# Patient Record
Sex: MALE | Race: WHITE | NOT HISPANIC OR LATINO | Employment: OTHER | URBAN - METROPOLITAN AREA
[De-identification: names, ages, dates, MRNs, and addresses within clinical notes are randomized per-mention and may not be internally consistent; named-entity substitution may affect disease eponyms.]

---

## 2017-01-04 ENCOUNTER — GENERIC CONVERSION - ENCOUNTER (OUTPATIENT)
Dept: OTHER | Facility: OTHER | Age: 67
End: 2017-01-04

## 2017-01-05 ENCOUNTER — ALLSCRIPTS OFFICE VISIT (OUTPATIENT)
Dept: OTHER | Facility: OTHER | Age: 67
End: 2017-01-05

## 2017-01-05 LAB
BUN SERPL-MCNC: 19 MG/DL
BUN/CREA RATIO (HISTORICAL): 21
CALCIUM SERPL-MCNC: 8.9 MG/DL
CHLORIDE SERPL-SCNC: 103 MMOL/L (ref 96–106)
CO2 SERPL-SCNC: 25 MMOL/L (ref 18–29)
CREAT SERPL-MCNC: 0.89 MG/DL
CREATININE, URINE (HISTORICAL): 17.6 MG/DL
EGFR AFRICAN AMERICAN (HISTORICAL): ABNORMAL ML/MIN/1.73
EGFR-AMERICAN CALC (HISTORICAL): ABNORMAL ML/MIN/1.73
GLUCOSE SERPL-MCNC: 166 MG/DL (ref 65–99)
HBA1C MFR BLD HPLC: 6.5 % (ref 4.8–5.6)
MICROALBUM.,U,RANDOM (HISTORICAL): <3 UG/ML
MICROALBUMIN/CREATININE RATIO (HISTORICAL): <17 MG/G CREAT (ref 0–30)
POTASSIUM SERPL-SCNC: 4.9 MMOL/L (ref 3.5–5.2)
SODIUM SERPL-SCNC: 143 MMOL/L (ref 134–144)

## 2017-04-05 ENCOUNTER — ALLSCRIPTS OFFICE VISIT (OUTPATIENT)
Dept: OTHER | Facility: OTHER | Age: 67
End: 2017-04-05

## 2017-06-29 ENCOUNTER — GENERIC CONVERSION - ENCOUNTER (OUTPATIENT)
Dept: OTHER | Facility: OTHER | Age: 67
End: 2017-06-29

## 2017-06-29 LAB
BUN SERPL-MCNC: 15 MG/DL (ref 8–27)
BUN/CREA RATIO (HISTORICAL): 19 (ref 10–24)
CALCIUM SERPL-MCNC: 8.9 MG/DL (ref 8.6–10.2)
CHLORIDE SERPL-SCNC: 99 MMOL/L (ref 96–106)
CO2 SERPL-SCNC: 24 MMOL/L (ref 18–29)
CREAT SERPL-MCNC: 0.81 MG/DL (ref 0.76–1.27)
CREATININE, URINE (HISTORICAL): 182 MG/DL
EGFR AFRICAN AMERICAN (HISTORICAL): 109 ML/MIN/1.73
EGFR-AMERICAN CALC (HISTORICAL): 94 ML/MIN/1.73
GLUCOSE SERPL-MCNC: 141 MG/DL (ref 65–99)
HBA1C MFR BLD HPLC: 7 % (ref 4.8–5.6)
MICROALBUM.,U,RANDOM (HISTORICAL): 4.2 UG/ML
MICROALBUMIN/CREATININE RATIO (HISTORICAL): 2.3 MG/G CREAT (ref 0–30)
POTASSIUM SERPL-SCNC: 4.6 MMOL/L (ref 3.5–5.2)
SODIUM SERPL-SCNC: 141 MMOL/L (ref 134–144)

## 2017-08-14 ENCOUNTER — ALLSCRIPTS OFFICE VISIT (OUTPATIENT)
Dept: OTHER | Facility: OTHER | Age: 67
End: 2017-08-14

## 2017-12-09 ENCOUNTER — TRANSCRIBE ORDERS (OUTPATIENT)
Dept: ADMINISTRATIVE | Facility: HOSPITAL | Age: 67
End: 2017-12-09

## 2017-12-09 ENCOUNTER — APPOINTMENT (OUTPATIENT)
Dept: LAB | Facility: HOSPITAL | Age: 67
End: 2017-12-09
Attending: FAMILY MEDICINE
Payer: MEDICARE

## 2017-12-09 DIAGNOSIS — E13.8 DIABETES MELLITUS OF OTHER TYPE WITH COMPLICATION, UNSPECIFIED LONG TERM INSULIN USE STATUS: ICD-10-CM

## 2017-12-09 DIAGNOSIS — E13.8 DIABETES MELLITUS OF OTHER TYPE WITH COMPLICATION, UNSPECIFIED LONG TERM INSULIN USE STATUS: Primary | ICD-10-CM

## 2017-12-09 LAB
ALBUMIN SERPL BCP-MCNC: 3.3 G/DL (ref 3.5–5)
ALP SERPL-CCNC: 117 U/L (ref 46–116)
ALT SERPL W P-5'-P-CCNC: 24 U/L (ref 12–78)
ANION GAP SERPL CALCULATED.3IONS-SCNC: 9 MMOL/L (ref 4–13)
AST SERPL W P-5'-P-CCNC: 16 U/L (ref 5–45)
BILIRUB SERPL-MCNC: 0.9 MG/DL (ref 0.2–1)
BUN SERPL-MCNC: 13 MG/DL (ref 5–25)
CALCIUM SERPL-MCNC: 8.2 MG/DL (ref 8.3–10.1)
CHLORIDE SERPL-SCNC: 106 MMOL/L (ref 100–108)
CHOLEST SERPL-MCNC: 118 MG/DL (ref 50–200)
CO2 SERPL-SCNC: 26 MMOL/L (ref 21–32)
CREAT SERPL-MCNC: 0.72 MG/DL (ref 0.6–1.3)
CREAT UR-MCNC: 251 MG/DL
ERYTHROCYTE [DISTWIDTH] IN BLOOD BY AUTOMATED COUNT: 12.6 % (ref 11.6–15.1)
EST. AVERAGE GLUCOSE BLD GHB EST-MCNC: 163 MG/DL
GFR SERPL CREATININE-BSD FRML MDRD: 98 ML/MIN/1.73SQ M
GLUCOSE P FAST SERPL-MCNC: 180 MG/DL (ref 65–99)
HBA1C MFR BLD: 7.3 % (ref 4.2–6.3)
HCT VFR BLD AUTO: 44.7 % (ref 36.5–49.3)
HDLC SERPL-MCNC: 45 MG/DL (ref 40–60)
HGB BLD-MCNC: 15 G/DL (ref 12–17)
LDLC SERPL CALC-MCNC: 60 MG/DL (ref 0–100)
MCH RBC QN AUTO: 31.6 PG (ref 26.8–34.3)
MCHC RBC AUTO-ENTMCNC: 33.6 G/DL (ref 31.4–37.4)
MCV RBC AUTO: 94 FL (ref 82–98)
MICROALBUMIN UR-MCNC: 11.5 MG/L (ref 0–20)
MICROALBUMIN/CREAT 24H UR: 5 MG/G CREATININE (ref 0–30)
PLATELET # BLD AUTO: 188 THOUSANDS/UL (ref 149–390)
PMV BLD AUTO: 11 FL (ref 8.9–12.7)
POTASSIUM SERPL-SCNC: 4.4 MMOL/L (ref 3.5–5.3)
PROT SERPL-MCNC: 6 G/DL (ref 6.4–8.2)
RBC # BLD AUTO: 4.74 MILLION/UL (ref 3.88–5.62)
SODIUM SERPL-SCNC: 141 MMOL/L (ref 136–145)
TRIGL SERPL-MCNC: 66 MG/DL
WBC # BLD AUTO: 6.28 THOUSAND/UL (ref 4.31–10.16)

## 2017-12-09 PROCEDURE — 82043 UR ALBUMIN QUANTITATIVE: CPT | Performed by: FAMILY MEDICINE

## 2017-12-09 PROCEDURE — 82570 ASSAY OF URINE CREATININE: CPT | Performed by: FAMILY MEDICINE

## 2017-12-09 PROCEDURE — 80061 LIPID PANEL: CPT

## 2017-12-09 PROCEDURE — 36415 COLL VENOUS BLD VENIPUNCTURE: CPT

## 2017-12-09 PROCEDURE — 83036 HEMOGLOBIN GLYCOSYLATED A1C: CPT

## 2017-12-09 PROCEDURE — 85027 COMPLETE CBC AUTOMATED: CPT

## 2017-12-09 PROCEDURE — 80053 COMPREHEN METABOLIC PANEL: CPT

## 2017-12-11 ENCOUNTER — GENERIC CONVERSION - ENCOUNTER (OUTPATIENT)
Dept: OTHER | Facility: OTHER | Age: 67
End: 2017-12-11

## 2017-12-12 ENCOUNTER — ALLSCRIPTS OFFICE VISIT (OUTPATIENT)
Dept: OTHER | Facility: OTHER | Age: 67
End: 2017-12-12

## 2017-12-13 ENCOUNTER — ALLSCRIPTS OFFICE VISIT (OUTPATIENT)
Dept: OTHER | Facility: OTHER | Age: 67
End: 2017-12-13

## 2017-12-13 NOTE — PROGRESS NOTES
Assessment  Assessed    1  CAD (coronary artery disease) (414 00) (I25 10)   2  History of Cath Stent Placement   3  Benign essential hypertension (401 1) (I10)   4  Dyslipidemia (272 4) (E78 5)    Plan  Benign essential hypertension, CAD (coronary artery disease)    · Follow-up visit in 1 year Evaluation and Treatment  Follow-up  Status: Hold For -Scheduling  Requested for: 17Taj5397   Ordered;Benign essential hypertension, CAD (coronary artery disease); Ordered By: Tejinder Yeung Performed:  Due: [id number]    Discussion/Summary  Discussion Summary:   Mr Mp Aguilar is a pleasant 82-year-old gentleman who presents to the office today for routine followup   his last visit he has been feeling well from a cardiac standpoint and is currently asymptomatic  His activity continues to be limited by his knee pain  blood pressure is well-controlled in the office today  He had lipids recently performed earlier this month are acceptable on current therapy  changes were made to his medication regimen  No cardiac testing is indicated given he is asymptomatic although I did offer him vascular screening which he declines  will see him back in the office in one year  History of Present Illness  HPI: Mr Mp Aguilar is a pleasant 82-year-old gentleman who presents to the office today for followup   his last visit one year ago he has been feeling well  His activity continues to be limited by bilateral knee pain  With the activity he is able to do including ascending a flight of steps, he denies any exertional chest pain or shortness of breath  He denies symptoms of heart failure Including increasing lower extremity edema, paroxysmal nocturnal dyspnea, orthopnea, weight gain or increasing abdominal girth  He denies lightheadedness, dizziness, syncope or presyncope  He denies symptoms of palpitations or claudication  Review of Systems  Cardiology Male ROS:    Cardiac: No complaints of chest pain, no palpitations, no fainiting    Skin: No complaints of nonhealing sores or skin rash  Genitourinary: No complaints of recurrent urinary tract infections, frequent urination at night, difficult urination, blood in urine, kidney stones, loss of bladder control, no kidney or prostate problems, no erectile dysfunction  Psychological: No complaints of feeling depressed, anxiety, panic attacks, or difficulty concentrating  General: No complaints of trouble sleeping, lack of energy, fatigue, appetite changes, weight changes, fever, frequent infections, or night sweats  Respiratory: No complaints of shortness of breath, cough with sputum, or wheezing  HEENT: No complaints of serious problems, hearing problems, nose problems, throat problems, or snoring  Gastrointestinal: No complaints of liver problems, nausea, vomiting, heartburn, constipation, bloody stools, diarrhea, problems swallowing, adbominal pain, or rectal bleeding  Hematologic: No complaints of bleeding disorders, anemia, blood clots, or excessive brusing  Neurological: No complaints of numbness, tingling, dizziness, weakness, seizures, headaches, syncope or fainting, AM fatigue, daytime sleepiness, no witnessed apnea episodes  Musculoskeletal: arthritis      Active Problems  Problems    1  Benign essential hypertension (401 1) (I10)   2  CAD (coronary artery disease) (414 00) (I25 10)   3  Carious teeth (521 00) (K02 9)   4  Dependent edema (782 3) (R60 9)   5  Diabetes mellitus type 2, controlled (250 00) (E11 9)   6  Dyslipidemia (272 4) (E78 5)   7  Edema (782 3) (R60 9)   8  Hypertension (401 9) (I10)   9  Lacunar Stroke (434 91)   10  Morbid or severe obesity due to excess calories (278 01) (E66 01)   11  Need for immunization against influenza (V04 81) (Z23)   12  Osteoarthritis (715 90) (M19 90)   13  Sciatica (724 3) (M54 30)    Past Medical History  Problems    1  History of Closed fracture of fifth metacarpal bone of right hand (815 00) (S62 306A)   2   History of Depression screening (V79 0) (Z13 89)   3  History of Diverticulitis (562 11) (K57 92)   4  History of Fracture of metacarpal shaft (815 03) (S62 329A)   5  History of Hemopneumothorax, left (511 89) (J94 2)   6  History of asthma (V12 69) (Z87 09)   7  History of pilonidal cyst (V13 3) (Z87 2)   8  History of Inguinal hernia (550 90) (K40 90)   9  History of Lumbar transverse process fracture (805 4) (S32 009A)   10  History of Need for prophylactic vaccination with Streptococcus pneumoniae  (Pneumococcus) and Influenza vaccines (V06 6) (Z23)   11  History of Pain of lower leg, unspecified laterality (729 5) (M79 669)   12  History of Pleural effusion (511 9) (J90)   13  History of Postoperative visit (V58 49) (Z48 89)   14  History of Rib fractures (807 00) (S22 39XA)   15  History of Screening for colorectal cancer (V76 51) (Z12 11,Z12 12)   16  History of Screening for diabetic peripheral neuropathy (V80 09) (Z13 89)   17  History of Screening for diabetic retinopathy (V80 2) (Z13 5)   18  Status post fall (V15 88) (Z91 81)   19  History of Superficial thrombophlebitis of leg, unspecified laterality  Active Problems And Past Medical History Reviewed: The active problems and past medical history were reviewed and updated today  Surgical History  Problems    1  History of Cath Stent Placement   2  History of Closed Treatment Of Fracture Of A Metacarpal Bone   3  History of Colostomy Revision   4  History of Exploratory Laparoscopy   5  History of Hernia Repair   6  History of Knee Arthroscopy With Lateral Meniscus Repair   7  History of Rectal Surgery   8  History of Surgical Lysis Of Intestinal Adhesions   9  History of Thoracentesis With Imaging Guidance   10  History of Tonsillectomy With Adenoidectomy  Surgical History Reviewed: The surgical history was reviewed and updated today  Family History  Mother    1  Family history of Coronary artery disease (414 00) (I25 10)  Father    2   Family history of Benign hypertensive heart disease (402 10) (I11 9)   3  Family history of hyperlipidemia (V18 19) (Z83 49)  Family History Reviewed: The family history was reviewed and updated today  Social History  Problems    · Always uses seat belt   · Being A Social Drinker   · Denied: History of Drug Use   · Former smoker (V15 82) (R06 316)   · Marital History - Currently    · No living will   · Retired  Social History Reviewed: The social history was reviewed and updated today  Current Meds   1  Atorvastatin Calcium 40 MG Oral Tablet; TAKE 1 TABLET BY MOUTH AT  BEDTIME; Therapy: 33WID4454 to (Evaluate:09Aug2018)  Requested for: 59RAY2466; Last Rx:88Cqy3129 Ordered   2  Furosemide 20 MG Oral Tablet; Take 1 tablet by mouth  every day; Therapy: 57OAK0488 to (Evaluate:09Aug2018)  Requested for: 77Qrs8858; Last Rx:16Cdx0405 Ordered   3  K-Tab 10 MEQ Oral Tablet Extended Release; TAKE 1 TABLET DAILY; Therapy: 56FIY8622 to (Evaluate:09Aug2018)  Requested for: 18NIF1029; Last Rx:24Afu7955 Ordered   4  MetFORMIN HCl  MG Oral Tablet Extended Release 24 Hour; Take 1 tablet twice daily; Therapy: 74MIR1852 to (Evaluate:09Aug2018)  Requested for: 18UFL1063; Last Rx:76Aau3505 Ordered   5  Metoprolol Tartrate 25 MG Oral Tablet; Take 1 tablet by mouth  twice a day; Therapy: 65AOZ0575 to (Evaluate:09Aug2018)  Requested for: 00RMQ6221; Last Rx:33Egk9147 Ordered   6  Nitroglycerin 0 4 MG Sublingual Tablet Sublingual; DISSOLVE 1 TABLET UNDER THE TONGUE AS NEEDED FOR CHEST PAIN; Therapy: 87NQR6720 to (Evaluate:22Sbb9903)  Requested for: 17HCS3933; Last Rx:93Vqj9225 Ordered   7  Ramipril 10 MG Oral Capsule; Take 1 capsule by mouth  daily; Therapy: 86FNS8372 to (Evaluate:02Rls9627)  Requested for: 37Kve9678; Last Rx:98Cze9628 Ordered  Medication List Reviewed: The medication list was reviewed and updated today  Allergies  Medication    1   No Known Drug Allergies    Vitals  Vital Signs    Recorded: 12Dec2017 02:52PM   Heart Rate 69   Systolic 819   Diastolic 68   Height 6 ft    Weight 304 lb 6 oz   BMI Calculated 41 28   BSA Calculated 2 55       Physical Exam   Constitutional  General appearance: No acute distress, well appearing and well nourished  Eyes  Conjunctiva and Sclera examination: Conjunctiva pink, sclera anicteric  Ears, Nose, Mouth, and Throat - Oropharynx: Clear, nares are clear, mucous membranes are moist   Neck  Neck and thyroid: Normal, supple, trachea midline, no thyromegaly  Pulmonary  Respiratory effort: No increased work of breathing or signs of respiratory distress  Cardiovascular  Auscultation of heart: Normal rate and rhythm, normal S1 and S2, no murmurs  Carotid pulses: Normal, 2+ bilaterally  Peripheral vascular exam: Normal pulses throughout, no tenderness, erythema or swelling  Pedal pulses: Normal, 2+ bilaterally  Examination of extremities for edema and/or varicosities: Normal    Abdomen  Abdomen: Non-tender and no distention  The abdomen was obese  Bowel sounds were normal  The abdomen was soft and nontender  no masses palpated  Liver and spleen: No hepatomegaly or splenomegaly  Musculoskeletal Gait and station: Normal gait  -- Digits and nails: Normal without clubbing or cyanosis  -- Inspection/palpation of joints, bones, and muscles: Normal, ROM normal    Skin - Skin and subcutaneous tissue: Normal without rashes or lesions  Skin is warm and well perfused, normal turgor  Neurologic - Cranial nerves: II - XII intact  Psychiatric - Orientation to person, place, and time: Normal -- Mood and affect: Normal       Results/Data  EKG/ECG- [facility] [id number] 12:00AM Joy Mas     Test Name Result Flag Reference   EKG/ECG december 12, 2017         ECG Report:  Rhythm and rate: sinus bradycardia--   normal sinus rhythm  T waves:   there are nonspecific ST-T wave changes        Future Appointments    Date/Time Provider Specialty Site   12/13/2017 12:15 PM Belen Estrada [address] [place] [address] [facility]       Signatures   Electronically signed by : Daya Montanez DO; Dec 12 2017  3:20PM EST                       (Author)

## 2018-01-13 VITALS
DIASTOLIC BLOOD PRESSURE: 72 MMHG | HEIGHT: 72 IN | SYSTOLIC BLOOD PRESSURE: 122 MMHG | BODY MASS INDEX: 40.9 KG/M2 | WEIGHT: 302 LBS

## 2018-01-13 VITALS — SYSTOLIC BLOOD PRESSURE: 118 MMHG | DIASTOLIC BLOOD PRESSURE: 64 MMHG

## 2018-01-13 NOTE — RESULT NOTES
Verified Results  (1) MICROALBUMIN CREATININE RATIO, RANDOM URINE [id number] 12:00AM [employer]     Test Name Result Flag Reference   Creatinine, Urine 94 3 mg/dL  Not Estab  Microalbumin, Urine <3 0 ug/mL  Not Estab     Microalb/Creat Ratio <3 2 mg/g creat  0 0-30 0     (1) HEMOGLOBIN A1C [id number] 12:00AM [employer]     Test Name Result Flag Reference   Hemoglobin A1c 6 5 % H 4 8-5 6   Pre-diabetes: 5 7 - 6 4           Diabetes: >6 4           Glycemic control for adults with diabetes: <7 0

## 2018-01-16 NOTE — RESULT NOTES
Verified Results  (1) BASIC METABOLIC PROFILE [id number] 12:00AM [facility]     Test Name Result Flag Reference   Glucose, Serum 146 mg/dL H 65-99   BUN 14 mg/dL  8-27   Creatinine, Serum 0 80 mg/dL  0 76-1 27   eGFR If NonAfricn Am 95 mL/min/1 73  >59   eGFR If Africn Am 110 mL/min/1 73  >59   BUN/Creatinine Ratio 18  10-22   Sodium, Serum 142 mmol/L  134-144   Potassium, Serum 5 2 mmol/L  3 5-5 2   Chloride, Serum 105 mmol/L     Carbon Dioxide, Total 25 mmol/L  18-29   Calcium, Serum 8 5 mg/dL L 8 6-10 2     (LC) Lipid Panel [id number] 12:00AM [facility]     Test Name Result Flag Reference   Cholesterol, Total 108 mg/dL  100-199   Triglycerides 78 mg/dL  0-149   HDL Cholesterol 42 mg/dL  >39   According to ATP-III Guidelines, HDL-C >59 mg/dL is considered a  negative risk factor for CHD  VLDL Cholesterol Lance 16 mg/dL  5-40   LDL Cholesterol Calc 50 mg/dL  0-99     [facility]) Please note [id number] 12:00AM [facility]     Test Name Result Flag Reference   Please note Comment     The date and/or time of collection was not indicated on the  requisition as required by state and federal law  The date of  receipt of the specimen was used as the collection date if not  supplied

## 2018-01-16 NOTE — RESULT NOTES
Verified Results  (1) BASIC METABOLIC PROFILE [id number] 11:08AM [facility]     Test Name Result Flag Reference   Glucose, Serum 141 mg/dL H 65-99   BUN 15 mg/dL  8-27   Creatinine, Serum 0 81 mg/dL  0 76-1 27   BUN/Creatinine Ratio 19  10-24   Sodium, Serum 141 mmol/L  134-144   Potassium, Serum 4 6 mmol/L  3 5-5 2   Chloride, Serum 99 mmol/L     Carbon Dioxide, Total 24 mmol/L  18-29   Calcium, Serum 8 9 mg/dL  8 6-10 2   eGFR If NonAfricn Am 94 mL/min/1 73  >59   eGFR If Africn Am 109 mL/min/1 73  >59     (1) HEMOGLOBIN A1C 28Jun2017 11:08AM [facility]     Test Name Result Flag Reference   Hemoglobin A1c 7 0 % H 4 8-5 6   Pre-diabetes: 5 7 - 6 4           Diabetes: >6 4           Glycemic control for adults with diabetes: <7 0     (1) MICROALBUMIN CREATININE RATIO, RANDOM URINE 28Jun2017 11:08AM [facility]   A courtesy copy of this report has been sent to  the patient  Test Name Result Flag Reference   Creatinine, Urine 182 0 mg/dL  Not Estab  Microalbumin, Urine 4 2 ug/mL  Not Estab     Microalb/Creat Ratio 2 3 mg/g creat  0 0-30 0

## 2018-01-18 NOTE — PROGRESS NOTES
Assessment    1  Encounter for preventive health examination (V70 0) (Z00 00)   2  Carious teeth (521 00) (K02 9)   3  Need for prophylactic vaccination with Streptococcus pneumoniae (Pneumococcus) and   Influenza vaccines (V06 6) (Z23)    Plan  Carious teeth    · [facility] [place] [facility]  Consult Only: the  expectation is that the referring provider will communicate back to the patient on  treatment options  Evaluation and Treatment: the expectation is that the referred to  provider will communicate back to the patient on treatment options  Status: Hold For  - Scheduling  Requested for: [id number]  Care Summary provided  : Yes  Diabetes mellitus type 2, controlled    · Jentadueto 2 5-850 MG Oral Tablet  Health Maintenance    · Azithromycin 250 MG Oral Tablet; TAKE 2 TABLETS ON DAY 1 THEN TAKE 1  TABLET A DAY FOR 4 DAYS   · MetFORMIN HCl  MG Oral Tablet Extended Release 24 Hour; Take 1 tablet  twice daily  Need for prophylactic vaccination with Streptococcus pneumoniae (Pneumococcus) and  Influenza vaccines    · Prevnar 13 Intramuscular Suspension (Prevnar 13 Intramuscular Suspension); INJECT 0 5  ML Intramuscular  Screening for colorectal cancer    · COLONOSCOPY; Status:Temporary Deferral - Pt requests deferral,Pt refuses;    1/5/2018    Discussion/Summary  Impression: health maintenance visit  Currently, he eats a poor diet  Prostate cancer screening: the risks and benefits of prostate cancer screening were discussed and the patient declines PSA testing  Testicular cancer screening: the risks and benefits of testicular cancer screening were discussed and monthly self testicular exam was advised  Colorectal cancer screening: the risks and benefits of colorectal cancer screening were discussed and colorectal cancer screening is current  The risks and benefits of immunizations were discussed, immunizations are needed and PCV 13   Advice and education were given regarding nutrition and dental care  Patient discussion: discussed with the patient  Patient needs dental extractions due to multiple carious teeth  History of Present Illness  HM, Adult Male: The patient is being seen for a health maintenance evaluation  The last health maintenance visit was 16 month(s) ago  Social History: Household members include spouse  He is   Work status: not currently employed  The patient has never smoked cigarettes  He reports rare alcohol use  He has never used illicit drugs  General Health: The patient's health since the last visit is described as good  He does not have regular dental visits  He complains of vision problems  Vision care includes wearing glasses and an eye examination more than a year ago  He denies hearing loss  Lifestyle:  He does not have a healthy diet  He has weight concerns  He does not exercise regularly  He does not use tobacco  He denies alcohol use  He denies drug use  Reproductive health:  the patient is sexually active  birth control is not being practiced  He complains of erectile dysfunction  Screening: cancer screening reviewed and current  Prostate cancer screening includes prostate-specific antigen testing last year  Testicular cancer screening includes monthly self testicular examinations  Colorectal cancer screening includes a colonoscopy within the past ten years  metabolic screening reviewed and current  Metabolic screening includes lipid profile performed within the past five years, glucose screening performed last year and thyroid function test performed last year  risk screening reviewed and current  Cardiovascular risk factors: hypertension, diabetes, high LDL cholesterol and obesity, but no low HDL cholesterol, no stress, no tobacco use, no illicit drug use, no sedentary lifestyle and no family history of cardiovascular disease     General health risks: previous colon polyps, but no elevated prostate-specific antigen, no undescended testis, no family history of prostate cancer, no inflammatory bowel disease, no osteoporosis risk factors, no asbestos exposure, no radiation exposure and no occupational exposure to  Safety elements used: seat belt, safe driving habits, sunscreen, smoke detector, carbon monoxide detector, hot water temperature less than 120 degrees F, bathroom grab bars, fall prevention measures, gun trigger locks, gun safe, CPR training for the patient and CPR training for household members, but no motorcycle helmet  Review of Systems    Constitutional: No fever or chills, feels well, no tiredness, no recent weight gain or weight loss  Eyes: No complaints of eye pain, no red eyes, no discharge from eyes, no itchy eyes  ENT: carious teeth  Cardiovascular: No complaints of slow heart rate, no fast heart rate, no chest pain, no palpitations, no leg claudication, no lower extremity  Respiratory: No complaints of shortness of breath, no wheezing, no cough, no SOB on exertion, no orthopnea or PND  Gastrointestinal: No complaints of abdominal pain, no constipation, no nausea or vomiting, no diarrhea or bloody stools  Genitourinary: No complaints of dysuria, no incontinence, no hesitancy, no nocturia, no genital lesion, no testicular pain  Musculoskeletal: arthralgias  Integumentary: No complaints of skin rash or skin lesions, no itching, no skin wound, no dry skin  Neurological: No compliants of headache, no confusion, no convulsions, no numbness or tingling, no dizziness or fainting, no limb weakness, no difficulty walking  Psychiatric: Is not suicidal, no sleep disturbances, no anxiety or depression, no change in personality, no emotional problems  Endocrine: No complaints of proptosis, no hot flashes, no muscle weakness, no erectile dysfunction, no deepening of the voice, no feelings of weakness  ROS reviewed  Active Problems    1  Arthritis (952 90) (M19 90)   2   Asthma (493 90) (J45 909)   3  Benign essential hypertension (401 1) (I10)   4  CAD (coronary artery disease) (414 00) (I25 10)   5  Dependent edema (782 3) (R60 9)   6  Depression screening (V79 0) (Z13 89)   7  Diabetes mellitus type 2, controlled (250 00) (E11 9)   8  Dyslipidemia (272 4) (E78 5)   9  Edema (782 3) (R60 9)   10  Hypertension (401 9) (I10)   11  Lacunar Stroke (434 91)   12  Morbid or severe obesity due to excess calories (278 01) (E66 01)   13  Need for influenza vaccination (V04 81) (Z23)   14  Osteoarthritis (715 90) (M19 90)   15  Sciatica (724 3) (M54 30)   16   Screening for colorectal cancer (V76 51) (Z12 11,Z12 12)    Past Medical History    · History of Closed fracture of fifth metacarpal bone of right hand (815 00) (S62 306A)   · History of Diverticulitis (562 11) (K57 92)   · History of Fracture of metacarpal shaft (815 03) (S62 329A)   · History of Hemopneumothorax, left (511 89) (J94 2)   · History of pilonidal cyst (V13 3) (Z87 2)   · History of Inguinal hernia (550 90) (K40 90)   · History of Lumbar transverse process fracture (805 4) (S32 008A)   · History of Pain of lower leg, unspecified laterality (729 5) (M79 669)   · History of Pleural effusion (511 9) (J90)   · History of Postoperative visit (V58 49) (Z48 89)   · History of Rib fractures (807 00) (S22 39XA)   · Status post fall (V15 88) (Z91 81)   · History of Superficial thrombophlebitis of leg, unspecified laterality    Surgical History    · History of Cath Stent Placement   · History of Closed Treatment Of Fracture Of A Metacarpal Bone   · History of Colostomy Revision   · History of Exploratory Laparoscopy   · History of Hernia Repair   · History of Knee Arthroscopy With Lateral Meniscus Repair   · History of Rectal Surgery   · History of Surgical Lysis Of Intestinal Adhesions   · History of Thoracentesis With Imaging Guidance   · History of Tonsillectomy With Adenoidectomy    Family History  Mother    · Family history of Coronary artery disease (414 00) (I25 10)  Father    · Family history of Benign hypertensive heart disease (402 10) (I11 9)   · Family history of hyperlipidemia (V18 19) (Z83 49)    Social History    · Always uses seat belt   · Being A Social Drinker   · Denied: History of Drug Use   · Former smoker (V15 82) (Z63 224)   · Marital History - Currently    · No living will   · Retired    Current Meds   1  Atorvastatin Calcium 40 MG Oral Tablet; TAKE 1 TABLET AT BEDTIME; Last   Rx:[id number] Ordered   2  Furosemide 20 MG Oral Tablet; Take 1 tablet by mouth  every day; Therapy: 29SJK3931 to (Evaluate:18Feb2017)  Requested for: 20Nov2016; Last   Rx:20Nov2016 Ordered   3  Jentadueto 2 5-850 MG Oral Tablet; Take 1 tablet daily; Therapy: 31SFI6995 to (Evaluate:03Jan2017)  Requested for: 91XXE0758; Last   Rx:72Lro5625 Ordered   4  K-Tab 10 MEQ Oral Tablet Extended Release; TAKE 1 TABLET DAILY; Therapy: 81GGD3273 to (Evaluate:03Jan2017)  Requested for: 52HJX6688; Last   Rx:10Mru5471 Ordered   5  Metoprolol Tartrate 25 MG Oral Tablet; Take 1 tablet twice daily; Therapy: 39LFS2284 to (Evaluate:03Jan2017)  Requested for: 29TVC6429; Last   Rx:62Imr3206 Ordered   6  Nitroglycerin 0 4 MG Sublingual Tablet Sublingual; DISSOLVE 1 TABLET UNDER THE   TONGUE AS NEEDED FOR CHEST PAIN;   Therapy: 32JPK4647 to (Evaluate:04Jul2017)  Requested for: 35VBN6474; Last   Rx:85Rav3873 Ordered   7  Ramipril 10 MG Oral Capsule; TAKE 1 CAPSULE DAILY; Therapy: 33FNM5773 to (Evaluate:26Mar2017)  Requested for: 70ECN8857; Last   Rx:29Xfh1423 Ordered    Allergies    1  No Known Drug Allergies    Vitals   Recorded: 42JMG6867 56:37WO   Systolic 152   Diastolic 78   Height 6 ft    Weight 304 lb 9 6 oz   BMI Calculated 41 31   BSA Calculated 2 55     Physical Exam    Constitutional   General appearance: Abnormal   obese     Ears, Nose, Mouth, and Throat   External inspection of ears and nose: Normal     Otoscopic examination: Tympanic membrance translucent with normal light reflex  Canals patent without erythema  Oropharynx: Normal with no erythema, edema, exudate or lesions  Pulmonary   Respiratory effort: No increased work of breathing or signs of respiratory distress  Auscultation of lungs: Clear to auscultation  Cardiovascular   Palpation of heart: Normal PMI, no thrills  Auscultation of heart: Normal rate and rhythm, normal S1 and S2, without murmurs  Examination of extremities for edema and/or varicosities: Normal     Abdomen   Abdomen: Abnormal   obese  Liver and spleen: No hepatomegaly or splenomegaly  Lymphatic   Palpation of lymph nodes in neck: No lymphadenopathy  Musculoskeletal   Gait and station: Normal     Digits and nails: Normal without clubbing or cyanosis  Inspection/palpation of joints, bones, and muscles: Normal     Skin   Skin and subcutaneous tissue: Normal without rashes or lesions  Neurologic   Cranial nerves: Cranial nerves 2-12 intact  Reflexes: 2+ and symmetric         Signatures   Electronically signed by : Jono Clark DO; Jan 5 2017  9:09AM EST                       (Author)

## 2018-01-22 VITALS
WEIGHT: 304.6 LBS | SYSTOLIC BLOOD PRESSURE: 132 MMHG | HEIGHT: 72 IN | BODY MASS INDEX: 41.26 KG/M2 | DIASTOLIC BLOOD PRESSURE: 78 MMHG

## 2018-01-22 VITALS
WEIGHT: 304.38 LBS | HEART RATE: 69 BPM | BODY MASS INDEX: 41.23 KG/M2 | SYSTOLIC BLOOD PRESSURE: 128 MMHG | HEIGHT: 72 IN | DIASTOLIC BLOOD PRESSURE: 68 MMHG

## 2018-01-23 VITALS
BODY MASS INDEX: 40.63 KG/M2 | SYSTOLIC BLOOD PRESSURE: 144 MMHG | DIASTOLIC BLOOD PRESSURE: 72 MMHG | HEIGHT: 72 IN | WEIGHT: 300 LBS

## 2018-01-23 NOTE — RESULT NOTES
Verified Results  (1) MICROALBUMIN CREATININE RATIO, RANDOM URINE [id number] 09:40AM [facility]     Test Name Result Flag Reference   MICROALBUMIN/ CREAT R 5 mg/g creatinine  0-30   MICROALBUMIN,URINE 11 5 mg/L  0 0-20 0   CREATININE URINE 251 0 mg/dL       (1) HEMOGLOBIN A1C [id number] 09:40AM [facility]     Test Name Result Flag Reference   HEMOGLOBIN A1C 7 3 % H 4 2-6 3   EST  AVG   GLUCOSE 163 mg/dl

## 2018-01-23 NOTE — RESULT NOTES
Verified Results  (1) CBC/ PLT (NO DIFF) [id number] 09:40AM Almeta Handler     Test Name Result Flag Reference   HEMATOCRIT 44 7 %  36 5-49 3   HEMOGLOBIN 15 0 g/dL  12 0-17 0   MCHC 33 6 g/dL  31 4-37 4   MCH 31 6 pg  26 8-34 3   MCV 94 fL  82-98   PLATELET COUNT 633 Thousands/uL  149-390   RBC COUNT 4 74 Million/uL  3 88-5 62   RDW 12 6 %  11 6-15 1   WBC COUNT 6 28 Thousand/uL  4 31-10 16   MPV 11 0 fL  8 9-12 7     (1) COMPREHENSIVE METABOLIC PANEL [id number] 61:24DS Almeta Handler     Test Name Result Flag Reference   SODIUM 141 mmol/L  136-145   POTASSIUM 4 4 mmol/L  3 5-5 3   CHLORIDE 106 mmol/L  100-108   CARBON DIOXIDE 26 mmol/L  21-32   ANION GAP (CALC) 9 mmol/L  4-13   BLOOD UREA NITROGEN 13 mg/dL  5-25   CREATININE 0 72 mg/dL  0 60-1 30   Standardized to IDMS reference method   CALCIUM 8 2 mg/dL L 8 3-10 1   BILI, TOTAL 0 90 mg/dL  0 20-1 00   ALK PHOSPHATAS 117 U/L H    ALT (SGPT) 24 U/L  12-78   Specimen collection should occur prior to Sulfasalazine administration due to the potential for falsely depressed results  AST(SGOT) 16 U/L  5-45   Specimen collection should occur prior to Sulfasalazine administration due to the potential for falsely depressed results  ALBUMIN 3 3 g/dL L 3 5-5 0   TOTAL PROTEIN 6 0 g/dL L 6 4-8 2   eGFR 98 ml/min/1 73sq m     This is a patient instruction: Patient fasting for 8 hours or longer recommended  National Kidney Disease Education Program recommendations are as follows:  GFR calculation is accurate only with a steady state creatinine  Chronic Kidney disease less than 60 ml/min/1 73 sq  meters  Kidney failure less than 15 ml/min/1 73 sq  meters  GLUCOSE FASTING 180 mg/dL H 65-99   Specimen collection should occur prior to Sulfasalazine administration due to the potential for falsely depressed results  Specimen collection should occur prior to Sulfapyridine administration due to the potential for falsely elevated results       (1) LIPID PANEL, FASTING [id number] 09:40AM Kristin Ibarra     Test Name Result Flag Reference   CHOLESTEROL 118 mg/dL     HDL,DIRECT 45 mg/dL  40-60   Specimen collection should occur prior to Metamizole administration due to the potential for falsley depressed results  LDL CHOLESTEROL CALCULATED 60 mg/dL  0-100   This is a patient instruction: This test requires patient fasting for 10-12 hours or longer  Drinking of black coffee or black tea is acceptable  Triglyceride:        Normal <150 mg/dl   Borderline High 150-199 mg/dl   High 200-499 mg/dl   Very High >499 mg/dl      Cholesterol:       Desirable <200 mg/dl    Borderline High 200-239 mg/dl    High >239 mg/dl      HDL Cholesterol:       High>59 mg/dL    Low <41 mg/dL      This screening LDL is a calculated result  It does not have the accuracy of the Direct Measured LDL in the monitoring of patients with hyperlipidemia and/or statin therapy  Direct Measure LDL (HFC653) must be ordered separately in these patients  TRIGLYCERIDES 66 mg/dL  <=150   Specimen collection should occur prior to N-Acetylcysteine or Metamizole administration due to the potential for falsely depressed results

## 2018-01-23 NOTE — CONSULTS
I had the pleasure of evaluating your patient, Jonelle Jasmine  My full evaluation follows:      History of Present Illness  Mr Ela Arteaga is a pleasant 45-year-old gentleman who presents to the office today for followup  Since his last visit one year ago he has been feeling well  His activity continues to be limited by bilateral knee pain  With the activity he is able to do including ascending a flight of steps, he denies any exertional chest pain or shortness of breath  He denies symptoms of heart failure Including increasing lower extremity edema, paroxysmal nocturnal dyspnea, orthopnea, weight gain or increasing abdominal girth  He denies lightheadedness, dizziness, syncope or presyncope  He denies symptoms of palpitations or claudication  Review of Systems      Cardiac: No complaints of chest pain, no palpitations, no fainiting  Skin: No complaints of nonhealing sores or skin rash  Genitourinary: No complaints of recurrent urinary tract infections, frequent urination at night, difficult urination, blood in urine, kidney stones, loss of bladder control, no kidney or prostate problems, no erectile dysfunction  Psychological: No complaints of feeling depressed, anxiety, panic attacks, or difficulty concentrating  General: No complaints of trouble sleeping, lack of energy, fatigue, appetite changes, weight changes, fever, frequent infections, or night sweats  Respiratory: No complaints of shortness of breath, cough with sputum, or wheezing  HEENT: No complaints of serious problems, hearing problems, nose problems, throat problems, or snoring  Gastrointestinal: No complaints of liver problems, nausea, vomiting, heartburn, constipation, bloody stools, diarrhea, problems swallowing, adbominal pain, or rectal bleeding  Hematologic: No complaints of bleeding disorders, anemia, blood clots, or excessive brusing     Neurological: No complaints of numbness, tingling, dizziness, weakness, seizures, headaches, syncope or fainting, AM fatigue, daytime sleepiness, no witnessed apnea episodes  Musculoskeletal: arthritis      Active Problems    1  Benign essential hypertension (401 1) (I10)   2  CAD (coronary artery disease) (414 00) (I25 10)   3  Carious teeth (521 00) (K02 9)   4  Dependent edema (782 3) (R60 9)   5  Diabetes mellitus type 2, controlled (250 00) (E11 9)   6  Dyslipidemia (272 4) (E78 5)   7  Edema (782 3) (R60 9)   8  Hypertension (401 9) (I10)   9  Lacunar Stroke (434 91)   10  Morbid or severe obesity due to excess calories (278 01) (E66 01)   11  Need for immunization against influenza (V04 81) (Z23)   12  Osteoarthritis (715 90) (M19 90)   13   Sciatica (724 3) (M54 30)    Past Medical History    · History of Closed fracture of fifth metacarpal bone of right hand (815 00) (S62 306A)   · History of Depression screening (V79 0) (Z13 89)   · History of Diverticulitis (562 11) (K57 92)   · History of Fracture of metacarpal shaft (815 03) (S62 329A)   · History of Hemopneumothorax, left (511 89) (J94 2)   · History of asthma (V12 69) (Z87 09)   · History of pilonidal cyst (V13 3) (Z87 2)   · History of Inguinal hernia (550 90) (K40 90)   · History of Lumbar transverse process fracture (805 4) (S32 009A)   · History of Need for prophylactic vaccination with Streptococcus pneumoniae  (Pneumococcus) and Influenza vaccines (V06 6) (Z23)   · History of Pain of lower leg, unspecified laterality (729 5) (M79 669)   · History of Pleural effusion (511 9) (J90)   · History of Postoperative visit (V58 49) (Z48 89)   · History of Rib fractures (807 00) (S22 39XA)   · History of Screening for colorectal cancer (V76 51) (Z12 11,Z12 12)   · History of Screening for diabetic peripheral neuropathy (V80 09) (Z13 89)   · History of Screening for diabetic retinopathy (V80 2) (Z13 5)   · Status post fall (V15 88) (Z91 81)   · History of Superficial thrombophlebitis of leg, unspecified laterality    The active problems and past medical history were reviewed and updated today  Surgical History    · History of Cath Stent Placement   · History of Closed Treatment Of Fracture Of A Metacarpal Bone   · History of Colostomy Revision   · History of Exploratory Laparoscopy   · History of Hernia Repair   · History of Knee Arthroscopy With Lateral Meniscus Repair   · History of Rectal Surgery   · History of Surgical Lysis Of Intestinal Adhesions   · History of Thoracentesis With Imaging Guidance   · History of Tonsillectomy With Adenoidectomy    The surgical history was reviewed and updated today  Family History    · Family history of Coronary artery disease (414 00) (I25 10)    · Family history of Benign hypertensive heart disease (402 10) (I11 9)   · Family history of hyperlipidemia (V18 19) (Z83 49)    The family history was reviewed and updated today  Social History    · Always uses seat belt   · Being A Social Drinker   · Denied: History of Drug Use   · Former smoker (V15 82) (Z02 860)   · Marital History - Currently    · No living will   · Retired  The social history was reviewed and updated today  Current Meds   1  Atorvastatin Calcium 40 MG Oral Tablet; TAKE 1 TABLET BY MOUTH AT  BEDTIME; Therapy: 82HFG5051 to (Evaluate:09Aug2018)  Requested for: 95SDU8245; Last   Rx:14Aug2017 Ordered   2  Furosemide 20 MG Oral Tablet; Take 1 tablet by mouth  every day; Therapy: 99NXG3443 to (Evaluate:09Aug2018)  Requested for: 77Nof6854; Last   Rx:14Aug2017 Ordered   3  K-Tab 10 MEQ Oral Tablet Extended Release; TAKE 1 TABLET DAILY; Therapy: 18AKD2569 to (Evaluate:09Aug2018)  Requested for: 85PMY2292; Last   Rx:14Aug2017 Ordered   4  MetFORMIN HCl  MG Oral Tablet Extended Release 24 Hour; Take 1 tablet twice   daily; Therapy: 54SAL9035 to (Evaluate:09Aug2018)  Requested for: 92TNT8726; Last   Rx:14Aug2017 Ordered   5  Metoprolol Tartrate 25 MG Oral Tablet;  Take 1 tablet by mouth  twice a day; Therapy: 58AFJ6353 to (Evaluate:16Cfl8072)  Requested for: 37XIJ9080; Last   Rx:78Fjv2397 Ordered   6  Nitroglycerin 0 4 MG Sublingual Tablet Sublingual; DISSOLVE 1 TABLET UNDER THE   TONGUE AS NEEDED FOR CHEST PAIN;   Therapy: 55SLL5583 to (Evaluate:74Slj2170)  Requested for: 30ECG6396; Last   Rx:19Vgr6766 Ordered   7  Ramipril 10 MG Oral Capsule; Take 1 capsule by mouth  daily; Therapy: 35WBB0503 to (Evaluate:94Kdb7388)  Requested for: 06JKK6748; Last   Rx:35Bhl5610 Ordered    The medication list was reviewed and updated today  Allergies    1  No Known Drug Allergies    Vitals   Recorded: 12Dec2017 02:52PM   Heart Rate 69   Systolic 016   Diastolic 68   Height 6 ft    Weight 304 lb 6 oz   BMI Calculated 41 28   BSA Calculated 2 55     Physical Exam    Constitutional   General appearance: No acute distress, well appearing and well nourished  Eyes   Conjunctiva and Sclera examination: Conjunctiva pink, sclera anicteric  Ears, Nose, Mouth, and Throat - Oropharynx: Clear, nares are clear, mucous membranes are moist    Neck   Neck and thyroid: Normal, supple, trachea midline, no thyromegaly  Pulmonary   Respiratory effort: No increased work of breathing or signs of respiratory distress  Cardiovascular   Auscultation of heart: Normal rate and rhythm, normal S1 and S2, no murmurs  Carotid pulses: Normal, 2+ bilaterally  Peripheral vascular exam: Normal pulses throughout, no tenderness, erythema or swelling  Pedal pulses: Normal, 2+ bilaterally  Examination of extremities for edema and/or varicosities: Normal     Abdomen   Abdomen: Non-tender and no distention  The abdomen was obese  Bowel sounds were normal  The abdomen was soft and nontender  no masses palpated  Liver and spleen: No hepatomegaly or splenomegaly  Musculoskeletal Gait and station: Normal gait  Digits and nails: Normal without clubbing or cyanosis   Inspection/palpation of joints, bones, and muscles: Normal, ROM normal  Skin - Skin and subcutaneous tissue: Normal without rashes or lesions  Skin is warm and well perfused, normal turgor  Neurologic - Cranial nerves: II - XII intact  Psychiatric - Orientation to person, place, and time: Normal  Mood and affect: Normal       Results/Data  EKG/ECG- POC 23ALD4465 12:00AM Vikas Duran     Test Name Result Flag Reference   EKG/ECG december 12, 2017         Rhythm and rate: sinus bradycardia   normal sinus rhythm  T waves:   there are nonspecific ST-T wave changes  Assessment    1  CAD (coronary artery disease) (414 00) (I25 10)   2  History of Cath Stent Placement   · PTCA/RITA TO RCA-2009   3  Benign essential hypertension (401 1) (I10)   4  Dyslipidemia (272 4) (E78 5)    Plan  Benign essential hypertension, CAD (coronary artery disease)    · Follow-up visit in 1 year Evaluation and Treatment  Follow-up  Status: Hold For -  Scheduling  Requested for: 37Gcj2329   Ordered; For: Benign essential hypertension, CAD (coronary artery disease); Ordered By: Vikas Duran Performed:  Due: 33NPI9325    Discussion/Summary    Mr Ela Arteaga is a pleasant 59-year-old gentleman who presents to the office today for routine followup  Since his last visit he has been feeling well from a cardiac standpoint and is currently asymptomatic  His activity continues to be limited by his knee pain  His blood pressure is well-controlled in the office today  He had lipids recently performed earlier this month are acceptable on current therapy  No changes were made to his medication regimen  No cardiac testing is indicated given he is asymptomatic although I did offer him vascular screening which he declines  I will see him back in the office in one year  Thank you very much for allowing me to participate in the care of this patient  If you have any questions, please do not hesitate to contact me        Future Appointments    Signatures   Electronically signed by : Sandra Rodriguez DO; Dec 12 2017 3:20PM EST                       (Author)

## 2018-03-23 ENCOUNTER — TELEPHONE (OUTPATIENT)
Dept: FAMILY MEDICINE CLINIC | Facility: CLINIC | Age: 68
End: 2018-03-23

## 2018-03-23 DIAGNOSIS — E11.9 TYPE 2 DIABETES MELLITUS WITHOUT COMPLICATION, WITHOUT LONG-TERM CURRENT USE OF INSULIN (HCC): Primary | ICD-10-CM

## 2018-03-26 ENCOUNTER — LAB (OUTPATIENT)
Dept: LAB | Facility: HOSPITAL | Age: 68
End: 2018-03-26
Attending: FAMILY MEDICINE
Payer: MEDICARE

## 2018-03-26 DIAGNOSIS — E11.9 TYPE 2 DIABETES MELLITUS WITHOUT COMPLICATION, WITHOUT LONG-TERM CURRENT USE OF INSULIN (HCC): ICD-10-CM

## 2018-03-26 LAB
ANION GAP SERPL CALCULATED.3IONS-SCNC: 6 MMOL/L (ref 4–13)
BUN SERPL-MCNC: 15 MG/DL (ref 5–25)
CALCIUM SERPL-MCNC: 8.7 MG/DL (ref 8.3–10.1)
CHLORIDE SERPL-SCNC: 105 MMOL/L (ref 100–108)
CO2 SERPL-SCNC: 29 MMOL/L (ref 21–32)
CREAT SERPL-MCNC: 0.83 MG/DL (ref 0.6–1.3)
CREAT UR-MCNC: 164 MG/DL
EST. AVERAGE GLUCOSE BLD GHB EST-MCNC: 154 MG/DL
GFR SERPL CREATININE-BSD FRML MDRD: 92 ML/MIN/1.73SQ M
GLUCOSE P FAST SERPL-MCNC: 168 MG/DL (ref 65–99)
HBA1C MFR BLD: 7 % (ref 4.2–6.3)
LDLC SERPL DIRECT ASSAY-MCNC: 70 MG/DL (ref 0–100)
MICROALBUMIN UR-MCNC: 7.6 MG/L (ref 0–20)
MICROALBUMIN/CREAT 24H UR: 5 MG/G CREATININE (ref 0–30)
POTASSIUM SERPL-SCNC: 5.1 MMOL/L (ref 3.5–5.3)
SODIUM SERPL-SCNC: 140 MMOL/L (ref 136–145)

## 2018-03-26 PROCEDURE — 82570 ASSAY OF URINE CREATININE: CPT

## 2018-03-26 PROCEDURE — 82043 UR ALBUMIN QUANTITATIVE: CPT

## 2018-03-26 PROCEDURE — 80048 BASIC METABOLIC PNL TOTAL CA: CPT

## 2018-03-26 PROCEDURE — 83721 ASSAY OF BLOOD LIPOPROTEIN: CPT

## 2018-03-26 PROCEDURE — 36415 COLL VENOUS BLD VENIPUNCTURE: CPT

## 2018-03-26 PROCEDURE — 83036 HEMOGLOBIN GLYCOSYLATED A1C: CPT

## 2018-04-13 ENCOUNTER — OFFICE VISIT (OUTPATIENT)
Dept: FAMILY MEDICINE CLINIC | Facility: CLINIC | Age: 68
End: 2018-04-13
Payer: MEDICARE

## 2018-04-13 VITALS
DIASTOLIC BLOOD PRESSURE: 80 MMHG | HEIGHT: 72 IN | WEIGHT: 305.4 LBS | BODY MASS INDEX: 41.37 KG/M2 | SYSTOLIC BLOOD PRESSURE: 132 MMHG

## 2018-04-13 DIAGNOSIS — I10 BENIGN ESSENTIAL HYPERTENSION: ICD-10-CM

## 2018-04-13 DIAGNOSIS — E11.9 CONTROLLED TYPE 2 DIABETES MELLITUS WITHOUT COMPLICATION, WITHOUT LONG-TERM CURRENT USE OF INSULIN (HCC): Primary | ICD-10-CM

## 2018-04-13 DIAGNOSIS — E78.5 DYSLIPIDEMIA: ICD-10-CM

## 2018-04-13 PROCEDURE — 99213 OFFICE O/P EST LOW 20 MIN: CPT | Performed by: FAMILY MEDICINE

## 2018-04-13 RX ORDER — FUROSEMIDE 20 MG/1
1 TABLET ORAL DAILY
COMMUNITY
Start: 2016-11-20 | End: 2018-07-18 | Stop reason: SDUPTHER

## 2018-04-13 RX ORDER — METFORMIN HYDROCHLORIDE 500 MG/1
1 TABLET, EXTENDED RELEASE ORAL 2 TIMES DAILY
COMMUNITY
Start: 2017-01-05 | End: 2018-04-13 | Stop reason: SDUPTHER

## 2018-04-13 RX ORDER — POTASSIUM CHLORIDE 750 MG/1
1 TABLET, FILM COATED, EXTENDED RELEASE ORAL DAILY
COMMUNITY
Start: 2015-06-10 | End: 2018-07-18 | Stop reason: SDUPTHER

## 2018-04-13 RX ORDER — RAMIPRIL 10 MG/1
1 CAPSULE ORAL DAILY
COMMUNITY
Start: 2015-03-03 | End: 2018-07-18 | Stop reason: SDUPTHER

## 2018-04-13 RX ORDER — HYDROXYZINE HYDROCHLORIDE 10 MG/1
1 TABLET, FILM COATED ORAL 3 TIMES DAILY PRN
COMMUNITY
Start: 2017-12-13 | End: 2018-04-13 | Stop reason: ALTCHOICE

## 2018-04-13 RX ORDER — METFORMIN HYDROCHLORIDE 500 MG/1
500 TABLET, EXTENDED RELEASE ORAL 2 TIMES DAILY WITH MEALS
Qty: 180 TABLET | Refills: 3 | Status: SHIPPED | OUTPATIENT
Start: 2018-04-13 | End: 2018-07-18 | Stop reason: SDUPTHER

## 2018-04-13 RX ORDER — NITROGLYCERIN 0.4 MG/1
1 TABLET SUBLINGUAL
COMMUNITY
Start: 2013-12-02 | End: 2018-07-18 | Stop reason: SDUPTHER

## 2018-04-13 RX ORDER — ATORVASTATIN CALCIUM 40 MG/1
1 TABLET, FILM COATED ORAL
COMMUNITY
Start: 2017-03-16 | End: 2018-07-18 | Stop reason: SDUPTHER

## 2018-04-13 NOTE — PROGRESS NOTES
Assessment/Plan:    No problem-specific Assessment & Plan notes found for this encounter  Diagnoses and all orders for this visit:    Controlled type 2 diabetes mellitus without complication, without long-term current use of insulin (HCC)    Benign essential hypertension    Dyslipidemia    Other orders  -     atorvastatin (LIPITOR) 40 mg tablet; Take 1 tablet by mouth  -     furosemide (LASIX) 20 mg tablet; Take 1 tablet by mouth daily  -     Discontinue: hydrOXYzine HCL (ATARAX) 10 mg tablet; Take 1 tablet by mouth 3 (three) times a day as needed  -     potassium chloride (K-TAB) 10 mEq tablet; Take 1 tablet by mouth daily  -     metFORMIN (GLUCOPHAGE-XR) 500 mg 24 hr tablet; Take 1 tablet by mouth 2 (two) times a day  -     metoprolol tartrate (LOPRESSOR) 25 mg tablet; Take 1 tablet by mouth 2 (two) times a day  -     nitroglycerin (NITROSTAT) 0 4 mg SL tablet; Place 1 tablet under the tongue  -     ramipril (ALTACE) 10 MG capsule; Take 1 capsule by mouth daily          Subjective:      Patient ID: Augustina Ayers is a 74 y o  male  Patient here follow-up regarding diabetes hypertension hyperlipidemia of most recent sugars have been slightly elevated but his wife rapid him out and told me that he has been doing a lot of nighttime snacking when she goes up the bed on he is going to try to do a better job regarding his eating so that we do not have to increase his medications denies any diabetic nephropathy are neuropathy in the feet        The following portions of the patient's history were reviewed and updated as appropriate:   He  has a past medical history of Asthma; Closed fracture of fifth metacarpal bone of right hand; Diverticulitis; Fracture of metacarpal shaft; Hemopneumothorax, left; Inguinal hernia; Lumbar transverse process fracture ([address] ); Pilonidal cyst; Pleural effusion; Rib fractures; Status post fall; and Superficial thrombophlebitis of leg    He   Patient Active Problem List    Diagnosis Date Noted    Sciatica 01/06/2014    Dyslipidemia 12/02/2013    CAD (coronary artery disease) 11/20/2013    Morbid obesity ([facility] [address] ) 11/20/2013    Diabetes mellitus type 2, controlled ([facility] ) 09/05/2013    Benign essential hypertension 07/27/2012     He  has a past surgical history that includes Coronary angioplasty with stent (2009); Fracture surgery (05/04/2015); Revision Colostomy; LAPAROSCOPY (05/18/2015); Hernia repair; Knee arthroscopy w/ meniscal repair; Rectal surgery; Other surgical history; Thoracentesis (06/02/2015); and Tonsillectomy and adenoidectomy  His family history includes Coronary artery disease in his mother; Heart disease in his father; Hyperlipidemia in his father  He  reports that he has quit smoking  He has never used smokeless tobacco  He reports that he drinks alcohol  He reports that he does not use drugs  Current Outpatient Prescriptions   Medication Sig Dispense Refill    atorvastatin (LIPITOR) 40 mg tablet Take 1 tablet by mouth      furosemide (LASIX) 20 mg tablet Take 1 tablet by mouth daily      metFORMIN (GLUCOPHAGE-XR) 500 mg 24 hr tablet Take 1 tablet by mouth 2 (two) times a day      metoprolol tartrate (LOPRESSOR) 25 mg tablet Take 1 tablet by mouth 2 (two) times a day      potassium chloride (K-TAB) 10 mEq tablet Take 1 tablet by mouth daily      ramipril (ALTACE) 10 MG capsule Take 1 capsule by mouth daily      nitroglycerin (NITROSTAT) 0 4 mg SL tablet Place 1 tablet under the tongue       No current facility-administered medications for this visit  No current outpatient prescriptions on file prior to visit  No current facility-administered medications on file prior to visit  He has No Known Allergies       Review of Systems   Constitutional: Negative for activity change, appetite change, diaphoresis, fatigue and fever  HENT: Negative  Eyes: Negative  Respiratory: Negative for apnea, cough, chest tightness, shortness of breath and wheezing  Cardiovascular: Negative for chest pain, palpitations and leg swelling  Gastrointestinal: Negative for abdominal distention, abdominal pain, anal bleeding, constipation, diarrhea, nausea and vomiting  Endocrine: Negative for cold intolerance, heat intolerance, polydipsia, polyphagia and polyuria  Genitourinary: Negative for difficulty urinating, dysuria, flank pain, hematuria and urgency  Musculoskeletal: Negative for arthralgias, back pain, gait problem, joint swelling and myalgias  Skin: Negative for color change, rash and wound  Allergic/Immunologic: Negative for environmental allergies, food allergies and immunocompromised state  Neurological: Negative for dizziness, seizures, syncope, speech difficulty, numbness and headaches  Hematological: Negative for adenopathy  Does not bruise/bleed easily  Psychiatric/Behavioral: Negative for agitation, behavioral problems, hallucinations, sleep disturbance and suicidal ideas  Objective:      /80 (BP Location: Left arm, Patient Position: Sitting, Cuff Size: Standard)   Ht 6' (1 829 m)   Wt (!) 139 kg (305 lb 6 4 oz)   BMI 41 42 kg/m²          Physical Exam   Constitutional: He is oriented to person, place, and time  He appears well-developed and well-nourished  No distress  HENT:   Head: Normocephalic  Right Ear: External ear normal    Left Ear: External ear normal    Nose: Nose normal    Mouth/Throat: Oropharynx is clear and moist    Eyes: Conjunctivae and EOM are normal  Pupils are equal, round, and reactive to light  Right eye exhibits no discharge  Left eye exhibits no discharge  No scleral icterus  Neck: Normal range of motion  No tracheal deviation present  No thyromegaly present  Cardiovascular: Normal rate, regular rhythm and normal heart sounds  Exam reveals no gallop and no friction rub  Pulses are no weak pulses  No murmur heard    Pulses:       Dorsalis pedis pulses are 2+ on the right side, and 2+ on the left side         Posterior tibial pulses are 2+ on the left side  Pulmonary/Chest: Effort normal and breath sounds normal  No respiratory distress  He has no wheezes  Abdominal: Soft  Bowel sounds are normal  He exhibits no mass  There is no tenderness  There is no guarding  Musculoskeletal: He exhibits no edema or deformity  Feet:   Right Foot:   Skin Integrity: Negative for ulcer, skin breakdown, erythema, warmth, callus or dry skin  Left Foot:   Skin Integrity: Negative for ulcer, skin breakdown, erythema, warmth, callus or dry skin  Lymphadenopathy:     He has no cervical adenopathy  Neurological: He is alert and oriented to person, place, and time  No cranial nerve deficit  Skin: Skin is warm and dry  No rash noted  He is not diaphoretic  No erythema  Psychiatric: He has a normal mood and affect  Thought content normal      Patient's shoes and socks removed  Right Foot/Ankle   Right Foot Inspection  Skin Exam: skin normal and skin intact no dry skin, no warmth, no callus, no erythema, no maceration, no abnormal color, no pre-ulcer, no ulcer and no callus                          Toe Exam: ROM and strength within normal limits  Sensory   Vibration: intact  Proprioception: intact   Monofilament testing: intact  Vascular  Capillary refills: < 3 seconds  The right DP pulse is 2+  Left Foot/Ankle  Left Foot Inspection  Skin Exam: skin normal and skin intactno dry skin, no warmth, no erythema, no maceration, normal color, no pre-ulcer, no ulcer and no callus                         Toe Exam: ROM and strength within normal limits                   Sensory   Vibration: intact  Proprioception: intact  Monofilament: intact  Vascular  Capillary refills: < 3 seconds  The left DP pulse is 2+  The left PT pulse is 2+  Assign Risk Category:  No deformity present; No loss of protective sensation;  No weak pulses       Risk: 0

## 2018-07-17 ENCOUNTER — APPOINTMENT (OUTPATIENT)
Dept: LAB | Facility: HOSPITAL | Age: 68
End: 2018-07-17
Attending: FAMILY MEDICINE
Payer: MEDICARE

## 2018-07-17 ENCOUNTER — TRANSCRIBE ORDERS (OUTPATIENT)
Dept: ADMINISTRATIVE | Facility: HOSPITAL | Age: 68
End: 2018-07-17

## 2018-07-17 DIAGNOSIS — E11.8 TYPE 2 DIABETES MELLITUS WITH COMPLICATION, UNSPECIFIED WHETHER LONG TERM INSULIN USE: Primary | ICD-10-CM

## 2018-07-17 LAB
ANION GAP SERPL CALCULATED.3IONS-SCNC: 10 MMOL/L (ref 4–13)
BUN SERPL-MCNC: 16 MG/DL (ref 5–25)
CALCIUM SERPL-MCNC: 8.7 MG/DL (ref 8.3–10.1)
CHLORIDE SERPL-SCNC: 106 MMOL/L (ref 100–108)
CO2 SERPL-SCNC: 25 MMOL/L (ref 21–32)
CREAT SERPL-MCNC: 0.8 MG/DL (ref 0.6–1.3)
EST. AVERAGE GLUCOSE BLD GHB EST-MCNC: 154 MG/DL
GFR SERPL CREATININE-BSD FRML MDRD: 94 ML/MIN/1.73SQ M
GLUCOSE P FAST SERPL-MCNC: 189 MG/DL (ref 65–99)
HBA1C MFR BLD: 7 % (ref 4.2–6.3)
POTASSIUM SERPL-SCNC: 4.6 MMOL/L (ref 3.5–5.3)
SODIUM SERPL-SCNC: 141 MMOL/L (ref 136–145)

## 2018-07-17 PROCEDURE — 83036 HEMOGLOBIN GLYCOSYLATED A1C: CPT

## 2018-07-17 PROCEDURE — 36415 COLL VENOUS BLD VENIPUNCTURE: CPT

## 2018-07-17 PROCEDURE — 80048 BASIC METABOLIC PNL TOTAL CA: CPT

## 2018-07-18 ENCOUNTER — OFFICE VISIT (OUTPATIENT)
Dept: FAMILY MEDICINE CLINIC | Facility: CLINIC | Age: 68
End: 2018-07-18
Payer: MEDICARE

## 2018-07-18 VITALS
WEIGHT: 305.8 LBS | DIASTOLIC BLOOD PRESSURE: 82 MMHG | BODY MASS INDEX: 41.42 KG/M2 | HEIGHT: 72 IN | SYSTOLIC BLOOD PRESSURE: 132 MMHG

## 2018-07-18 DIAGNOSIS — Z11.59 ENCOUNTER FOR HEPATITIS C SCREENING TEST FOR LOW RISK PATIENT: ICD-10-CM

## 2018-07-18 DIAGNOSIS — I25.118 CORONARY ARTERY DISEASE OF NATIVE HEART WITH STABLE ANGINA PECTORIS, UNSPECIFIED VESSEL OR LESION TYPE (HCC): ICD-10-CM

## 2018-07-18 DIAGNOSIS — I10 ESSENTIAL HYPERTENSION: Primary | ICD-10-CM

## 2018-07-18 DIAGNOSIS — E11.9 CONTROLLED TYPE 2 DIABETES MELLITUS WITHOUT COMPLICATION, WITHOUT LONG-TERM CURRENT USE OF INSULIN (HCC): ICD-10-CM

## 2018-07-18 DIAGNOSIS — E78.2 MIXED HYPERLIPIDEMIA: ICD-10-CM

## 2018-07-18 PROCEDURE — G0402 INITIAL PREVENTIVE EXAM: HCPCS | Performed by: FAMILY MEDICINE

## 2018-07-18 RX ORDER — ATORVASTATIN CALCIUM 40 MG/1
40 TABLET, FILM COATED ORAL DAILY
Qty: 90 TABLET | Refills: 3 | Status: SHIPPED | OUTPATIENT
Start: 2018-07-18 | End: 2018-07-18 | Stop reason: SDUPTHER

## 2018-07-18 RX ORDER — NITROGLYCERIN 0.4 MG/1
0.4 TABLET SUBLINGUAL
Qty: 90 TABLET | Refills: 0 | Status: SHIPPED | OUTPATIENT
Start: 2018-07-18 | End: 2018-09-05 | Stop reason: SDUPTHER

## 2018-07-18 RX ORDER — POTASSIUM CHLORIDE 750 MG/1
10 TABLET, FILM COATED, EXTENDED RELEASE ORAL DAILY
Qty: 90 TABLET | Refills: 3 | Status: SHIPPED | OUTPATIENT
Start: 2018-07-18 | End: 2019-08-02 | Stop reason: SDUPTHER

## 2018-07-18 RX ORDER — METFORMIN HYDROCHLORIDE 500 MG/1
500 TABLET, EXTENDED RELEASE ORAL 2 TIMES DAILY WITH MEALS
Qty: 180 TABLET | Refills: 3 | Status: SHIPPED | OUTPATIENT
Start: 2018-07-18 | End: 2018-07-18 | Stop reason: SDUPTHER

## 2018-07-18 RX ORDER — FUROSEMIDE 20 MG/1
20 TABLET ORAL DAILY
Qty: 90 TABLET | Refills: 3 | Status: SHIPPED | OUTPATIENT
Start: 2018-07-18 | End: 2018-07-18 | Stop reason: SDUPTHER

## 2018-07-18 RX ORDER — METFORMIN HYDROCHLORIDE 500 MG/1
500 TABLET, EXTENDED RELEASE ORAL 2 TIMES DAILY WITH MEALS
Qty: 180 TABLET | Refills: 3 | Status: SHIPPED | OUTPATIENT
Start: 2018-07-18 | End: 2019-08-02 | Stop reason: SDUPTHER

## 2018-07-18 RX ORDER — FUROSEMIDE 20 MG/1
20 TABLET ORAL DAILY
Qty: 90 TABLET | Refills: 3 | Status: SHIPPED | OUTPATIENT
Start: 2018-07-18 | End: 2019-08-02 | Stop reason: SDUPTHER

## 2018-07-18 RX ORDER — NITROGLYCERIN 0.4 MG/1
0.4 TABLET SUBLINGUAL
Qty: 90 TABLET | Refills: 0 | Status: SHIPPED | OUTPATIENT
Start: 2018-07-18 | End: 2018-07-18 | Stop reason: SDUPTHER

## 2018-07-18 RX ORDER — POTASSIUM CHLORIDE 750 MG/1
10 TABLET, FILM COATED, EXTENDED RELEASE ORAL DAILY
Qty: 90 TABLET | Refills: 3 | Status: SHIPPED | OUTPATIENT
Start: 2018-07-18 | End: 2018-07-18 | Stop reason: SDUPTHER

## 2018-07-18 RX ORDER — ATORVASTATIN CALCIUM 40 MG/1
40 TABLET, FILM COATED ORAL DAILY
Qty: 90 TABLET | Refills: 3 | Status: SHIPPED | OUTPATIENT
Start: 2018-07-18 | End: 2019-08-02 | Stop reason: SDUPTHER

## 2018-07-18 RX ORDER — RAMIPRIL 10 MG/1
10 CAPSULE ORAL DAILY
Qty: 90 CAPSULE | Refills: 3 | Status: SHIPPED | OUTPATIENT
Start: 2018-07-18 | End: 2019-08-02 | Stop reason: SDUPTHER

## 2018-07-18 RX ORDER — RAMIPRIL 10 MG/1
10 CAPSULE ORAL DAILY
Qty: 90 CAPSULE | Refills: 3 | Status: SHIPPED | OUTPATIENT
Start: 2018-07-18 | End: 2018-07-18 | Stop reason: SDUPTHER

## 2018-07-18 NOTE — PROGRESS NOTES
Assessment and Plan:  Problem List Items Addressed This Visit     None      Visit Diagnoses     Encounter for hepatitis C screening test for low risk patient    -  Primary        Health Maintenance Due   Topic Date Due    ABDOMINAL AORTIC ANEURYSM (AAA) SCREEN  12/06/2017         HPI:  Farrukh Porter is a 74 y o  male here for his Subsequent Wellness Visit      Patient Active Problem List   Diagnosis    Benign essential hypertension    CAD (coronary artery disease)    Diabetes mellitus type 2, controlled ([facility]    Dyslipidemia    Morbid obesity ([facility]    Sciatica     Past Medical History:   Diagnosis Date    Asthma     resolved: 08/14/17    Closed fracture of fifth metacarpal bone of right hand     last assessed: 06/30/15    Diverticulitis     Fracture of metacarpal shaft     Hemopneumothorax, left     last assessed: 06/02/15    Inguinal hernia     Lumbar transverse process fracture (HCC)     Pilonidal cyst     Pleural effusion     last assessed: 07/01/15    Rib fractures     last assessed: 06/02/15    Status post fall     Superficial thrombophlebitis of leg     last assessed: 03/19/14     Past Surgical History:   Procedure Laterality Date    CORONARY ANGIOPLASTY WITH STENT PLACEMENT  2009    PTCA/RITA to RCA    FRACTURE SURGERY  05/04/2015    Closed treatment of fracture of metacarpal bone, right 5t metacarpal fracture Dr Rashawn Bruce ARTHROSCOPY W/ MENISCAL REPAIR      Lateral meniscus    LAPAROSCOPY  05/18/2015    Exploratory, extensive lysis of adhesions Dr Samanta Benjamin OTHER SURGICAL HISTORY      Surgical lysis of intestinal adhesions    RECTAL SURGERY      REVISION COLOSTOMY      THORACENTESIS  06/02/2015    with imaging guidance left, removed 1300cc of fluid w/o problem lower base of lun06    TONSILLECTOMY AND ADENOIDECTOMY       Family History   Problem Relation Age of Onset    Coronary artery disease Mother     Heart disease Father         Benign hypertensive  Hyperlipidemia Father      History   Smoking Status    Former Smoker    Quit date: 1985   Smokeless Tobacco    Never Used     History   Alcohol Use    Yes     Comment: Social      History   Drug Use No         Current Outpatient Prescriptions   Medication Sig Dispense Refill    atorvastatin (LIPITOR) 40 mg tablet Take 1 tablet by mouth      furosemide (LASIX) 20 mg tablet Take 1 tablet by mouth daily      metFORMIN (GLUCOPHAGE-XR) 500 mg 24 hr tablet Take 1 tablet (500 mg total) by mouth 2 (two) times a day with meals for 90 days 180 tablet 3    metoprolol tartrate (LOPRESSOR) 25 mg tablet Take 1 tablet by mouth 2 (two) times a day      nitroglycerin (NITROSTAT) 0 4 mg SL tablet Place 1 tablet under the tongue      potassium chloride (K-TAB) 10 mEq tablet Take 1 tablet by mouth daily      ramipril (ALTACE) 10 MG capsule Take 1 capsule by mouth daily       No current facility-administered medications for this visit        No Known Allergies  Immunization History   Administered Date(s) Administered    Influenza Quadrivalent Preservative Free 3 years and older IM 08/24/2015, 10/05/2016, 08/14/2017    Influenza TIV (IM) 1950, 09/27/2012, 10/01/2013, 10/01/2014    Pneumococcal Polysaccharide PPV23 1950    Zoster 09/09/2015       Patient Care Team:  Delfina Hayes, DO as PCP - General  Daya Montanez DO    Medicare Screening Tests and Risk Assessments:  BARB Clinical     ISAR:   Previous hospitalizations?:  No       Once in a Lifetime Medicare Screening:   EKG performed?:  No    AAA screening performed? (if performed, please add date to Health Maintenance):  No       Medicare Screening Tests and Risk Assessment:   AAA Risk Assessment     Tobacco use (males only):  No   Age over 72 (males only):  Yes Family history of AAA:  No   Osteoporosis Risk Assessment     Female:  No   :  Yes :  No   Age over 48:  Yes Low body weight (<127lbs):  No   Tobacco use:  No Alcohol use:  No   Low calcium diet:  No PMHX of fractures:  Yes   FHX of fractures:  No    HIV Risk Assessment    None indicated:  Yes        Drug and Alcohol Use:   Tobacco use    Cigarettes:  former smoker    Quit date:  1/1/1985   Tobacco use duration    Years:  20    Packs per day:  1    Tobacco Cessation Readiness    Alcohol use    Alcohol use:  rare use    Concern about alcohol use:  No    Alcohol Treatment Readiness   Illicit Drug Use    Drug use:  never    Drug type:  no sedative use       Diet & Exercise:   Diet   What is your diet?:  Diabetic, Limited junk food, Low Cholesterol   How many servings a day of the following:   Exercise    Do you currently exercise?:  currently not exercising       Cognitive Impairment Screening:   Depression screening preformed:  Yes     PHQ-9 Depression scale score:  0   Depression screening results:  negative for symptoms   Cognitive Impairment Screening    Do you have difficulty learning or retaining new information?:  No Do you have difficulty handling new tasks?:  No   Do you have difficulty with reasoning?:  No Do you have difficulty with spatial ability and orientation?:  No   Do you have difficulty with language?:  No Do you have difficulty with behavior?:  No       Functional Ability/Level of Safety:   Hearing    Hearing difficulties:  No Bilateral:  normal   Hearing aid:  No    Hearing Impairment Assessment    Hearing status:  No impairment   Current Activities    Status:  unlimited ADL's, unlimited IADL's, unlimited social activities, unlimited driving   Help needed with the folllowing:    Using the phone:  No Transportation:  No   Shopping:  No Preparing Meals:  No   Doing Housework:  No Doing Laundry:  No   ADL    Fall Risk   Have you fallen in the last 12 months?:  No Are you unsteady on your feet?:  No    Are you taking any medications that may cause fatigue or dizziness?:  Yes   Do you have any chronic conditions that may contribute to a fall?:  Diabetes, Arthritis, Stroke    Injury History   Polypharmacy:  No Antidepressant Use:  No   Sedative Use:  No Antihypertensive Use:  Yes   Previous Fall:  Yes Alcohol Use:  No   Deconditioning:  No Visual Impairment:  No   Cogitive Impairment:  No Mmobility Impairment:  No   Postural Hypotension:  No Urinary Incontinence:  No       Home Safety:   Are there hazards in your environment?:  No   If you fell, would you need help to get back up from the ground?:  Yes Do you have problems or concerns getting in/out of a bed, chair, tub, or toilet?:  No   Do you feel unsteady when walking?:  No Is your activity limited by pain?:  No   Do you have handrails and grab-bars in the home?:  Yes Are emergency numbers kept by the phone and regularly updated?:  Yes   Are you and/or family members aware of the dangers of smoking in bed?:  Yes Are firearms stored securely?:  Yes   Do you have working smoke alarms and fire extinguisher?:  Yes Do all household members know how to use them?:  Yes   Have you left the stove on unsupervised?:  No    Home Safety Risk Factors   Unfamilar with surroundings:  No Uneven floors:  No   Stairs or handrail saftey risk:  No Loose rugs:  No   Household clutter:  No Poor household lighting:  No   No grab bars in bathroom:  No Further evaluation needed:  No       Advanced Directives:   Advanced Directives    Living Will:  No Durable POA for healthcare:  No   Advanced directive:  No    Patient's End of Life Decisions        Urinary Incontinence:   Do you have urinary incontinence?:  No Do you have incomplete emptying?:  No   Do you urinate frequently?:  No Do you have urinary urgency?:  No   Do you have urinary hesitancy?:  No Do you have dysuria (painful and/or difficult urination)?:  No   Do you have nocturia (waking up to urinate)?:  No Do you strain when urinating (have to push to urinate)?:  No   Do you have a weak stream when urinating?:  No Do you have intermittent streaming when urinating?:  No   Do you dribble urine after finishing?:  No        Glaucoma:            Provider Screening     Preventative Screening/Counseling:   Cardiovascular Screening/Counseling:   (Labs Q5 years, EKG optional one-time)         Diabetes Screening/Counseling:   (2 tests/year if Pre-Diabetes or 1 test/year if no Diabetes)         Colorectal Cancer Screening/Counseling:   (FOBT Q1 yr; Flex Sig Q4 yrs or Q10 yrs after Screening Colonoscopy; Screening Colonoscpy Q2 yrs High Risk or Q10 yrs Low Risk; Barium Enema Q2 yrs High Risk or Q4 yrs Low Risk)         Prostate Cancer Screening/Counseling:   (Annual)          Breast Cancer Screening/Counseling:   (Baseline Age 28 - 43; Annual Age 36+)         Cervical Cancer Screening/Counseling:   (Annual for High Risk or Childbearing Age with Abnormal Pap in Last 3 yrs; Every 2 all others)         Osteoporosis Screening/Counseling:   (Every 2 Yrs if at risk or more if medically necessary)         AAA Screening/Counseling:   (Once per Lifetime with risk factors)    Family History of AAA:  No Age over 72 (males only):  Yes Tobacco use (males only):  No         Glaucoma Screening/Counseling:   (Annual)         HIV Screening/Counseling:   (Voluntary; Once annually for high risk OR 3 times for Pregnancy at diagnosis of IUP; 3rd trimester; and at Labor         Hepatitis C Screening:             Immunizations: Other Preventative Couseling (Non-Medicare Wellness Visit Required):       Referrals (Non-Medicare Wellness Visit Required):       Medical Equipment/Suppliers:           No exam data present    Physical Exam :  General ROS: negative  Physical Exam   Constitutional: He is oriented to person, place, and time  He appears well-developed and well-nourished  HENT:   Head: Normocephalic and atraumatic     Right Ear: External ear normal    Left Ear: External ear normal    Nose: Nose normal    Mouth/Throat: Oropharynx is clear and moist    Eyes: Conjunctivae and EOM are normal  Pupils are equal, round, and reactive to light    Neck: Normal range of motion  Neck supple  Cardiovascular: Normal rate, regular rhythm, normal heart sounds and intact distal pulses  Exam reveals no friction rub  Pulses are no weak pulses  No murmur heard  Pulses:       Dorsalis pedis pulses are 2+ on the right side, and 2+ on the left side  Posterior tibial pulses are 2+ on the right side, and 2+ on the left side  Pulmonary/Chest: Effort normal and breath sounds normal  No respiratory distress  He has no wheezes  He has no rales  He exhibits no tenderness  Abdominal: Soft  Bowel sounds are normal    Musculoskeletal: Normal range of motion  Feet:   Right Foot:   Skin Integrity: Negative for ulcer, skin breakdown, erythema, warmth, callus or dry skin  Left Foot:   Skin Integrity: Negative for ulcer, skin breakdown, erythema, warmth, callus or dry skin  Neurological: He is alert and oriented to person, place, and time  Skin: Skin is warm and dry  Capillary refill takes 2 to 3 seconds  Psychiatric: He has a normal mood and affect  His behavior is normal  Judgment and thought content normal    Patient's shoes and socks removed  Right Foot/Ankle   Right Foot Inspection  Skin Exam: skin normal and skin intact no dry skin, no warmth, no callus, no erythema, no maceration, no abnormal color, no pre-ulcer, no ulcer and no callus                          Toe Exam: ROM and strength within normal limits  Sensory   Vibration: intact  Proprioception: intact   Monofilament testing: intact  Vascular  Capillary refills: < 3 seconds  The right DP pulse is 2+  The right PT pulse is 2+       Left Foot/Ankle  Left Foot Inspection  Skin Exam: skin normal and skin intactno dry skin, no warmth, no erythema, no maceration, normal color, no pre-ulcer, no ulcer and no callus                         Toe Exam: ROM and strength within normal limits                   Sensory   Vibration: intact  Proprioception: intact  Monofilament: intact  Vascular  Capillary refills: < 3 seconds  The left DP pulse is 2+  The left PT pulse is 2+  Assign Risk Category:  No deformity present; No loss of protective sensation;  No weak pulses       Risk: 2

## 2018-09-05 DIAGNOSIS — I25.118 CORONARY ARTERY DISEASE OF NATIVE HEART WITH STABLE ANGINA PECTORIS, UNSPECIFIED VESSEL OR LESION TYPE (HCC): ICD-10-CM

## 2018-09-05 RX ORDER — NITROGLYCERIN 0.4 MG/1
TABLET SUBLINGUAL
Qty: 100 TABLET | Refills: 2 | Status: SHIPPED | OUTPATIENT
Start: 2018-09-05 | End: 2019-12-10

## 2018-10-01 ENCOUNTER — OFFICE VISIT (OUTPATIENT)
Dept: URGENT CARE | Facility: CLINIC | Age: 68
End: 2018-10-01
Payer: MEDICARE

## 2018-10-01 VITALS
WEIGHT: 300 LBS | HEART RATE: 58 BPM | SYSTOLIC BLOOD PRESSURE: 141 MMHG | TEMPERATURE: 99.7 F | RESPIRATION RATE: 18 BRPM | HEIGHT: 72 IN | DIASTOLIC BLOOD PRESSURE: 65 MMHG | OXYGEN SATURATION: 96 % | BODY MASS INDEX: 40.63 KG/M2
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
## 2018-10-01 DIAGNOSIS — L03.114 CELLULITIS OF LEFT UPPER EXTREMITY: ICD-10-CM

## 2018-10-01 DIAGNOSIS — S50.812A CAT SCRATCH OF FOREARM, LEFT, INITIAL ENCOUNTER: Primary | ICD-10-CM

## 2018-10-01 PROCEDURE — 99203 OFFICE O/P NEW LOW 30 MIN: CPT | Performed by: NURSE PRACTITIONER

## 2018-10-01 PROCEDURE — G0463 HOSPITAL OUTPT CLINIC VISIT: HCPCS | Performed by: NURSE PRACTITIONER

## 2018-10-01 PROCEDURE — 90715 TDAP VACCINE 7 YRS/> IM: CPT

## 2018-10-01 RX ORDER — AMOXICILLIN AND CLAVULANATE POTASSIUM 875; 125 MG/1; MG/1
1 TABLET, FILM COATED ORAL EVERY 12 HOURS SCHEDULED
Qty: 20 TABLET | Refills: 0 | Status: SHIPPED | OUTPATIENT
Start: 2018-10-01 | End: 2018-10-11

## 2018-10-01 NOTE — PROGRESS NOTES
[address]        NAME: Yesi Mtz is a 74 y o  male  : 1950    MRN: [id number]  DATE: 2018  TIME: 5:25 PM    Assessment and Plan   Cat scratch of forearm, left, initial encounter Chance Williamson, [id number]]  1  Cat scratch of forearm, left, initial encounter  amoxicillin-clavulanate (AUGMENTIN) 875-125 mg per tablet   2  Cellulitis of left upper extremity  amoxicillin-clavulanate (AUGMENTIN) 875-125 mg per tablet         Patient Instructions     Patient Instructions   TDap today  Augmentin as directed  If worse- go to ER  Call or return if problems/concerns/questions    Follow up with PCP in 3-5 days  Proceed to  ER if symptoms worsen  Chief Complaint     Chief Complaint   Patient presents with    Laceration     unsure of cat bit him or scratched him         History of Present Illness       Last Wednesday night pt  Rolled over on his cat- unsure if the cat bit him or scratched him  Within the last 24 hours the area has been more red, swollen, and painful  Unknown last tetanus shot  Pt  Owns cat- will watch for 10 days- declining rabies vaccine        Review of Systems   Review of Systems   Constitutional: Negative for activity change, diaphoresis, fatigue and fever  HENT: Negative for congestion, facial swelling, hearing loss, rhinorrhea, sinus pain, sinus pressure, sneezing, sore throat and voice change  Eyes: Negative for discharge and visual disturbance  Respiratory: Negative for cough, choking, chest tightness, shortness of breath, wheezing and stridor  Cardiovascular: Negative for chest pain, palpitations and leg swelling  Gastrointestinal: Negative for abdominal distention, abdominal pain, constipation, diarrhea, nausea and vomiting  Endocrine: Negative for polydipsia, polyphagia and polyuria  Genitourinary: Negative for difficulty urinating, dysuria, frequency and urgency     Musculoskeletal: Negative for arthralgias, back pain, gait problem, joint swelling, myalgias, neck pain and neck stiffness  Skin: Positive for rash and wound  Negative for color change  Neurological: Negative for dizziness, syncope, speech difficulty, weakness, light-headedness and headaches  Hematological: Negative for adenopathy  Does not bruise/bleed easily  Psychiatric/Behavioral: Negative for agitation, behavioral problems, confusion, hallucinations, sleep disturbance and suicidal ideas  The patient is not nervous/anxious  Current Medications       Current Outpatient Prescriptions:     atorvastatin (LIPITOR) 40 mg tablet, Take 1 tablet (40 mg total) by mouth daily, Disp: 90 tablet, Rfl: 3    furosemide (LASIX) 20 mg tablet, Take 1 tablet (20 mg total) by mouth daily, Disp: 90 tablet, Rfl: 3    metFORMIN (GLUCOPHAGE-XR) 500 mg 24 hr tablet, Take 1 tablet (500 mg total) by mouth 2 (two) times a day with meals for 90 days, Disp: 180 tablet, Rfl: 3    metoprolol tartrate (LOPRESSOR) 25 mg tablet, Take 1 tablet (25 mg total) by mouth 2 (two) times a day, Disp: 180 tablet, Rfl: 3    nitroglycerin (NITROSTAT) 0 4 mg SL tablet, DISSOLVE 1 TABLET UNDER THE TONGUE EVERY 5 MINUTES AS  NEEDED FOR CHEST PAIN    NOT TO EXCEED 3 TABLETS IN 15  MINUTES, Disp: 100 tablet, Rfl: 2    potassium chloride (K-TAB) 10 mEq tablet, Take 1 tablet (10 mEq total) by mouth daily, Disp: 90 tablet, Rfl: 3    ramipril (ALTACE) 10 MG capsule, Take 1 capsule (10 mg total) by mouth daily, Disp: 90 capsule, Rfl: 3    amoxicillin-clavulanate (AUGMENTIN) 875-125 mg per tablet, Take 1 tablet by mouth every 12 (twelve) hours for 10 days, Disp: 20 tablet, Rfl: 0    Current Allergies     Allergies as of 10/01/2018    (No Known Allergies)            The following portions of the patient's history were reviewed and updated as appropriate: allergies, current medications, past family history, past medical history, past social history, past surgical history and problem list      Past Medical History:   Diagnosis Date    Asthma     resolved: 08/14/17    Closed fracture of fifth metacarpal bone of right hand     last assessed: 06/30/15    Diverticulitis     Fracture of metacarpal shaft     Hemopneumothorax, left     last assessed: 06/02/15    Inguinal hernia     Lumbar transverse process fracture (HCC)     Pilonidal cyst     Pleural effusion     last assessed: 07/01/15    Rib fractures     last assessed: 06/02/15    Status post fall     Superficial thrombophlebitis of leg     last assessed: 03/19/14       Past Surgical History:   Procedure Laterality Date    CORONARY ANGIOPLASTY WITH STENT PLACEMENT  2009    PTCA/RITA to RCA    FRACTURE SURGERY  05/04/2015    Closed treatment of fracture of metacarpal bone, right 5t metacarpal fracture Dr Sandeep Etienne ARTHROSCOPY W/ MENISCAL REPAIR      Lateral meniscus    LAPAROSCOPY  05/18/2015    Exploratory, extensive lysis of adhesions Dr Stacey Cruz HISTORY      Surgical lysis of intestinal adhesions    RECTAL SURGERY      REVISION COLOSTOMY      THORACENTESIS  06/02/2015    with imaging guidance left, removed 1300cc of fluid w/o problem lower base of lun06    TONSILLECTOMY AND ADENOIDECTOMY         Family History   Problem Relation Age of Onset    Coronary artery disease Mother     Heart disease Father         Benign hypertensive    Hyperlipidemia Father          Medications have been verified  Objective   /65   Pulse 58   Temp 99 7 °F (37 6 °C)   Resp 18   Ht 6' (1 829 m)   Wt 136 kg (300 lb)   SpO2 96%   BMI 40 69 kg/m²        Physical Exam     Physical Exam   Constitutional: He is oriented to person, place, and time  He appears well-developed and well-nourished  No distress  Cardiovascular: Normal rate, regular rhythm and normal heart sounds  No murmur heard  Pulmonary/Chest: Effort normal and breath sounds normal  No respiratory distress  He has no wheezes     Musculoskeletal: Normal range of motion  Neurological: He is alert and oriented to person, place, and time  Skin: Skin is warm and dry  He is not diaphoretic  Psychiatric: He has a normal mood and affect  His behavior is normal  Judgment and thought content normal    Nursing note and vitals reviewed        TDap given here today

## 2018-10-01 NOTE — PATIENT INSTRUCTIONS
TDap today  Augmentin as directed  If worse- go to ER  Call or return if problems/concerns/questions

## 2018-10-03 ENCOUNTER — HOSPITAL ENCOUNTER (EMERGENCY)
Facility: HOSPITAL | Age: 68
Discharge: HOME | End: 2018-10-03
Attending: EMERGENCY MEDICINE | Admitting: EMERGENCY MEDICINE
Payer: MEDICARE

## 2018-10-03 ENCOUNTER — APPOINTMENT (EMERGENCY)
Dept: RADIOLOGY | Facility: HOSPITAL | Age: 68
End: 2018-10-03
Payer: MEDICARE

## 2018-10-03 ENCOUNTER — APPOINTMENT (EMERGENCY)
Dept: ULTRASOUND IMAGING | Facility: HOSPITAL | Age: 68
End: 2018-10-03
Payer: MEDICARE

## 2018-10-03 VITALS
TEMPERATURE: 97.8 F | SYSTOLIC BLOOD PRESSURE: 162 MMHG | OXYGEN SATURATION: 96 % | RESPIRATION RATE: 17 BRPM | DIASTOLIC BLOOD PRESSURE: 69 MMHG | HEART RATE: 60 BPM | WEIGHT: 303.6 LBS | BODY MASS INDEX: 41.12 KG/M2 | HEIGHT: 72 IN

## 2018-10-03 DIAGNOSIS — S50.812S: Primary | ICD-10-CM
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
## 2018-10-03 LAB
ANION GAP SERPL CALCULATED.3IONS-SCNC: 6 MMOL/L (ref 4–13)
BASOPHILS # BLD AUTO: 0.02 THOUSANDS/ΜL (ref 0–0.1)
BASOPHILS NFR BLD AUTO: 0 % (ref 0–1)
BUN SERPL-MCNC: 11 MG/DL (ref 5–25)
CALCIUM SERPL-MCNC: 9 MG/DL (ref 8.3–10.1)
CHLORIDE SERPL-SCNC: 106 MMOL/L (ref 100–108)
CO2 SERPL-SCNC: 28 MMOL/L (ref 21–32)
CREAT SERPL-MCNC: 0.84 MG/DL (ref 0.6–1.3)
EOSINOPHIL # BLD AUTO: 0.09 THOUSAND/ΜL (ref 0–0.61)
EOSINOPHIL NFR BLD AUTO: 1 % (ref 0–6)
ERYTHROCYTE [DISTWIDTH] IN BLOOD BY AUTOMATED COUNT: 12 % (ref 11.6–15.1)
GFR SERPL CREATININE-BSD FRML MDRD: 92 ML/MIN/1.73SQ M
GLUCOSE SERPL-MCNC: 183 MG/DL (ref 65–140)
HCT VFR BLD AUTO: 44.3 % (ref 36.5–49.3)
HGB BLD-MCNC: 14.7 G/DL (ref 12–17)
IMM GRANULOCYTES # BLD AUTO: 0.03 THOUSAND/UL (ref 0–0.2)
IMM GRANULOCYTES NFR BLD AUTO: 0 % (ref 0–2)
LYMPHOCYTES # BLD AUTO: 1.47 THOUSANDS/ΜL (ref 0.6–4.47)
LYMPHOCYTES NFR BLD AUTO: 20 % (ref 14–44)
MCH RBC QN AUTO: 31.9 PG (ref 26.8–34.3)
MCHC RBC AUTO-ENTMCNC: 33.2 G/DL (ref 31.4–37.4)
MCV RBC AUTO: 96 FL (ref 82–98)
MONOCYTES # BLD AUTO: 0.51 THOUSAND/ΜL (ref 0.17–1.22)
MONOCYTES NFR BLD AUTO: 7 % (ref 4–12)
NEUTROPHILS # BLD AUTO: 5.27 THOUSANDS/ΜL (ref 1.85–7.62)
NEUTS SEG NFR BLD AUTO: 72 % (ref 43–75)
NRBC BLD AUTO-RTO: 0 /100 WBCS
PLATELET # BLD AUTO: 194 THOUSANDS/UL (ref 149–390)
PMV BLD AUTO: 10.6 FL (ref 8.9–12.7)
POTASSIUM SERPL-SCNC: 4.4 MMOL/L (ref 3.5–5.3)
RBC # BLD AUTO: 4.61 MILLION/UL (ref 3.88–5.62)
SODIUM SERPL-SCNC: 140 MMOL/L (ref 136–145)
WBC # BLD AUTO: 7.39 THOUSAND/UL (ref 4.31–10.16)

## 2018-10-03 PROCEDURE — 85025 COMPLETE CBC W/AUTO DIFF WBC: CPT | Performed by: EMERGENCY MEDICINE

## 2018-10-03 PROCEDURE — 36415 COLL VENOUS BLD VENIPUNCTURE: CPT | Performed by: EMERGENCY MEDICINE

## 2018-10-03 PROCEDURE — 99284 EMERGENCY DEPT VISIT MOD MDM: CPT

## 2018-10-03 PROCEDURE — 76882 US LMTD JT/FCL EVL NVASC XTR: CPT

## 2018-10-03 PROCEDURE — 73090 X-RAY EXAM OF FOREARM: CPT

## 2018-10-03 PROCEDURE — 80048 BASIC METABOLIC PNL TOTAL CA: CPT | Performed by: EMERGENCY MEDICINE

## 2018-10-03 RX ORDER — AZITHROMYCIN 250 MG/1
TABLET, FILM COATED ORAL
Qty: 6 TABLET | Refills: 0 | Status: SHIPPED | OUTPATIENT
Start: 2018-10-03 | End: 2018-10-07

## 2018-10-03 NOTE — DISCHARGE INSTRUCTIONS
Cat Scratch Disease   WHAT YOU NEED TO KNOW:   Cat-scratch disease (CSD) is an infection caused by bacteria in a cat's mouth  You can get CSD by being scratched, licked, or bitten by an infected cat  The germs usually spread after the cat licks its paws then scratches or bites human skin  DISCHARGE INSTRUCTIONS:   Return to the emergency department if:   · You have severe pain in your stomach, muscles, bones, or joints  · You have severe pain in the lymph nodes in your neck, armpit, or groin  · You have seizures, headaches, or cannot think clearly  Contact your healthcare provider if:   · You have a fever, sore throat, or headache  · You notice swelling in your neck, armpit, or groin  · You have stomach, muscle, or joint pain  · You have a skin rash, itching, or swelling after you take your medicine  · You have questions or concerns about your condition or care  Medicines:   · Medicines  may be given to help treat a bacterial infection or decrease pain, fever, and swelling  · Take your medicine as directed  Contact your healthcare provider if you think your medicine is not helping or if you have side effects  Tell him or her if you are allergic to any medicine  Keep a list of the medicines, vitamins, and herbs you take  Include the amounts, and when and why you take them  Bring the list or the pill bottles to follow-up visits  Carry your medicine list with you in case of an emergency  Follow up with your healthcare provider as directed:  Write down your questions so you remember to ask them during your visits  Prevent CSD:   · Always wash your hands after you handle or pet a cat  · Have your cat treated for fleas  [place] can spread the germ from cat to cat  · Do not allow your cat to lick an open wound on your skin  · Take care when you play with cats to avoid bites or scratches  Avoid rough play      · If you get scratched, licked, or bitten by a cat, wash the affected area with clean water and soap right away  © 2017 [facility] Information is for End User's use only and may not be sold, redistributed or otherwise used for commercial purposes  All illustrations and images included in CareNotes® are the copyrighted property of A D [employer] M [employer]  or Deshawn Harknis  The above information is an  only  It is not intended as medical advice for individual conditions or treatments  Talk to your doctor, nurse or pharmacist before following any medical regimen to see if it is safe and effective for you

## 2018-10-03 NOTE — ED PROVIDER NOTES
History  Chief Complaint   Patient presents with    Arm Pain     Pt has a laceration/scratch on his left forearm from his cat  Pt rolled on his cat last Wednesday and cat either scratched or bit him  Pt reports he noticed redness around injury on Sunday  This is a pleasant 44-year-old right-handed male who presents to the emergency department erythema/soft tissue swelling of the dorsum of the left forearm after a cat injury occurring on 26 September 2018  Patient states that he rolled over onto his cat while he was sleeping, causing the cat to either bite or scratch him on the dorsum of the left forearm  He did not perform any immediate local wound care measures but because of gradually increasing swelling and erythema was seen in an urgent care on 1 October 2018  He was started on Augmentin of which he has taken 6 doses thus far  He was given strict instructions to come to the emergency department if he had any increasing swelling/erythema of the left forearm  He has marked the area of erythema in question; states there has been a gradual increase since he was seen on 1 October  Does not have any history of fracture/surgery left upper extremity  Denies any paresthesias/weakness in the distal aspect of left upper extremity  He has not otherwise performed local wound care measures to the left arm apart from marking the area of inflammation  Received tetanus vaccine on 1 October 2018 during urgent care visit  History provided by:  Patient and medical records  Arm Pain   Associated symptoms: no fatigue, no fever, no myalgias and no rash        Prior to Admission Medications   Prescriptions Last Dose Informant Patient Reported?  Taking?   amoxicillin-clavulanate (AUGMENTIN) 875-125 mg per tablet   No Yes   Sig: Take 1 tablet by mouth every 12 (twelve) hours for 10 days   atorvastatin (LIPITOR) 40 mg tablet   No Yes   Sig: Take 1 tablet (40 mg total) by mouth daily   furosemide (LASIX) 20 mg tablet No Yes   Sig: Take 1 tablet (20 mg total) by mouth daily   metFORMIN (GLUCOPHAGE-XR) 500 mg 24 hr tablet   No Yes   Sig: Take 1 tablet (500 mg total) by mouth 2 (two) times a day with meals for 90 days   metoprolol tartrate (LOPRESSOR) 25 mg tablet   No Yes   Sig: Take 1 tablet (25 mg total) by mouth 2 (two) times a day   nitroglycerin (NITROSTAT) 0 4 mg SL tablet   No Yes   Sig: DISSOLVE 1 TABLET UNDER THE TONGUE EVERY 5 MINUTES AS  NEEDED FOR CHEST PAIN    NOT TO EXCEED 3 TABLETS IN 15  MINUTES   potassium chloride (K-TAB) 10 mEq tablet   No Yes   Sig: Take 1 tablet (10 mEq total) by mouth daily   ramipril (ALTACE) 10 MG capsule   No Yes   Sig: Take 1 capsule (10 mg total) by mouth daily      Facility-Administered Medications: None       Past Medical History:   Diagnosis Date    Asthma     resolved: 08/14/17    Closed fracture of fifth metacarpal bone of right hand     last assessed: 06/30/15    Diabetes mellitus ([facility] [address] )     Diverticulitis     Fracture of metacarpal shaft     Hemopneumothorax, left     last assessed: 06/02/15    Hyperlipidemia     Hypertension     Inguinal hernia     Lumbar transverse process fracture (HCC)     Pilonidal cyst     Pleural effusion     last assessed: 07/01/15    Rib fractures     last assessed: 06/02/15    Status post fall     Superficial thrombophlebitis of leg     last assessed: 03/19/14       Past Surgical History:   Procedure Laterality Date    CORONARY ANGIOPLASTY WITH STENT PLACEMENT  2009    PTCA/RITA to RCA    FRACTURE SURGERY  05/04/2015    Closed treatment of fracture of metacarpal bone, right 5t metacarpal fracture Dr Ghassan Salas ARTHROSCOPY W/ MENISCAL REPAIR      Lateral meniscus    LAPAROSCOPY  05/18/2015    Exploratory, extensive lysis of adhesions Dr Evangelista Murray OTHER SURGICAL HISTORY      Surgical lysis of intestinal adhesions    RECTAL SURGERY      REVISION COLOSTOMY      THORACENTESIS  06/02/2015    with imaging guidance left, removed 1300cc of fluid w/o problem lower base of lun06    TONSILLECTOMY AND ADENOIDECTOMY         Family History   Problem Relation Age of Onset    Coronary artery disease Mother     Heart disease Father         Benign hypertensive    Hyperlipidemia Father      I have reviewed and agree with the history as documented  Social History   Substance Use Topics    Smoking status: Former Smoker     Quit date: 1985    Smokeless tobacco: Never Used    Alcohol use Yes      Comment: Social        Review of Systems   Constitutional: Negative for chills, diaphoresis, fatigue and fever  Musculoskeletal: Negative for arthralgias, joint swelling and myalgias  Skin: Positive for wound  Negative for color change, pallor and rash  Neurological: Negative for weakness and numbness  Hematological: Negative for adenopathy  Does not bruise/bleed easily  Physical Exam  Physical Exam   Constitutional: He is oriented to person, place, and time  Vital signs are normal  He appears well-developed and well-nourished  He is active and cooperative  No distress  HENT:   Head: Normocephalic and atraumatic  Neck: Trachea normal and phonation normal    Cardiovascular: Normal rate, regular rhythm, intact distal pulses and normal pulses  Pulses:       Radial pulses are 2+ on the right side, and 2+ on the left side  Ulnar pulses 2+ bilaterally   Pulmonary/Chest: Effort normal  No respiratory distress  Musculoskeletal:        Right elbow: Normal        Left elbow: Normal         Right wrist: Normal         Left wrist: Normal         Right forearm: Normal         Left forearm: He exhibits swelling and laceration (Well-demarcated linear 3 cm superficial laceration mid-dorsal L forearm  There is a well-demarcated roughly square area of erythema approx 10 cm on each side surrounding laceration  No induration/crepitus/drainage/lymphangitis )  He exhibits no tenderness, no bony tenderness, no edema and no deformity  Arms:       Right hand: Normal         Left hand: Normal    Lymphadenopathy:     He has no axillary adenopathy  Neurological: He is alert and oriented to person, place, and time  He has normal strength  No sensory deficit  GCS eye subscore is 4  GCS verbal subscore is 5  GCS motor subscore is 6  Sensation is intact to light touch in the bilateral upper extremity in the C3-C8 distribution  Strength 5/5 bilateral upper extremity at shoulder/elbow/wrist/MCP/PIP/DI P in all digits and all planes of motion  Skin: Skin is warm and dry  Capillary refill takes less than 2 seconds  Capillary refill less than 2 seconds in all digits of bilateral upper extremity  Laceration (L forearm) noted  He is not diaphoretic  Nursing note and vitals reviewed        Vital Signs  ED Triage Vitals [10/03/18 1104]   Temperature Pulse Respirations Blood Pressure SpO2   97 8 °F (36 6 °C) 60 17 162/69 96 %      Temp Source Heart Rate Source Patient Position - Orthostatic VS BP Location FiO2 (%)   Temporal Monitor Sitting Right arm --      Pain Score       2           Vitals:    10/03/18 1104   BP: 162/69   Pulse: 60   Patient Position - Orthostatic VS: Sitting       Visual Acuity      ED Medications  Medications - No data to display    Diagnostic Studies  Results Reviewed     Procedure Component Value Units Date/Time    Basic metabolic panel [[id number]]  (Abnormal) Collected:  10/03/18 1150    Lab Status:  Final result Specimen:  Blood from Arm, Right Updated:  10/03/18 1207     Sodium 140 mmol/L      Potassium 4 4 mmol/L      Chloride 106 mmol/L      CO2 28 mmol/L      ANION GAP 6 mmol/L      BUN 11 mg/dL      Creatinine 0 84 mg/dL      Glucose 183 (H) mg/dL      Calcium 9 0 mg/dL      eGFR 92 ml/min/1 73sq m     Narrative:         National Kidney Disease Education Program recommendations are as follows:  GFR calculation is accurate only with a steady state creatinine  Chronic Kidney disease less than 60 ml/min/1 73 sq  meters  Kidney failure less than 15 ml/min/1 73 sq  meters  CBC and differential [[id number]] Collected:  10/03/18 1150    Lab Status:  Final result Specimen:  Blood from Arm, Right Updated:  10/03/18 1154     WBC 7 39 Thousand/uL      RBC 4 61 Million/uL      Hemoglobin 14 7 g/dL      Hematocrit 44 3 %      MCV 96 fL      MCH 31 9 pg      MCHC 33 2 g/dL      RDW 12 0 %      MPV 10 6 fL      Platelets 804 Thousands/uL      nRBC 0 /100 WBCs      Neutrophils Relative 72 %      Immat GRANS % 0 %      Lymphocytes Relative 20 %      Monocytes Relative 7 %      Eosinophils Relative 1 %      Basophils Relative 0 %      Neutrophils Absolute 5 27 Thousands/µL      Immature Grans Absolute 0 03 Thousand/uL      Lymphocytes Absolute 1 47 Thousands/µL      Monocytes Absolute 0 51 Thousand/µL      Eosinophils Absolute 0 09 Thousand/µL      Basophils Absolute 0 02 Thousands/µL                  US extremity soft tissue   Final Result by HERNAN Gutiérrez MD (10/03 1223)      Normal study  Workstation performed: [id number]         XR forearm 2 views LEFT   Final Result by Hali Anders MD (10/03 1144)      No acute osseous abnormality  Workstation performed: [id number]                    Procedures  Procedures       Phone Contacts  ED Phone Contact    ED Course  ED Course as of Oct 03 1417   Wed Oct 03, 2018   1224 1  WBC wnl   2  Hg/Hct wnl   3  Plt wnl   4  Electrolytes wnl            MDM  Number of Diagnoses or Management Options  Cat scratch of forearm, left, sequela: established and worsening     Amount and/or Complexity of Data Reviewed  Clinical lab tests: ordered and reviewed  Tests in the radiology section of CPT®: ordered and reviewed  Decide to obtain previous medical records or to obtain history from someone other than the patient: yes  Review and summarize past medical records: yes  Independent visualization of images, tracings, or specimens: yes    Risk of Complications, Morbidity, and/or Mortality  Presenting problems: high  Diagnostic procedures: high  Management options: high  General comments: Workup is normal including lab/x-ray/ultrasound without evidence of deep space infection or major systemic illness and without any evidence of progression of symptoms beyond local area of involvement  No evidence of neurovascular involvement or injury  The appearance of the injury and patient's form is most suggestive of scratch as opposed to a cat bite; there is no evidence of a puncture wound that would be more consistent with bite and accordingly I suspect this is a cat scratch  Amoxicillin/clavulanate may be suboptimal for treatment of Bartonella-related disease and I will add a course of azithromycin for additional symptomatic control  Patient will require short-term follow-up with his primary physician for wound recheck  ED return for fever/chills/crepitus/lymphangitis  All questions answered prior to discharge  The patient expressed understanding and agreed to plan  Patient Progress  Patient progress: stable    CritCare Time    Disposition  Final diagnoses:   Cat scratch of forearm, left, sequela     Time reflects when diagnosis was documented in both MDM as applicable and the Disposition within this note     Time User Action Codes Description Comment    10/3/2018  1:25 PM [place] Add [A72 878D,  W55 03XS] Cat scratch of forearm, left, sequela       ED Disposition     ED Disposition Condition Comment    Discharge  Melina Swain discharge to home/self care  Condition at discharge: Good        Follow-up Information     Follow up With Specialties Details Why Contact Nehal Recio,  Family Medicine Schedule an appointment as soon as possible for a visit in 1 week For wound re-check  Go to the ER for fever, chills, red streaks moving up your arm, or swollen glands in your armpit at any time   [address] [place] [address]            Discharge Medication List as of 10/3/2018  1:26 PM      START taking these medications    Details   azithromycin (ZITHROMAX) 250 mg tablet Take 2 tablets today then 1 tablet daily x 4 days, Normal         CONTINUE these medications which have NOT CHANGED    Details   amoxicillin-clavulanate (AUGMENTIN) 875-125 mg per tablet Take 1 tablet by mouth every 12 (twelve) hours for 10 days, Starting Mon 10/1/2018, Until Thu 10/11/2018, Normal      atorvastatin (LIPITOR) 40 mg tablet Take 1 tablet (40 mg total) by mouth daily, Starting Wed 7/18/2018, Normal      furosemide (LASIX) 20 mg tablet Take 1 tablet (20 mg total) by mouth daily, Starting Wed 7/18/2018, Normal      metFORMIN (GLUCOPHAGE-XR) 500 mg 24 hr tablet Take 1 tablet (500 mg total) by mouth 2 (two) times a day with meals for 90 days, Starting Wed 7/18/2018, Until Tue 10/16/2018, Print      metoprolol tartrate (LOPRESSOR) 25 mg tablet Take 1 tablet (25 mg total) by mouth 2 (two) times a day, Starting Wed 7/18/2018, Normal      nitroglycerin (NITROSTAT) 0 4 mg SL tablet DISSOLVE 1 TABLET UNDER THE TONGUE EVERY 5 MINUTES AS  NEEDED FOR CHEST PAIN   NOT TO EXCEED 3 TABLETS IN 15  MINUTES, Normal      potassium chloride (K-TAB) 10 mEq tablet Take 1 tablet (10 mEq total) by mouth daily, Starting Wed 7/18/2018, Normal      ramipril (ALTACE) 10 MG capsule Take 1 capsule (10 mg total) by mouth daily, Starting Wed 7/18/2018, Normal           No discharge procedures on file      ED Provider  Electronically Signed by           Bryon Venegas DO  10/03/18 9237

## 2018-10-24 ENCOUNTER — IMMUNIZATION (OUTPATIENT)
Dept: FAMILY MEDICINE CLINIC | Facility: CLINIC | Age: 68
End: 2018-10-24
Payer: MEDICARE

## 2018-10-24 DIAGNOSIS — Z23 NEED FOR IMMUNIZATION AGAINST INFLUENZA: Primary | ICD-10-CM

## 2018-10-24 PROCEDURE — G0008 ADMIN INFLUENZA VIRUS VAC: HCPCS | Performed by: FAMILY MEDICINE

## 2018-10-24 PROCEDURE — 90662 IIV NO PRSV INCREASED AG IM: CPT | Performed by: FAMILY MEDICINE

## 2018-11-19 ENCOUNTER — OFFICE VISIT (OUTPATIENT)
Dept: FAMILY MEDICINE CLINIC | Facility: CLINIC | Age: 68
End: 2018-11-19
Payer: MEDICARE

## 2018-11-19 VITALS
DIASTOLIC BLOOD PRESSURE: 68 MMHG | WEIGHT: 303.6 LBS | HEIGHT: 72 IN | SYSTOLIC BLOOD PRESSURE: 128 MMHG | BODY MASS INDEX: 41.12 KG/M2

## 2018-11-19 DIAGNOSIS — E78.5 DYSLIPIDEMIA: ICD-10-CM

## 2018-11-19 DIAGNOSIS — I10 BENIGN ESSENTIAL HYPERTENSION: Primary | ICD-10-CM

## 2018-11-19 DIAGNOSIS — E11.9 CONTROLLED TYPE 2 DIABETES MELLITUS WITHOUT COMPLICATION, WITHOUT LONG-TERM CURRENT USE OF INSULIN (HCC): ICD-10-CM

## 2018-11-19 PROCEDURE — 99214 OFFICE O/P EST MOD 30 MIN: CPT | Performed by: FAMILY MEDICINE

## 2018-11-19 NOTE — ASSESSMENT & PLAN NOTE
Lab Results   Component Value Date    HGBA1C 7 0 (H) 07/17/2018       No results for input(s): POCGLU in the last 72 hours      Blood Sugar Average: Last 72 hrs:   do for diabetic lab work for the prep for the meantime continue current meds

## 2018-11-19 NOTE — PROGRESS NOTES
Assessment/Plan:  Patient's will get his diabetic lab work in the near future and he will work on getting to his optometrist for good diabetic eye exam    No problem-specific Assessment & Plan notes found for this encounter  There are no diagnoses linked to this encounter  Subjective:      Patient ID: Vinny Reich is a 74 y o  male  Diabetes   Pertinent negatives for hypoglycemia include no dizziness, headaches, seizures or speech difficulty  Pertinent negatives for diabetes include no chest pain, no fatigue, no polydipsia, no polyphagia and no polyuria  The following portions of the patient's history were reviewed and updated as appropriate:   He  has a past medical history of Asthma; Closed fracture of fifth metacarpal bone of right hand; Diabetes mellitus ([facility] [address] ); Diverticulitis; Fracture of metacarpal shaft; Hemopneumothorax, left; Hyperlipidemia; Hypertension; Inguinal hernia; Lumbar transverse process fracture ([facility] [address] ); Pilonidal cyst; Pleural effusion; Rib fractures; Status post fall; and Superficial thrombophlebitis of leg  He   Patient Active Problem List    Diagnosis Date Noted    Sciatica 01/06/2014    Dyslipidemia 12/02/2013    CAD (coronary artery disease) 11/20/2013    Morbid obesity ([facility] [address] ) 11/20/2013    Diabetes mellitus type 2, controlled ([facility] ) 09/05/2013    Benign essential hypertension 07/27/2012     He  has a past surgical history that includes Coronary angioplasty with stent (2009); Fracture surgery (05/04/2015); Revision Colostomy; LAPAROSCOPY (05/18/2015); Hernia repair; Knee arthroscopy w/ meniscal repair; Rectal surgery; Other surgical history; Thoracentesis (06/02/2015); and Tonsillectomy and adenoidectomy  His family history includes Coronary artery disease in his mother; Heart disease in his father; Hyperlipidemia in his father  He  reports that he quit smoking about 33 years ago  He has never used smokeless tobacco  He reports that he drinks alcohol   He reports that he does not use drugs  Current Outpatient Prescriptions   Medication Sig Dispense Refill    atorvastatin (LIPITOR) 40 mg tablet Take 1 tablet (40 mg total) by mouth daily 90 tablet 3    furosemide (LASIX) 20 mg tablet Take 1 tablet (20 mg total) by mouth daily 90 tablet 3    metFORMIN (GLUCOPHAGE-XR) 500 mg 24 hr tablet Take 1 tablet (500 mg total) by mouth 2 (two) times a day with meals for 90 days 180 tablet 3    metoprolol tartrate (LOPRESSOR) 25 mg tablet Take 1 tablet (25 mg total) by mouth 2 (two) times a day 180 tablet 3    nitroglycerin (NITROSTAT) 0 4 mg SL tablet DISSOLVE 1 TABLET UNDER THE TONGUE EVERY 5 MINUTES AS  NEEDED FOR CHEST PAIN   NOT TO EXCEED 3 TABLETS IN 15  MINUTES 100 tablet 2    potassium chloride (K-TAB) 10 mEq tablet Take 1 tablet (10 mEq total) by mouth daily 90 tablet 3    ramipril (ALTACE) 10 MG capsule Take 1 capsule (10 mg total) by mouth daily 90 capsule 3     No current facility-administered medications for this visit  Current Outpatient Prescriptions on File Prior to Visit   Medication Sig    atorvastatin (LIPITOR) 40 mg tablet Take 1 tablet (40 mg total) by mouth daily    furosemide (LASIX) 20 mg tablet Take 1 tablet (20 mg total) by mouth daily    metFORMIN (GLUCOPHAGE-XR) 500 mg 24 hr tablet Take 1 tablet (500 mg total) by mouth 2 (two) times a day with meals for 90 days    metoprolol tartrate (LOPRESSOR) 25 mg tablet Take 1 tablet (25 mg total) by mouth 2 (two) times a day    nitroglycerin (NITROSTAT) 0 4 mg SL tablet DISSOLVE 1 TABLET UNDER THE TONGUE EVERY 5 MINUTES AS  NEEDED FOR CHEST PAIN   NOT TO EXCEED 3 TABLETS IN 15  MINUTES    potassium chloride (K-TAB) 10 mEq tablet Take 1 tablet (10 mEq total) by mouth daily    ramipril (ALTACE) 10 MG capsule Take 1 capsule (10 mg total) by mouth daily     No current facility-administered medications on file prior to visit  He has No Known Allergies       Review of Systems   Constitutional: Negative for activity change, appetite change, diaphoresis, fatigue and fever  HENT: Negative  Eyes: Negative  Respiratory: Negative for apnea, cough, chest tightness, shortness of breath and wheezing  Cardiovascular: Negative for chest pain, palpitations and leg swelling  Gastrointestinal: Negative for abdominal distention, abdominal pain, anal bleeding, constipation, diarrhea, nausea and vomiting  Endocrine: Negative for cold intolerance, heat intolerance, polydipsia, polyphagia and polyuria  Genitourinary: Negative for difficulty urinating, dysuria, flank pain, hematuria and urgency  Musculoskeletal: Negative for arthralgias, back pain, gait problem, joint swelling and myalgias  Skin: Negative for color change, rash and wound  Allergic/Immunologic: Negative for environmental allergies, food allergies and immunocompromised state  Neurological: Negative for dizziness, seizures, syncope, speech difficulty, numbness and headaches  Hematological: Negative for adenopathy  Does not bruise/bleed easily  Psychiatric/Behavioral: Negative for agitation, behavioral problems, hallucinations, sleep disturbance and suicidal ideas  Objective:      /68 (BP Location: Left arm, Patient Position: Sitting, Cuff Size: Large)   Ht 6' (1 829 m)   Wt (!) 138 kg (303 lb 9 6 oz)   BMI 41 18 kg/m²          Physical Exam   Constitutional: He is oriented to person, place, and time  He appears well-developed and well-nourished  No distress  HENT:   Head: Normocephalic  Right Ear: External ear normal    Left Ear: External ear normal    Nose: Nose normal    Mouth/Throat: Oropharynx is clear and moist    Eyes: Pupils are equal, round, and reactive to light  Conjunctivae and EOM are normal  Right eye exhibits no discharge  Left eye exhibits no discharge  No scleral icterus  Neck: Normal range of motion  No tracheal deviation present  No thyromegaly present     Cardiovascular: Normal rate, regular rhythm and normal heart sounds  Exam reveals no gallop and no friction rub  No murmur heard  Pulmonary/Chest: Effort normal and breath sounds normal  No respiratory distress  He has no wheezes  Abdominal: Soft  Bowel sounds are normal  He exhibits no mass  There is no tenderness  There is no guarding  Musculoskeletal: He exhibits no edema or deformity  Lymphadenopathy:     He has no cervical adenopathy  Neurological: He is alert and oriented to person, place, and time  No cranial nerve deficit  Skin: Skin is warm and dry  No rash noted  He is not diaphoretic  No erythema  Psychiatric: He has a normal mood and affect   Thought content normal

## 2018-12-03 PROBLEM — Z95.5 PRESENCE OF DRUG-ELUTING STENT IN RIGHT CORONARY ARTERY: Status: ACTIVE | Noted: 2018-12-03

## 2018-12-04 ENCOUNTER — OFFICE VISIT (OUTPATIENT)
Dept: CARDIOLOGY CLINIC | Facility: HOSPITAL | Age: 68
End: 2018-12-04
Payer: MEDICARE

## 2018-12-04 VITALS
HEIGHT: 72 IN | DIASTOLIC BLOOD PRESSURE: 63 MMHG | SYSTOLIC BLOOD PRESSURE: 137 MMHG | BODY MASS INDEX: 42.66 KG/M2 | HEART RATE: 49 BPM | WEIGHT: 315 LBS

## 2018-12-04 DIAGNOSIS — I10 BENIGN ESSENTIAL HYPERTENSION: ICD-10-CM

## 2018-12-04 DIAGNOSIS — E78.5 DYSLIPIDEMIA: ICD-10-CM

## 2018-12-04 DIAGNOSIS — Z95.5 PRESENCE OF DRUG-ELUTING STENT IN RIGHT CORONARY ARTERY: ICD-10-CM

## 2018-12-04 DIAGNOSIS — I25.10 CORONARY ARTERY DISEASE INVOLVING NATIVE CORONARY ARTERY OF NATIVE HEART WITHOUT ANGINA PECTORIS: Primary | ICD-10-CM

## 2018-12-04 DIAGNOSIS — E66.01 MORBID OBESITY (HCC): ICD-10-CM

## 2018-12-04 PROCEDURE — 99214 OFFICE O/P EST MOD 30 MIN: CPT | Performed by: INTERNAL MEDICINE

## 2018-12-04 PROCEDURE — 93000 ELECTROCARDIOGRAM COMPLETE: CPT | Performed by: INTERNAL MEDICINE

## 2018-12-04 NOTE — PROGRESS NOTES
Cardiology Follow Up    Codie Baxter  1950  [id number]  [address]  [address] [facility] [address] [phone]    1  Coronary artery disease involving native coronary artery of native heart without angina pectoris  POCT ECG   2  Presence of drug-eluting stent in right coronary artery     3  Benign essential hypertension     4  Dyslipidemia  Lipid Panel with Direct LDL reflex   5  Morbid obesity [facility])         Discussion/Summary:  Mr Barrington Cisneros is a pleasant 29-year-old gentleman who presents to the office today for routine followup       Since his last visit he has been feeling well from a cardiac standpoint and is currently asymptomatic  His activity continues to be limited by his knee pain       His blood pressure is well-controlled in the office today  He had lipids recently performed earlier this year and they are acceptable on current therapy  I have requested a repeat lipid panel       No changes were made to his medication regimen  No cardiac testing is indicated given he is asymptomatic although I did again offer him vascular screening which he declines      I will see him back in the office in one year  Interval History:   Mr Barrington Cisneros is a pleasant 29-year-old gentleman who presents to the office today for followup       Since his last visit one year ago he has been feeling well  His activity continues to be limited by bilateral knee pain  With the activity he is able to do including ascending a flight of steps, he denies any exertional chest pain or shortness of breath  He denies symptoms of heart failure including increasing lower extremity edema, paroxysmal nocturnal dyspnea, orthopnea, weight gain or increasing abdominal girth  He denies lightheadedness, dizziness, syncope or presyncope  He denies symptoms of palpitations or claudication       Problem List     Benign essential hypertension Overview Signed 11/19/2018 10:43 AM by Luis Ellis, DO     On good medications ramipril doing well         CAD (coronary artery disease)    Diabetes mellitus type 2, controlled ([facility] )    Overview Signed 11/19/2018 10:43 AM by Luis Ellis, DO     Blood sugars in the range of 150         Lab Results   Component Value Date    HGBA1C 7 0 (H) 07/17/2018       No results for input(s): POCGLU in the last 72 hours      Blood Sugar Average: Last 72 hrs:          Dyslipidemia    Morbid obesity ([facility] )    Sciatica        Past Medical History:   Diagnosis Date    Asthma     resolved: 08/14/17    Closed fracture of fifth metacarpal bone of right hand     last assessed: 06/30/15    [facility]     Diverticulitis     Fracture of metacarpal shaft     Hemopneumothorax, left     last assessed: 06/02/15    Hyperlipidemia     Hypertension     Inguinal hernia     Lumbar transverse process fracture (HCC)     Pilonidal cyst     Pleural effusion     last assessed: 07/01/15    Rib fractures     last assessed: 06/02/15    Status post fall     Superficial thrombophlebitis of leg     last assessed: 03/19/14     Social History     Social History    Marital status: /Civil Union     Spouse name: N/A    Number of children: N/A    Years of education: N/A     Occupational History    Retired      Social History Main Topics    Smoking status: Former Smoker     Quit date: 1985    Smokeless tobacco: Never Used    Alcohol use Yes      Comment: Social    Drug use: No    Sexual activity: Yes     Other Topics Concern    Not on file     Social History Narrative    Always uses seat belt    No living will      Family History   Problem Relation Age of Onset    Coronary artery disease Mother     Heart disease Father         Benign hypertensive    Hyperlipidemia Father      Past Surgical History:   Procedure Laterality Date    CORONARY ANGIOPLASTY WITH STENT PLACEMENT  2009    PTCA/RITA to RCA    FRACTURE SURGERY 05/04/2015    Closed treatment of fracture of metacarpal bone, right 5t metacarpal fracture Dr Cory Mejia ARTHROSCOPY W/ MENISCAL REPAIR      Lateral meniscus    LAPAROSCOPY  05/18/2015    Exploratory, extensive lysis of adhesions Dr Rudolph Hart      Surgical lysis of intestinal adhesions    RECTAL SURGERY      REVISION COLOSTOMY      THORACENTESIS  06/02/2015    with imaging guidance left, removed 1300cc of fluid w/o problem lower base of lun06    TONSILLECTOMY AND ADENOIDECTOMY         Current Outpatient Prescriptions:     atorvastatin (LIPITOR) 40 mg tablet, Take 1 tablet (40 mg total) by mouth daily, Disp: 90 tablet, Rfl: 3    furosemide (LASIX) 20 mg tablet, Take 1 tablet (20 mg total) by mouth daily, Disp: 90 tablet, Rfl: 3    metFORMIN (GLUCOPHAGE-XR) 500 mg 24 hr tablet, Take 1 tablet (500 mg total) by mouth 2 (two) times a day with meals for 90 days, Disp: 180 tablet, Rfl: 3    metoprolol tartrate (LOPRESSOR) 25 mg tablet, Take 1 tablet (25 mg total) by mouth 2 (two) times a day, Disp: 180 tablet, Rfl: 3    nitroglycerin (NITROSTAT) 0 4 mg SL tablet, DISSOLVE 1 TABLET UNDER THE TONGUE EVERY 5 MINUTES AS  NEEDED FOR CHEST PAIN    NOT TO EXCEED 3 TABLETS IN 15  MINUTES, Disp: 100 tablet, Rfl: 2    potassium chloride (K-TAB) 10 mEq tablet, Take 1 tablet (10 mEq total) by mouth daily, Disp: 90 tablet, Rfl: 3    ramipril (ALTACE) 10 MG capsule, Take 1 capsule (10 mg total) by mouth daily, Disp: 90 capsule, Rfl: 3  No Known Allergies    Labs:     Chemistry        Component Value Date/Time     06/28/2017 1108    K 4 4 10/03/2018 1150    K 4 6 06/28/2017 1108     10/03/2018 1150    CL 99 06/28/2017 1108    CO2 28 10/03/2018 1150    CO2 24 06/28/2017 1108    BUN 11 10/03/2018 1150    BUN 15 06/28/2017 1108    CREATININE 0 84 10/03/2018 1150    CREATININE 0 81 06/28/2017 1108        Component Value Date/Time    CALCIUM 9 0 10/03/2018 1150 CALCIUM 8 9 06/28/2017 1108    ALKPHOS 117 (H) 12/09/2017 0940    ALKPHOS 127 (H) 05/14/2015 1003    AST 16 12/09/2017 0940    AST 18 05/14/2015 1003    ALT 24 12/09/2017 0940    ALT 27 05/14/2015 1003    BILITOT 1 50 (H) 05/14/2015 1003            Lab Results   Component Value Date    CHOL 108 07/14/2016    CHOL 191 10/28/2015    CHOL 117 11/16/2013     Lab Results   Component Value Date    HDL 45 12/09/2017    HDL 42 07/14/2016    HDL 50 10/28/2015     Lab Results   Component Value Date    LDLCALC 60 12/09/2017    LDLCALC 50 07/14/2016    LDLCALC 122 (H) 10/28/2015     Lab Results   Component Value Date    TRIG 66 12/09/2017    TRIG 78 07/14/2016    TRIG 97 10/28/2015     No results found for: CHOLHDL    Imaging: No results found  ECG:  Sinus bradycardia, otherwise normal ECG      Review of Systems   Cardiovascular: Negative for chest pain, cyanosis, dyspnea on exertion and irregular heartbeat  Musculoskeletal: Positive for arthritis and joint pain  All other systems reviewed and are negative  Vitals:    12/04/18 0745   BP: 137/63   Pulse: (!) 49     Vitals:    12/04/18 0745   Weight: (!) 150 kg (330 lb 6 4 oz)     Height: 6' (182 9 cm)   Body mass index is 44 81 kg/m²      Physical Exam:   General appearance:  Appears stated age, alert, well appearing and in no distress  HEENT:  PERRLA, EOMI, no scleral icterus, no conjunctival pallor  NECK:  Supple, No elevated JVP, no thyromegaly, no carotid bruits  HEART:  Regular rate and rhythm, normal S1/S2, no S3/S4, no murmur or rub  LUNGS:  Clear to auscultation bilaterally  ABDOMEN:  Soft, non-tender, positive bowel sounds, no rebound or guarding, no organomegaly   EXTREMITIES:  No edema,  + varicosites noted  VASCULAR:  Normal pedal pulses   SKIN: No lesions or rashes on exposed skin  NEURO:  CN II-XII intact, no focal deficits

## 2018-12-05 ENCOUNTER — LAB (OUTPATIENT)
Dept: LAB | Facility: HOSPITAL | Age: 68
End: 2018-12-05
Attending: FAMILY MEDICINE
Payer: MEDICARE

## 2018-12-05 DIAGNOSIS — E11.8 TYPE 2 DIABETES MELLITUS WITH COMPLICATION, UNSPECIFIED WHETHER LONG TERM INSULIN USE: ICD-10-CM

## 2018-12-05 DIAGNOSIS — E78.5 DYSLIPIDEMIA: ICD-10-CM

## 2018-12-05 DIAGNOSIS — Z11.59 ENCOUNTER FOR HEPATITIS C SCREENING TEST FOR LOW RISK PATIENT: ICD-10-CM

## 2018-12-05 LAB
CHOLEST SERPL-MCNC: 120 MG/DL (ref 50–200)
CREAT UR-MCNC: 51.8 MG/DL
HDLC SERPL-MCNC: 48 MG/DL (ref 40–60)
LDLC SERPL CALC-MCNC: 60 MG/DL (ref 0–100)
MICROALBUMIN UR-MCNC: <5 MG/L (ref 0–20)
MICROALBUMIN/CREAT 24H UR: <10 MG/G CREATININE (ref 0–30)
TRIGL SERPL-MCNC: 62 MG/DL

## 2018-12-05 PROCEDURE — 80061 LIPID PANEL: CPT

## 2018-12-05 PROCEDURE — 86803 HEPATITIS C AB TEST: CPT

## 2018-12-05 PROCEDURE — 82570 ASSAY OF URINE CREATININE: CPT

## 2018-12-05 PROCEDURE — 82043 UR ALBUMIN QUANTITATIVE: CPT

## 2018-12-06 LAB — HCV AB SER QL: NORMAL

## 2019-03-07 ENCOUNTER — OFFICE VISIT (OUTPATIENT)
Dept: FAMILY MEDICINE CLINIC | Facility: CLINIC | Age: 69
End: 2019-03-07
Payer: MEDICARE

## 2019-03-07 VITALS
SYSTOLIC BLOOD PRESSURE: 118 MMHG | HEIGHT: 72 IN | DIASTOLIC BLOOD PRESSURE: 68 MMHG | WEIGHT: 302 LBS | BODY MASS INDEX: 40.9 KG/M2

## 2019-03-07 DIAGNOSIS — I10 BENIGN ESSENTIAL HYPERTENSION: ICD-10-CM

## 2019-03-07 DIAGNOSIS — I25.118 CORONARY ARTERY DISEASE OF NATIVE HEART WITH STABLE ANGINA PECTORIS, UNSPECIFIED VESSEL OR LESION TYPE (HCC): ICD-10-CM

## 2019-03-07 DIAGNOSIS — E78.5 DYSLIPIDEMIA: ICD-10-CM

## 2019-03-07 DIAGNOSIS — E66.01 MORBID OBESITY (HCC): ICD-10-CM

## 2019-03-07 DIAGNOSIS — M17.0 PRIMARY OSTEOARTHRITIS OF BOTH KNEES: ICD-10-CM

## 2019-03-07 DIAGNOSIS — E11.9 CONTROLLED TYPE 2 DIABETES MELLITUS WITHOUT COMPLICATION, WITHOUT LONG-TERM CURRENT USE OF INSULIN (HCC): Primary | ICD-10-CM

## 2019-03-07 PROCEDURE — 99214 OFFICE O/P EST MOD 30 MIN: CPT | Performed by: FAMILY MEDICINE

## 2019-03-07 NOTE — PROGRESS NOTES
Assessment/Plan:  Controlled diabetes 2 patient will have his diabetic lab work done in the near future also will have a diabetic eye exam results to be sent to me also will have a see his diabetic podiatrist for treatment of ingrown toenail on the left foot    Hypertension continue same meds    Morbid obesity patient will follow a low-calorie low-fat diet    Primary osteoarthritis both knees patient will be referred to Dr Gemma Pacheco at [address] [facility] for injection therapy to both knees    No problem-specific Assessment & Plan notes found for this encounter  Diagnoses and all orders for this visit:    Controlled type 2 diabetes mellitus without complication, without long-term current use of insulin ([address] )    Morbid obesity ([address] )    Coronary artery disease of native heart with stable angina pectoris, unspecified vessel or lesion type ([address] )    Benign essential hypertension    Dyslipidemia          Subjective:      Patient ID: Dwight Ireland is a 79 y o  male      Mr Weir Showers here for a follow-up visit regarding diabetes hypertension dyslipidemia also primary osteoarthritis of both knees and an ingrown toenail on the left great toe blood sugars not recently done as far as hemoglobin A1c he does have of is prescriptions for his blood work having considerable problems with his knees and discomfort especially at nighttime when he tries to go to sleep needs a referral to Dr Gemma Pacheco at [facility] [facility] for possible injection therapy to the knees needs a diabetic eye exam has not been there for years and will be seeing a ophthalmologist in the near future      The following portions of the patient's history were reviewed and updated as appropriate: He  has a past medical history of Asthma, Closed fracture of fifth metacarpal bone of right hand, Diabetes mellitus ([address] ), Diverticulitis, Fracture of metacarpal shaft, Hemopneumothorax, left, Hyperlipidemia, Hypertension, Inguinal hernia, Lumbar transverse process fracture (HCC), Pilonidal cyst, Pleural effusion, Rib fractures, Status post fall, and Superficial thrombophlebitis of leg  He   Patient Active Problem List    Diagnosis Date Noted    Presence of drug-eluting stent in right coronary artery 12/03/2018    Sciatica 01/06/2014    Dyslipidemia 12/02/2013    Coronary artery disease of native heart with stable angina pectoris ([facility] ) 11/20/2013    Morbid obesity ([place] [address] ) 11/20/2013    Type 2 diabetes mellitus with complication ([place] [address] ) 14/36/1901    Benign essential hypertension 07/27/2012     He  has a past surgical history that includes Coronary angioplasty with stent (2009); Fracture surgery (05/04/2015); Revision Colostomy; LAPAROSCOPY (05/18/2015); Hernia repair; Knee arthroscopy w/ meniscal repair; Rectal surgery; Other surgical history; Thoracentesis (06/02/2015); and Tonsillectomy and adenoidectomy  His family history includes Coronary artery disease in his mother; Heart disease in his father; Hyperlipidemia in his father  He  reports that he quit smoking about 34 years ago  He has never used smokeless tobacco  He reports that he drank alcohol  He reports that he does not use drugs  Current Outpatient Medications   Medication Sig Dispense Refill    atorvastatin (LIPITOR) 40 mg tablet Take 1 tablet (40 mg total) by mouth daily 90 tablet 3    furosemide (LASIX) 20 mg tablet Take 1 tablet (20 mg total) by mouth daily 90 tablet 3    metFORMIN (GLUCOPHAGE-XR) 500 mg 24 hr tablet Take 1 tablet (500 mg total) by mouth 2 (two) times a day with meals for 90 days 180 tablet 3    metoprolol tartrate (LOPRESSOR) 25 mg tablet Take 1 tablet (25 mg total) by mouth 2 (two) times a day 180 tablet 3    nitroglycerin (NITROSTAT) 0 4 mg SL tablet DISSOLVE 1 TABLET UNDER THE TONGUE EVERY 5 MINUTES AS  NEEDED FOR CHEST PAIN    NOT TO EXCEED 3 TABLETS IN 15  MINUTES 100 tablet 2    potassium chloride (K-TAB) 10 mEq tablet Take 1 tablet (10 mEq total) by mouth daily 90 tablet 3    ramipril (ALTACE) 10 MG capsule Take 1 capsule (10 mg total) by mouth daily 90 capsule 3     No current facility-administered medications for this visit  Current Outpatient Medications on File Prior to Visit   Medication Sig    atorvastatin (LIPITOR) 40 mg tablet Take 1 tablet (40 mg total) by mouth daily    furosemide (LASIX) 20 mg tablet Take 1 tablet (20 mg total) by mouth daily    metFORMIN (GLUCOPHAGE-XR) 500 mg 24 hr tablet Take 1 tablet (500 mg total) by mouth 2 (two) times a day with meals for 90 days    metoprolol tartrate (LOPRESSOR) 25 mg tablet Take 1 tablet (25 mg total) by mouth 2 (two) times a day    nitroglycerin (NITROSTAT) 0 4 mg SL tablet DISSOLVE 1 TABLET UNDER THE TONGUE EVERY 5 MINUTES AS  NEEDED FOR CHEST PAIN   NOT TO EXCEED 3 TABLETS IN 15  MINUTES    potassium chloride (K-TAB) 10 mEq tablet Take 1 tablet (10 mEq total) by mouth daily    ramipril (ALTACE) 10 MG capsule Take 1 capsule (10 mg total) by mouth daily     No current facility-administered medications on file prior to visit  He has No Known Allergies       Review of Systems   Constitutional: Negative for activity change, appetite change, diaphoresis, fatigue and fever  HENT: Negative  Eyes: Negative  Respiratory: Negative for apnea, cough, chest tightness, shortness of breath and wheezing  Cardiovascular: Negative for chest pain, palpitations and leg swelling  Gastrointestinal: Negative for abdominal distention, abdominal pain, anal bleeding, constipation, diarrhea, nausea and vomiting  Endocrine: Negative for cold intolerance, heat intolerance, polydipsia, polyphagia and polyuria  Genitourinary: Negative for difficulty urinating, dysuria, flank pain, hematuria and urgency  Musculoskeletal: Positive for arthralgias and gait problem  Negative for back pain, joint swelling and myalgias          Primary osteoarthritis of both knees which is quite painful patient would benefit from injection therapy   Skin: Negative for color change, rash and wound  Ingrown toenail left great toe   Allergic/Immunologic: Negative for environmental allergies, food allergies and immunocompromised state  Neurological: Negative for dizziness, seizures, syncope, speech difficulty, numbness and headaches  Hematological: Negative for adenopathy  Does not bruise/bleed easily  Psychiatric/Behavioral: Negative for agitation, behavioral problems, hallucinations, sleep disturbance and suicidal ideas  Objective:      /68 (BP Location: Left arm, Patient Position: Sitting, Cuff Size: Standard)   Ht 6' (1 829 m)   Wt (!) 137 kg (302 lb)   BMI 40 96 kg/m²          Physical Exam   Constitutional: He is oriented to person, place, and time  He appears well-developed and well-nourished  No distress  HENT:   Head: Normocephalic  Right Ear: External ear normal    Left Ear: External ear normal    Nose: Nose normal    Mouth/Throat: Oropharynx is clear and moist    Eyes: Pupils are equal, round, and reactive to light  Conjunctivae and EOM are normal  Right eye exhibits no discharge  Left eye exhibits no discharge  No scleral icterus  Neck: Normal range of motion  No tracheal deviation present  No thyromegaly present  Cardiovascular: Normal rate, regular rhythm and normal heart sounds  Exam reveals no gallop and no friction rub  Pulses are no weak pulses  No murmur heard  Pulses:       Dorsalis pedis pulses are 2+ on the right side, and 2+ on the left side  Posterior tibial pulses are 2+ on the right side, and 2+ on the left side  Pulmonary/Chest: Effort normal and breath sounds normal  No respiratory distress  He has no wheezes  Abdominal: Soft  Bowel sounds are normal  He exhibits no mass  There is no tenderness  There is no guarding  Musculoskeletal: He exhibits no edema or deformity          Feet:    Feet:   Right Foot:   Skin Integrity: Negative for ulcer, skin breakdown, erythema, warmth, callus or dry skin  Left Foot:   Skin Integrity: Negative for ulcer, skin breakdown, erythema, warmth, callus or dry skin  Lymphadenopathy:     He has no cervical adenopathy  Neurological: He is alert and oriented to person, place, and time  No cranial nerve deficit  Skin: Skin is warm and dry  Capillary refill takes 2 to 3 seconds  No rash noted  He is not diaphoretic  No erythema  Psychiatric: He has a normal mood and affect  Thought content normal      Patient's shoes and socks removed  Right Foot/Ankle   Right Foot Inspection  Skin Exam: skin normal and skin intact no dry skin, no warmth, no callus, no erythema, no maceration, no abnormal color, no pre-ulcer, no ulcer and no callus                          Toe Exam: ROM and strength within normal limits  Sensory   Vibration: intact  Proprioception: intact   Monofilament testing: intact  Vascular  Capillary refills: < 3 seconds  The right DP pulse is 2+  The right PT pulse is 2+  Left Foot/Ankle  Left Foot Inspection  Skin Exam: skin normal and skin intactno dry skin, no warmth, no erythema, no maceration, normal color, no pre-ulcer, no ulcer and no callus                         Toe Exam: ROM and strength within normal limits                   Sensory   Vibration: intact  Proprioception: intact  Monofilament: intact  Vascular  Capillary refills: < 3 seconds  The left DP pulse is 2+  The left PT pulse is 2+  Assign Risk Category:  No deformity present; No loss of protective sensation; No weak pulses       Risk: 0    BMI Counseling: Body mass index is 40 96 kg/m²  Discussed the patient's BMI with him  The BMI is above average  BMI counseling and education was provided to the patient   Nutrition recommendations include reducing portion sizes, decreasing overall calorie intake, 3-5 servings of fruits/vegetables daily, reducing fast food intake, consuming healthier snacks, decreasing soda and/or juice intake, moderation in carbohydrate intake and increasing intake of lean protein  Exercise recommendations include moderate aerobic physical activity for 150 minutes/week

## 2019-03-07 NOTE — PATIENT INSTRUCTIONS
Obesity   AMBULATORY CARE:   Obesity  is when your body mass index (BMI) is greater than 30  Your healthcare provider will use your height and weight to measure your BMI  The risks of obesity include  many health problems, such as injuries or physical disability  You may need tests to check for the following:  · Diabetes     · High blood pressure or high cholesterol     · Heart disease     · Gallbladder or liver disease     · Cancer of the colon, breast, prostate, liver, or kidney     · Sleep apnea     · Arthritis or gout  Seek care immediately if:   · You have a severe headache, confusion, or difficulty speaking  · You have weakness on one side of your body  · You have chest pain, sweating, or shortness of breath  Contact your healthcare provider if:   · You have symptoms of gallbladder or liver disease, such as pain in your upper abdomen  · You have knee or hip pain and discomfort while walking  · You have symptoms of diabetes, such as intense hunger and thirst, and frequent urination  · You have symptoms of sleep apnea, such as snoring or daytime sleepiness  · You have questions or concerns about your condition or care  Treatment for obesity  focuses on helping you lose weight to improve your health  Even a small decrease in BMI can reduce the risk for many health problems  Your healthcare provider will help you set a weight-loss goal   · Lifestyle changes  are the first step in treating obesity  These include making healthy food choices and getting regular physical activity  Your healthcare provider may suggest a weight-loss program that involves coaching, education, and therapy  · Medicine  may help you lose weight when it is used with a healthy diet and physical activity  · Surgery  can help you lose weight if you are very obese and have other health problems  There are several types of weight-loss surgery  Ask your healthcare provider for more information    Be successful losing weight:   · Set small, realistic goals  An example of a small goal is to walk for 20 minutes 5 days a week  Anther goal is to lose 5% of your body weight  · Tell friends, family members, and coworkers about your goals  and ask for their support  Ask a friend to lose weight with you, or join a weight-loss support group  · Identify foods or triggers that may cause you to overeat , and find ways to avoid them  Remove tempting high-calorie foods from your home and workplace  Place a bowl of fresh fruit on your kitchen counter  If stress causes you to eat, then find other ways to cope with stress  · Keep a diary to track what you eat and drink  Also write down how many minutes of physical activity you do each day  Weigh yourself once a week and record it in your diary  Eating changes: You will need to eat 500 to 1,000 fewer calories each day than you currently eat to lose 1 to 2 pounds a week  The following changes will help you cut calories:  · Eat smaller portions  Use small plates, no larger than 9 inches in diameter  Fill your plate half full of fruits and vegetables  Measure your food using measuring cups until you know what a serving size looks like  · Eat 3 meals and 1 or 2 snacks each day  Plan your meals in advance  Colleen Damon and eat at home most of the time  Eat slowly  · Eat fruits and vegetables at every meal   They are low in calories and high in fiber, which makes you feel full  Do not add butter, margarine, or cream sauce to vegetables  Use herbs to season steamed vegetables  · Eat less fat and fewer fried foods  Eat more baked or grilled chicken and fish  These protein sources are lower in calories and fat than red meat  Limit fast food  Dress your salads with olive oil and vinegar instead of bottled dressing  · Limit the amount of sugar you eat  Do not drink sugary beverages  Limit alcohol  Activity changes:  Physical activity is good for your body in many ways   It helps you burn calories and build strong muscles  It decreases stress and depression, and improves your mood  It can also help you sleep better  Talk to your healthcare provider before you begin an exercise program   · Exercise for at least 30 minutes 5 days a week  Start slowly  Set aside time each day for physical activity that you enjoy and that is convenient for you  It is best to do both weight training and an activity that increases your heart rate, such as walking, bicycling, or swimming  · Find ways to be more active  Do yard work and housecleaning  Walk up the stairs instead of using elevators  Spend your leisure time going to events that require walking, such as outdoor festivals or fairs  This extra physical activity can help you lose weight and keep it off  Follow up with your healthcare provider as directed: You may need to meet with a dietitian  Write down your questions so you remember to ask them during your visits  © 2017 [employer] Information is for End User's use only and may not be sold, redistributed or otherwise used for commercial purposes  All illustrations and images included in CareNotes® are the copyrighted property of [employer]  or Deshawn Harkins  The above information is an  only  It is not intended as medical advice for individual conditions or treatments  Talk to your doctor, nurse or pharmacist before following any medical regimen to see if it is safe and effective for you  Weight Management   AMBULATORY CARE:   Why it is important to manage your weight:  Being overweight increases your risk of health conditions such as heart disease, high blood pressure, type 2 diabetes, and certain types of cancer  It can also increase your risk for osteoarthritis, sleep apnea, and other respiratory problems  Aim for a slow, steady weight loss  Even a small amount of weight loss can lower your risk of health problems    How to lose weight safely:  A safe and healthy way to lose weight is to eat fewer calories and get regular exercise  You can lose up about 1 pound a week by decreasing the number of calories you eat by 500 calories each day  You can decrease calories by eating smaller portion sizes or by cutting out high-calorie foods  Read labels to find out how many calories are in the foods you eat  You can also burn calories with exercise such as walking, swimming, or biking  You will be more likely to keep weight off if you make these changes part of your lifestyle  Healthy meal plan for weight management:  A healthy meal plan includes a variety of foods, contains fewer calories, and helps you stay healthy  A healthy meal plan includes the following:  · Eat whole-grain foods more often  A healthy meal plan should contain fiber  Fiber is the part of grains, fruits, and vegetables that is not broken down by your body  Whole-grain foods are healthy and provide extra fiber in your diet  Some examples of whole-grain foods are whole-wheat breads and pastas, oatmeal, brown rice, and bulgur  · Eat a variety of vegetables every day  Include dark, leafy greens such as spinach, kale, jhonathan greens, and mustard greens  Eat yellow and orange vegetables such as carrots, sweet potatoes, and winter squash  · Eat a variety of fruits every day  Choose fresh or canned fruit (canned in its own juice or light syrup) instead of juice  Fruit juice has very little or no fiber  · Eat low-fat dairy foods  Drink fat-free (skim) milk or 1% milk  Eat fat-free yogurt and low-fat cottage cheese  Try low-fat cheeses such as mozzarella and other reduced-fat cheeses  · Choose meat and other protein foods that are low in fat  Choose beans or other legumes such as split peas or lentils  Choose fish, skinless poultry (chicken or turkey), or lean cuts of red meat (beef or pork)  Before you cook meat or poultry, cut off any visible fat  · Use less fat and oil  Try baking foods instead of frying them  Add less fat, such as margarine, sour cream, regular salad dressing and mayonnaise to foods  Eat fewer high-fat foods  Some examples of high-fat foods include french fries, doughnuts, ice cream, and cakes  · Eat fewer sweets  Limit foods and drinks that are high in sugar  This includes candy, cookies, regular soda, and sweetened drinks  Ways to decrease calories:   · Eat smaller portions  ¨ Use a small plate with smaller servings  ¨ Do not eat second helpings  ¨ When you eat at a restaurant, ask for a box and place half of your meal in the box before you eat  ¨ Share an entrée with someone else  · Replace high-calorie snacks with healthy, low-calorie snacks  ¨ Choose fresh fruit, vegetables, fat-free rice cakes, or air-popped popcorn instead of potato chips, nuts, or chocolate  ¨ Choose water or calorie-free drinks instead of soda or sweetened drinks  · Eat regular meals  Skipping meals can lead to overeating later in the day  Eat a healthy snack in place of a meal if you do not have time to eat a regular meal      · Do not shop for groceries when you are hungry  You may be more likely to make unhealthy food choices  Take a grocery list of healthy foods and shop after you have eaten  Exercise:  Exercise at least 30 minutes per day on most days of the week  Some examples of exercise include walking, biking, dancing, and swimming  You can also fit in more physical activity by taking the stairs instead of the elevator or parking farther away from stores  Ask your healthcare provider about the best exercise plan for you  Other things to consider as you try to lose weight:   · Be aware of situations that may give you the urge to overeat, such as eating while watching television  Find ways to avoid these situations  For example, read a book, go for a walk, or do crafts      · Meet with a weight loss support group or friends who are also trying to lose weight  This may help you stay motivated to continue working on your weight loss goals  © 2017 [employer] Information is for End User's use only and may not be sold, redistributed or otherwise used for commercial purposes  All illustrations and images included in CareNotes® are the copyrighted property of [employer]  or Deshawn Harkins  The above information is an  only  It is not intended as medical advice for individual conditions or treatments  Talk to your doctor, nurse or pharmacist before following any medical regimen to see if it is safe and effective for you  Heart Healthy Diet   AMBULATORY CARE:   A heart healthy diet  is an eating plan low in total fat, unhealthy fats, and sodium (salt)  A heart healthy diet helps decrease your risk for heart disease and stroke  Limit the amount of fat you eat to 25% to 35% of your total daily calories  Limit sodium to less than 2,300 mg each day  Healthy fats:  Healthy fats can help improve cholesterol levels  The risk for heart disease is decreased when cholesterol levels are normal  Choose healthy fats, such as the following:  · Unsaturated fat  is found in foods such as soybean, canola, olive, corn, and safflower oils  It is also found in soft tub margarine that is made with liquid vegetable oil  · Omega-3 fat  is found in certain fish, such as salmon, tuna, and trout, and in walnuts and flaxseed  Unhealthy fats:  Unhealthy fats can cause unhealthy cholesterol levels in your blood and increase your risk of heart disease  Limit unhealthy fats, such as the following:  · Cholesterol  is found in animal foods, such as eggs and lobster, and in dairy products made from whole milk  Limit cholesterol to less than 300 milligrams (mg) each day  You may need to limit cholesterol to 200 mg each day if you have heart disease  · Saturated fat  is found in meats, such as walker and hamburger   It is also found in chicken or turkey skin, whole milk, and butter  Limit saturated fat to less than 7% of your total daily calories  Limit saturated fat to less than 6% if you have heart disease or are at increased risk for it  · Trans fat  is found in packaged foods, such as potato chips and cookies  It is also in hard margarine, some fried foods, and shortening  Avoid trans fats as much as possible    Heart healthy foods and drinks to include:  Ask your dietitian or healthcare provider how many servings to have from each of the following food groups:  · Grains:      ¨ Whole-wheat breads, cereals, and pastas, and brown rice    ¨ Low-fat, low-sodium crackers and chips    · Vegetables:      ¨ Broccoli, green beans, green peas, and spinach    ¨ Collards, kale, and lima beans    ¨ Carrots, sweet potatoes, tomatoes, and peppers    ¨ Canned vegetables with no salt added    · Fruits:      ¨ Bananas, peaches, pears, and pineapple    ¨ Grapes, raisins, and dates    ¨ Oranges, tangerines, grapefruit, orange juice, and grapefruit juice    ¨ Apricots, mangoes, melons, and papaya    ¨ Raspberries and strawberries    ¨ Canned fruit with no added sugar    · Low-fat dairy products:      ¨ Nonfat (skim) milk, 1% milk, and low-fat almond, cashew, or soy milks fortified with calcium    ¨ Low-fat cheese, regular or frozen yogurt, and cottage cheese    · Meats and proteins , such as lean cuts of beef and pork (loin, leg, round), skinless chicken and turkey, legumes, soy products, egg whites, and nuts  Foods and drinks to limit or avoid:  Ask your dietitian or healthcare provider about these and other foods that are high in unhealthy fat, sodium, and sugar:  · Snack or packaged foods , such as frozen dinners, cookies, macaroni and cheese, and cereals with more than 300 mg of sodium per serving    · Canned or dry mixes  for cakes, soups, sauces, or gravies    · Vegetables with added sodium , such as instant potatoes, vegetables with added sauces, or regular canned vegetables    · Other foods high in sodium , such as ketchup, barbecue sauce, salad dressing, pickles, olives, soy sauce, and miso    · High-fat dairy foods  such as whole or 2% milk, cream cheese, or sour cream, and cheeses     · High-fat protein foods  such as high-fat cuts of beef (T-bone steaks, ribs), chicken or turkey with skin, and organ meats, such as liver    · Cured or smoked meats , such as hot dogs, walker, and sausage    · Unhealthy fats and oils , such as butter, stick margarine, shortening, and cooking oils such as coconut or palm oil    · Food and drinks high in sugar , such as soft drinks (soda), sports drinks, sweetened tea, candy, cake, cookies, pies, and doughnuts  Other diet guidelines to follow:   · Eat more foods containing omega-3 fats  Eat fish high in omega-3 fats at least 2 times a week  · Limit alcohol  Too much alcohol can damage your heart and raise your blood pressure  Women should limit alcohol to 1 drink a day  Men should limit alcohol to 2 drinks a day  A drink of alcohol is 12 ounces of beer, 5 ounces of wine, or 1½ ounces of liquor  · Choose low-sodium foods  High-sodium foods can lead to high blood pressure  Add little or no salt to food you prepare  Use herbs and spices in place of salt  · Eat more fiber  to help lower cholesterol levels  Eat at least 5 servings of fruits and vegetables each day  Eat 3 ounces of whole-grain foods each day  Legumes (beans) are also a good source of fiber  Lifestyle guidelines:   · Do not smoke  Nicotine and other chemicals in cigarettes and cigars can cause lung and heart damage  Ask your healthcare provider for information if you currently smoke and need help to quit  E-cigarettes or smokeless tobacco still contain nicotine  Talk to your healthcare provider before you use these products  · Exercise regularly  to help you maintain a healthy weight and improve your blood pressure and cholesterol levels   Ask your healthcare provider about the best exercise plan for you  Do not start an exercise program without asking your healthcare provider  Follow up with your healthcare provider as directed:  Write down your questions so you remember to ask them during your visits  © 2017 [employer] Information is for End User's use only and may not be sold, redistributed or otherwise used for commercial purposes  All illustrations and images included in CareNotes® are the copyrighted property of [employer]  or Deshawn Harkins  The above information is an  only  It is not intended as medical advice for individual conditions or treatments  Talk to your doctor, nurse or pharmacist before following any medical regimen to see if it is safe and effective for you

## 2019-03-19 ENCOUNTER — HOSPITAL ENCOUNTER (OUTPATIENT)
Dept: RADIOLOGY | Facility: HOSPITAL | Age: 69
Discharge: HOME | End: 2019-03-19
Attending: FAMILY MEDICINE
Payer: MEDICARE

## 2019-03-19 DIAGNOSIS — M17.0 PRIMARY OSTEOARTHRITIS OF BOTH KNEES: ICD-10-CM

## 2019-03-19 PROCEDURE — 73562 X-RAY EXAM OF KNEE 3: CPT

## 2019-03-25 NOTE — RESULT ENCOUNTER NOTE
Please call the patient regarding his abnormal result    Left knee has complete loss of joint space no cartilage left again he could certainly try injections 1st but on this knee is so bad that he is probably a candidate for a total knee replacement or at least resurfacing of the knee

## 2019-03-25 NOTE — RESULT ENCOUNTER NOTE
Please call the patient regarding his abnormal result    Right knee significant joint space narrowing with bone spurs patient would probably benefit from injections for his knee if he is interested we can set this up

## 2019-06-19 ENCOUNTER — LAB (OUTPATIENT)
Dept: LAB | Facility: HOSPITAL | Age: 69
End: 2019-06-19
Attending: FAMILY MEDICINE
Payer: MEDICARE

## 2019-06-19 DIAGNOSIS — E11.8 TYPE 2 DIABETES MELLITUS WITH COMPLICATION, UNSPECIFIED WHETHER LONG TERM INSULIN USE: ICD-10-CM

## 2019-06-19 LAB
CREAT UR-MCNC: 64 MG/DL
MICROALBUMIN UR-MCNC: <5 MG/L (ref 0–20)
MICROALBUMIN/CREAT 24H UR: <8 MG/G CREATININE (ref 0–30)

## 2019-06-19 PROCEDURE — 82043 UR ALBUMIN QUANTITATIVE: CPT

## 2019-06-19 PROCEDURE — 82570 ASSAY OF URINE CREATININE: CPT

## 2019-06-28 ENCOUNTER — OFFICE VISIT (OUTPATIENT)
Dept: PODIATRY | Facility: CLINIC | Age: 69
End: 2019-06-28
Payer: MEDICARE

## 2019-06-28 VITALS
DIASTOLIC BLOOD PRESSURE: 70 MMHG | WEIGHT: 305 LBS | SYSTOLIC BLOOD PRESSURE: 136 MMHG | HEIGHT: 73 IN | HEART RATE: 51 BPM | BODY MASS INDEX: 40.42 KG/M2

## 2019-06-28 DIAGNOSIS — E11.40 TYPE 2 DIABETES MELLITUS WITH DIABETIC NEUROPATHY, WITHOUT LONG-TERM CURRENT USE OF INSULIN (HCC): Primary | ICD-10-CM

## 2019-06-28 DIAGNOSIS — B35.1 ONYCHOMYCOSIS: ICD-10-CM

## 2019-06-28 PROCEDURE — 11721 DEBRIDE NAIL 6 OR MORE: CPT | Performed by: PODIATRIST

## 2019-07-23 ENCOUNTER — OFFICE VISIT (OUTPATIENT)
Dept: FAMILY MEDICINE CLINIC | Facility: CLINIC | Age: 69
End: 2019-07-23
Payer: MEDICARE

## 2019-07-23 VITALS
SYSTOLIC BLOOD PRESSURE: 148 MMHG | WEIGHT: 307.8 LBS | DIASTOLIC BLOOD PRESSURE: 80 MMHG | BODY MASS INDEX: 40.79 KG/M2 | HEIGHT: 73 IN

## 2019-07-23 DIAGNOSIS — Z00.00 MEDICARE ANNUAL WELLNESS VISIT, SUBSEQUENT: ICD-10-CM

## 2019-07-23 DIAGNOSIS — E11.8 TYPE 2 DIABETES MELLITUS WITH COMPLICATION, WITHOUT LONG-TERM CURRENT USE OF INSULIN (HCC): Primary | ICD-10-CM

## 2019-07-23 LAB
LEFT EYE DIABETIC RETINOPATHY: NORMAL
LEFT EYE IMAGE QUALITY: NORMAL
LEFT EYE MACULAR EDEMA: NORMAL
LEFT EYE OTHER RETINOPATHY: NORMAL
RIGHT EYE DIABETIC RETINOPATHY: NORMAL
RIGHT EYE IMAGE QUALITY: NORMAL
RIGHT EYE MACULAR EDEMA: NORMAL
RIGHT EYE OTHER RETINOPATHY: NORMAL
SEVERITY (EYE EXAM): NORMAL

## 2019-07-23 PROCEDURE — G0439 PPPS, SUBSEQ VISIT: HCPCS | Performed by: FAMILY MEDICINE

## 2019-07-23 PROCEDURE — 92250 FUNDUS PHOTOGRAPHY W/I&R: CPT | Performed by: FAMILY MEDICINE

## 2019-07-23 NOTE — PROGRESS NOTES
Assessment and Plan:     Problem List Items Addressed This Visit        Endocrine    Type 2 diabetes mellitus with complication ([facility] [address] ) - Primary         History of Present Illness:     Patient presents for Welcome to Medicare visit  Patient Care Team:  Quan Gallardo DO as PCP - General Gavino Ramon DO     Review of Systems:     Review of Systems   Constitutional: Positive for activity change and fatigue  Negative for appetite change, diaphoresis and fever  HENT: Negative  Eyes: Negative  Respiratory: Negative for apnea, cough, chest tightness, shortness of breath and wheezing  Cardiovascular: Negative for chest pain, palpitations and leg swelling  Gastrointestinal: Negative for abdominal distention, abdominal pain, anal bleeding, bowel incontinence, constipation, diarrhea, nausea and vomiting  Endocrine: Negative for cold intolerance, heat intolerance, polydipsia, polyphagia and polyuria  Genitourinary: Negative for difficulty urinating, dysuria, flank pain, hematuria and urgency  Musculoskeletal: Positive for arthralgias, back pain, gait problem and joint swelling  Negative for myalgias  Skin: Negative for color change, rash and wound  Allergic/Immunologic: Negative for environmental allergies, food allergies and immunocompromised state  Neurological: Negative for dizziness, seizures, syncope, speech difficulty, numbness and headaches  Hematological: Negative for adenopathy  Does not bruise/bleed easily  Psychiatric/Behavioral: Negative for agitation, behavioral problems, hallucinations, sleep disturbance and suicidal ideas  The patient is nervous/anxious           Problem List:     Patient Active Problem List   Diagnosis    Benign essential hypertension    Coronary artery disease of native heart with stable angina pectoris (HCC)    Type 2 diabetes mellitus with complication (HCC)    Dyslipidemia    Morbid obesity ([address] )    Sciatica    Presence of drug-eluting stent in right coronary artery      Past Medical and Surgical History:     Past Medical History:   Diagnosis Date    Asthma     resolved: 19    Closed fracture of fifth metacarpal bone of right hand     last assessed: 06/30/15    Diabetes mellitus ([facility] [address] )     Diverticulitis     Fracture of metacarpal shaft     Hemopneumothorax, left     last assessed: 06/02/15    Hyperlipidemia     Hypertension     Inguinal hernia     Lumbar transverse process fracture (HCC)     Pilonidal cyst     Pleural effusion     last assessed: 07/01/15    Rib fractures     last assessed: 06/02/15    Status post fall     Superficial thrombophlebitis of leg     last assessed: 14     Past Surgical History:   Procedure Laterality Date    CORONARY ANGIOPLASTY WITH STENT PLACEMENT      PTCA/RITA to RCA    FRACTURE SURGERY  2015    Closed treatment of fracture of metacarpal bone, right 5t metacarpal fracture Dr Munir Alvarez ARTHROSCOPY W/ MENISCAL REPAIR      Lateral meniscus    LAPAROSCOPY  2015    Exploratory, extensive lysis of adhesions Dr Vicky Boyle HISTORY      Surgical lysis of intestinal adhesions    RECTAL SURGERY      REVISION COLOSTOMY      THORACENTESIS  2015    with imaging guidance left, removed 1300cc of fluid w/o problem lower base of lun06    TONSILLECTOMY AND ADENOIDECTOMY        Family History:     Family History   Problem Relation Age of Onset    Coronary artery disease Mother     Heart disease Father         Benign hypertensive    Hyperlipidemia Father       Social History:     Social History     Tobacco Use   Smoking Status Former Smoker    Last attempt to quit: 1985    Years since quittin 5   Smokeless Tobacco Never Used     Social History     Substance and Sexual Activity   Alcohol Use Not Currently    Comment: Social     Social History     Substance and Sexual Activity   Drug Use No      Medications and Allergies:     Current Outpatient Medications   Medication Sig Dispense Refill    atorvastatin (LIPITOR) 40 mg tablet Take 1 tablet (40 mg total) by mouth daily 90 tablet 3    furosemide (LASIX) 20 mg tablet Take 1 tablet (20 mg total) by mouth daily 90 tablet 3    metFORMIN (GLUCOPHAGE-XR) 500 mg 24 hr tablet Take 1 tablet (500 mg total) by mouth 2 (two) times a day with meals for 90 days 180 tablet 3    metoprolol tartrate (LOPRESSOR) 25 mg tablet Take 1 tablet (25 mg total) by mouth 2 (two) times a day 180 tablet 3    nitroglycerin (NITROSTAT) 0 4 mg SL tablet DISSOLVE 1 TABLET UNDER THE TONGUE EVERY 5 MINUTES AS  NEEDED FOR CHEST PAIN   NOT TO EXCEED 3 TABLETS IN 15  MINUTES 100 tablet 2    potassium chloride (K-TAB) 10 mEq tablet Take 1 tablet (10 mEq total) by mouth daily 90 tablet 3    ramipril (ALTACE) 10 MG capsule Take 1 capsule (10 mg total) by mouth daily 90 capsule 3     No current facility-administered medications for this visit  No Known Allergies   Immunizations:     Immunization History   Administered Date(s) Administered    Influenza Quadrivalent Preservative Free 3 years and older IM 08/24/2015, 10/05/2016, 08/14/2017    Influenza TIV (IM) 1950, 09/27/2012, 10/01/2013, 10/01/2014    Influenza, high dose seasonal 0 5 mL 10/24/2018    Pneumococcal Polysaccharide PPV23 1950    Zoster 09/09/2015      Medicare Screening Tests and Risk Assessments:     Will Oconnor is here for his Subsequent Wellness visit  Last Medicare Wellness visit information reviewed, patient interviewed, no change since last AWV  Last Medicare Wellness visit information reviewed, patient interviewed and updates made to the record today  Health Risk Assessment:  Patient rates overall health as good  Patient feels that their physical health rating is Slightly better  Eyesight was rated as Same  Hearing was rated as Same  Patient feels that their emotional and mental health rating is Same   Pain experienced by patient in the last 7 days has been Some  Patient's pain rating has been 4/10  Patient states that he has experienced no weight loss or gain in last 6 months  Emotional/Mental Health:  Patient has been feeling nervous/anxious  PHQ-9 Depression Screening:    Frequency of the following problems over the past two weeks:      1  Little interest or pleasure in doing things: 0 - not at all      2  Feeling down, depressed, or hopeless: 0 - not at all  PHQ-2 Score: 0          Broken Bones/Falls: Fall Risk Assessment:    In the past year, patient has experienced: No history of falling in past year          Bladder/Bowel:  Patient has not leaked urine accidently in the last six months  Patient reports no loss of bowel control  Immunizations:  Patient has had a flu vaccination within the last year  Patient has not received a pneumonia shot  Patient has not received a shingles shot  Patient has not received tetanus/diphtheria shot  Home Safety:  Patient has trouble with stairs inside or outside of their home  Patient currently reports that there are safety hazards present in home , working smoke alarms, working carbon monoxide detectors  Preventative Screenings:   no prostate cancer screen performed, no colon cancer screen completed, cholesterol screen completed, no glaucoma eye exam completed    Nutrition:  Current diet: Regular and Limited junk food with servings of the following:    Medications:  Patient is not currently taking any over-the-counter supplements  Patient is able to manage medications  Lifestyle Choices:  Patient reports no tobacco use  Patient has smoked or used tobacco in the past   Patient has stopped his tobacco use  Tobacco use quit date: 15 years ago  Patient reports no alcohol use  Patient drives a vehicle  Patient wears seat belt  Current level of exercise of physical activity described by patient as: limited due to knee pain          Activities of Daily Living:  Can get out of bed by his or her self, able to dress self, able to make own meals, able to do own shopping, able to bathe self, can do own laundry/housekeeping, can manage own money, pay bills and track expenses    Previous Hospitalizations:  No hospitalization or ED visit in past 12 months        Advanced Directives:  Patient has decided on a power of   Patient has spoken to designated power of   Patient has not completed advanced directive  Preventative Screening/Counseling:      Cardiovascular:      General: Risks and Benefits Discussed and Screening Current          Diabetes:      General: Risks and Benefits Discussed and Screening Current          Colorectal Cancer:      General: Risks and Benefits Discussed and Screening Current          Prostate Cancer:      General: Risks and Benefits Discussed and Screening Current          Osteoporosis:      General: Screening Not Indicated          AAA:      General: Screening Not Indicated          Glaucoma:      General: Risks and Benefits Discussed      Referrals: Ophthalmology          HIV:      General: Screening Not Indicated          Hepatitis C:      General: Screening Not Indicated        Advanced Directives:   Patient has no living will for healthcare, does not have durable POA for healthcare, patient does not have an advanced directive  Information on ACP and/or AD provided  5 wishes given  No end of life assessment reviewed with patient  Provider does not agree with end of life deisions       Immunizations:      Influenza: Risks & Benefits Discussed and Patient Declines      Pneumococcal: Risks & Benefits Discussed and Lifetime Vaccine Completed      Shingrix: Risks & Benefits Discussed and Patient Declines      Zostavax: Risks & Benefits Discussed and Patient Declines          No exam data present     Physical Exam:     /80 (BP Location: Left arm, Patient Position: Sitting, Cuff Size: Standard)   Ht 6' 1" (1 854 m)   Wt (!) 140 kg (307 lb 12 8 oz) BMI 40 61 kg/m²     Physical Exam   Constitutional: He is oriented to person, place, and time  He appears well-developed and well-nourished  No distress  HENT:   Head: Normocephalic  Right Ear: External ear normal    Left Ear: External ear normal    Nose: Nose normal    Mouth/Throat: Oropharynx is clear and moist    Eyes: Pupils are equal, round, and reactive to light  Conjunctivae and EOM are normal  Right eye exhibits no discharge  Left eye exhibits no discharge  No scleral icterus  Neck: Normal range of motion  No tracheal deviation present  No thyromegaly present  Cardiovascular: Normal rate, regular rhythm and normal heart sounds  Exam reveals no gallop and no friction rub  Pulses are no weak pulses  No murmur heard  Pulses:       Dorsalis pedis pulses are 2+ on the right side, and 2+ on the left side  Posterior tibial pulses are 2+ on the right side, and 2+ on the left side  Pulmonary/Chest: Effort normal and breath sounds normal  No respiratory distress  He has no wheezes  Abdominal: Soft  Bowel sounds are normal  He exhibits no mass  There is no tenderness  There is no guarding  Musculoskeletal: He exhibits no edema or deformity  Feet:   Right Foot:   Skin Integrity: Negative for ulcer, skin breakdown, erythema, warmth, callus or dry skin  Left Foot:   Skin Integrity: Negative for ulcer, skin breakdown, erythema, warmth, callus or dry skin  Lymphadenopathy:     He has no cervical adenopathy  Neurological: He is alert and oriented to person, place, and time  No cranial nerve deficit  Skin: Skin is warm and dry  No rash noted  He is not diaphoretic  No erythema  Psychiatric: He has a normal mood and affect  Thought content normal    Patient's shoes and socks removed  Right Foot/Ankle   Right Foot Inspection  Skin Exam: skin normal and skin intact no dry skin, no warmth, no callus, no erythema, no maceration, no abnormal color, no pre-ulcer, no ulcer and no callus Toe Exam: ROM and strength within normal limits  Sensory   Vibration: intact  Proprioception: intact   Monofilament testing: intact  Vascular  Capillary refills: < 3 seconds  The right DP pulse is 2+  The right PT pulse is 2+  Left Foot/Ankle  Left Foot Inspection  Skin Exam: skin normal and skin intactno dry skin, no warmth, no erythema, no maceration, normal color, no pre-ulcer, no ulcer and no callus                         Toe Exam: ROM and strength within normal limits                   Sensory   Vibration: intact  Proprioception: intact  Monofilament: intact  Vascular  Capillary refills: < 3 seconds  The left DP pulse is 2+  The left PT pulse is 2+  Assign Risk Category:  No deformity present; No loss of protective sensation;  No weak pulses       Risk: 0

## 2019-07-23 NOTE — PATIENT INSTRUCTIONS
Obesity   AMBULATORY CARE:   Obesity  is when your body mass index (BMI) is greater than 30  Your healthcare provider will use your height and weight to measure your BMI  The risks of obesity include  many health problems, such as injuries or physical disability  You may need tests to check for the following:  · Diabetes     · High blood pressure or high cholesterol     · Heart disease     · Gallbladder or liver disease     · Cancer of the colon, breast, prostate, liver, or kidney     · Sleep apnea     · Arthritis or gout  Seek care immediately if:   · You have a severe headache, confusion, or difficulty speaking  · You have weakness on one side of your body  · You have chest pain, sweating, or shortness of breath  Contact your healthcare provider if:   · You have symptoms of gallbladder or liver disease, such as pain in your upper abdomen  · You have knee or hip pain and discomfort while walking  · You have symptoms of diabetes, such as intense hunger and thirst, and frequent urination  · You have symptoms of sleep apnea, such as snoring or daytime sleepiness  · You have questions or concerns about your condition or care  Treatment for obesity  focuses on helping you lose weight to improve your health  Even a small decrease in BMI can reduce the risk for many health problems  Your healthcare provider will help you set a weight-loss goal   · Lifestyle changes  are the first step in treating obesity  These include making healthy food choices and getting regular physical activity  Your healthcare provider may suggest a weight-loss program that involves coaching, education, and therapy  · Medicine  may help you lose weight when it is used with a healthy diet and physical activity  · Surgery  can help you lose weight if you are very obese and have other health problems  There are several types of weight-loss surgery  Ask your healthcare provider for more information    Be successful losing weight:   · Set small, realistic goals  An example of a small goal is to walk for 20 minutes 5 days a week  Anther goal is to lose 5% of your body weight  · Tell friends, family members, and coworkers about your goals  and ask for their support  Ask a friend to lose weight with you, or join a weight-loss support group  · Identify foods or triggers that may cause you to overeat , and find ways to avoid them  Remove tempting high-calorie foods from your home and workplace  Place a bowl of fresh fruit on your kitchen counter  If stress causes you to eat, then find other ways to cope with stress  · Keep a diary to track what you eat and drink  Also write down how many minutes of physical activity you do each day  Weigh yourself once a week and record it in your diary  Eating changes: You will need to eat 500 to 1,000 fewer calories each day than you currently eat to lose 1 to 2 pounds a week  The following changes will help you cut calories:  · Eat smaller portions  Use small plates, no larger than 9 inches in diameter  Fill your plate half full of fruits and vegetables  Measure your food using measuring cups until you know what a serving size looks like  · Eat 3 meals and 1 or 2 snacks each day  Plan your meals in advance  [place] and eat at home most of the time  Eat slowly  · Eat fruits and vegetables at every meal   They are low in calories and high in fiber, which makes you feel full  Do not add butter, margarine, or cream sauce to vegetables  Use herbs to season steamed vegetables  · Eat less fat and fewer fried foods  Eat more baked or grilled chicken and fish  These protein sources are lower in calories and fat than red meat  Limit fast food  Dress your salads with olive oil and vinegar instead of bottled dressing  · Limit the amount of sugar you eat  Do not drink sugary beverages  Limit alcohol  Activity changes:  Physical activity is good for your body in many ways   It helps you burn calories and build strong muscles  It decreases stress and depression, and improves your mood  It can also help you sleep better  Talk to your healthcare provider before you begin an exercise program   · Exercise for at least 30 minutes 5 days a week  Start slowly  Set aside time each day for physical activity that you enjoy and that is convenient for you  It is best to do both weight training and an activity that increases your heart rate, such as walking, bicycling, or swimming  · Find ways to be more active  Do yard work and housecleaning  Walk up the stairs instead of using elevators  Spend your leisure time going to events that require walking, such as outdoor festivals or fairs  This extra physical activity can help you lose weight and keep it off  Follow up with your healthcare provider as directed: You may need to meet with a dietitian  Write down your questions so you remember to ask them during your visits  © 2017 [employer] Information is for End User's use only and may not be sold, redistributed or otherwise used for commercial purposes  All illustrations and images included in CareNotes® are the copyrighted property of [employer]  or Deshawn Harkins  The above information is an  only  It is not intended as medical advice for individual conditions or treatments  Talk to your doctor, nurse or pharmacist before following any medical regimen to see if it is safe and effective for you  Urinary Incontinence   WHAT YOU NEED TO KNOW:   What is urinary incontinence? Urinary incontinence (UI) is when you lose control of your bladder  What causes UI? UI occurs because your bladder cannot store or empty urine properly  The following are the most common types of UI:  · Stress incontinence  is when you leak urine due to increased bladder pressure  This may happen when you cough, sneeze, or exercise       · Urge incontinence  is when you feel the need to urinate right away and leak urine accidentally  · Mixed incontinence  is when you have both stress and urge UI  What are the signs and symptoms of UI?   · You feel like your bladder does not empty completely when you urinate  · You urinate often and need to urinate immediately  · You leak urine when you sleep, or you wake up with the urge to urinate  · You leak urine when you cough, sneeze, exercise, or laugh  How is UI diagnosed? Your healthcare provider will ask how often you leak urine and whether you have stress or urge symptoms  Tell him which medicines you take, how often you urinate, and how much liquid you drink each day  You may need any of the following tests:  · Urine tests  may show infection or kidney function  · A pelvic exam  may be done to check for blockages  A pelvic exam will also show if your bladder, uterus, or other organs have moved out of place  · An x-ray, ultrasound, or CT  may show problems with parts of your urinary system  You may be given contrast liquid to help your organs show up better in the pictures  Tell the healthcare provider if you have ever had an allergic reaction to contrast liquid  Do not enter the MRI room with anything metal  Metal can cause serious injury  Tell the healthcare provider if you have any metal in or on your body  · A bladder scan  will show how much urine is left in your bladder after you urinate  You will be asked to urinate and then healthcare providers will use a small ultrasound machine to check the urine left in your bladder  · Cystometry  is used to check the function of your urinary system  Your healthcare provider checks the pressure in your bladder while filling it with fluid  Your bladder pressure may also be tested when your bladder is full and while you urinate  How is UI treated? · Medicines  can help strengthen your bladder control      · Electrical stimulation  is used to send a small amount of electrical energy to your pelvic floor muscles  This helps control your bladder function  Electrodes may be placed outside your body or in your rectum  For women, the electrodes may be placed in the vagina  · A bulking agent  may be injected into the wall of your urethra to make it thicker  This helps keep your urethra closed and decreases urine leakage  · Devices  such as a clamp, pessary, or tampon may help stop urine leaks  Ask your healthcare provider for more information about these and other devices  · Surgery  may be needed if other treatments do not work  Several types of surgery can help improve your bladder control  Ask your healthcare provider for more information about the surgery you may need  How can I manage my symptoms? · Do pelvic muscle exercises often  Your pelvic muscles help you stop urinating  Squeeze these muscles tight for 5 seconds, then relax for 5 seconds  Gradually work up to squeezing for 10 seconds  Do 3 sets of 15 repetitions a day, or as directed  This will help strengthen your pelvic muscles and improve bladder control  · A catheter  may be used to help empty your bladder  A catheter is a tiny, plastic tube that is put into your bladder to drain your urine  Your healthcare provider may tell you to use a catheter to prevent your bladder from getting too full and leaking urine  · Keep a UI record  Write down how often you leak urine and how much you leak  Make a note of what you were doing when you leaked urine  · Train your bladder  Go to the bathroom at set times, such as every 2 hours, even if you do not feel the urge to go  You can also try to hold your urine when you feel the urge to go  For example, hold your urine for 5 minutes when you feel the urge to go  As that becomes easier, hold your urine for 10 minutes  · Drink liquids as directed  Ask your healthcare provider how much liquid to drink each day and which liquids are best for you   You may need to limit the amount of liquid you drink to help control your urine leakage  Limit or do not have drinks that contain caffeine or alcohol  Do not drink any liquid right before you go to bed  · Prevent constipation  Eat a variety of high-fiber foods  Good examples are high-fiber cereals, beans, vegetables, and whole-grain breads  Prune juice may help make your bowel movement softer  Walking is the best way to trigger your intestines to have a bowel movement  · Exercise regularly and maintain a healthy weight  Ask your healthcare provider how much you should weigh and about the best exercise plan for you  Weight loss and exercise will decrease pressure on your bladder and help you control your leakage  Ask him to help you create a weight loss plan if you are overweight  When should I seek immediate care? · You have severe pain  · You are confused or cannot think clearly  When should I contact my healthcare provider? · You have a fever  · You see blood in your urine  · You have pain when you urinate  · You have new or worse pain, even after treatment  · Your mouth feels dry or you have vision changes  · Your urine is cloudy or smells bad  · You have questions or concerns about your condition or care  CARE AGREEMENT:   You have the right to help plan your care  Learn about your health condition and how it may be treated  Discuss treatment options with your caregivers to decide what care you want to receive  You always have the right to refuse treatment  The above information is an  only  It is not intended as medical advice for individual conditions or treatments  Talk to your doctor, nurse or pharmacist before following any medical regimen to see if it is safe and effective for you  © 2017 [employer] Information is for End User's use only and may not be sold, redistributed or otherwise used for commercial purposes   All illustrations and images included in CareNotes® are the copyrighted property of A [employer] A M [employer] Inc  or Deshawn Harkins  Cigarette Smoking and Your Health   AMBULATORY CARE:   Risks to your health if you smoke:  Nicotine and other chemicals found in tobacco damage every cell in your body  Even if you are a light smoker, you have an increased risk for cancer, heart disease, and lung disease  If you are pregnant or have diabetes, smoking increases your risk for complications  Benefits to your health if you stop smoking:   · You decrease respiratory symptoms such as coughing, wheezing, and shortness of breath  · You reduce your risk for cancers of the lung, mouth, throat, kidney, bladder, pancreas, stomach, and cervix  If you already have cancer, you increase the benefits of chemotherapy  You also reduce your risk for cancer returning or a second cancer from developing  · You reduce your risk for heart disease, blood clots, heart attack, and stroke  · You reduce your risk for lung infections, and diseases such as pneumonia, asthma, chronic bronchitis, and emphysema  · Your circulation improves  More oxygen can be delivered to your body  If you have diabetes, you lower your risk for complications, such as kidney, artery, and eye diseases  You also lower your risk for nerve damage  Nerve damage can lead to amputations, poor vision, and blindness  · You improve your body's ability to heal and to fight infections  Benefits to the health of others if you stop smoking:  Tobacco is harmful to nonsmokers who breathe in your secondhand smoke  The following are ways the health of others around you may improve when you stop smoking:  · You lower the risks for lung cancer and heart disease in nonsmoking adults  · If you are pregnant, you lower the risk for miscarriage, early delivery, low birth weight, and stillbirth  You also lower your baby's risk for SIDS, obesity, developmental delay, and neurobehavioral problems, such as ADHD  · If you have children, you lower their risk for ear infections, colds, pneumonia, bronchitis, and asthma  For more information and support to stop smoking:   · Smokefree  gov  Phone: 5- 394 - 489-8350  Web Address: www [employer]  Follow up with your healthcare provider as directed:  Write down your questions so you remember to ask them during your visits  © 2017 [facility] Information is for End User's use only and may not be sold, redistributed or otherwise used for commercial purposes  All illustrations and images included in CareNotes® are the copyrighted property of [employer] M [employer]  or Deshawn Harkins  The above information is an  only  It is not intended as medical advice for individual conditions or treatments  Talk to your doctor, nurse or pharmacist before following any medical regimen to see if it is safe and effective for you  Fall Prevention   WHAT YOU NEED TO KNOW:   What is fall prevention? Fall prevention includes ways to make your home and other areas safer  It also includes ways you can move more carefully to prevent a fall  What increases my risk for falls? · Lack of vitamin D    · Not getting enough sleep each night    · Trouble walking or keeping your balance, or foot problems    · Health conditions that cause changes in your blood pressure, vision, or muscle strength and coordination    · Medicines that make you dizzy, weak, or sleepy    · Problems seeing clearly    · Shoes that have high heels or are not supportive    · Tripping hazards, such as items left on the floor, no handrails on the stairs, or broken steps  How can I help protect myself from falls? · Stand or sit up slowly  This may help you keep your balance and prevent falls  If you need to get up during the night, sit up first  Be sure you are fully awake before you stand  Turn on the light before you start walking  Go slowly in case you are still sleepy   Make sure you will not trip over any pets sleeping in the bedroom  · Use assistive devices as directed  Your healthcare provider may suggest that you use a cane or walker to help you keep your balance  You may need to have grab bars put in your bathroom near the toilet or in the shower  · Wear shoes that fit well and have soles that   Wear shoes both inside and outside  Use slippers with good   Do not wear shoes with high heels  · Wear a personal alarm  This is a device that allows you to call 911 if you fall and need help  Ask your healthcare provider for more information  · Stay active  Exercise can help strengthen your muscles and improve your balance  Your healthcare provider may recommend water aerobics or walking  He or she may also recommend physical therapy to improve your coordination  Never start an exercise program without talking to your healthcare provider first      · Manage medical conditions  Keep all appointments with your healthcare providers  Visit your eye doctor as directed  How can I make my home safer? · Add items to prevent falls in the bathroom  Put nonslip strips on your bath or shower floor to prevent you from slipping  Use a bath mat if you do not have carpet in the bathroom  This will prevent you from falling when you step out of the bath or shower  Use a shower seat so you do not need to stand while you shower  Sit on the toilet or a chair in your bathroom to dry yourself and put on clothing  This will prevent you from losing your balance from drying or dressing yourself while you are standing  · Keep paths clear  Remove books, shoes, and other objects from walkways and stairs  Place cords for telephones and lamps out of the way so that you do not need to walk over them  Tape them down if you cannot move them  Remove small rugs  If you cannot remove a rug, secure it with double-sided tape  This will prevent you from tripping  · Install bright lights in your home  Use night lights to help light paths to the bathroom or kitchen  Always turn on the light before you start walking  · Keep items you use often on shelves within reach  Do not use a step stool to help you reach an item  · Paint or place reflective tape on the edges of your stairs  This will help you see the stairs better  Call 911 or have someone else call if:   · You have fallen and are unconscious  · You have fallen and cannot move part of your body  Contact your healthcare provider if:   · You have fallen and have pain or a headache  · You have questions or concerns about your condition or care  CARE AGREEMENT:   You have the right to help plan your care  Learn about your health condition and how it may be treated  Discuss treatment options with your caregivers to decide what care you want to receive  You always have the right to refuse treatment  The above information is an  only  It is not intended as medical advice for individual conditions or treatments  Talk to your doctor, nurse or pharmacist before following any medical regimen to see if it is safe and effective for you  © 2017 [facility] Information is for End User's use only and may not be sold, redistributed or otherwise used for commercial purposes  All illustrations and images included in CareNotes® are the copyrighted property of [employer]  or Deshawn Harkins  Advance Directives   WHAT YOU NEED TO KNOW:   What are advance directives? Advance directives are legal documents that state your wishes and plans for medical care  These plans are made ahead of time in case you lose your ability to make decisions for yourself  Advance directives can apply to any medical decision, such as the treatments you want, and if you want to donate organs  What are the types of advance directives? There are many types of advance directives, and each state has rules about how to use them   You may choose a combination of any of the following:  · Living will: This is a written record of the treatment you want  You can also choose which treatments you do not want, which to limit, and which to stop at a certain time  This includes surgery, medicine, IV fluid, and tube feedings  · Durable power of  for healthcare [facility]): This is a written record that states who you want to make healthcare choices for you when you are unable to make them for yourself  This person, called a proxy, is usually a family member or a friend  You may choose more than 1 proxy  · Do not resuscitate (DNR) order:  A DNR order is used in case your heart stops beating or you stop breathing  It is a request not to have certain forms of treatment, such as CPR  A DNR order may be included in other types of advance directives  · Medical directive: This covers the care that you want if you are in a coma, near death, or unable to make decisions for yourself  You can list the treatments you want for each condition  Treatment may include pain medicine, surgery, blood transfusions, dialysis, IV or tube feedings, and a ventilator (breathing machine)  · Values history: This document has questions about your views, beliefs, and how you feel and think about life  This information can help others choose the care that you would choose  Why are advance directives important? An advance directive helps you control your care  Although spoken wishes may be used, it is better to have your wishes written down  Spoken wishes can be misunderstood, or not followed  Treatments may be given even if you do not want them  An advance directive may make it easier for your family to make difficult choices about your care  How do I decide what to put in my advance directives? · Make informed decisions:  Make sure you fully understand treatments or care you may receive   Think about the benefits and problems your decisions could cause for you or your family  Talk to healthcare providers if you have concerns or questions before you write down your wishes  You may also want to talk with your Christianity or , or a   Check your state laws to make sure that what you put in your advance directive is legal      · Sign all forms:  Sign and date your advance directive when you have finished  You may also need 2 witnesses to sign the forms  Witnesses cannot be your doctor or his staff, your spouse, heirs or beneficiaries, people you owe money to, or your chosen proxy  Talk to your family, proxy, and healthcare providers about your advance directive  Give each person a copy, and keep one for yourself in a place you can get to easily  Do not keep it hidden or locked away  · Review and revise your plans: You can revise your advance directive at any time, as long as you are able to make decisions  Review your plan every year, and when there are changes in your life, or your health  When you make changes, let your family, proxy, and healthcare providers know  Give each a new copy  Where can I find more information? · [employer]  [address] , [address]   Phone: 0- 430 - [phone]  Phone: 3- 061 - [phone]  Web Address: http://www  aafp org  · [address] [facility])  [address] [facility], [address] [facility] , [address] [place] [address]  Phone: 0- [phone] - [phone]  Phone: [phone]  Web Address: [address]  [facility] AGREEMENT:   You have the right to help plan your care  To help with this plan, you must learn about your health condition and treatment options  You must also learn about advance directives and how they are used  Work with your healthcare providers to decide what care will be used to treat you  You always have the right to refuse treatment  The above information is an  only   It is not intended as medical advice for individual conditions or treatments  Talk to your doctor, nurse or pharmacist before following any medical regimen to see if it is safe and effective for you  © 2017 [employer] Information is for End User's use only and may not be sold, redistributed or otherwise used for commercial purposes  All illustrations and images included in CareNotes® are the copyrighted property of [employer] M [employer]  or Deshawn Harkins

## 2019-08-02 DIAGNOSIS — I10 ESSENTIAL HYPERTENSION: ICD-10-CM

## 2019-08-02 DIAGNOSIS — E78.2 MIXED HYPERLIPIDEMIA: ICD-10-CM

## 2019-08-02 DIAGNOSIS — E11.9 CONTROLLED TYPE 2 DIABETES MELLITUS WITHOUT COMPLICATION, WITHOUT LONG-TERM CURRENT USE OF INSULIN (HCC): ICD-10-CM

## 2019-08-05 RX ORDER — RAMIPRIL 10 MG/1
CAPSULE ORAL
Qty: 90 CAPSULE | Refills: 3 | Status: SHIPPED | OUTPATIENT
Start: 2019-08-05 | End: 2020-06-26

## 2019-08-05 RX ORDER — ATORVASTATIN CALCIUM 40 MG/1
TABLET, FILM COATED ORAL
Qty: 90 TABLET | Refills: 3 | Status: SHIPPED | OUTPATIENT
Start: 2019-08-05 | End: 2020-06-26

## 2019-08-05 RX ORDER — METFORMIN HYDROCHLORIDE 500 MG/1
TABLET, EXTENDED RELEASE ORAL
Qty: 180 TABLET | Refills: 3 | Status: SHIPPED | OUTPATIENT
Start: 2019-08-05 | End: 2020-09-21

## 2019-08-05 RX ORDER — FUROSEMIDE 20 MG/1
TABLET ORAL
Qty: 90 TABLET | Refills: 3 | Status: SHIPPED | OUTPATIENT
Start: 2019-08-05 | End: 2020-09-21

## 2019-08-05 RX ORDER — POTASSIUM CHLORIDE 750 MG/1
TABLET, FILM COATED, EXTENDED RELEASE ORAL
Qty: 90 TABLET | Refills: 3 | Status: SHIPPED | OUTPATIENT
Start: 2019-08-05 | End: 2020-06-26

## 2019-08-30 ENCOUNTER — OFFICE VISIT (OUTPATIENT)
Dept: PODIATRY | Facility: CLINIC | Age: 69
End: 2019-08-30
Payer: MEDICARE

## 2019-08-30 VITALS
SYSTOLIC BLOOD PRESSURE: 129 MMHG | BODY MASS INDEX: 40.66 KG/M2 | HEART RATE: 49 BPM | HEIGHT: 73 IN | DIASTOLIC BLOOD PRESSURE: 68 MMHG | WEIGHT: 306.8 LBS

## 2019-08-30 DIAGNOSIS — B35.1 ONYCHOMYCOSIS: Primary | ICD-10-CM

## 2019-08-30 DIAGNOSIS — E11.40 TYPE 2 DIABETES MELLITUS WITH DIABETIC NEUROPATHY, WITHOUT LONG-TERM CURRENT USE OF INSULIN (HCC): ICD-10-CM

## 2019-08-30 PROCEDURE — 11721 DEBRIDE NAIL 6 OR MORE: CPT | Performed by: PODIATRIST

## 2019-08-30 NOTE — PROGRESS NOTES
PATIENT:  Adelaida Benjamin  1950    ASSESSMENT/PLAN:  1  Onychomycosis  Debridement   2  Type 2 diabetes mellitus with diabetic neuropathy, without long-term current use of insulin ([facility] )           Orders Placed This Encounter   Procedures    Debridement      Disease prevention and related risk factors of diabetes were identified and discussed  The patient was educated in proper foot wear for diabetics  Also educated in daily foot assessment and routine diabetic foot care  Discussed the importance of controlling BS through diet and exercise  The patient will follow up in 9 weeks for further diabetic foot exam and care     PROCEDURE:  All mycotic toenails were reduced and debrided in length, width, and girth using a nail nipper and dremel  HPI:  Adelaida Benjamin is a 79 y  o year old male seen for diabetic foot exam   The patient has class findings with DPN  BS is under control  The patient denied any acute pedal disorder, redness, acute swelling, or recent injury            PAST MEDICAL HISTORY:  Past Medical History:   Diagnosis Date    Asthma     resolved: 08/14/17    Closed fracture of fifth metacarpal bone of right hand     last assessed: 06/30/15    Diabetes mellitus ([facility] )     Diverticulitis     Fracture of metacarpal shaft     Hemopneumothorax, left     last assessed: 06/02/15    Hyperlipidemia     Hypertension     Inguinal hernia     Lumbar transverse process fracture (HCC)     Pilonidal cyst     Pleural effusion     last assessed: 07/01/15    Rib fractures     last assessed: 06/02/15    Status post fall     Superficial thrombophlebitis of leg     last assessed: 03/19/14       PAST SURGICAL HISTORY:  Past Surgical History:   Procedure Laterality Date    CORONARY ANGIOPLASTY WITH STENT PLACEMENT  2009    PTCA/RITA to RCA    FRACTURE SURGERY  05/04/2015    Closed treatment of fracture of metacarpal bone, right 5t metacarpal fracture Dr Bernice Chandler ARTHROSCOPY W/ MENISCAL REPAIR      Lateral meniscus    LAPAROSCOPY  2015    Exploratory, extensive lysis of adhesions Dr Pb Downing      Surgical lysis of intestinal adhesions    RECTAL SURGERY      REVISION COLOSTOMY      THORACENTESIS  2015    with imaging guidance left, removed 1300cc of fluid w/o problem lower base of lun06    TONSILLECTOMY AND ADENOIDECTOMY          ALLERGIES:  Patient has no known allergies  MEDICATIONS:  Current Outpatient Medications   Medication Sig Dispense Refill    atorvastatin (LIPITOR) 40 mg tablet TAKE 1 TABLET BY MOUTH  DAILY 90 tablet 3    furosemide (LASIX) 20 mg tablet TAKE 1 TABLET BY MOUTH  DAILY 90 tablet 3    metFORMIN (GLUCOPHAGE-XR) 500 mg 24 hr tablet TAKE 1 TABLET BY MOUTH  TWICE A DAY WITH MEALS 180 tablet 3    metoprolol tartrate (LOPRESSOR) 25 mg tablet TAKE 1 TABLET BY MOUTH TWO  TIMES DAILY 180 tablet 3    nitroglycerin (NITROSTAT) 0 4 mg SL tablet DISSOLVE 1 TABLET UNDER THE TONGUE EVERY 5 MINUTES AS  NEEDED FOR CHEST PAIN   NOT TO EXCEED 3 TABLETS IN 15  MINUTES 100 tablet 2    potassium chloride (K-DUR) 10 mEq tablet TAKE 1 TABLET BY MOUTH  DAILY 90 tablet 3    ramipril (ALTACE) 10 MG capsule TAKE 1 CAPSULE BY MOUTH  DAILY 90 capsule 3     No current facility-administered medications for this visit          SOCIAL HISTORY:  Social History     Socioeconomic History    Marital status: /Civil Union     Spouse name: None    Number of children: None    Years of education: None    Highest education level: None   Occupational History    Occupation: Retired   Social Needs    Financial resource strain: None    Food insecurity:     Worry: None     Inability: None    Transportation needs:     Medical: None     Non-medical: None   Tobacco Use    Smoking status: Former Smoker     Last attempt to quit:      Years since quittin 6    Smokeless tobacco: Never Used   Substance and Sexual Activity    Alcohol use: Not Currently     Comment: Social    Drug use: No    Sexual activity: Yes     Partners: Female   Lifestyle    Physical activity:     Days per week: None     Minutes per session: None    Stress: None   Relationships    Social connections:     Talks on phone: None     Gets together: None     Attends Rastafari service: None     Active member of club or organization: None     Attends meetings of clubs or organizations: None     Relationship status: None    Intimate partner violence:     Fear of current or ex partner: None     Emotionally abused: None     Physically abused: None     Forced sexual activity: None   Other Topics Concern    None   Social History Narrative    Always uses seat belt    No living will        REVIEW OF SYSTEMS:  GENERAL: NAD, afebrile  HEART: No chest pain, or palpitation  RESPIRATORY:  No acute SOB or cough  GI: No Nausea, vomit or diarrhea  NEUROLOGIC: No syncope or acute weakness    PHYSICAL EXAM:  VASCULAR EXAM  Dorsalis pedis  +1, Posterior tibial artery  absent  The patient has class findings with skin atrophy, lack of digital hair, and nail dystrophy  There is +1 lower extremity edema bilaterally  Venous stasis skin changes noted BLE  NEUROLOGIC EXAM  Sensation is intact to light touch  Sensation is intact to 10gm monofilament  No focal neurologic deficit  DERMATOLOGIC EXAM:   No ulcer or cellulitis noted  The patient has hypertrophic toenails with discoloration, onycholysis, and subungal debris  No notable skin lesion  MUSCULOSKELETAL EXAM:   No acute joint pain, edema, or redness  No acute musculoskeletal problem  Patient has deformity including bunion and hammertoe

## 2019-11-08 ENCOUNTER — OFFICE VISIT (OUTPATIENT)
Dept: PODIATRY | Facility: CLINIC | Age: 69
End: 2019-11-08
Payer: MEDICARE

## 2019-11-08 VITALS
WEIGHT: 306.7 LBS | SYSTOLIC BLOOD PRESSURE: 133 MMHG | HEIGHT: 73 IN | HEART RATE: 69 BPM | DIASTOLIC BLOOD PRESSURE: 75 MMHG | BODY MASS INDEX: 40.65 KG/M2

## 2019-11-08 DIAGNOSIS — B35.1 ONYCHOMYCOSIS: Primary | ICD-10-CM

## 2019-11-08 DIAGNOSIS — E11.40 TYPE 2 DIABETES MELLITUS WITH DIABETIC NEUROPATHY, WITHOUT LONG-TERM CURRENT USE OF INSULIN (HCC): ICD-10-CM

## 2019-11-08 PROCEDURE — 11721 DEBRIDE NAIL 6 OR MORE: CPT | Performed by: PODIATRIST

## 2019-11-08 NOTE — PROGRESS NOTES
PATIENT:  Ravi Sharma  1950    ASSESSMENT/PLAN:  1  Onychomycosis  Debridement   2  Type 2 diabetes mellitus with diabetic neuropathy, without long-term current use of insulin ([facility] [address] )  Debridement         Orders Placed This Encounter   Procedures    Debridement      Disease prevention and related risk factors of diabetes were identified and discussed  The patient was educated in proper foot wear for diabetics  Also educated in daily foot assessment and routine diabetic foot care  Discussed the importance of controlling BS through diet and exercise  The patient will follow up in 9 weeks for further diabetic foot exam and care     PROCEDURE:  All mycotic toenails were reduced and debrided in length, width, and girth using a nail nipper and dremel  HPI:  Ravi Sharma is a 79 y  o year old male seen for diabetic foot exam   The patient has class findings with DPN  BS is under control  The patient denied any acute pedal disorder, redness, acute swelling, or recent injury            PAST MEDICAL HISTORY:  Past Medical History:   Diagnosis Date    Asthma     resolved: 08/14/17    Closed fracture of fifth metacarpal bone of right hand     last assessed: 06/30/15    Diabetes mellitus ([facility] [address] )     Diverticulitis     Fracture of metacarpal shaft     Hemopneumothorax, left     last assessed: 06/02/15    Hyperlipidemia     Hypertension     Inguinal hernia     Lumbar transverse process fracture (HCC)     Pilonidal cyst     Pleural effusion     last assessed: 07/01/15    Rib fractures     last assessed: 06/02/15    Status post fall     Superficial thrombophlebitis of leg     last assessed: 03/19/14       PAST SURGICAL HISTORY:  Past Surgical History:   Procedure Laterality Date    CORONARY ANGIOPLASTY WITH STENT PLACEMENT  2009    PTCA/RITA to RCA    FRACTURE SURGERY  05/04/2015    Closed treatment of fracture of metacarpal bone, right 5t metacarpal fracture Dr Gurmeet Logan  KNEE ARTHROSCOPY W/ MENISCAL REPAIR      Lateral meniscus    LAPAROSCOPY  2015    Exploratory, extensive lysis of adhesions Dr Savannah Mclean      Surgical lysis of intestinal adhesions    RECTAL SURGERY      REVISION COLOSTOMY      THORACENTESIS  2015    with imaging guidance left, removed 1300cc of fluid w/o problem lower base of lun06    TONSILLECTOMY AND ADENOIDECTOMY          ALLERGIES:  Patient has no known allergies  MEDICATIONS:  Current Outpatient Medications   Medication Sig Dispense Refill    atorvastatin (LIPITOR) 40 mg tablet TAKE 1 TABLET BY MOUTH  DAILY 90 tablet 3    furosemide (LASIX) 20 mg tablet TAKE 1 TABLET BY MOUTH  DAILY 90 tablet 3    metFORMIN (GLUCOPHAGE-XR) 500 mg 24 hr tablet TAKE 1 TABLET BY MOUTH  TWICE A DAY WITH MEALS 180 tablet 3    metoprolol tartrate (LOPRESSOR) 25 mg tablet TAKE 1 TABLET BY MOUTH TWO  TIMES DAILY 180 tablet 3    nitroglycerin (NITROSTAT) 0 4 mg SL tablet DISSOLVE 1 TABLET UNDER THE TONGUE EVERY 5 MINUTES AS  NEEDED FOR CHEST PAIN   NOT TO EXCEED 3 TABLETS IN 15  MINUTES 100 tablet 2    potassium chloride (K-DUR) 10 mEq tablet TAKE 1 TABLET BY MOUTH  DAILY 90 tablet 3    ramipril (ALTACE) 10 MG capsule TAKE 1 CAPSULE BY MOUTH  DAILY 90 capsule 3     No current facility-administered medications for this visit          SOCIAL HISTORY:  Social History     Socioeconomic History    Marital status: /Civil Union     Spouse name: None    Number of children: None    Years of education: None    Highest education level: None   Occupational History    Occupation: Retired   Social Needs    Financial resource strain: None    Food insecurity:     Worry: None     Inability: None    Transportation needs:     Medical: None     Non-medical: None   Tobacco Use    Smoking status: Former Smoker     Last attempt to quit:      Years since quittin 8    Smokeless tobacco: Never Used   Substance and Sexual Activity  Alcohol use: Not Currently     Comment: Social    Drug use: No    Sexual activity: Yes     Partners: Female   Lifestyle    Physical activity:     Days per week: None     Minutes per session: None    Stress: None   Relationships    Social connections:     Talks on phone: None     Gets together: None     Attends Alevism service: None     Active member of club or organization: None     Attends meetings of clubs or organizations: None     Relationship status: None    Intimate partner violence:     Fear of current or ex partner: None     Emotionally abused: None     Physically abused: None     Forced sexual activity: None   Other Topics Concern    None   Social History Narrative    Always uses seat belt    No living will        REVIEW OF SYSTEMS:  GENERAL: NAD, afebrile  HEART: No chest pain, or palpitation  RESPIRATORY:  No acute SOB or cough  GI: No Nausea, vomit or diarrhea  NEUROLOGIC: No syncope or acute weakness    PHYSICAL EXAM:  VASCULAR EXAM  Dorsalis pedis  +1, Posterior tibial artery  absent  The patient has class findings with skin atrophy, lack of digital hair, and nail dystrophy  There is +1 lower extremity edema bilaterally  Venous stasis skin changes noted BLE  NEUROLOGIC EXAM  Sensation is intact to light touch  Sensation is intact to 10gm monofilament  No focal neurologic deficit  DERMATOLOGIC EXAM:   No ulcer or cellulitis noted  The patient has hypertrophic toenails with discoloration, onycholysis, and subungal debris  No notable skin lesion  MUSCULOSKELETAL EXAM:   No acute joint pain, edema, or redness  No acute musculoskeletal problem  Patient has deformity including bunion and hammertoe

## 2019-11-26 ENCOUNTER — OFFICE VISIT (OUTPATIENT)
Dept: FAMILY MEDICINE CLINIC | Facility: CLINIC | Age: 69
End: 2019-11-26
Payer: MEDICARE

## 2019-11-26 VITALS
HEIGHT: 73 IN | SYSTOLIC BLOOD PRESSURE: 134 MMHG | DIASTOLIC BLOOD PRESSURE: 70 MMHG | WEIGHT: 305.8 LBS | BODY MASS INDEX: 40.53 KG/M2

## 2019-11-26 DIAGNOSIS — I10 BENIGN ESSENTIAL HYPERTENSION: ICD-10-CM

## 2019-11-26 DIAGNOSIS — E78.5 DYSLIPIDEMIA: ICD-10-CM

## 2019-11-26 DIAGNOSIS — E11.8 TYPE 2 DIABETES MELLITUS WITH COMPLICATION (HCC): Primary | ICD-10-CM

## 2019-11-26 DIAGNOSIS — E66.01 MORBID OBESITY (HCC): ICD-10-CM

## 2019-11-26 PROCEDURE — 99214 OFFICE O/P EST MOD 30 MIN: CPT | Performed by: FAMILY MEDICINE

## 2019-11-26 NOTE — PROGRESS NOTES
Assessment/Plan:  Patient is going to continue current medications due for diabetic lab work which will be done in January of this year    Problem List Items Addressed This Visit        Endocrine    Type 2 diabetes mellitus with complication ([address] ) - Primary       Cardiovascular and Mediastinum    Benign essential hypertension       Other    Dyslipidemia    Morbid obesity ([address] )           Diagnoses and all orders for this visit:    Type 2 diabetes mellitus with complication ([address] )    Benign essential hypertension    Dyslipidemia    Morbid obesity ([address] )        No problem-specific Assessment & Plan notes found for this encounter  Subjective:      Patient ID: Richy Foster is a 79 y o  male  HPI    The following portions of the patient's history were reviewed and updated as appropriate:   He has a past medical history of Asthma, Closed fracture of fifth metacarpal bone of right hand, Diabetes mellitus ([address] ), Diverticulitis, Fracture of metacarpal shaft, Hemopneumothorax, left, Hyperlipidemia, Hypertension, Inguinal hernia, Lumbar transverse process fracture ([address] ), Pilonidal cyst, Pleural effusion, Rib fractures, Status post fall, and Superficial thrombophlebitis of leg ,  does not have any pertinent problems on file  ,   has a past surgical history that includes Coronary angioplasty with stent (2009); Fracture surgery (05/04/2015); Revision Colostomy; LAPAROSCOPY (05/18/2015); Hernia repair; Knee arthroscopy w/ meniscal repair; Rectal surgery; Other surgical history; Thoracentesis (06/02/2015); and Tonsillectomy and adenoidectomy  ,  family history includes Coronary artery disease in his mother; Heart disease in his father; Hyperlipidemia in his father  ,   reports that he quit smoking about 34 years ago  He has never used smokeless tobacco  He reports that he drank alcohol  He reports that he does not use drugs  ,  has No Known Allergies     Current Outpatient Medications   Medication Sig Dispense Refill    atorvastatin (LIPITOR) 40 mg tablet TAKE 1 TABLET BY MOUTH  DAILY 90 tablet 3    furosemide (LASIX) 20 mg tablet TAKE 1 TABLET BY MOUTH  DAILY 90 tablet 3    metFORMIN (GLUCOPHAGE-XR) 500 mg 24 hr tablet TAKE 1 TABLET BY MOUTH  TWICE A DAY WITH MEALS 180 tablet 3    metoprolol tartrate (LOPRESSOR) 25 mg tablet TAKE 1 TABLET BY MOUTH TWO  TIMES DAILY 180 tablet 3    nitroglycerin (NITROSTAT) 0 4 mg SL tablet DISSOLVE 1 TABLET UNDER THE TONGUE EVERY 5 MINUTES AS  NEEDED FOR CHEST PAIN   NOT TO EXCEED 3 TABLETS IN 15  MINUTES 100 tablet 2    potassium chloride (K-DUR) 10 mEq tablet TAKE 1 TABLET BY MOUTH  DAILY 90 tablet 3    ramipril (ALTACE) 10 MG capsule TAKE 1 CAPSULE BY MOUTH  DAILY 90 capsule 3     No current facility-administered medications for this visit  Review of Systems      Objective:  Vitals:    11/26/19 0826   BP: 134/70   BP Location: Left arm   Patient Position: Sitting   Cuff Size: Standard   Weight: (!) 139 kg (305 lb 12 8 oz)   Height: 6' 1" (1 854 m)     Body mass index is 40 35 kg/m²       Physical Exam

## 2019-12-09 NOTE — PROGRESS NOTES
Cardiology Follow Up    Jaswant Simon  1950  [id number]  [address]  [address] [facility] [id number] [phone]    1  Coronary artery disease involving native coronary artery of native heart without angina pectoris  POCT ECG    nitroglycerin (NITROSTAT) 0 4 mg SL tablet   2  Presence of drug-eluting stent in right coronary artery     3  Benign essential hypertension     4  Dyslipidemia  Lipid Panel with Direct LDL reflex   5  Morbid obesity ([address] )     6  Coronary artery disease of native heart with stable angina pectoris, unspecified vessel or lesion type [facility])         Discussion/Summary:  Mr Alvarado Gallardo is a pleasant 73-year-old gentleman who presents to the office today for routine followup       Since his last visit he continues to feel well from a cardiac standpoint  His activity is mainly limited by his knee pain      His blood pressure is well controlled in the office today on his current antihypertensive regimen to which no changes were made  I do not have any recent assessment of his lipids  His lipid profile from about a year ago revealed acceptable numbers  A prescription was provided and he will undergo reassessment      No cardiac testing is advised given he is asymptomatic  We again discussed vascular screening given his risk factors which he declines      I will see him back in the office in one year  Interval History:   Mr Alvarado Gallardo is a pleasant 73-year-old gentleman who presents to the office today for followup       Since his last visit one year ago he has been feeling well  He continues to be limited by bilateral knee pain  He can ascend a flight of steps very slowly without any cardiopulmonary symptoms of chest pain or shortness of breath    He denies any signs or symptoms of congestive heart failure including increasing lower extremity edema, paroxysmal nocturnal dyspnea, orthopnea, acute weight gain or increasing abdominal girth  He does report lower extremity edema which is worse as the day goes on  He denies lightheadedness, syncope or presyncope  He denies palpitations or symptoms of claudication  Problem List     Benign essential hypertension    Overview Signed 11/19/2018 10:43 AM by Man Mittal DO     On good medications ramipril doing well         CAD (coronary artery disease)    Diabetes mellitus type 2, controlled ([facility] )    Overview Signed 11/19/2018 10:43 AM by Man Mittal DO     Blood sugars in the range of 150         Lab Results   Component Value Date    HGBA1C 7 0 (H) 06/19/2019       No results for input(s): POCGLU in the last 72 hours      Blood Sugar Average: Last 72 hrs:          Dyslipidemia    Morbid obesity ([facility] [address] )    Sciatica        Past Medical History:   Diagnosis Date    Asthma     resolved: 08/14/17    Closed fracture of fifth metacarpal bone of right hand     last assessed: 06/30/15    Diabetes mellitus ([facility] [address] )     Diverticulitis     Fracture of metacarpal shaft     Hemopneumothorax, left     last assessed: 06/02/15    Hyperlipidemia     Hypertension     Inguinal hernia     Lumbar transverse process fracture (HCC)     Pilonidal cyst     Pleural effusion     last assessed: 07/01/15    Rib fractures     last assessed: 06/02/15    Status post fall     Superficial thrombophlebitis of leg     last assessed: 03/19/14     Social History     Socioeconomic History    Marital status: /Civil Union     Spouse name: Not on file    Number of children: Not on file    Years of education: Not on file    Highest education level: Not on file   Occupational History    Occupation: Retired   Social Needs    Financial resource strain: Not on file    Food insecurity:     Worry: Not on file     Inability: Not on file   TÃ¡ximo needs:     Medical: Not on file     Non-medical: Not on file   Tobacco Use    Smoking status: Former Smoker Last attempt to quit: 1985     Years since quittin 9    Smokeless tobacco: Never Used   Substance and Sexual Activity    Alcohol use: Not Currently     Comment: Social    Drug use: No    Sexual activity: Yes     Partners: Female   Lifestyle    Physical activity:     Days per week: Not on file     Minutes per session: Not on file    Stress: Not on file   Relationships    Social connections:     Talks on phone: Not on file     Gets together: Not on file     Attends Confucianism service: Not on file     Active member of club or organization: Not on file     Attends meetings of clubs or organizations: Not on file     Relationship status: Not on file    Intimate partner violence:     Fear of current or ex partner: Not on file     Emotionally abused: Not on file     Physically abused: Not on file     Forced sexual activity: Not on file   Other Topics Concern    Not on file   Social History Narrative    Always uses seat belt    No living will      Family History   Problem Relation Age of Onset    Coronary artery disease Mother     Heart disease Father         Benign hypertensive    Hyperlipidemia Father      Past Surgical History:   Procedure Laterality Date    CORONARY ANGIOPLASTY WITH STENT PLACEMENT      PTCA/RITA to RCA   [facility] FRACTURE SURGERY  2015    Closed treatment of fracture of metacarpal bone, right 5t metacarpal fracture Dr Soha Cox ARTHROSCOPY W/ MENISCAL REPAIR      Lateral meniscus    LAPAROSCOPY  2015    Exploratory, extensive lysis of adhesions Dr Deangelo Frank      Surgical lysis of intestinal adhesions    RECTAL SURGERY      REVISION COLOSTOMY      THORACENTESIS  2015    with imaging guidance left, removed 1300cc of fluid w/o problem lower base of lun06    TONSILLECTOMY AND ADENOIDECTOMY         Current Outpatient Medications:     atorvastatin (LIPITOR) 40 mg tablet, TAKE 1 TABLET BY MOUTH  DAILY, Disp: 90 tablet, Rfl: 3    furosemide (LASIX) 20 mg tablet, TAKE 1 TABLET BY MOUTH  DAILY, Disp: 90 tablet, Rfl: 3    metFORMIN (GLUCOPHAGE-XR) 500 mg 24 hr tablet, TAKE 1 TABLET BY MOUTH  TWICE A DAY WITH MEALS, Disp: 180 tablet, Rfl: 3    metoprolol tartrate (LOPRESSOR) 25 mg tablet, TAKE 1 TABLET BY MOUTH TWO  TIMES DAILY, Disp: 180 tablet, Rfl: 3    potassium chloride (K-DUR) 10 mEq tablet, TAKE 1 TABLET BY MOUTH  DAILY, Disp: 90 tablet, Rfl: 3    ramipril (ALTACE) 10 MG capsule, TAKE 1 CAPSULE BY MOUTH  DAILY, Disp: 90 capsule, Rfl: 3    nitroglycerin (NITROSTAT) 0 4 mg SL tablet, Place 1 tablet (0 4 mg total) under the tongue every 5 (five) minutes as needed for chest pain, Disp: 30 tablet, Rfl: 3  No Known Allergies    Labs:     Chemistry        Component Value Date/Time     06/28/2017 1108    K 4 3 06/19/2019 0940    K 4 6 06/28/2017 1108     06/19/2019 0940    CL 99 06/28/2017 1108    CO2 28 06/19/2019 0940    CO2 24 06/28/2017 1108    BUN 15 06/19/2019 0940    BUN 15 06/28/2017 1108    CREATININE 0 92 06/19/2019 0940    CREATININE 0 81 06/28/2017 1108        Component Value Date/Time    CALCIUM 8 9 06/19/2019 0940    CALCIUM 8 9 06/28/2017 1108    ALKPHOS 117 (H) 12/09/2017 0940    ALKPHOS 127 (H) 05/14/2015 1003    AST 16 12/09/2017 0940    AST 18 05/14/2015 1003    ALT 24 12/09/2017 0940    ALT 27 05/14/2015 1003    BILITOT 1 50 (H) 05/14/2015 1003            Lab Results   Component Value Date    CHOL 108 07/14/2016    CHOL 191 10/28/2015    CHOL 117 11/16/2013     Lab Results   Component Value Date    HDL 48 12/05/2018    HDL 45 12/09/2017    HDL 42 07/14/2016     Lab Results   Component Value Date    LDLCALC 60 12/05/2018    LDLCALC 60 12/09/2017    LDLCALC 50 07/14/2016     Lab Results   Component Value Date    TRIG 62 12/05/2018    TRIG 66 12/09/2017    TRIG 78 07/14/2016     No results found for: CHOLHDL    Imaging: No results found      ECG:  Sinus bradycardia, otherwise normal ECG      Review of Systems   Cardiovascular: Negative for chest pain, claudication, cyanosis, dyspnea on exertion and irregular heartbeat  Musculoskeletal: Positive for arthritis and joint pain  All other systems reviewed and are negative  Vitals:    12/10/19 1440   BP: 120/68   Pulse:      Vitals:    12/10/19 1406   Weight: (!) 139 kg (305 lb 8 9 oz)     Height: 6' 1" (185 4 cm)   Body mass index is 40 31 kg/m²      Physical Exam:  General:  Alert and cooperative, appears stated age  HEENT:  PERRLA, EOMI, no scleral icterus, no conjunctival pallor  Neck:  No lymphadenopathy, no thyromegaly, no carotid bruits, no elevated JVP  Heart:  Regular rate and rhythm, normal S1/S2, no S3/S4, no murmur  Lungs:  Clear to auscultation bilaterally   Abdomen:  Soft, non-tender, positive bowel sounds, no rebound or guarding,   no organomegaly   Extremities:  Pedal edema   Vascular:  2+ pedal pulses  Skin:  No rashes or lesions on exposed skin  Neurologic:  Cranial nerves II-XII grossly intact without focal deficits

## 2019-12-10 ENCOUNTER — OFFICE VISIT (OUTPATIENT)
Dept: CARDIOLOGY CLINIC | Facility: HOSPITAL | Age: 69
End: 2019-12-10
Payer: MEDICARE

## 2019-12-10 VITALS
WEIGHT: 305.56 LBS | HEART RATE: 55 BPM | SYSTOLIC BLOOD PRESSURE: 120 MMHG | BODY MASS INDEX: 40.5 KG/M2 | HEIGHT: 73 IN | DIASTOLIC BLOOD PRESSURE: 68 MMHG

## 2019-12-10 DIAGNOSIS — Z95.5 PRESENCE OF DRUG-ELUTING STENT IN RIGHT CORONARY ARTERY: ICD-10-CM

## 2019-12-10 DIAGNOSIS — I25.118 CORONARY ARTERY DISEASE OF NATIVE HEART WITH STABLE ANGINA PECTORIS, UNSPECIFIED VESSEL OR LESION TYPE (HCC): ICD-10-CM

## 2019-12-10 DIAGNOSIS — E78.5 DYSLIPIDEMIA: ICD-10-CM

## 2019-12-10 DIAGNOSIS — I10 BENIGN ESSENTIAL HYPERTENSION: ICD-10-CM

## 2019-12-10 DIAGNOSIS — I25.10 CORONARY ARTERY DISEASE INVOLVING NATIVE CORONARY ARTERY OF NATIVE HEART WITHOUT ANGINA PECTORIS: Primary | ICD-10-CM

## 2019-12-10 DIAGNOSIS — E66.01 MORBID OBESITY (HCC): ICD-10-CM

## 2019-12-10 PROCEDURE — 99214 OFFICE O/P EST MOD 30 MIN: CPT | Performed by: INTERNAL MEDICINE

## 2019-12-10 PROCEDURE — 93000 ELECTROCARDIOGRAM COMPLETE: CPT | Performed by: INTERNAL MEDICINE

## 2019-12-10 RX ORDER — NITROGLYCERIN 0.4 MG/1
0.4 TABLET SUBLINGUAL
Qty: 30 TABLET | Refills: 3 | Status: SHIPPED | OUTPATIENT
Start: 2019-12-10 | End: 2021-01-20 | Stop reason: SDUPTHER

## 2020-01-22 ENCOUNTER — TRANSCRIBE ORDERS (OUTPATIENT)
Dept: ADMINISTRATIVE | Facility: HOSPITAL | Age: 70
End: 2020-01-22

## 2020-01-22 ENCOUNTER — LAB (OUTPATIENT)
Dept: LAB | Facility: HOSPITAL | Age: 70
End: 2020-01-22
Attending: INTERNAL MEDICINE
Payer: MEDICARE

## 2020-01-22 DIAGNOSIS — E11.8 TYPE II DIABETES MELLITUS WITH MANIFESTATIONS (HCC): ICD-10-CM

## 2020-01-22 DIAGNOSIS — E11.8 TYPE II DIABETES MELLITUS WITH MANIFESTATIONS (HCC): Primary | ICD-10-CM

## 2020-01-22 DIAGNOSIS — E78.5 DYSLIPIDEMIA: ICD-10-CM

## 2020-01-22 LAB
ANION GAP SERPL CALCULATED.3IONS-SCNC: 15 MMOL/L (ref 4–13)
BUN SERPL-MCNC: 18 MG/DL (ref 5–25)
CALCIUM SERPL-MCNC: 8.6 MG/DL (ref 8.3–10.1)
CHLORIDE SERPL-SCNC: 103 MMOL/L (ref 100–108)
CHOLEST SERPL-MCNC: 122 MG/DL (ref 50–200)
CO2 SERPL-SCNC: 23 MMOL/L (ref 21–32)
CREAT SERPL-MCNC: 0.85 MG/DL (ref 0.6–1.3)
EST. AVERAGE GLUCOSE BLD GHB EST-MCNC: 154 MG/DL
GFR SERPL CREATININE-BSD FRML MDRD: 90 ML/MIN/1.73SQ M
GLUCOSE P FAST SERPL-MCNC: 178 MG/DL (ref 65–99)
HBA1C MFR BLD: 7 % (ref 4.2–6.3)
HDLC SERPL-MCNC: 42 MG/DL
LDLC SERPL CALC-MCNC: 65 MG/DL (ref 0–100)
POTASSIUM SERPL-SCNC: 4.1 MMOL/L (ref 3.5–5.3)
SODIUM SERPL-SCNC: 141 MMOL/L (ref 136–145)
TRIGL SERPL-MCNC: 76 MG/DL

## 2020-01-22 PROCEDURE — 80061 LIPID PANEL: CPT

## 2020-01-22 PROCEDURE — 36415 COLL VENOUS BLD VENIPUNCTURE: CPT

## 2020-01-22 PROCEDURE — 83036 HEMOGLOBIN GLYCOSYLATED A1C: CPT

## 2020-01-22 PROCEDURE — 80048 BASIC METABOLIC PNL TOTAL CA: CPT

## 2020-01-22 NOTE — RESULT ENCOUNTER NOTE
Please call the patient regarding his abnormal result    Hemoglobin A1c is 7 that is good keep up the good work

## 2020-02-11 ENCOUNTER — OFFICE VISIT (OUTPATIENT)
Dept: PODIATRY | Facility: CLINIC | Age: 70
End: 2020-02-11
Payer: MEDICARE

## 2020-02-11 VITALS
HEIGHT: 72 IN | BODY MASS INDEX: 41.17 KG/M2 | SYSTOLIC BLOOD PRESSURE: 136 MMHG | DIASTOLIC BLOOD PRESSURE: 74 MMHG | HEART RATE: 54 BPM | WEIGHT: 304 LBS

## 2020-02-11 DIAGNOSIS — E11.40 TYPE 2 DIABETES MELLITUS WITH DIABETIC NEUROPATHY, WITHOUT LONG-TERM CURRENT USE OF INSULIN (HCC): Primary | ICD-10-CM

## 2020-02-11 PROCEDURE — 3078F DIAST BP <80 MM HG: CPT | Performed by: PODIATRIST

## 2020-02-11 PROCEDURE — 3075F SYST BP GE 130 - 139MM HG: CPT | Performed by: PODIATRIST

## 2020-02-11 PROCEDURE — 1036F TOBACCO NON-USER: CPT | Performed by: PODIATRIST

## 2020-02-11 PROCEDURE — 1160F RVW MEDS BY RX/DR IN RCRD: CPT | Performed by: PODIATRIST

## 2020-02-11 PROCEDURE — 2022F DILAT RTA XM EVC RTNOPTHY: CPT | Performed by: PODIATRIST

## 2020-02-11 PROCEDURE — 99213 OFFICE O/P EST LOW 20 MIN: CPT | Performed by: PODIATRIST

## 2020-02-11 PROCEDURE — 4040F PNEUMOC VAC/ADMIN/RCVD: CPT | Performed by: PODIATRIST

## 2020-02-11 PROCEDURE — 3008F BODY MASS INDEX DOCD: CPT | Performed by: PODIATRIST

## 2020-02-11 NOTE — PROGRESS NOTES
PATIENT:  Alvin Benton  1950    ASSESSMENT/PLAN:  1  Type 2 diabetes mellitus with diabetic neuropathy, without long-term current use of insulin ([facility] )          Patient was counseled on the condition and diagnosis  Educated disease prevention and risks related to diabetes  Educated proper daily foot care and exam   Instructed proper skin care / protection and footwear  Instructed to identify any signs of infection and related foot problem  The recent blood work was reviewed and the last HbA1c was 7 0  Discussed proper blood glucose control with diet and exercise  All nails were debrided  The patient will follow up in 9 weeks for further diabetic foot exam and care  HPI:  Alvin Benton is a 81 y  o year old male seen for diabetic foot exam   The patient has class findings with DPN  BS is under control  The patient denied any acute pedal disorder, redness, acute swelling, or recent injury           PAST MEDICAL HISTORY:  Past Medical History:   Diagnosis Date    Asthma     resolved: 08/14/17    Closed fracture of fifth metacarpal bone of right hand     last assessed: 06/30/15    [facility]     Diverticulitis     Fracture of metacarpal shaft     Hemopneumothorax, left     last assessed: 06/02/15    Hyperlipidemia     Hypertension     Inguinal hernia     Lumbar transverse process fracture (HCC)     Pilonidal cyst     Pleural effusion     last assessed: 07/01/15    Rib fractures     last assessed: 06/02/15    Status post fall     Superficial thrombophlebitis of leg     last assessed: 03/19/14       PAST SURGICAL HISTORY:  Past Surgical History:   Procedure Laterality Date    CORONARY ANGIOPLASTY WITH STENT PLACEMENT  2009    PTCA/RITA to RCA    FRACTURE SURGERY  05/04/2015    Closed treatment of fracture of metacarpal bone, right 5t metacarpal fracture Dr Enid Welch ARTHROSCOPY W/ MENISCAL REPAIR      Lateral meniscus    LAPAROSCOPY 2015    Exploratory, extensive lysis of adhesions Dr Courtney Marte      Surgical lysis of intestinal adhesions    RECTAL SURGERY      REVISION COLOSTOMY      THORACENTESIS  2015    with imaging guidance left, removed 1300cc of fluid w/o problem lower base of lun06    TONSILLECTOMY AND ADENOIDECTOMY          ALLERGIES:  Patient has no known allergies  MEDICATIONS:  Current Outpatient Medications   Medication Sig Dispense Refill    atorvastatin (LIPITOR) 40 mg tablet TAKE 1 TABLET BY MOUTH  DAILY 90 tablet 3    furosemide (LASIX) 20 mg tablet TAKE 1 TABLET BY MOUTH  DAILY 90 tablet 3    metFORMIN (GLUCOPHAGE-XR) 500 mg 24 hr tablet TAKE 1 TABLET BY MOUTH  TWICE A DAY WITH MEALS 180 tablet 3    metoprolol tartrate (LOPRESSOR) 25 mg tablet TAKE 1 TABLET BY MOUTH TWO  TIMES DAILY 180 tablet 3    nitroglycerin (NITROSTAT) 0 4 mg SL tablet Place 1 tablet (0 4 mg total) under the tongue every 5 (five) minutes as needed for chest pain 30 tablet 3    potassium chloride (K-DUR) 10 mEq tablet TAKE 1 TABLET BY MOUTH  DAILY 90 tablet 3    ramipril (ALTACE) 10 MG capsule TAKE 1 CAPSULE BY MOUTH  DAILY 90 capsule 3     No current facility-administered medications for this visit          SOCIAL HISTORY:  Social History     Socioeconomic History    Marital status: /Civil Union     Spouse name: None    Number of children: None    Years of education: None    Highest education level: None   Occupational History    Occupation: Retired   Social Needs    Financial resource strain: None    Food insecurity:     Worry: None     Inability: None    Transportation needs:     Medical: None     Non-medical: None   Tobacco Use    Smoking status: Former Smoker     Last attempt to quit:      Years since quittin 1    Smokeless tobacco: Never Used   Substance and Sexual Activity    Alcohol use: Not Currently     Comment: Social    Drug use: No    Sexual activity: Yes Partners: Female   Lifestyle    Physical activity:     Days per week: None     Minutes per session: None    Stress: None   Relationships    Social connections:     Talks on phone: None     Gets together: None     Attends [employer]ism service: None     Active member of club or organization: None     Attends meetings of clubs or organizations: None     Relationship status: None    Intimate partner violence:     Fear of current or ex partner: None     Emotionally abused: None     Physically abused: None     Forced sexual activity: None   Other Topics Concern    None   Social History Narrative    Always uses seat belt    No living will        REVIEW OF SYSTEMS:  GENERAL: NAD, afebrile  HEART: No chest pain, or palpitation  RESPIRATORY:  No acute SOB or cough  GI: No Nausea, vomit or diarrhea  NEUROLOGIC: No syncope or acute weakness    PHYSICAL EXAM:  VASCULAR EXAM  Dorsalis pedis  +1, Posterior tibial artery  absent  The patient has class findings with skin atrophy, lack of digital hair, and nail dystrophy  There is +1 lower extremity edema bilaterally  Venous stasis skin changes noted BLE  NEUROLOGIC EXAM  Sensation is intact to light touch  Sensation is intact to 10gm monofilament  No focal neurologic deficit  DERMATOLOGIC EXAM:   No ulcer or cellulitis noted  The patient has hypertrophic toenails with discoloration, onycholysis, and subungal debris  No notable skin lesion  MUSCULOSKELETAL EXAM:   No acute joint pain, edema, or redness  No acute musculoskeletal problem  Patient has deformity including bunion and hammertoe  Diabetic Foot Exam    Patient's shoes and socks removed  Right Foot/Ankle   Right Foot Inspection  Skin Exam: skin normal, skin intact and dry skin no warmth, no callus, no erythema, no maceration, no abnormal color, no pre-ulcer, no ulcer and no callus                          Toe Exam: right toe deformityno swelling, no tenderness and erythema  Sensory Vibration: diminished  Proprioception: intact   Monofilament testing: intact  Vascular  Capillary refills: < 3 seconds  The right DP pulse is 1+  The right PT pulse is 0  Left Foot/Ankle  Left Foot Inspection  Skin Exam: skin normal, skin intact and dry skinno warmth, no erythema, no maceration, normal color, no pre-ulcer, no ulcer and no callus                         Toe Exam: left toe deformityno swelling, no tenderness and no erythema                   Sensory   Vibration: diminished  Proprioception: intact  Monofilament: intact  Vascular  Capillary refills: < 3 seconds  The left DP pulse is 1+  The left PT pulse is 0  Assign Risk Category:  Deformity present; No loss of protective sensation;  No weak pulses       Risk: 1

## 2020-02-20 ENCOUNTER — OFFICE VISIT (OUTPATIENT)
Dept: FAMILY MEDICINE CLINIC | Facility: CLINIC | Age: 70
End: 2020-02-20
Payer: MEDICARE

## 2020-02-20 ENCOUNTER — TELEPHONE (OUTPATIENT)
Dept: GASTROENTEROLOGY | Facility: CLINIC | Age: 70
End: 2020-02-20

## 2020-02-20 VITALS
DIASTOLIC BLOOD PRESSURE: 68 MMHG | HEIGHT: 72 IN | WEIGHT: 305 LBS | SYSTOLIC BLOOD PRESSURE: 112 MMHG | BODY MASS INDEX: 41.31 KG/M2

## 2020-02-20 DIAGNOSIS — E11.8 TYPE 2 DIABETES MELLITUS WITH COMPLICATION (HCC): Primary | ICD-10-CM

## 2020-02-20 DIAGNOSIS — Z12.11 SCREENING FOR COLON CANCER: ICD-10-CM

## 2020-02-20 DIAGNOSIS — I10 BENIGN ESSENTIAL HYPERTENSION: ICD-10-CM

## 2020-02-20 DIAGNOSIS — E66.01 MORBID OBESITY (HCC): ICD-10-CM

## 2020-02-20 PROCEDURE — 2022F DILAT RTA XM EVC RTNOPTHY: CPT | Performed by: FAMILY MEDICINE

## 2020-02-20 PROCEDURE — 3008F BODY MASS INDEX DOCD: CPT | Performed by: FAMILY MEDICINE

## 2020-02-20 PROCEDURE — 3074F SYST BP LT 130 MM HG: CPT | Performed by: FAMILY MEDICINE

## 2020-02-20 PROCEDURE — 3078F DIAST BP <80 MM HG: CPT | Performed by: FAMILY MEDICINE

## 2020-02-20 PROCEDURE — 4040F PNEUMOC VAC/ADMIN/RCVD: CPT | Performed by: FAMILY MEDICINE

## 2020-02-20 PROCEDURE — 1036F TOBACCO NON-USER: CPT | Performed by: FAMILY MEDICINE

## 2020-02-20 PROCEDURE — 1160F RVW MEDS BY RX/DR IN RCRD: CPT | Performed by: FAMILY MEDICINE

## 2020-02-20 PROCEDURE — 99214 OFFICE O/P EST MOD 30 MIN: CPT | Performed by: FAMILY MEDICINE

## 2020-02-20 PROCEDURE — 3051F HG A1C>EQUAL 7.0%<8.0%: CPT | Performed by: FAMILY MEDICINE

## 2020-02-20 NOTE — PROGRESS NOTES
BMI Counseling: Body mass index is 41 37 kg/m²  The BMI is above normal  Nutrition recommendations include decreasing portion sizes, encouraging healthy choices of fruits and vegetables, decreasing fast food intake, consuming healthier snacks, moderation in carbohydrate intake and increasing intake of lean protein  Exercise recommendations include moderate physical activity 150 minutes/week  No pharmacotherapy was ordered  Patient referred to PCP due to patient being overweight  Assessment/Plan:    Problem List Items Addressed This Visit        Endocrine    Type 2 diabetes mellitus with complication ([facility] ) - Primary       Cardiovascular and Mediastinum    Benign essential hypertension       Other    Morbid obesity ([facility] )      Other Visit Diagnoses     Screening for colon cancer        Relevant Orders    Ambulatory referral to Gastroenterology           Diagnoses and all orders for this visit:    Type 2 diabetes mellitus with complication ([facility]    Benign essential hypertension    Morbid obesity ([facility] )    Screening for colon cancer  -     Ambulatory referral to Gastroenterology; Future        No problem-specific Assessment & Plan notes found for this encounter  Subjective:      Patient ID: Michela King is a 81 y o  male      Follow-up visit on Mr Princess Padilla diabetic labs most recently were excellent sugar was a little high but his A1c was 7 his lipid profile was marvelous he meets all the bench marks for good lipid control on he would like to be a little more active needs to repeat his Synvisc injections into his knees he will contact his orthopedic surgeon for repeat on knee injections weight is steady hopefully when he becomes more active heel drop few lb he has had his iris scan so that is up-to-date and he has no diabetic foot problems      The following portions of the patient's history were reviewed and updated as appropriate:   He has a past medical history of Asthma, Closed fracture of fifth metacarpal bone of right hand, Diabetes mellitus ([address] ), Diverticulitis, Fracture of metacarpal shaft, Hemopneumothorax, left, Hyperlipidemia, Hypertension, Inguinal hernia, Lumbar transverse process fracture ([address] ), Pilonidal cyst, Pleural effusion, Rib fractures, Status post fall, and Superficial thrombophlebitis of leg ,  does not have any pertinent problems on file  ,   has a past surgical history that includes Coronary angioplasty with stent (2009); Fracture surgery (05/04/2015); Revision Colostomy; LAPAROSCOPY (05/18/2015); Hernia repair; Knee arthroscopy w/ meniscal repair; Rectal surgery; Other surgical history; Thoracentesis (06/02/2015); and Tonsillectomy and adenoidectomy  ,  family history includes Coronary artery disease in his mother; Heart disease in his father; Hyperlipidemia in his father  ,   reports that he quit smoking about 35 years ago  He has never used smokeless tobacco  He reports that he drank alcohol  He reports that he does not use drugs  ,  has No Known Allergies     Current Outpatient Medications   Medication Sig Dispense Refill    atorvastatin (LIPITOR) 40 mg tablet TAKE 1 TABLET BY MOUTH  DAILY 90 tablet 3    furosemide (LASIX) 20 mg tablet TAKE 1 TABLET BY MOUTH  DAILY 90 tablet 3    metFORMIN (GLUCOPHAGE-XR) 500 mg 24 hr tablet TAKE 1 TABLET BY MOUTH  TWICE A DAY WITH MEALS 180 tablet 3    metoprolol tartrate (LOPRESSOR) 25 mg tablet TAKE 1 TABLET BY MOUTH TWO  TIMES DAILY 180 tablet 3    nitroglycerin (NITROSTAT) 0 4 mg SL tablet Place 1 tablet (0 4 mg total) under the tongue every 5 (five) minutes as needed for chest pain 30 tablet 3    potassium chloride (K-DUR) 10 mEq tablet TAKE 1 TABLET BY MOUTH  DAILY 90 tablet 3    ramipril (ALTACE) 10 MG capsule TAKE 1 CAPSULE BY MOUTH  DAILY 90 capsule 3     No current facility-administered medications for this visit  Review of Systems   Constitutional: Positive for activity change   Negative for appetite change, diaphoresis, fatigue and fever  HENT: Negative  Eyes: Negative  Respiratory: Negative for apnea, cough, chest tightness, shortness of breath and wheezing  Cardiovascular: Negative for chest pain, palpitations and leg swelling  Gastrointestinal: Negative for abdominal distention, abdominal pain, anal bleeding, constipation, diarrhea, nausea and vomiting  Endocrine: Negative for cold intolerance, heat intolerance, polydipsia, polyphagia and polyuria  Genitourinary: Negative for difficulty urinating, dysuria, flank pain, hematuria and urgency  Musculoskeletal: Positive for arthralgias and joint swelling  Negative for back pain, gait problem and myalgias  Skin: Negative for color change, rash and wound  Allergic/Immunologic: Negative for environmental allergies, food allergies and immunocompromised state  Neurological: Negative for dizziness, seizures, syncope, speech difficulty, numbness and headaches  Hematological: Negative for adenopathy  Does not bruise/bleed easily  Psychiatric/Behavioral: Negative for agitation, behavioral problems, hallucinations, sleep disturbance and suicidal ideas  Objective:  Vitals:    02/20/20 0755   BP: 112/68   BP Location: Left arm   Patient Position: Sitting   Weight: (!) 138 kg (305 lb)   Height: 6' (1 829 m)     Body mass index is 41 37 kg/m²  Physical Exam   Constitutional: He is oriented to person, place, and time  He appears well-developed and well-nourished  No distress  HENT:   Head: Normocephalic  Right Ear: External ear normal    Left Ear: External ear normal    Nose: Nose normal    Mouth/Throat: Oropharynx is clear and moist    Eyes: Pupils are equal, round, and reactive to light  Conjunctivae and EOM are normal  Right eye exhibits no discharge  Left eye exhibits no discharge  No scleral icterus  Neck: Normal range of motion  No tracheal deviation present  No thyromegaly present  Cardiovascular: Normal rate, regular rhythm and normal heart sounds  Exam reveals no gallop and no friction rub  Pulses are no weak pulses  No murmur heard  Pulses:       Dorsalis pedis pulses are 2+ on the right side, and 2+ on the left side  Posterior tibial pulses are 2+ on the right side, and 2+ on the left side  Pulmonary/Chest: Effort normal and breath sounds normal  No respiratory distress  He has no wheezes  Abdominal: Soft  Bowel sounds are normal  He exhibits no mass  There is no tenderness  There is no guarding  Musculoskeletal: He exhibits no edema or deformity  Bilateral knee arthritis   Feet:   Right Foot:   Skin Integrity: Negative for ulcer, skin breakdown, erythema, warmth, callus or dry skin  Left Foot:   Skin Integrity: Negative for ulcer, skin breakdown, erythema, warmth, callus or dry skin  Lymphadenopathy:     He has no cervical adenopathy  Neurological: He is alert and oriented to person, place, and time  No cranial nerve deficit  Skin: Skin is warm and dry  No rash noted  He is not diaphoretic  No erythema  Psychiatric: He has a normal mood and affect  Thought content normal      Patient's shoes and socks removed  Right Foot/Ankle   Right Foot Inspection  Skin Exam: skin normal and skin intact no dry skin, no warmth, no callus, no erythema, no maceration, no abnormal color, no pre-ulcer, no ulcer and no callus                          Toe Exam: ROM and strength within normal limits  Sensory   Vibration: intact  Proprioception: intact   Monofilament testing: intact  Vascular  Capillary refills: < 3 seconds  The right DP pulse is 2+  The right PT pulse is 2+       Left Foot/Ankle  Left Foot Inspection  Skin Exam: skin normal and skin intactno dry skin, no warmth, no erythema, no maceration, normal color, no pre-ulcer, no ulcer and no callus                         Toe Exam: ROM and strength within normal limits                   Sensory   Vibration: intact  Proprioception: intact  Monofilament: intact  Vascular  Capillary refills: < 3 seconds  The left DP pulse is 2+  The left PT pulse is 2+  Assign Risk Category:  No deformity present; No loss of protective sensation;  No weak pulses       Risk: 0

## 2020-02-20 NOTE — TELEPHONE ENCOUNTER
Left message to arrange appointment     ----- Message from Miles Johnston sent at 2/20/2020  8:51 AM EST -----  SCREENING

## 2020-06-05 ENCOUNTER — OFFICE VISIT (OUTPATIENT)
Dept: PODIATRY | Facility: CLINIC | Age: 70
End: 2020-06-05
Payer: MEDICARE

## 2020-06-05 VITALS — WEIGHT: 304 LBS | BODY MASS INDEX: 41.17 KG/M2 | HEIGHT: 72 IN

## 2020-06-05 DIAGNOSIS — B35.1 ONYCHOMYCOSIS: ICD-10-CM

## 2020-06-05 DIAGNOSIS — E11.40 TYPE 2 DIABETES MELLITUS WITH DIABETIC NEUROPATHY, WITHOUT LONG-TERM CURRENT USE OF INSULIN (HCC): Primary | ICD-10-CM

## 2020-06-05 PROCEDURE — 11721 DEBRIDE NAIL 6 OR MORE: CPT | Performed by: PODIATRIST

## 2020-06-18 ENCOUNTER — TRANSCRIBE ORDERS (OUTPATIENT)
Dept: ADMINISTRATIVE | Facility: HOSPITAL | Age: 70
End: 2020-06-18

## 2020-06-18 ENCOUNTER — APPOINTMENT (OUTPATIENT)
Dept: LAB | Facility: HOSPITAL | Age: 70
End: 2020-06-18
Attending: FAMILY MEDICINE
Payer: MEDICARE

## 2020-06-18 DIAGNOSIS — E11.8 TYPE 2 DIABETES MELLITUS WITH COMPLICATION (HCC): ICD-10-CM

## 2020-06-18 DIAGNOSIS — E11.8 TYPE 2 DIABETES MELLITUS WITH COMPLICATION (HCC): Primary | ICD-10-CM

## 2020-06-18 LAB
ANION GAP SERPL CALCULATED.3IONS-SCNC: 6 MMOL/L (ref 4–13)
BUN SERPL-MCNC: 14 MG/DL (ref 5–25)
CALCIUM SERPL-MCNC: 8.2 MG/DL (ref 8.3–10.1)
CHLORIDE SERPL-SCNC: 106 MMOL/L (ref 100–108)
CO2 SERPL-SCNC: 28 MMOL/L (ref 21–32)
CREAT SERPL-MCNC: 0.78 MG/DL (ref 0.6–1.3)
CREAT UR-MCNC: 250 MG/DL
EST. AVERAGE GLUCOSE BLD GHB EST-MCNC: 148 MG/DL
GFR SERPL CREATININE-BSD FRML MDRD: 93 ML/MIN/1.73SQ M
GLUCOSE P FAST SERPL-MCNC: 183 MG/DL (ref 65–99)
HBA1C MFR BLD: 6.8 %
MICROALBUMIN UR-MCNC: 10.1 MG/L (ref 0–20)
MICROALBUMIN/CREAT 24H UR: 4 MG/G CREATININE (ref 0–30)
POTASSIUM SERPL-SCNC: 4.4 MMOL/L (ref 3.5–5.3)
SODIUM SERPL-SCNC: 140 MMOL/L (ref 136–145)

## 2020-06-18 PROCEDURE — 80048 BASIC METABOLIC PNL TOTAL CA: CPT

## 2020-06-18 PROCEDURE — 36415 COLL VENOUS BLD VENIPUNCTURE: CPT

## 2020-06-18 PROCEDURE — 82570 ASSAY OF URINE CREATININE: CPT

## 2020-06-18 PROCEDURE — 83036 HEMOGLOBIN GLYCOSYLATED A1C: CPT

## 2020-06-18 PROCEDURE — 82043 UR ALBUMIN QUANTITATIVE: CPT

## 2020-06-22 ENCOUNTER — OFFICE VISIT (OUTPATIENT)
Dept: FAMILY MEDICINE CLINIC | Facility: CLINIC | Age: 70
End: 2020-06-22
Payer: MEDICARE

## 2020-06-22 VITALS
WEIGHT: 296.4 LBS | HEIGHT: 71 IN | SYSTOLIC BLOOD PRESSURE: 96 MMHG | BODY MASS INDEX: 41.49 KG/M2 | TEMPERATURE: 96 F | DIASTOLIC BLOOD PRESSURE: 60 MMHG

## 2020-06-22 DIAGNOSIS — I10 BENIGN ESSENTIAL HYPERTENSION: ICD-10-CM

## 2020-06-22 DIAGNOSIS — E11.8 TYPE 2 DIABETES MELLITUS WITH COMPLICATION (HCC): Primary | ICD-10-CM

## 2020-06-22 DIAGNOSIS — E78.2 MIXED HYPERLIPIDEMIA: ICD-10-CM

## 2020-06-22 PROCEDURE — 2022F DILAT RTA XM EVC RTNOPTHY: CPT | Performed by: FAMILY MEDICINE

## 2020-06-22 PROCEDURE — 1160F RVW MEDS BY RX/DR IN RCRD: CPT | Performed by: FAMILY MEDICINE

## 2020-06-22 PROCEDURE — 3074F SYST BP LT 130 MM HG: CPT | Performed by: FAMILY MEDICINE

## 2020-06-22 PROCEDURE — 99213 OFFICE O/P EST LOW 20 MIN: CPT | Performed by: FAMILY MEDICINE

## 2020-06-22 PROCEDURE — 3078F DIAST BP <80 MM HG: CPT | Performed by: FAMILY MEDICINE

## 2020-06-22 PROCEDURE — 3044F HG A1C LEVEL LT 7.0%: CPT | Performed by: FAMILY MEDICINE

## 2020-06-22 PROCEDURE — 1036F TOBACCO NON-USER: CPT | Performed by: FAMILY MEDICINE

## 2020-06-22 PROCEDURE — 3008F BODY MASS INDEX DOCD: CPT | Performed by: FAMILY MEDICINE

## 2020-06-22 PROCEDURE — 4040F PNEUMOC VAC/ADMIN/RCVD: CPT | Performed by: FAMILY MEDICINE

## 2020-06-25 DIAGNOSIS — E78.2 MIXED HYPERLIPIDEMIA: ICD-10-CM

## 2020-06-25 DIAGNOSIS — I10 ESSENTIAL HYPERTENSION: ICD-10-CM

## 2020-06-26 RX ORDER — RAMIPRIL 10 MG/1
CAPSULE ORAL
Qty: 90 CAPSULE | Refills: 3 | Status: SHIPPED | OUTPATIENT
Start: 2020-06-26 | End: 2021-05-20

## 2020-06-26 RX ORDER — POTASSIUM CHLORIDE 750 MG/1
TABLET, FILM COATED, EXTENDED RELEASE ORAL
Qty: 90 TABLET | Refills: 3 | Status: SHIPPED | OUTPATIENT
Start: 2020-06-26 | End: 2021-05-20

## 2020-06-26 RX ORDER — ATORVASTATIN CALCIUM 40 MG/1
TABLET, FILM COATED ORAL
Qty: 90 TABLET | Refills: 3 | Status: SHIPPED | OUTPATIENT
Start: 2020-06-26 | End: 2021-05-20

## 2020-08-05 ENCOUNTER — CONSULT (OUTPATIENT)
Dept: GASTROENTEROLOGY | Facility: CLINIC | Age: 70
End: 2020-08-05
Payer: MEDICARE

## 2020-08-05 VITALS
HEIGHT: 71 IN | TEMPERATURE: 99 F | WEIGHT: 297.6 LBS | SYSTOLIC BLOOD PRESSURE: 124 MMHG | DIASTOLIC BLOOD PRESSURE: 53 MMHG | BODY MASS INDEX: 41.66 KG/M2 | HEART RATE: 60 BPM

## 2020-08-05 DIAGNOSIS — Z87.19 HISTORY OF DIVERTICULITIS: ICD-10-CM

## 2020-08-05 DIAGNOSIS — Z12.11 SCREENING FOR COLON CANCER: Primary | ICD-10-CM

## 2020-08-05 PROCEDURE — 99204 OFFICE O/P NEW MOD 45 MIN: CPT | Performed by: INTERNAL MEDICINE

## 2020-08-05 PROCEDURE — 3044F HG A1C LEVEL LT 7.0%: CPT | Performed by: INTERNAL MEDICINE

## 2020-08-05 NOTE — PATIENT INSTRUCTIONS
Patient is going to wait to schedule with Dr Karen Caballero when his schedule opens, he is going for a Colonoscopy  Scheduled patient for a Colonoscopy with Dr Karen Caballero on 10/9/2020  Gave yuen-prep instructions  Patient expressed understanding

## 2020-08-06 DIAGNOSIS — Z12.11 SCREENING FOR COLON CANCER: Primary | ICD-10-CM

## 2020-08-06 NOTE — PROGRESS NOTES
[facility] - Outpatient Consultation  Vinny Reich 81 y o  male MRN: [id number]  Encounter: [id number]          ASSESSMENT AND PLAN:      1  Screening for colon cancer  Will schedule him for screening colonoscopy since his last one was more than 10 years ago  I spoke to him about the benefits and risks of the procedure as well as instructions for the bowel preparation and answered all of his questions  - Ambulatory referral to Gastroenterology  - Colonoscopy; Future    2  History of diverticulitis  He has a history of diverticulitis and had a partial colon resection because of this  I encouraged him to eat a high-fiber diet to reduce his risk of future episodes of acute diverticulitis     ______________________________________________________________________    HPI:  He presents for evaluation for screening colonoscopy  He said his last colonoscopy was more than 10 years ago  He denies any family history of colon polyps or colon cancer in any personal history of colon polyps  He denies any bleeding, change in bowel habits, abdominal pain, and weight loss  He reports a personal history of diverticulitis and he said he partial resection of his colon because of this many years ago  He also reports a history of partial colon resection after a traumatic injury  He denies any upper GI symptoms such as reflux, nausea, or vomiting  REVIEW OF SYSTEMS:    CONSTITUTIONAL: Denies any fever, chills, rigors, and weight loss  HEENT: No earache or tinnitus  Denies hearing loss or visual disturbances  CARDIOVASCULAR: No chest pain or palpitations  RESPIRATORY: Denies any cough, hemoptysis, shortness of breath or dyspnea on exertion  GASTROINTESTINAL: As noted in the History of Present Illness  GENITOURINARY: No problems with urination  Denies any hematuria or dysuria  NEUROLOGIC: No dizziness or vertigo, denies headaches  MUSCULOSKELETAL: Denies any muscle or joint pain     SKIN: Denies skin rashes or itching  ENDOCRINE: Denies excessive thirst  Denies intolerance to heat or cold  PSYCHOSOCIAL: Denies depression or anxiety  Denies any recent memory loss         Historical Information   Past Medical History:   Diagnosis Date    Asthma     resolved: 17    Closed fracture of fifth metacarpal bone of right hand     last assessed: 06/30/15    [facility]     Diverticulitis     Fracture of metacarpal shaft     Hemopneumothorax, left     last assessed: 06/02/15    Hyperlipidemia     Hypertension     Inguinal hernia     Lumbar transverse process fracture (HCC)     Pilonidal cyst     Pleural effusion     last assessed: 07/01/15    Rib fractures     last assessed: 06/02/15    Status post fall     Superficial thrombophlebitis of leg     last assessed: 14     Past Surgical History:   Procedure Laterality Date    CORONARY ANGIOPLASTY WITH STENT PLACEMENT      PTCA/RITA to RCA    FRACTURE SURGERY  2015    Closed treatment of fracture of metacarpal bone, right 5t metacarpal fracture Dr Chaparro Porteous ARTHROSCOPY W/ MENISCAL REPAIR      Lateral meniscus    LAPAROSCOPY  2015    Exploratory, extensive lysis of adhesions Dr Swanson [place] HISTORY      Surgical lysis of intestinal adhesions    RECTAL SURGERY      REVISION COLOSTOMY      THORACENTESIS  2015    with imaging guidance left, removed 1300cc of fluid w/o problem lower base of lun06    TONSILLECTOMY AND ADENOIDECTOMY       Social History   Social History     Substance and Sexual Activity   Alcohol Use Not Currently    Comment: Social     Social History     Substance and Sexual Activity   Drug Use No     Social History     Tobacco Use   Smoking Status Former Smoker    Last attempt to quit: [facility] Years since quittin 6   Smokeless Tobacco Never Used     Family History   Problem Relation Age of Onset    Coronary artery disease Mother     Heart disease Father         Benign hypertensive    Hyperlipidemia Father        Meds/Allergies       Current Outpatient Medications:     atorvastatin (LIPITOR) 40 mg tablet    furosemide (LASIX) 20 mg tablet    metFORMIN (GLUCOPHAGE-XR) 500 mg 24 hr tablet    metoprolol tartrate (LOPRESSOR) 25 mg tablet    nitroglycerin (NITROSTAT) 0 4 mg SL tablet    potassium chloride (K-DUR) 10 mEq tablet    ramipril (ALTACE) 10 MG capsule    No Known Allergies        Objective     Blood pressure 124/53, pulse 60, temperature 99 °F (37 2 °C), temperature source Tympanic, height 5' 11" (1 803 m), weight 135 kg (297 lb 9 6 oz)  Body mass index is 41 51 kg/m²  PHYSICAL EXAM:      General Appearance:   Alert, cooperative, no distress   HEENT:   Normocephalic, atraumatic, anicteric      Neck:  Supple, symmetrical, trachea midline   Lungs:   Clear to auscultation bilaterally; no rales, rhonchi or wheezing; respirations unlabored    Heart[de-identified]   Regular rate and rhythm; no murmur, rub, or gallop  Abdomen:   Soft, non-tender, non-distended; normal bowel sounds; no masses, no organomegaly    Genitalia:   Deferred    Rectal:   Deferred    Extremities:  No cyanosis, clubbing or edema    Pulses:  2+ and symmetric    Skin:  No jaundice, rashes, or lesions    Lymph nodes:  No palpable cervical lymphadenopathy        Lab Results:   No visits with results within 1 Day(s) from this visit  Latest known visit with results is:   Appointment on 06/18/2020   Component Date Value    Creatinine, Ur 06/18/2020 250 0     Microalbum  ,U,Random 06/18/2020 10 1     Microalb Creat Ratio 06/18/2020 4          Radiology Results:   No results found

## 2020-08-07 ENCOUNTER — TELEPHONE (OUTPATIENT)
Dept: GASTROENTEROLOGY | Facility: CLINIC | Age: 70
End: 2020-08-07

## 2020-08-07 NOTE — TELEPHONE ENCOUNTER
Called patient to Schedule patient for a Colonoscopy with Dr Oly Booker on 10/9/2020  LMOM to call the office back to confirm

## 2020-09-08 ENCOUNTER — OFFICE VISIT (OUTPATIENT)
Dept: PODIATRY | Facility: CLINIC | Age: 70
End: 2020-09-08
Payer: MEDICARE

## 2020-09-08 VITALS — BODY MASS INDEX: 42.7 KG/M2 | WEIGHT: 305 LBS | TEMPERATURE: 99 F | HEIGHT: 71 IN

## 2020-09-08 DIAGNOSIS — B35.1 ONYCHOMYCOSIS: ICD-10-CM

## 2020-09-08 DIAGNOSIS — E11.40 TYPE 2 DIABETES MELLITUS WITH DIABETIC NEUROPATHY, WITHOUT LONG-TERM CURRENT USE OF INSULIN (HCC): Primary | ICD-10-CM

## 2020-09-08 PROCEDURE — 11721 DEBRIDE NAIL 6 OR MORE: CPT | Performed by: PODIATRIST

## 2020-09-08 NOTE — PROGRESS NOTES
PATIENT:  Debbi Monreal  1950    ASSESSMENT/PLAN:  1  Type 2 diabetes mellitus with diabetic neuropathy, without long-term current use of insulin ([address] )     2  Onychomycosis  Debridement         Orders Placed This Encounter   Procedures    Debridement      Disease prevention and related risk factors of diabetes were identified and discussed  The patient was educated in proper foot wear for diabetics  Also educated in daily foot assessment and routine diabetic foot care  Discussed the importance of controlling BS through diet and exercise  The patient will follow up in 9 weeks for further diabetic foot exam and care     PROCEDURE:  All mycotic toenails were reduced and debrided in length, width, and girth using a nail nipper and dremel  HPI:  Debbi Monreal is a 81 y  o year old male seen for diabetic foot exam   The patient has class findings with DPN  BS is under control  No foot ulcer  Complained of thick nails with discomfort  The patient denied any acute pedal disorder, redness, acute swelling, or recent injury            PAST MEDICAL HISTORY:  Past Medical History:   Diagnosis Date    Asthma     resolved: 08/14/17    Closed fracture of fifth metacarpal bone of right hand     last assessed: 06/30/15    Diabetes mellitus ([facility]     Diverticulitis     Fracture of metacarpal shaft     Hemopneumothorax, left     last assessed: 06/02/15    Hyperlipidemia     Hypertension     Inguinal hernia     Lumbar transverse process fracture ([facility] )     Pilonidal cyst     Pleural effusion     last assessed: 07/01/15    Rib fractures     last assessed: 06/02/15    Status post fall     Superficial thrombophlebitis of leg     last assessed: 03/19/14       PAST SURGICAL HISTORY:  Past Surgical History:   Procedure Laterality Date    CORONARY ANGIOPLASTY WITH STENT PLACEMENT  2009    PTCA/RITA to RCA    FRACTURE SURGERY  05/04/2015    Closed treatment of fracture of metacarpal bone, right 5t metacarpal fracture Dr Sandeep Etienne ARTHROSCOPY W/ MENISCAL REPAIR      Lateral meniscus    LAPAROSCOPY  2015    Exploratory, extensive lysis of adhesions Dr Graciela Corea      Surgical lysis of intestinal adhesions    RECTAL SURGERY      REVISION COLOSTOMY      THORACENTESIS  2015    with imaging guidance left, removed 1300cc of fluid w/o problem lower base of lun06    TONSILLECTOMY AND ADENOIDECTOMY          ALLERGIES:  Patient has no known allergies  MEDICATIONS:  Current Outpatient Medications   Medication Sig Dispense Refill    atorvastatin (LIPITOR) 40 mg tablet TAKE 1 TABLET BY MOUTH  DAILY 90 tablet 3    furosemide (LASIX) 20 mg tablet TAKE 1 TABLET BY MOUTH  DAILY 90 tablet 3    metFORMIN (GLUCOPHAGE-XR) 500 mg 24 hr tablet TAKE 1 TABLET BY MOUTH  TWICE A DAY WITH MEALS 180 tablet 3    metoprolol tartrate (LOPRESSOR) 25 mg tablet TAKE 1 TABLET BY MOUTH TWO  TIMES DAILY 180 tablet 3    nitroglycerin (NITROSTAT) 0 4 mg SL tablet Place 1 tablet (0 4 mg total) under the tongue every 5 (five) minutes as needed for chest pain 30 tablet 3    potassium chloride (K-DUR) 10 mEq tablet TAKE 1 TABLET BY MOUTH  DAILY 90 tablet 3    ramipril (ALTACE) 10 MG capsule TAKE 1 CAPSULE BY MOUTH  DAILY 90 capsule 3     No current facility-administered medications for this visit          SOCIAL HISTORY:  Social History     Socioeconomic History    Marital status: /Civil Union     Spouse name: None    Number of children: None    Years of education: None    Highest education level: None   Occupational History    Occupation: Retired   Social Needs    Financial resource strain: None    Food insecurity     Worry: None     Inability: None    Transportation needs     Medical: None     Non-medical: None   Tobacco Use    Smoking status: Former Smoker     Last attempt to quit:      Years since quittin 7    Smokeless tobacco: Never Used Substance and Sexual Activity    Alcohol use: Not Currently     Comment: Social    Drug use: No    Sexual activity: Yes     Partners: Female   Lifestyle    Physical activity     Days per week: None     Minutes per session: None    Stress: None   Relationships    Social connections     Talks on phone: None     Gets together: None     Attends [employer] service: None     Active member of club or organization: None     Attends meetings of clubs or organizations: None     Relationship status: None    Intimate partner violence     Fear of current or ex partner: None     Emotionally abused: None     Physically abused: None     Forced sexual activity: None   Other Topics Concern    None   Social History Narrative    Always uses seat belt    No living will        REVIEW OF SYSTEMS:  GENERAL: NAD, afebrile  HEART: No chest pain, or palpitation  RESPIRATORY:  No acute SOB or cough  GI: No Nausea, vomit or diarrhea  NEUROLOGIC: No syncope or acute weakness    PHYSICAL EXAM:  VASCULAR EXAM  Dorsalis pedis  +1, Posterior tibial artery  absent  The patient has class findings with skin atrophy, lack of digital hair, and nail dystrophy  There is +1 lower extremity edema bilaterally  Venous stasis skin changes noted BLE  NEUROLOGIC EXAM  Sensation is intact to light touch  Sensation is intact to 10gm monofilament  No focal neurologic deficit  DERMATOLOGIC EXAM:   No ulcer or cellulitis noted  The patient has hypertrophic toenails with discoloration, onycholysis, and subungal debris  No notable skin lesion  MUSCULOSKELETAL EXAM:   No acute joint pain, edema, or redness  No acute musculoskeletal problem  Patient has deformity including bunion and hammertoe

## 2020-09-18 DIAGNOSIS — I10 ESSENTIAL HYPERTENSION: ICD-10-CM

## 2020-09-18 DIAGNOSIS — E11.9 CONTROLLED TYPE 2 DIABETES MELLITUS WITHOUT COMPLICATION, WITHOUT LONG-TERM CURRENT USE OF INSULIN (HCC): ICD-10-CM

## 2020-09-21 RX ORDER — METFORMIN HYDROCHLORIDE 500 MG/1
TABLET, EXTENDED RELEASE ORAL
Qty: 180 TABLET | Refills: 3 | Status: SHIPPED | OUTPATIENT
Start: 2020-09-21 | End: 2021-08-13

## 2020-09-21 RX ORDER — FUROSEMIDE 20 MG/1
TABLET ORAL
Qty: 90 TABLET | Refills: 3 | Status: SHIPPED | OUTPATIENT
Start: 2020-09-21 | End: 2021-08-13

## 2020-10-01 ENCOUNTER — TELEPHONE (OUTPATIENT)
Dept: GASTROENTEROLOGY | Facility: CLINIC | Age: 70
End: 2020-10-01

## 2020-10-06 ENCOUNTER — TELEPHONE (OUTPATIENT)
Dept: GASTROENTEROLOGY | Facility: CLINIC | Age: 70
End: 2020-10-06

## 2020-10-06 ENCOUNTER — APPOINTMENT (OUTPATIENT)
Dept: LAB | Facility: HOSPITAL | Age: 70
End: 2020-10-06
Attending: FAMILY MEDICINE
Payer: MEDICARE

## 2020-10-06 DIAGNOSIS — E11.8 TYPE 2 DIABETES MELLITUS WITH COMPLICATION (HCC): ICD-10-CM

## 2020-10-06 DIAGNOSIS — Z12.11 SCREENING FOR COLON CANCER: Primary | ICD-10-CM

## 2020-10-06 LAB
CREAT UR-MCNC: 132 MG/DL
MICROALBUMIN UR-MCNC: <5 MG/L (ref 0–20)
MICROALBUMIN/CREAT 24H UR: <4 MG/G CREATININE (ref 0–30)

## 2020-10-06 PROCEDURE — 82043 UR ALBUMIN QUANTITATIVE: CPT

## 2020-10-06 PROCEDURE — 82570 ASSAY OF URINE CREATININE: CPT

## 2020-10-06 RX ORDER — SODIUM, POTASSIUM,MAG SULFATES 17.5-3.13G
SOLUTION, RECONSTITUTED, ORAL ORAL
Qty: 2 BOTTLE | Refills: 0 | Status: SHIPPED | OUTPATIENT
Start: 2020-10-06 | End: 2021-01-13 | Stop reason: ALTCHOICE

## 2020-10-08 ENCOUNTER — OFFICE VISIT (OUTPATIENT)
Dept: FAMILY MEDICINE CLINIC | Facility: CLINIC | Age: 70
End: 2020-10-08
Payer: MEDICARE

## 2020-10-08 ENCOUNTER — ANESTHESIA EVENT (OUTPATIENT)
Dept: PERIOP | Facility: HOSPITAL | Age: 70
End: 2020-10-08

## 2020-10-08 VITALS
BODY MASS INDEX: 41.16 KG/M2 | TEMPERATURE: 96.3 F | DIASTOLIC BLOOD PRESSURE: 76 MMHG | HEIGHT: 71 IN | WEIGHT: 294 LBS | SYSTOLIC BLOOD PRESSURE: 124 MMHG

## 2020-10-08 DIAGNOSIS — Z13.5 ENCOUNTER FOR SCREENING FOR DIABETIC RETINOPATHY: ICD-10-CM

## 2020-10-08 DIAGNOSIS — Z23 NEED FOR IMMUNIZATION AGAINST INFLUENZA: Primary | ICD-10-CM

## 2020-10-08 DIAGNOSIS — Z00.00 MEDICARE ANNUAL WELLNESS VISIT, SUBSEQUENT: ICD-10-CM

## 2020-10-08 PROCEDURE — G0008 ADMIN INFLUENZA VIRUS VAC: HCPCS | Performed by: FAMILY MEDICINE

## 2020-10-08 PROCEDURE — 90662 IIV NO PRSV INCREASED AG IM: CPT | Performed by: FAMILY MEDICINE

## 2020-10-08 PROCEDURE — G0438 PPPS, INITIAL VISIT: HCPCS | Performed by: FAMILY MEDICINE

## 2020-10-08 PROCEDURE — 92250 FUNDUS PHOTOGRAPHY W/I&R: CPT | Performed by: FAMILY MEDICINE

## 2020-10-08 PROCEDURE — 1123F ACP DISCUSS/DSCN MKR DOCD: CPT | Performed by: FAMILY MEDICINE

## 2020-10-09 ENCOUNTER — HOSPITAL ENCOUNTER (OUTPATIENT)
Dept: PERIOP | Facility: HOSPITAL | Age: 70
Setting detail: OUTPATIENT SURGERY
Discharge: HOME | End: 2020-10-09
Attending: INTERNAL MEDICINE | Admitting: INTERNAL MEDICINE
Payer: MEDICARE

## 2020-10-09 ENCOUNTER — ANESTHESIA (OUTPATIENT)
Dept: PERIOP | Facility: HOSPITAL | Age: 70
End: 2020-10-09

## 2020-10-09 VITALS
HEIGHT: 71 IN | HEART RATE: 58 BPM | TEMPERATURE: 98 F | WEIGHT: 294 LBS | DIASTOLIC BLOOD PRESSURE: 70 MMHG | OXYGEN SATURATION: 96 % | SYSTOLIC BLOOD PRESSURE: 126 MMHG | RESPIRATION RATE: 18 BRPM | BODY MASS INDEX: 41.16 KG/M2

## 2020-10-09 VITALS — HEART RATE: 59 BPM

## 2020-10-09 DIAGNOSIS — Z12.11 SCREENING FOR COLON CANCER: ICD-10-CM

## 2020-10-09 LAB
GLUCOSE SERPL-MCNC: 152 MG/DL (ref 65–140)
LEFT EYE DIABETIC RETINOPATHY: NORMAL
LEFT EYE IMAGE QUALITY: NORMAL
LEFT EYE MACULAR EDEMA: NORMAL
LEFT EYE OTHER RETINOPATHY: NORMAL
RIGHT EYE DIABETIC RETINOPATHY: NORMAL
RIGHT EYE IMAGE QUALITY: NORMAL
RIGHT EYE MACULAR EDEMA: NORMAL
RIGHT EYE OTHER RETINOPATHY: NORMAL
SEVERITY (EYE EXAM): NORMAL

## 2020-10-09 PROCEDURE — 45385 COLONOSCOPY W/LESION REMOVAL: CPT | Performed by: INTERNAL MEDICINE

## 2020-10-09 PROCEDURE — 88305 TISSUE EXAM BY PATHOLOGIST: CPT | Performed by: PATHOLOGY

## 2020-10-09 PROCEDURE — 45380 COLONOSCOPY AND BIOPSY: CPT | Performed by: INTERNAL MEDICINE

## 2020-10-09 PROCEDURE — 45381 COLONOSCOPY SUBMUCOUS NJX: CPT | Performed by: INTERNAL MEDICINE

## 2020-10-09 PROCEDURE — 82948 REAGENT STRIP/BLOOD GLUCOSE: CPT

## 2020-10-09 RX ORDER — LIDOCAINE HYDROCHLORIDE 10 MG/ML
0.5 INJECTION, SOLUTION EPIDURAL; INFILTRATION; INTRACAUDAL; PERINEURAL ONCE AS NEEDED
Status: DISCONTINUED | OUTPATIENT
Start: 2020-10-09 | End: 2020-10-13 | Stop reason: HOSPADM

## 2020-10-09 RX ORDER — SODIUM CHLORIDE, SODIUM LACTATE, POTASSIUM CHLORIDE, CALCIUM CHLORIDE 600; 310; 30; 20 MG/100ML; MG/100ML; MG/100ML; MG/100ML
125 INJECTION, SOLUTION INTRAVENOUS CONTINUOUS
Status: DISCONTINUED | OUTPATIENT
Start: 2020-10-09 | End: 2020-10-13 | Stop reason: HOSPADM

## 2020-10-09 RX ORDER — METOCLOPRAMIDE HYDROCHLORIDE 5 MG/ML
10 INJECTION INTRAMUSCULAR; INTRAVENOUS ONCE AS NEEDED
Status: DISCONTINUED | OUTPATIENT
Start: 2020-10-09 | End: 2020-10-13 | Stop reason: HOSPADM

## 2020-10-09 RX ORDER — PROPOFOL 10 MG/ML
INJECTION, EMULSION INTRAVENOUS CONTINUOUS PRN
Status: DISCONTINUED | OUTPATIENT
Start: 2020-10-09 | End: 2020-10-09

## 2020-10-09 RX ORDER — ONDANSETRON 2 MG/ML
4 INJECTION INTRAMUSCULAR; INTRAVENOUS ONCE AS NEEDED
Status: DISCONTINUED | OUTPATIENT
Start: 2020-10-09 | End: 2020-10-13 | Stop reason: HOSPADM

## 2020-10-09 RX ORDER — DIPHENHYDRAMINE HYDROCHLORIDE 50 MG/ML
12.5 INJECTION INTRAMUSCULAR; INTRAVENOUS ONCE AS NEEDED
Status: DISCONTINUED | OUTPATIENT
Start: 2020-10-09 | End: 2020-10-13 | Stop reason: HOSPADM

## 2020-10-09 RX ORDER — PROPOFOL 10 MG/ML
INJECTION, EMULSION INTRAVENOUS AS NEEDED
Status: DISCONTINUED | OUTPATIENT
Start: 2020-10-09 | End: 2020-10-09

## 2020-10-09 RX ADMIN — SODIUM CHLORIDE, SODIUM LACTATE, POTASSIUM CHLORIDE, AND CALCIUM CHLORIDE 125 ML/HR: .6; .31; .03; .02 INJECTION, SOLUTION INTRAVENOUS at 07:48

## 2020-10-09 RX ADMIN — PROPOFOL 100 MG: 10 INJECTION, EMULSION INTRAVENOUS at 07:55

## 2020-10-09 RX ADMIN — PROPOFOL 100 MCG/KG/MIN: 10 INJECTION, EMULSION INTRAVENOUS at 07:55

## 2020-10-13 ENCOUNTER — TELEPHONE (OUTPATIENT)
Dept: GASTROENTEROLOGY | Facility: CLINIC | Age: 70
End: 2020-10-13

## 2020-11-17 ENCOUNTER — OFFICE VISIT (OUTPATIENT)
Dept: PODIATRY | Facility: CLINIC | Age: 70
End: 2020-11-17
Payer: MEDICARE

## 2020-11-17 VITALS
BODY MASS INDEX: 41.41 KG/M2 | WEIGHT: 295.8 LBS | HEIGHT: 71 IN | SYSTOLIC BLOOD PRESSURE: 129 MMHG | HEART RATE: 47 BPM | TEMPERATURE: 96.7 F | DIASTOLIC BLOOD PRESSURE: 61 MMHG

## 2020-11-17 DIAGNOSIS — B35.1 ONYCHOMYCOSIS: ICD-10-CM

## 2020-11-17 DIAGNOSIS — E11.40 TYPE 2 DIABETES MELLITUS WITH DIABETIC NEUROPATHY, WITHOUT LONG-TERM CURRENT USE OF INSULIN (HCC): Primary | ICD-10-CM

## 2020-11-17 PROCEDURE — 11721 DEBRIDE NAIL 6 OR MORE: CPT | Performed by: PODIATRIST

## 2021-01-11 ENCOUNTER — LAB (OUTPATIENT)
Dept: LAB | Facility: HOSPITAL | Age: 71
End: 2021-01-11
Attending: FAMILY MEDICINE
Payer: MEDICARE

## 2021-01-11 DIAGNOSIS — E11.8 TYPE 2 DIABETES MELLITUS WITH COMPLICATION (HCC): ICD-10-CM

## 2021-01-11 LAB
CREAT UR-MCNC: 159 MG/DL
MICROALBUMIN UR-MCNC: 5.8 MG/L (ref 0–20)
MICROALBUMIN/CREAT 24H UR: 4 MG/G CREATININE (ref 0–30)

## 2021-01-11 PROCEDURE — 82570 ASSAY OF URINE CREATININE: CPT

## 2021-01-11 PROCEDURE — 82043 UR ALBUMIN QUANTITATIVE: CPT

## 2021-01-13 ENCOUNTER — OFFICE VISIT (OUTPATIENT)
Dept: FAMILY MEDICINE CLINIC | Facility: CLINIC | Age: 71
End: 2021-01-13
Payer: MEDICARE

## 2021-01-13 VITALS
BODY MASS INDEX: 41.3 KG/M2 | SYSTOLIC BLOOD PRESSURE: 132 MMHG | WEIGHT: 295 LBS | TEMPERATURE: 97 F | DIASTOLIC BLOOD PRESSURE: 74 MMHG | HEIGHT: 71 IN

## 2021-01-13 DIAGNOSIS — I10 BENIGN ESSENTIAL HYPERTENSION: ICD-10-CM

## 2021-01-13 DIAGNOSIS — I25.118 CORONARY ARTERY DISEASE OF NATIVE HEART WITH STABLE ANGINA PECTORIS, UNSPECIFIED VESSEL OR LESION TYPE (HCC): ICD-10-CM

## 2021-01-13 DIAGNOSIS — I73.9 PERIPHERAL VASCULAR OCCLUSIVE DISEASE (HCC): ICD-10-CM

## 2021-01-13 DIAGNOSIS — R09.89 CAROTID BRUIT, UNSPECIFIED LATERALITY: ICD-10-CM

## 2021-01-13 DIAGNOSIS — E78.5 DYSLIPIDEMIA: ICD-10-CM

## 2021-01-13 DIAGNOSIS — I77.9 CAROTID ARTERY DISEASE, UNSPECIFIED LATERALITY, UNSPECIFIED TYPE (HCC): ICD-10-CM

## 2021-01-13 DIAGNOSIS — E66.01 MORBID OBESITY (HCC): ICD-10-CM

## 2021-01-13 DIAGNOSIS — E11.8 TYPE 2 DIABETES MELLITUS WITH COMPLICATION (HCC): Primary | ICD-10-CM

## 2021-01-13 PROCEDURE — 99214 OFFICE O/P EST MOD 30 MIN: CPT | Performed by: FAMILY MEDICINE

## 2021-01-13 NOTE — PROGRESS NOTES
BMI Counseling: Body mass index is 41 14 kg/m²  The BMI is above normal  Nutrition recommendations include decreasing portion sizes, encouraging healthy choices of fruits and vegetables, decreasing fast food intake, consuming healthier snacks, moderation in carbohydrate intake and increasing intake of lean protein  Exercise recommendations include exercising 3-5 times per week  No pharmacotherapy was ordered  Patient referred to PCP due to patient being overweight  Assessment/Plan:  Patient is diabetic blood work was well controlled patient has risk factors for peripheral vascular occlusive disease he has a lot of arthritis to in both of his knees but has great difficulty ambulating going to get arterial Dopplers of both lower extremities to rule out peripheral vascular occlusive disease he has also had a stroke in the past his hyper lipid leave hyperlipidemia can diabetic and hypertensive in and should have his carotid arteries evaluated we discussed weight management but physician's supervised weight management he declines but he is going to changes diet will see him again in 3 months our goal is 1 lb of weight loss per week by changing his diet to a more are healthier diet    Problem List Items Addressed This Visit        Endocrine    Type 2 diabetes mellitus with complication ([address] ) - Primary       Cardiovascular and Mediastinum    Benign essential hypertension       Other    Dyslipidemia    Morbid obesity ([address] )           Diagnoses and all orders for this visit:    Type 2 diabetes mellitus with complication ([address] )    Benign essential hypertension    Dyslipidemia    Morbid obesity ([address] )        No problem-specific Assessment & Plan notes found for this encounter  Subjective:      Patient ID: Tresa Valle is a 78 y o  male      Mr Evalyn Eisenmenger is here for his checkup regarding his diabetes hypertension arthritis low back pain morbid obesity on his most recent labs were actually pretty good so from a lab standpoint he is doing a pretty good job but he is E really being hampered with his arthritis just getting up on the exam table was very difficult for him I think that his sugars would be better served and his arthritis would certainly benefit from some weight loss I offered him physician's supervised weight loss program but he does not wish to pursue that so we went over his diet and suggested things that he can do to eat more healthy also I recommended an Exercycle which he already has an to pedal on the Exercycle for 10-15 minutes daily for the 1st month trying to work up to a goal of a 30 minutes battling he is going to be seeing his eye doctor in the near future we have scanned him      The following portions of the patient's history were reviewed and updated as appropriate:   He has a past medical history of Asthma, Closed fracture of fifth metacarpal bone of right hand, Diabetes mellitus ([address] ), Diverticulitis, Fracture of metacarpal shaft, Hemopneumothorax, left, Hyperlipidemia, Hypertension, Inguinal hernia, Lumbar transverse process fracture ([facility] ), Pilonidal cyst, Pleural effusion, Rib fractures, Status post fall, and Superficial thrombophlebitis of leg ,  does not have any pertinent problems on file  ,   has a past surgical history that includes Coronary angioplasty with stent (2009); Fracture surgery (05/04/2015); Revision Colostomy; LAPAROSCOPY (05/18/2015); Hernia repair; Knee arthroscopy w/ meniscal repair; Rectal surgery; Other surgical history; Thoracentesis (06/02/2015); Tonsillectomy and adenoidectomy; Colonoscopy; and Colon surgery  ,  family history includes Coronary artery disease in his mother; Heart disease in his father; Hyperlipidemia in his father  ,   reports that he quit smoking about 36 years ago  He has never used smokeless tobacco  He reports previous alcohol use  He reports that he does not use drugs  ,  has No Known Allergies     Current Outpatient Medications   Medication Sig Dispense Refill    atorvastatin (LIPITOR) 40 mg tablet TAKE 1 TABLET BY MOUTH  DAILY 90 tablet 3    furosemide (LASIX) 20 mg tablet TAKE 1 TABLET BY MOUTH  DAILY 90 tablet 3    metFORMIN (GLUCOPHAGE-XR) 500 mg 24 hr tablet TAKE 1 TABLET BY MOUTH  TWICE A DAY WITH MEALS 180 tablet 3    metoprolol tartrate (LOPRESSOR) 25 mg tablet TAKE 1 TABLET BY MOUTH TWO  TIMES DAILY 180 tablet 3    nitroglycerin (NITROSTAT) 0 4 mg SL tablet Place 1 tablet (0 4 mg total) under the tongue every 5 (five) minutes as needed for chest pain 30 tablet 3    potassium chloride (K-DUR) 10 mEq tablet TAKE 1 TABLET BY MOUTH  DAILY 90 tablet 3    ramipril (ALTACE) 10 MG capsule TAKE 1 CAPSULE BY MOUTH  DAILY 90 capsule 3     No current facility-administered medications for this visit  Review of Systems   Constitutional: Positive for activity change  Negative for appetite change, diaphoresis, fatigue and fever  HENT: Positive for hearing loss  Negative for dental problem  Eyes: Positive for visual disturbance  Respiratory: Negative for apnea, cough, chest tightness, shortness of breath and wheezing  Cardiovascular: Negative for chest pain, palpitations and leg swelling  Gastrointestinal: Negative for abdominal distention, abdominal pain, anal bleeding, constipation, diarrhea, nausea and vomiting  Endocrine: Negative for cold intolerance, heat intolerance, polydipsia, polyphagia and polyuria  Genitourinary: Negative for difficulty urinating, dysuria, flank pain, hematuria and urgency  Musculoskeletal: Positive for arthralgias, back pain and gait problem  Negative for joint swelling and myalgias  Skin: Negative for color change, rash and wound  Allergic/Immunologic: Negative for environmental allergies, food allergies and immunocompromised state  Neurological: Negative for dizziness, seizures, syncope, speech difficulty, numbness and headaches  Hematological: Negative for adenopathy  Does not bruise/bleed easily  Psychiatric/Behavioral: Negative for agitation, behavioral problems, hallucinations, sleep disturbance and suicidal ideas  Objective:  Vitals:    01/13/21 0807   BP: 132/74   BP Location: Left arm   Patient Position: Sitting   Cuff Size: Standard   Temp: (!) 97 °F (36 1 °C)   TempSrc: Tympanic   Weight: 134 kg (295 lb)   Height: 5' 11" (1 803 m)     Body mass index is 41 14 kg/m²  Physical Exam  Constitutional:       General: He is not in acute distress  Appearance: He is well-developed  He is not diaphoretic  HENT:      Head: Normocephalic  Right Ear: External ear normal       Left Ear: External ear normal       Nose: Nose normal    Eyes:      General: No scleral icterus  Right eye: No discharge  Left eye: No discharge  Conjunctiva/sclera: Conjunctivae normal       Pupils: Pupils are equal, round, and reactive to light  Neck:      Musculoskeletal: Normal range of motion  Thyroid: No thyromegaly  Trachea: No tracheal deviation  Cardiovascular:      Rate and Rhythm: Normal rate and regular rhythm  Pulses: no weak pulses          Dorsalis pedis pulses are 2+ on the right side and 2+ on the left side  Posterior tibial pulses are 2+ on the right side and 2+ on the left side  Heart sounds: Normal heart sounds  No murmur  No friction rub  No gallop  Pulmonary:      Effort: Pulmonary effort is normal  No respiratory distress  Breath sounds: Normal breath sounds  No wheezing  Abdominal:      General: Bowel sounds are normal       Palpations: Abdomen is soft  There is no mass  Tenderness: There is no abdominal tenderness  There is no guarding  Musculoskeletal:         General: No deformity  Feet:      Right foot:      Skin integrity: No ulcer, skin breakdown, erythema, warmth, callus or dry skin  Left foot:      Skin integrity: No ulcer, skin breakdown, erythema, warmth, callus or dry skin     Lymphadenopathy:      Cervical: No cervical adenopathy  Skin:     General: Skin is warm and dry  Findings: No erythema or rash  Neurological:      Mental Status: He is alert and oriented to person, place, and time  Cranial Nerves: No cranial nerve deficit  Gait: Gait abnormal    Psychiatric:         Thought Content: Thought content normal        Patient's shoes and socks removed  Right Foot/Ankle   Right Foot Inspection  Skin Exam: skin normal and skin intact no dry skin, no warmth, no callus, no erythema, no maceration, no abnormal color, no pre-ulcer, no ulcer and no callus                          Toe Exam: ROM and strength within normal limits  Sensory   Vibration: intact  Proprioception: intact   Monofilament testing: intact  Vascular  Capillary refills: < 3 seconds  The right DP pulse is 2+  The right PT pulse is 2+  Left Foot/Ankle  Left Foot Inspection  Skin Exam: skin normal and skin intactno dry skin, no warmth, no erythema, no maceration, normal color, no pre-ulcer, no ulcer and no callus                         Toe Exam: ROM and strength within normal limits                   Sensory   Vibration: intact  Proprioception: intact  Monofilament: intact  Vascular  Capillary refills: < 3 seconds  The left DP pulse is 2+  The left PT pulse is 2+  Assign Risk Category:  No deformity present; No loss of protective sensation;  No weak pulses       Risk: 0

## 2021-01-20 ENCOUNTER — OFFICE VISIT (OUTPATIENT)
Dept: CARDIOLOGY CLINIC | Facility: HOSPITAL | Age: 71
End: 2021-01-20
Payer: MEDICARE

## 2021-01-20 ENCOUNTER — IMMUNIZATIONS (OUTPATIENT)
Dept: FAMILY MEDICINE CLINIC | Facility: HOSPITAL | Age: 71
End: 2021-01-20

## 2021-01-20 VITALS
HEIGHT: 71 IN | WEIGHT: 290 LBS | HEART RATE: 58 BPM | SYSTOLIC BLOOD PRESSURE: 128 MMHG | BODY MASS INDEX: 40.6 KG/M2 | OXYGEN SATURATION: 96 % | DIASTOLIC BLOOD PRESSURE: 62 MMHG

## 2021-01-20 DIAGNOSIS — E66.01 MORBID OBESITY (HCC): ICD-10-CM

## 2021-01-20 DIAGNOSIS — E78.5 DYSLIPIDEMIA: ICD-10-CM

## 2021-01-20 DIAGNOSIS — I25.118 CORONARY ARTERY DISEASE OF NATIVE ARTERY OF NATIVE HEART WITH STABLE ANGINA PECTORIS (HCC): Primary | ICD-10-CM

## 2021-01-20 DIAGNOSIS — Z95.5 PRESENCE OF DRUG-ELUTING STENT IN RIGHT CORONARY ARTERY: ICD-10-CM

## 2021-01-20 DIAGNOSIS — Z23 ENCOUNTER FOR IMMUNIZATION: Primary | ICD-10-CM

## 2021-01-20 DIAGNOSIS — I10 BENIGN ESSENTIAL HYPERTENSION: ICD-10-CM

## 2021-01-20 PROCEDURE — 91301 SARS-COV-2 / COVID-19 MRNA VACCINE (MODERNA) 100 MCG: CPT

## 2021-01-20 PROCEDURE — 0011A SARS-COV-2 / COVID-19 MRNA VACCINE (MODERNA) 100 MCG: CPT

## 2021-01-20 PROCEDURE — 99214 OFFICE O/P EST MOD 30 MIN: CPT | Performed by: INTERNAL MEDICINE

## 2021-01-20 PROCEDURE — 93000 ELECTROCARDIOGRAM COMPLETE: CPT | Performed by: INTERNAL MEDICINE

## 2021-01-20 RX ORDER — NITROGLYCERIN 0.4 MG/1
0.4 TABLET SUBLINGUAL
Qty: 30 TABLET | Refills: 3 | Status: SHIPPED | OUTPATIENT
Start: 2021-01-20 | End: 2022-02-03 | Stop reason: SDUPTHER

## 2021-01-20 NOTE — PROGRESS NOTES
Cardiology Follow Up    [facility]  1950  [id number]  [address]  [address] [facility] [id number] [phone]    1  Coronary artery disease of native artery of native heart with stable angina pectoris (HCC)  POCT ECG    nitroglycerin (NITROSTAT) 0 4 mg SL tablet    Echo complete with contrast if indicated   2  Presence of drug-eluting stent in right coronary artery     3  Benign essential hypertension     4  Dyslipidemia  Lipid Panel with Direct LDL reflex   5  Morbid obesity [facility])         Discussion/Summary:  Mr Ashley Tang is a pleasant 28-year-old gentleman who presents to the office today for routine followup       Since his last visit he continues to feel well from a cardiac standpoint  His activity is mainly limited by his knee pain      His blood pressure is well controlled in the office today  No changes were made to his antihypertensive medication regimen  A low-salt diet was reinforced  His most recent lipids reveal acceptable numbers on his current statin regimen  I will reassess these prior to his next visit      It has been over 10 years since his last echocardiogram   I have asked him to have an ultrasound for reassessment  Given his lack of anginal symptoms no ischemic evaluation has been recommended      I will see him back in the office in one year  Interval History:   Mr Ashley Tang is a pleasant 28-year-old gentleman who presents to the office today for followup       Since his last visit one year ago he has been feeling well  He continues to be limited by bilateral knee pain  He can ascend a flight of steps very slowly without any cardiopulmonary symptoms of chest pain or shortness of breath    He denies any signs or symptoms of congestive heart failure including increasing lower extremity edema, paroxysmal nocturnal dyspnea, orthopnea, acute weight gain or increasing abdominal girth  He denies lightheadedness, syncope or presyncope  He denies palpitations or symptoms of claudication  Problem List     Benign essential hypertension    Overview Signed 2018 10:43 AM by Leslie Brandt, DO     On good medications ramipril doing well         CAD (coronary artery disease)    Diabetes mellitus type 2, controlled ([facility] )    Overview Signed 2018 10:43 AM by Leslie Brandt, DO     Blood sugars in the range of 150         Lab Results   Component Value Date    HGBA1C 6 7 (H) 2021       No results for input(s): POCGLU in the last 72 hours      Blood Sugar Average: Last 72 hrs:          Dyslipidemia    Morbid obesity ([facility] )    Sciatica        Past Medical History:   Diagnosis Date    Asthma     resolved: 17    Closed fracture of fifth metacarpal bone of right hand     last assessed: 06/30/15    Diabetes mellitus ([facility] )     Diverticulitis     Fracture of metacarpal shaft     Hemopneumothorax, left     last assessed: 06/02/15    Hyperlipidemia     Hypertension     Inguinal hernia     Lumbar transverse process fracture (HCC)     Pilonidal cyst     Pleural effusion     last assessed: 07/01/15    Rib fractures     last assessed: 06/02/15    Status post fall     Superficial thrombophlebitis of leg     last assessed: 14     Social History     Socioeconomic History    Marital status: /Civil Union     Spouse name: Not on file    Number of children: Not on file    Years of education: Not on file    Highest education level: Not on file   Occupational History    Occupation: Retired   Social Needs    Financial resource strain: Not on file    Food insecurity     Worry: Not on file     Inability: Not on file   [employer] needs     Medical: Not on file     Non-medical: Not on file   Tobacco Use    Smoking status: Former Smoker     Quit date:      Years since quittin 0    Smokeless tobacco: Never Used   Substance and Sexual Activity    Alcohol use: Not Currently     Comment: Social    Drug use: No    Sexual activity: Yes     Partners: Female   Lifestyle    Physical activity     Days per week: Not on file     Minutes per session: Not on file    Stress: Not on file   Relationships    Social connections     Talks on phone: Not on file     Gets together: Not on file     Attends Congregation service: Not on file     Active member of club or organization: Not on file     Attends meetings of clubs or organizations: Not on file     Relationship status: Not on file    Intimate partner violence     Fear of current or ex partner: Not on file     Emotionally abused: Not on file     Physically abused: Not on file     Forced sexual activity: Not on file   Other Topics Concern    Not on file   Social History Narrative    Always uses seat belt    No living will      Family History   Problem Relation Age of Onset    Coronary artery disease Mother     Heart disease Father         Benign hypertensive    Hyperlipidemia Father      Past Surgical History:   Procedure Laterality Date    COLON SURGERY      COLONOSCOPY      CORONARY ANGIOPLASTY WITH STENT PLACEMENT  2009    PTCA/RITA to RCA   Ruperto Flower FRACTURE SURGERY  05/04/2015    Closed treatment of fracture of metacarpal bone, right 5t metacarpal fracture Dr Marlon Son ARTHROSCOPY W/ MENISCAL REPAIR      Lateral meniscus    LAPAROSCOPY  05/18/2015    Exploratory, extensive lysis of adhesions Dr Corbett Limbbobby      Surgical lysis of intestinal adhesions    RECTAL SURGERY      REVISION COLOSTOMY      THORACENTESIS  06/02/2015    with imaging guidance left, removed 1300cc of fluid w/o problem lower base of lun06    TONSILLECTOMY AND ADENOIDECTOMY         Current Outpatient Medications:     atorvastatin (LIPITOR) 40 mg tablet, TAKE 1 TABLET BY MOUTH  DAILY, Disp: 90 tablet, Rfl: 3    furosemide (LASIX) 20 mg tablet, TAKE 1 TABLET BY MOUTH  DAILY, Disp: 90 tablet, Rfl: 3    metFORMIN (GLUCOPHAGE-XR) 500 mg 24 hr tablet, TAKE 1 TABLET BY MOUTH  TWICE A DAY WITH MEALS, Disp: 180 tablet, Rfl: 3    metoprolol tartrate (LOPRESSOR) 25 mg tablet, TAKE 1 TABLET BY MOUTH TWO  TIMES DAILY, Disp: 180 tablet, Rfl: 3    nitroglycerin (NITROSTAT) 0 4 mg SL tablet, Place 1 tablet (0 4 mg total) under the tongue every 5 (five) minutes as needed for chest pain, Disp: 30 tablet, Rfl: 3    ramipril (ALTACE) 10 MG capsule, TAKE 1 CAPSULE BY MOUTH  DAILY, Disp: 90 capsule, Rfl: 3    potassium chloride (K-DUR) 10 mEq tablet, TAKE 1 TABLET BY MOUTH  DAILY, Disp: 90 tablet, Rfl: 3  No Known Allergies    Labs:     Chemistry        Component Value Date/Time     06/28/2017 1108    K 4 1 01/11/2021 0928    K 4 6 06/28/2017 1108     01/11/2021 0928    CL 99 06/28/2017 1108    CO2 27 01/11/2021 0928    CO2 24 06/28/2017 1108    BUN 19 01/11/2021 0928    BUN 15 06/28/2017 1108    CREATININE 0 89 01/11/2021 0928    CREATININE 0 81 06/28/2017 1108        Component Value Date/Time    CALCIUM 8 5 01/11/2021 0928    CALCIUM 8 9 06/28/2017 1108    ALKPHOS 117 (H) 12/09/2017 0940    ALKPHOS 127 (H) 05/14/2015 1003    AST 16 12/09/2017 0940    AST 18 05/14/2015 1003    ALT 24 12/09/2017 0940    ALT 27 05/14/2015 1003    BILITOT 1 50 (H) 05/14/2015 1003            Lab Results   Component Value Date    CHOL 108 07/14/2016    CHOL 191 10/28/2015    CHOL 117 11/16/2013     Lab Results   Component Value Date    HDL 42 01/22/2020    HDL 48 12/05/2018    HDL 45 12/09/2017     Lab Results   Component Value Date    LDLCALC 65 01/22/2020    LDLCALC 60 12/05/2018    LDLCALC 60 12/09/2017     Lab Results   Component Value Date    TRIG 76 01/22/2020    TRIG 62 12/05/2018    TRIG 66 12/09/2017     No results found for: CHOLHDL    Imaging: No results found  ECG:  Sinus bradycardia, nonspecific ST and T-wave abnormality      Review of Systems   Musculoskeletal: Positive for arthritis and joint pain   Negative for gout  Vitals:    01/20/21 1448   BP: 128/62   Pulse: 58   SpO2: 96%     Vitals:    01/20/21 1448   Weight: 132 kg (290 lb)     Height: 5' 11" (180 3 cm)   Body mass index is 40 45 kg/m²      Physical Exam:  General:  Alert and cooperative, appears stated age  HEENT:  PERRLA, EOMI, no scleral icterus, no conjunctival pallor  Neck:  No lymphadenopathy, no thyromegaly, no carotid bruits, no elevated JVP  Heart:  Regular rate and rhythm, normal S1/S2, no S3/S4, no murmur  Lungs:  Clear to auscultation bilaterally   Abdomen:  Soft, non-tender, positive bowel sounds, no rebound or guarding,   no organomegaly   Extremities:  No clubbing, cyanosis or edema   Vascular:  2+ pedal pulses  Skin:  No rashes or lesions on exposed skin  Neurologic:  Cranial nerves II-XII grossly intact without focal deficits

## 2021-01-26 ENCOUNTER — OFFICE VISIT (OUTPATIENT)
Dept: PODIATRY | Facility: CLINIC | Age: 71
End: 2021-01-26
Payer: MEDICARE

## 2021-01-26 DIAGNOSIS — E11.40 TYPE 2 DIABETES MELLITUS WITH DIABETIC NEUROPATHY, WITHOUT LONG-TERM CURRENT USE OF INSULIN (HCC): Primary | ICD-10-CM

## 2021-01-26 DIAGNOSIS — M20.42 HAMMER TOES OF BOTH FEET: ICD-10-CM

## 2021-01-26 DIAGNOSIS — M20.41 HAMMER TOES OF BOTH FEET: ICD-10-CM

## 2021-01-26 DIAGNOSIS — M21.619 BUNION: ICD-10-CM

## 2021-01-26 PROCEDURE — 99213 OFFICE O/P EST LOW 20 MIN: CPT | Performed by: PODIATRIST

## 2021-01-26 NOTE — PROGRESS NOTES
PATIENT:  Aleena Ramsay  1950    ASSESSMENT/PLAN:  1  Type 2 diabetes mellitus with diabetic neuropathy, without long-term current use of insulin ([address] )     2  Hammer toes of both feet     3  Bunion          Patient was counseled on the condition and diagnosis  Educated disease prevention and risks related to diabetes  Educated proper daily foot care and exam   Instructed proper skin care / protection and footwear  Instructed to identify any signs of infection and related foot problem  The recent blood work was reviewed and the last HbA1c was 6 7  Discussed proper blood glucose control with diet and exercise  All nails and skin lesions were debrided  He has kissing lesion on right 1st interspace due to foot deformity  Silopad was dispensed  Discussed proper footwear to accommodate the deformity  The patient will follow up in 9 weeks for further diabetic foot exam and care  HPI:  Aleena Ramsay is a 78 y  o year old male seen for diabetic foot exam   The patient has class findings with DPN  BS is under control  Pain presents on right great toe and 2nd toe  The patient denied any acute pedal disorder, redness, acute swelling, or recent injury           PAST MEDICAL HISTORY:  Past Medical History:   Diagnosis Date    Asthma     resolved: 08/14/17    Closed fracture of fifth metacarpal bone of right hand     last assessed: 06/30/15    Diabetes mellitus ([facility] [address] )     Diverticulitis     Fracture of metacarpal shaft     Hemopneumothorax, left     last assessed: 06/02/15    Hyperlipidemia     Hypertension     Inguinal hernia     Lumbar transverse process fracture (HCC)     Pilonidal cyst     Pleural effusion     last assessed: 07/01/15    Rib fractures     last assessed: 06/02/15    Status post fall     Superficial thrombophlebitis of leg     last assessed: 03/19/14       PAST SURGICAL HISTORY:  Past Surgical History:   Procedure Laterality Date    COLON SURGERY      COLONOSCOPY      CORONARY ANGIOPLASTY WITH STENT PLACEMENT  2009    PTCA/RITA to RCA   Brianna Bowling FRACTURE SURGERY  05/04/2015    Closed treatment of fracture of metacarpal bone, right 5t metacarpal fracture Dr Morenita Brewster ARTHROSCOPY W/ MENISCAL REPAIR      Lateral meniscus    LAPAROSCOPY  05/18/2015    Exploratory, extensive lysis of adhesions Dr Mukund Mclain      Surgical lysis of intestinal adhesions    RECTAL SURGERY      REVISION COLOSTOMY      THORACENTESIS  06/02/2015    with imaging guidance left, removed 1300cc of fluid w/o problem lower base of lun06    TONSILLECTOMY AND ADENOIDECTOMY          ALLERGIES:  Patient has no known allergies  MEDICATIONS:  Current Outpatient Medications   Medication Sig Dispense Refill    atorvastatin (LIPITOR) 40 mg tablet TAKE 1 TABLET BY MOUTH  DAILY 90 tablet 3    furosemide (LASIX) 20 mg tablet TAKE 1 TABLET BY MOUTH  DAILY 90 tablet 3    metFORMIN (GLUCOPHAGE-XR) 500 mg 24 hr tablet TAKE 1 TABLET BY MOUTH  TWICE A DAY WITH MEALS 180 tablet 3    metoprolol tartrate (LOPRESSOR) 25 mg tablet TAKE 1 TABLET BY MOUTH TWO  TIMES DAILY 180 tablet 3    nitroglycerin (NITROSTAT) 0 4 mg SL tablet Place 1 tablet (0 4 mg total) under the tongue every 5 (five) minutes as needed for chest pain 30 tablet 3    potassium chloride (K-DUR) 10 mEq tablet TAKE 1 TABLET BY MOUTH  DAILY 90 tablet 3    ramipril (ALTACE) 10 MG capsule TAKE 1 CAPSULE BY MOUTH  DAILY 90 capsule 3     No current facility-administered medications for this visit          SOCIAL HISTORY:  Social History     Socioeconomic History    Marital status: /Civil Union     Spouse name: None    Number of children: None    Years of education: None    Highest education level: None   Occupational History    Occupation: Retired   Social Needs    Financial resource strain: None    Food insecurity     Worry: None     Inability: None    Transportation needs     Medical: None Non-medical: None   Tobacco Use    Smoking status: Former Smoker     Quit date:      Years since quittin 0    Smokeless tobacco: Never Used   Substance and Sexual Activity    Alcohol use: Not Currently     Comment: Social    Drug use: No    Sexual activity: Yes     Partners: Female   Lifestyle    Physical activity     Days per week: None     Minutes per session: None    Stress: None   Relationships    Social connections     Talks on phone: None     Gets together: None     Attends Christianity service: None     Active member of club or organization: None     Attends meetings of clubs or organizations: None     Relationship status: None    Intimate partner violence     Fear of current or ex partner: None     Emotionally abused: None     Physically abused: None     Forced sexual activity: None   Other Topics Concern    None   Social History Narrative    Always uses seat belt    No living will        REVIEW OF SYSTEMS:  GENERAL: NAD, afebrile  HEART: No chest pain, or palpitation  RESPIRATORY:  No acute SOB or cough  GI: No Nausea, vomit or diarrhea  NEUROLOGIC: No syncope or acute weakness    PHYSICAL EXAM:  VASCULAR EXAM  Dorsalis pedis  +1, Posterior tibial artery  absent  The patient has class findings with skin atrophy, lack of digital hair, and nail dystrophy  There is +1 lower extremity edema bilaterally  Venous stasis skin changes noted BLE  NEUROLOGIC EXAM  Sensation is intact to light touch  Sensation is intact to 10gm monofilament  No focal neurologic deficit  DERMATOLOGIC EXAM:   No ulcer or cellulitis noted  The patient has hypertrophic toenails with discoloration, onycholysis, and subungal debris  Hyperkeratotic skin lesions on lateral aspect of right hallux and medial right 2nd toe  MUSCULOSKELETAL EXAM:   No acute joint pain, edema, or redness  No acute musculoskeletal problem  Patient has deformity including bunion and hammertoe        Diabetic Foot Exam    Patient's shoes and socks removed  Right Foot/Ankle   Right Foot Inspection  Skin Exam: skin normal, skin intact, dry skin, callus and callus no warmth, no erythema, no maceration, no abnormal color, no pre-ulcer and no ulcer                          Toe Exam: right toe deformityno swelling, no tenderness and erythema  Sensory   Vibration: diminished  Proprioception: intact   Monofilament testing: intact  Vascular  Capillary refills: < 3 seconds  The right DP pulse is 1+  The right PT pulse is 0  Left Foot/Ankle  Left Foot Inspection  Skin Exam: skin normal, skin intact and dry skinno warmth, no erythema, no maceration, normal color, no pre-ulcer, no ulcer and no callus                         Toe Exam: left toe deformityno swelling, no tenderness and no erythema                   Sensory   Vibration: diminished  Proprioception: intact  Monofilament: intact  Vascular  Capillary refills: < 3 seconds  The left DP pulse is 1+  The left PT pulse is 0  Assign Risk Category:  Deformity present; No loss of protective sensation;  No weak pulses       Risk: 1

## 2021-02-17 ENCOUNTER — IMMUNIZATIONS (OUTPATIENT)
Dept: FAMILY MEDICINE CLINIC | Facility: HOSPITAL | Age: 71
End: 2021-02-17

## 2021-02-17 DIAGNOSIS — Z23 ENCOUNTER FOR IMMUNIZATION: Primary | ICD-10-CM

## 2021-02-17 PROCEDURE — 91301 SARS-COV-2 / COVID-19 MRNA VACCINE (MODERNA) 100 MCG: CPT

## 2021-02-17 PROCEDURE — 0012A SARS-COV-2 / COVID-19 MRNA VACCINE (MODERNA) 100 MCG: CPT

## 2021-04-06 ENCOUNTER — OFFICE VISIT (OUTPATIENT)
Dept: PODIATRY | Facility: CLINIC | Age: 71
End: 2021-04-06
Payer: MEDICARE

## 2021-04-06 VITALS
SYSTOLIC BLOOD PRESSURE: 130 MMHG | HEART RATE: 48 BPM | DIASTOLIC BLOOD PRESSURE: 73 MMHG | BODY MASS INDEX: 40.45 KG/M2 | HEIGHT: 71 IN

## 2021-04-06 DIAGNOSIS — E11.40 TYPE 2 DIABETES MELLITUS WITH DIABETIC NEUROPATHY, WITHOUT LONG-TERM CURRENT USE OF INSULIN (HCC): Primary | ICD-10-CM

## 2021-04-06 DIAGNOSIS — B35.1 ONYCHOMYCOSIS: ICD-10-CM

## 2021-04-06 PROCEDURE — 11721 DEBRIDE NAIL 6 OR MORE: CPT | Performed by: PODIATRIST

## 2021-04-06 NOTE — PROGRESS NOTES
PATIENT:  Vinny Reich  1950    ASSESSMENT/PLAN:  1  Type 2 diabetes mellitus with diabetic neuropathy, without long-term current use of insulin ([facility] [address] )     2  Onychomycosis  Debridement         Orders Placed This Encounter   Procedures    Debridement      Disease prevention and related risk factors of diabetes were identified and discussed  The patient was educated in proper foot wear for diabetics  Also educated in daily foot assessment and routine diabetic foot care  The patient will follow up in 9 weeks for further diabetic foot exam and care     PROCEDURE:  All mycotic toenails were reduced and debrided in length, width, and girth using a nail nipper and dremel  HPI:  Vinny Reich is a 78 y  o year old male seen for diabetic foot exam   The patient has class findings with DPN  BS is under control  No foot ulcer  Complained of thick nails with discomfort  The patient denied any acute pedal disorder          PAST MEDICAL HISTORY:  Past Medical History:   Diagnosis Date    Asthma     resolved: 08/14/17    Closed fracture of fifth metacarpal bone of right hand     last assessed: 06/30/15    Diabetes mellitus ([facility] [address] )     Diverticulitis     Fracture of metacarpal shaft     Hemopneumothorax, left     last assessed: 06/02/15    Hyperlipidemia     Hypertension     Inguinal hernia     Lumbar transverse process fracture (HCC)     Pilonidal cyst     Pleural effusion     last assessed: 07/01/15    Rib fractures     last assessed: 06/02/15    Status post fall     Superficial thrombophlebitis of leg     last assessed: 03/19/14       PAST SURGICAL HISTORY:  Past Surgical History:   Procedure Laterality Date    COLON SURGERY      COLONOSCOPY      CORONARY ANGIOPLASTY WITH STENT PLACEMENT  2009    PTCA/RITA to RCA    FRACTURE SURGERY  05/04/2015    Closed treatment of fracture of metacarpal bone, right 5t metacarpal fracture Dr Candace Akers MENISCAL REPAIR      Lateral meniscus    LAPAROSCOPY  2015    Exploratory, extensive lysis of adhesions Dr Krishan Anand      Surgical lysis of intestinal adhesions    RECTAL SURGERY      REVISION COLOSTOMY      THORACENTESIS  2015    with imaging guidance left, removed 1300cc of fluid w/o problem lower base of lun06    TONSILLECTOMY AND ADENOIDECTOMY          ALLERGIES:  Patient has no known allergies  MEDICATIONS:  Current Outpatient Medications   Medication Sig Dispense Refill    atorvastatin (LIPITOR) 40 mg tablet TAKE 1 TABLET BY MOUTH  DAILY 90 tablet 3    furosemide (LASIX) 20 mg tablet TAKE 1 TABLET BY MOUTH  DAILY 90 tablet 3    metFORMIN (GLUCOPHAGE-XR) 500 mg 24 hr tablet TAKE 1 TABLET BY MOUTH  TWICE A DAY WITH MEALS 180 tablet 3    metoprolol tartrate (LOPRESSOR) 25 mg tablet TAKE 1 TABLET BY MOUTH TWO  TIMES DAILY 180 tablet 3    nitroglycerin (NITROSTAT) 0 4 mg SL tablet Place 1 tablet (0 4 mg total) under the tongue every 5 (five) minutes as needed for chest pain 30 tablet 3    potassium chloride (K-DUR) 10 mEq tablet TAKE 1 TABLET BY MOUTH  DAILY 90 tablet 3    ramipril (ALTACE) 10 MG capsule TAKE 1 CAPSULE BY MOUTH  DAILY 90 capsule 3     No current facility-administered medications for this visit          SOCIAL HISTORY:  Social History     Socioeconomic History    Marital status: /Civil Union     Spouse name: None    Number of children: None    Years of education: None    Highest education level: None   Occupational History    Occupation: Retired   Social Needs    Financial resource strain: None    Food insecurity     Worry: None     Inability: None    Transportation needs     Medical: None     Non-medical: None   Tobacco Use    Smoking status: Former Smoker     Quit date:      Years since quittin 2    Smokeless tobacco: Never Used   Substance and Sexual Activity    Alcohol use: Not Currently     Comment: Social    Drug use: No    Sexual activity: Yes     Partners: Female   Lifestyle    Physical activity     Days per week: None     Minutes per session: None    Stress: None   Relationships    Social connections     Talks on phone: None     Gets together: None     Attends [employer] service: None     Active member of club or organization: None     Attends meetings of clubs or organizations: None     Relationship status: None    Intimate partner violence     Fear of current or ex partner: None     Emotionally abused: None     Physically abused: None     Forced sexual activity: None   Other Topics Concern    None   Social History Narrative    Always uses seat belt    No living will        REVIEW OF SYSTEMS:  GENERAL: NAD, afebrile  HEART: No chest pain, or palpitation  RESPIRATORY:  No acute SOB or cough  GI: No Nausea, vomit or diarrhea  NEUROLOGIC: No syncope or acute weakness    PHYSICAL EXAM:  VASCULAR EXAM  Dorsalis pedis  +1, Posterior tibial artery  absent  The patient has class findings with skin atrophy, lack of digital hair, and nail dystrophy  There is +1 lower extremity edema bilaterally  Venous stasis skin changes noted BLE  NEUROLOGIC EXAM  Sensation is intact to light touch  No focal neurologic deficit  DERMATOLOGIC EXAM:   No ulcer or cellulitis noted  The patient has hypertrophic toenails with discoloration, onycholysis, and subungal debris  No notable skin lesion  MUSCULOSKELETAL EXAM:   No acute joint pain, edema, or redness  No acute musculoskeletal problem  Patient has deformity including bunion and hammertoe

## 2021-04-12 ENCOUNTER — APPOINTMENT (OUTPATIENT)
Dept: LAB | Facility: HOSPITAL | Age: 71
End: 2021-04-12
Attending: INTERNAL MEDICINE
Payer: MEDICARE

## 2021-04-12 DIAGNOSIS — E11.8 TYPE 2 DIABETES MELLITUS WITH COMPLICATION (HCC): Primary | ICD-10-CM

## 2021-04-12 DIAGNOSIS — E78.5 DYSLIPIDEMIA: ICD-10-CM

## 2021-04-12 DIAGNOSIS — E11.8 TYPE 2 DIABETES MELLITUS WITH COMPLICATION (HCC): ICD-10-CM

## 2021-04-12 LAB
ANION GAP SERPL CALCULATED.3IONS-SCNC: 4 MMOL/L (ref 4–13)
BUN SERPL-MCNC: 19 MG/DL (ref 5–25)
CALCIUM SERPL-MCNC: 9 MG/DL (ref 8.3–10.1)
CHLORIDE SERPL-SCNC: 104 MMOL/L (ref 100–108)
CHOLEST SERPL-MCNC: 134 MG/DL (ref 50–200)
CO2 SERPL-SCNC: 29 MMOL/L (ref 21–32)
CREAT SERPL-MCNC: 0.93 MG/DL (ref 0.6–1.3)
EST. AVERAGE GLUCOSE BLD GHB EST-MCNC: 143 MG/DL
GFR SERPL CREATININE-BSD FRML MDRD: 84 ML/MIN/1.73SQ M
GLUCOSE P FAST SERPL-MCNC: 163 MG/DL (ref 65–99)
HBA1C MFR BLD: 6.6 %
HDLC SERPL-MCNC: 53 MG/DL
LDLC SERPL CALC-MCNC: 63 MG/DL (ref 0–100)
POTASSIUM SERPL-SCNC: 4.4 MMOL/L (ref 3.5–5.3)
SODIUM SERPL-SCNC: 137 MMOL/L (ref 136–145)
TRIGL SERPL-MCNC: 92 MG/DL

## 2021-04-12 PROCEDURE — 80061 LIPID PANEL: CPT

## 2021-04-12 PROCEDURE — 36415 COLL VENOUS BLD VENIPUNCTURE: CPT

## 2021-04-12 PROCEDURE — 83036 HEMOGLOBIN GLYCOSYLATED A1C: CPT

## 2021-04-12 PROCEDURE — 80048 BASIC METABOLIC PNL TOTAL CA: CPT

## 2021-04-13 ENCOUNTER — OFFICE VISIT (OUTPATIENT)
Dept: FAMILY MEDICINE CLINIC | Facility: CLINIC | Age: 71
End: 2021-04-13
Payer: MEDICARE

## 2021-04-13 VITALS
SYSTOLIC BLOOD PRESSURE: 164 MMHG | HEIGHT: 71 IN | WEIGHT: 296 LBS | TEMPERATURE: 96 F | BODY MASS INDEX: 41.44 KG/M2 | DIASTOLIC BLOOD PRESSURE: 74 MMHG

## 2021-04-13 DIAGNOSIS — E78.5 DYSLIPIDEMIA: ICD-10-CM

## 2021-04-13 DIAGNOSIS — E11.8 TYPE 2 DIABETES MELLITUS WITH COMPLICATION (HCC): ICD-10-CM

## 2021-04-13 DIAGNOSIS — I25.118 CORONARY ARTERY DISEASE OF NATIVE ARTERY OF NATIVE HEART WITH STABLE ANGINA PECTORIS (HCC): ICD-10-CM

## 2021-04-13 DIAGNOSIS — E66.01 MORBID OBESITY (HCC): ICD-10-CM

## 2021-04-13 DIAGNOSIS — I10 BENIGN ESSENTIAL HYPERTENSION: Primary | ICD-10-CM

## 2021-04-13 PROCEDURE — 99214 OFFICE O/P EST MOD 30 MIN: CPT | Performed by: FAMILY MEDICINE

## 2021-04-13 NOTE — PROGRESS NOTES
Assessment/Plan:    Problem List Items Addressed This Visit        Endocrine    Type 2 diabetes mellitus with complication [facility])       Cardiovascular and Mediastinum    Benign essential hypertension - Primary    Coronary artery disease of native heart with stable angina pectoris ([facility] [address] )       Other    Dyslipidemia    Morbid obesity ([facility] [address] )           Diagnoses and all orders for this visit:    Benign essential hypertension    Type 2 diabetes mellitus with complication ([facility] [address] )    Coronary artery disease of native artery of native heart with stable angina pectoris ([facility] [address] )    Morbid obesity ([facility] [address] )    Dyslipidemia        No problem-specific Assessment & Plan notes found for this encounter  Subjective:      Patient ID: Cade Sharpe is a 78 y o  male  LORENZO of visit most recent blood sugar 168 A1c 6 point 6 and patient is on kidney function excellent doing well has not had an eye exam yet referred him to [facility] just went to the podiatrist everything is good in that regard      The following portions of the patient's history were reviewed and updated as appropriate:   He has a past medical history of Asthma, Closed fracture of fifth metacarpal bone of right hand, Diabetes mellitus ([facility] [address] ), Diverticulitis, Fracture of metacarpal shaft, Hemopneumothorax, left, Hyperlipidemia, Hypertension, Inguinal hernia, Lumbar transverse process fracture ([facility] [address] ), Pilonidal cyst, Pleural effusion, Rib fractures, Status post fall, and Superficial thrombophlebitis of leg ,  does not have any pertinent problems on file  ,   has a past surgical history that includes Coronary angioplasty with stent (2009); Fracture surgery (05/04/2015); Revision Colostomy; LAPAROSCOPY (05/18/2015); Hernia repair; Knee arthroscopy w/ meniscal repair; Rectal surgery; Other surgical history; Thoracentesis (06/02/2015); Tonsillectomy and adenoidectomy; Colonoscopy; and Colon surgery  ,  family history includes Coronary artery disease in his mother; Heart disease in his father; Hyperlipidemia in his father  ,   reports that he quit smoking about 36 years ago  He has never used smokeless tobacco  He reports previous alcohol use  He reports that he does not use drugs  ,  has No Known Allergies     Current Outpatient Medications   Medication Sig Dispense Refill    atorvastatin (LIPITOR) 40 mg tablet TAKE 1 TABLET BY MOUTH  DAILY 90 tablet 3    furosemide (LASIX) 20 mg tablet TAKE 1 TABLET BY MOUTH  DAILY 90 tablet 3    metFORMIN (GLUCOPHAGE-XR) 500 mg 24 hr tablet TAKE 1 TABLET BY MOUTH  TWICE A DAY WITH MEALS 180 tablet 3    metoprolol tartrate (LOPRESSOR) 25 mg tablet TAKE 1 TABLET BY MOUTH TWO  TIMES DAILY 180 tablet 3    nitroglycerin (NITROSTAT) 0 4 mg SL tablet Place 1 tablet (0 4 mg total) under the tongue every 5 (five) minutes as needed for chest pain 30 tablet 3    potassium chloride (K-DUR) 10 mEq tablet TAKE 1 TABLET BY MOUTH  DAILY 90 tablet 3    ramipril (ALTACE) 10 MG capsule TAKE 1 CAPSULE BY MOUTH  DAILY 90 capsule 3     No current facility-administered medications for this visit  Review of Systems   Constitutional: Negative for activity change, appetite change, diaphoresis, fatigue and fever  HENT: Negative  Eyes: Negative  Respiratory: Negative for apnea, cough, chest tightness, shortness of breath and wheezing  Cardiovascular: Negative for chest pain, palpitations and leg swelling  Gastrointestinal: Negative for abdominal distention, abdominal pain, anal bleeding, constipation, diarrhea, nausea and vomiting  Endocrine: Negative for cold intolerance, heat intolerance, polydipsia, polyphagia and polyuria  Genitourinary: Negative for difficulty urinating, dysuria, flank pain, hematuria and urgency  Musculoskeletal: Negative for arthralgias, back pain, gait problem, joint swelling and myalgias  Skin: Negative for color change, rash and wound     Allergic/Immunologic: Negative for environmental allergies, food allergies and immunocompromised state  Neurological: Negative for dizziness, seizures, syncope, speech difficulty, numbness and headaches  Hematological: Negative for adenopathy  Does not bruise/bleed easily  Psychiatric/Behavioral: Negative for agitation, behavioral problems, hallucinations, sleep disturbance and suicidal ideas  Objective:  Vitals:    04/13/21 0757   BP: 164/74   BP Location: Left arm   Patient Position: Sitting   Cuff Size: Large   Temp: (!) 96 °F (35 6 °C)   TempSrc: Temporal   Weight: 134 kg (296 lb)   Height: 5' 11" (1 803 m)     Body mass index is 41 28 kg/m²  Physical Exam  Constitutional:       General: He is not in acute distress  Appearance: He is well-developed  He is obese  He is not diaphoretic  HENT:      Head: Normocephalic  Right Ear: External ear normal       Left Ear: External ear normal       Nose: Nose normal    Eyes:      General: No scleral icterus  Right eye: No discharge  Left eye: No discharge  Conjunctiva/sclera: Conjunctivae normal       Pupils: Pupils are equal, round, and reactive to light  Neck:      Musculoskeletal: Normal range of motion  Thyroid: No thyromegaly  Trachea: No tracheal deviation  Cardiovascular:      Rate and Rhythm: Normal rate and regular rhythm  Pulses: no weak pulses          Dorsalis pedis pulses are 2+ on the right side and 2+ on the left side  Posterior tibial pulses are 2+ on the right side and 2+ on the left side  Heart sounds: Normal heart sounds  No murmur  No friction rub  No gallop  Pulmonary:      Effort: Pulmonary effort is normal  No respiratory distress  Breath sounds: Normal breath sounds  No wheezing  Abdominal:      General: Bowel sounds are normal       Palpations: Abdomen is soft  There is no mass  Tenderness: There is no abdominal tenderness  There is no guarding  Musculoskeletal:         General: No deformity     Feet:      Right foot: Skin integrity: No ulcer, skin breakdown, erythema, warmth, callus or dry skin  Left foot:      Skin integrity: No ulcer, skin breakdown, erythema, warmth, callus or dry skin  Lymphadenopathy:      Cervical: No cervical adenopathy  Skin:     General: Skin is warm and dry  Findings: No erythema or rash  Neurological:      Mental Status: He is alert and oriented to person, place, and time  Cranial Nerves: No cranial nerve deficit  Psychiatric:         Thought Content: Thought content normal        Patient's shoes and socks removed  Right Foot/Ankle   Right Foot Inspection  Skin Exam: skin normal and skin intact no dry skin, no warmth, no callus, no erythema, no maceration, no abnormal color, no pre-ulcer, no ulcer and no callus                          Toe Exam: ROM and strength within normal limits  Sensory   Vibration: intact  Proprioception: intact   Monofilament testing: intact  Vascular  Capillary refills: < 3 seconds  The right DP pulse is 2+  The right PT pulse is 2+  Left Foot/Ankle  Left Foot Inspection  Skin Exam: skin normal and skin intactno dry skin, no warmth, no erythema, no maceration, normal color, no pre-ulcer, no ulcer and no callus                         Toe Exam: ROM and strength within normal limits                   Sensory   Vibration: intact  Proprioception: intact  Monofilament: intact  Vascular  Capillary refills: < 3 seconds  The left DP pulse is 2+  The left PT pulse is 2+  Assign Risk Category:  No deformity present; No loss of protective sensation;  No weak pulses       Risk: 0

## 2021-04-27 ENCOUNTER — HOSPITAL ENCOUNTER (OUTPATIENT)
Dept: NON INVASIVE DIAGNOSTICS | Facility: HOSPITAL | Age: 71
Discharge: HOME | End: 2021-04-27
Attending: FAMILY MEDICINE
Payer: MEDICARE

## 2021-04-27 ENCOUNTER — HOSPITAL ENCOUNTER (OUTPATIENT)
Dept: NON INVASIVE DIAGNOSTICS | Facility: HOSPITAL | Age: 71
Discharge: HOME | End: 2021-04-27
Attending: INTERNAL MEDICINE
Payer: MEDICARE

## 2021-04-27 DIAGNOSIS — E66.01 MORBID OBESITY (HCC): ICD-10-CM

## 2021-04-27 DIAGNOSIS — R09.89 CAROTID BRUIT, UNSPECIFIED LATERALITY: ICD-10-CM

## 2021-04-27 DIAGNOSIS — I77.9 CAROTID ARTERY DISEASE, UNSPECIFIED LATERALITY, UNSPECIFIED TYPE (HCC): ICD-10-CM

## 2021-04-27 DIAGNOSIS — E11.8 TYPE 2 DIABETES MELLITUS WITH COMPLICATION (HCC): ICD-10-CM

## 2021-04-27 DIAGNOSIS — E78.5 DYSLIPIDEMIA: ICD-10-CM

## 2021-04-27 DIAGNOSIS — I25.118 CORONARY ARTERY DISEASE OF NATIVE ARTERY OF NATIVE HEART WITH STABLE ANGINA PECTORIS (HCC): ICD-10-CM

## 2021-04-27 PROCEDURE — 93880 EXTRACRANIAL BILAT STUDY: CPT

## 2021-04-27 PROCEDURE — 93306 TTE W/DOPPLER COMPLETE: CPT | Performed by: INTERNAL MEDICINE

## 2021-04-27 PROCEDURE — 93880 EXTRACRANIAL BILAT STUDY: CPT | Performed by: SURGERY

## 2021-04-27 PROCEDURE — 93306 TTE W/DOPPLER COMPLETE: CPT

## 2021-05-06 ENCOUNTER — HOSPITAL ENCOUNTER (OUTPATIENT)
Dept: NON INVASIVE DIAGNOSTICS | Facility: HOSPITAL | Age: 71
Discharge: HOME | End: 2021-05-06
Attending: FAMILY MEDICINE
Payer: MEDICARE

## 2021-05-06 DIAGNOSIS — E66.01 MORBID OBESITY (HCC): ICD-10-CM

## 2021-05-06 DIAGNOSIS — E78.5 DYSLIPIDEMIA: ICD-10-CM

## 2021-05-06 DIAGNOSIS — E11.8 TYPE 2 DIABETES MELLITUS WITH COMPLICATION (HCC): ICD-10-CM

## 2021-05-06 DIAGNOSIS — I73.9 PERIPHERAL VASCULAR OCCLUSIVE DISEASE (HCC): ICD-10-CM

## 2021-05-06 PROCEDURE — 93922 UPR/L XTREMITY ART 2 LEVELS: CPT | Performed by: SURGERY

## 2021-05-06 PROCEDURE — 93925 LOWER EXTREMITY STUDY: CPT | Performed by: SURGERY

## 2021-05-06 PROCEDURE — 93923 UPR/LXTR ART STDY 3+ LVLS: CPT

## 2021-05-06 PROCEDURE — 93925 LOWER EXTREMITY STUDY: CPT

## 2021-05-19 DIAGNOSIS — E78.2 MIXED HYPERLIPIDEMIA: ICD-10-CM

## 2021-05-19 DIAGNOSIS — I10 ESSENTIAL HYPERTENSION: ICD-10-CM

## 2021-05-20 RX ORDER — RAMIPRIL 10 MG/1
CAPSULE ORAL
Qty: 90 CAPSULE | Refills: 3 | Status: SHIPPED | OUTPATIENT
Start: 2021-05-20 | End: 2022-04-22

## 2021-05-20 RX ORDER — POTASSIUM CHLORIDE 750 MG/1
TABLET, FILM COATED, EXTENDED RELEASE ORAL
Qty: 90 TABLET | Refills: 3 | Status: SHIPPED | OUTPATIENT
Start: 2021-05-20 | End: 2022-04-22

## 2021-05-20 RX ORDER — ATORVASTATIN CALCIUM 40 MG/1
TABLET, FILM COATED ORAL
Qty: 90 TABLET | Refills: 3 | Status: SHIPPED | OUTPATIENT
Start: 2021-05-20 | End: 2022-04-22

## 2021-06-25 ENCOUNTER — OFFICE VISIT (OUTPATIENT)
Dept: PODIATRY | Facility: CLINIC | Age: 71
End: 2021-06-25
Payer: MEDICARE

## 2021-06-25 VITALS
DIASTOLIC BLOOD PRESSURE: 78 MMHG | HEIGHT: 71 IN | WEIGHT: 288 LBS | HEART RATE: 57 BPM | BODY MASS INDEX: 40.32 KG/M2 | SYSTOLIC BLOOD PRESSURE: 126 MMHG

## 2021-06-25 DIAGNOSIS — E11.40 TYPE 2 DIABETES MELLITUS WITH DIABETIC NEUROPATHY, WITHOUT LONG-TERM CURRENT USE OF INSULIN (HCC): Primary | ICD-10-CM

## 2021-06-25 DIAGNOSIS — B35.1 ONYCHOMYCOSIS: ICD-10-CM

## 2021-06-25 PROCEDURE — 11721 DEBRIDE NAIL 6 OR MORE: CPT | Performed by: PODIATRIST

## 2021-06-25 NOTE — PROGRESS NOTES
PATIENT:  Tala Lindsay  1950    ASSESSMENT/PLAN:  1  Type 2 diabetes mellitus with diabetic neuropathy, without long-term current use of insulin ([facility] [address] )     2  Onychomycosis  Debridement         Orders Placed This Encounter   Procedures    Debridement      Disease prevention and related risk factors of diabetes were identified and discussed  The patient was educated in proper foot wear for diabetics  Also educated in daily foot assessment and routine diabetic foot care  The patient will follow up in 9 weeks for further diabetic foot exam and care     PROCEDURE:  All mycotic toenails were reduced and debrided in length, width, and girth using a nail nipper and dremel  HPI:  Tala Lindsay is a 78 y  o year old male seen for diabetic foot exam   The patient has class findings with DPN  BS is under control  No foot ulcer  Complained of thick nails with discomfort  The patient denied any acute pedal disorder          PAST MEDICAL HISTORY:  Past Medical History:   Diagnosis Date    Asthma     resolved: 08/14/17    Closed fracture of fifth metacarpal bone of right hand     last assessed: 06/30/15    Diabetes mellitus ([facility] [address] )     Diverticulitis     Fracture of metacarpal shaft     Hemopneumothorax, left     last assessed: 06/02/15    Hyperlipidemia     Hypertension     Inguinal hernia     Lumbar transverse process fracture (HCC)     Pilonidal cyst     Pleural effusion     last assessed: 07/01/15    Rib fractures     last assessed: 06/02/15    Status post fall     Superficial thrombophlebitis of leg     last assessed: 03/19/14       PAST SURGICAL HISTORY:  Past Surgical History:   Procedure Laterality Date    COLON SURGERY      COLONOSCOPY      CORONARY ANGIOPLASTY WITH STENT PLACEMENT  2009    PTCA/RITA to RCA    FRACTURE SURGERY  05/04/2015    Closed treatment of fracture of metacarpal bone, right 5t metacarpal fracture Dr Merrick Chin MENISCAL REPAIR      Lateral meniscus    LAPAROSCOPY  2015    Exploratory, extensive lysis of adhesions Dr Denver Ill      Surgical lysis of intestinal adhesions    RECTAL SURGERY      REVISION COLOSTOMY      THORACENTESIS  2015    with imaging guidance left, removed 1300cc of fluid w/o problem lower base of lun06    TONSILLECTOMY AND ADENOIDECTOMY          ALLERGIES:  Patient has no known allergies  MEDICATIONS:  Current Outpatient Medications   Medication Sig Dispense Refill    atorvastatin (LIPITOR) 40 mg tablet TAKE 1 TABLET BY MOUTH  DAILY 90 tablet 3    furosemide (LASIX) 20 mg tablet TAKE 1 TABLET BY MOUTH  DAILY 90 tablet 3    metFORMIN (GLUCOPHAGE-XR) 500 mg 24 hr tablet TAKE 1 TABLET BY MOUTH  TWICE A DAY WITH MEALS 180 tablet 3    metoprolol tartrate (LOPRESSOR) 25 mg tablet TAKE 1 TABLET BY MOUTH  TWICE DAILY 180 tablet 3    nitroglycerin (NITROSTAT) 0 4 mg SL tablet Place 1 tablet (0 4 mg total) under the tongue every 5 (five) minutes as needed for chest pain 30 tablet 3    potassium chloride (Klor-Con) 10 mEq tablet TAKE 1 TABLET BY MOUTH  DAILY 90 tablet 3    ramipril (ALTACE) 10 MG capsule TAKE 1 CAPSULE BY MOUTH  DAILY 90 capsule 3     No current facility-administered medications for this visit         SOCIAL HISTORY:  Social History     Socioeconomic History    Marital status: /Civil Union     Spouse name: None    Number of children: None    Years of education: None    Highest education level: None   Occupational History    Occupation: Retired   Tobacco Use    Smoking status: Former Smoker     Quit date:      Years since quittin 5    Smokeless tobacco: Never Used   Vaping Use    Vaping Use: Never used   Substance and Sexual Activity    Alcohol use: Not Currently     Comment: Social    Drug use: No    Sexual activity: Yes     Partners: Female   Other Topics Concern    None   Social History Narrative    Always uses seat belt    No living will     Social Determinants of Health     Financial Resource Strain:     Difficulty of Paying Living Expenses:    Food Insecurity:     Worried About Running Out of Food in the Last Year:     [address] in the Last Year:    Transportation Needs:     Lack of Transportation (Medical):  Lack of Transportation (Non-Medical):    Physical Activity:     Days of Exercise per Week:     Minutes of Exercise per Session:    Stress:     Feeling of Stress :    Social Connections:     Frequency of Communication with Friends and Family:     Frequency of Social Gatherings with Friends and Family:     Attends Zoroastrian Services:     Active Member of Clubs or Organizations:     Attends Club or Organization Meetings:     Marital Status:    Intimate Partner Violence:     Fear of Current or Ex-Partner:     Emotionally Abused:     Physically Abused:     Sexually Abused:         REVIEW OF SYSTEMS:  GENERAL: NAD, afebrile  HEART: No chest pain, or palpitation  RESPIRATORY:  No acute SOB or cough  GI: No Nausea, vomit or diarrhea  NEUROLOGIC: No syncope or acute weakness    PHYSICAL EXAM:  VASCULAR EXAM  Dorsalis pedis  +1, Posterior tibial artery  absent  The patient has class findings with skin atrophy, lack of digital hair, and nail dystrophy  There is +1 lower extremity edema bilaterally  Venous stasis skin changes noted BLE  NEUROLOGIC EXAM  Sensation is intact to light touch  No focal neurologic deficit  DERMATOLOGIC EXAM:   No ulcer or cellulitis noted  The patient has hypertrophic toenails with discoloration, onycholysis, and subungal debris  MUSCULOSKELETAL EXAM:   No acute joint pain, edema, or redness  No acute musculoskeletal problem  Patient has deformity including bunion and hammertoe

## 2021-07-30 ENCOUNTER — APPOINTMENT (OUTPATIENT)
Dept: LAB | Facility: HOSPITAL | Age: 71
End: 2021-07-30
Attending: FAMILY MEDICINE
Payer: MEDICARE

## 2021-07-30 LAB
ANION GAP SERPL CALCULATED.3IONS-SCNC: 9 MMOL/L (ref 4–13)
BUN SERPL-MCNC: 15 MG/DL (ref 5–25)
CALCIUM SERPL-MCNC: 8.4 MG/DL (ref 8.3–10.1)
CHLORIDE SERPL-SCNC: 105 MMOL/L (ref 100–108)
CHOLEST SERPL-MCNC: 125 MG/DL (ref 50–200)
CO2 SERPL-SCNC: 26 MMOL/L (ref 21–32)
CREAT SERPL-MCNC: 0.84 MG/DL (ref 0.6–1.3)
CREAT UR-MCNC: 193 MG/DL
EST. AVERAGE GLUCOSE BLD GHB EST-MCNC: 154 MG/DL
GFR SERPL CREATININE-BSD FRML MDRD: 90 ML/MIN/1.73SQ M
GLUCOSE P FAST SERPL-MCNC: 211 MG/DL (ref 65–99)
HBA1C MFR BLD: 7 %
HDLC SERPL-MCNC: 46 MG/DL
LDLC SERPL CALC-MCNC: 61 MG/DL (ref 0–100)
MICROALBUMIN UR-MCNC: 18.1 MG/L (ref 0–20)
MICROALBUMIN/CREAT 24H UR: 9 MG/G CREATININE (ref 0–30)
NONHDLC SERPL-MCNC: 79 MG/DL
POTASSIUM SERPL-SCNC: 4 MMOL/L (ref 3.5–5.3)
SODIUM SERPL-SCNC: 140 MMOL/L (ref 136–145)
TRIGL SERPL-MCNC: 92 MG/DL

## 2021-07-30 PROCEDURE — 36415 COLL VENOUS BLD VENIPUNCTURE: CPT | Performed by: FAMILY MEDICINE

## 2021-07-30 PROCEDURE — 82043 UR ALBUMIN QUANTITATIVE: CPT | Performed by: FAMILY MEDICINE

## 2021-07-30 PROCEDURE — 83036 HEMOGLOBIN GLYCOSYLATED A1C: CPT | Performed by: FAMILY MEDICINE

## 2021-07-30 PROCEDURE — 80061 LIPID PANEL: CPT | Performed by: FAMILY MEDICINE

## 2021-07-30 PROCEDURE — 80048 BASIC METABOLIC PNL TOTAL CA: CPT | Performed by: FAMILY MEDICINE

## 2021-07-30 PROCEDURE — 82570 ASSAY OF URINE CREATININE: CPT | Performed by: FAMILY MEDICINE

## 2021-08-12 DIAGNOSIS — I10 ESSENTIAL HYPERTENSION: ICD-10-CM

## 2021-08-12 DIAGNOSIS — E11.9 CONTROLLED TYPE 2 DIABETES MELLITUS WITHOUT COMPLICATION, WITHOUT LONG-TERM CURRENT USE OF INSULIN (HCC): ICD-10-CM

## 2021-08-13 RX ORDER — METFORMIN HYDROCHLORIDE 500 MG/1
TABLET, EXTENDED RELEASE ORAL
Qty: 180 TABLET | Refills: 3 | Status: SHIPPED | OUTPATIENT
Start: 2021-08-13 | End: 2021-08-19

## 2021-08-13 RX ORDER — FUROSEMIDE 20 MG/1
TABLET ORAL
Qty: 90 TABLET | Refills: 3 | Status: SHIPPED | OUTPATIENT
Start: 2021-08-13 | End: 2022-07-18

## 2021-08-19 ENCOUNTER — OFFICE VISIT (OUTPATIENT)
Dept: FAMILY MEDICINE CLINIC | Facility: CLINIC | Age: 71
End: 2021-08-19
Payer: MEDICARE

## 2021-08-19 VITALS
TEMPERATURE: 96.6 F | WEIGHT: 294 LBS | SYSTOLIC BLOOD PRESSURE: 132 MMHG | HEIGHT: 71 IN | DIASTOLIC BLOOD PRESSURE: 70 MMHG | BODY MASS INDEX: 41.16 KG/M2

## 2021-08-19 DIAGNOSIS — E66.01 MORBID OBESITY (HCC): ICD-10-CM

## 2021-08-19 DIAGNOSIS — I10 BENIGN ESSENTIAL HYPERTENSION: ICD-10-CM

## 2021-08-19 DIAGNOSIS — E11.9 CONTROLLED TYPE 2 DIABETES MELLITUS WITHOUT COMPLICATION, WITHOUT LONG-TERM CURRENT USE OF INSULIN (HCC): ICD-10-CM

## 2021-08-19 DIAGNOSIS — E11.8 TYPE 2 DIABETES MELLITUS WITH COMPLICATION (HCC): Primary | ICD-10-CM

## 2021-08-19 PROCEDURE — 99214 OFFICE O/P EST MOD 30 MIN: CPT | Performed by: FAMILY MEDICINE

## 2021-08-19 RX ORDER — METFORMIN HYDROCHLORIDE 500 MG/1
500 TABLET, EXTENDED RELEASE ORAL
Qty: 270 TABLET | Refills: 3
Start: 2021-08-19 | End: 2021-09-08 | Stop reason: SDUPTHER

## 2021-08-19 NOTE — PROGRESS NOTES
Assessment/Plan:    Problem List Items Addressed This Visit        Endocrine    Type 2 diabetes mellitus with complication ([address] ) - Primary       Cardiovascular and Mediastinum    Benign essential hypertension       Other    Morbid obesity ([address] )           Diagnoses and all orders for this visit:    Type 2 diabetes mellitus with complication ([address] )    Benign essential hypertension    Morbid obesity ([address] )        No problem-specific Assessment & Plan notes found for this encounter  Subjective:      Patient ID: Thurmond Gosselin is a 78 y o  male  HPI    The following portions of the patient's history were reviewed and updated as appropriate:   He has a past medical history of Asthma, Closed fracture of fifth metacarpal bone of right hand, Diabetes mellitus ([address] ), Diverticulitis, Fracture of metacarpal shaft, Hemopneumothorax, left, Hyperlipidemia, Hypertension, Inguinal hernia, Lumbar transverse process fracture ([address] ), Pilonidal cyst, Pleural effusion, Rib fractures, Status post fall, and Superficial thrombophlebitis of leg ,  does not have any pertinent problems on file  ,   has a past surgical history that includes Coronary angioplasty with stent (2009); Fracture surgery (05/04/2015); Revision Colostomy; LAPAROSCOPY (05/18/2015); Hernia repair; Knee arthroscopy w/ meniscal repair; Rectal surgery; Other surgical history; Thoracentesis (06/02/2015); Tonsillectomy and adenoidectomy; Colonoscopy; and Colon surgery  ,  family history includes Coronary artery disease in his mother; Heart disease in his father; Hyperlipidemia in his father  ,   reports that he quit smoking about 36 years ago  He has never used smokeless tobacco  He reports previous alcohol use  He reports that he does not use drugs  ,  has No Known Allergies     Current Outpatient Medications   Medication Sig Dispense Refill    atorvastatin (LIPITOR) 40 mg tablet TAKE 1 TABLET BY MOUTH  DAILY 90 tablet 3    furosemide (LASIX) 20 mg tablet TAKE 1 TABLET BY MOUTH  DAILY 90 tablet 3    metFORMIN (GLUCOPHAGE-XR) 500 mg 24 hr tablet TAKE 1 TABLET BY MOUTH  TWICE DAILY WITH MEALS 180 tablet 3    metoprolol tartrate (LOPRESSOR) 25 mg tablet TAKE 1 TABLET BY MOUTH  TWICE DAILY 180 tablet 3    nitroglycerin (NITROSTAT) 0 4 mg SL tablet Place 1 tablet (0 4 mg total) under the tongue every 5 (five) minutes as needed for chest pain 30 tablet 3    potassium chloride (Klor-Con) 10 mEq tablet TAKE 1 TABLET BY MOUTH  DAILY 90 tablet 3    ramipril (ALTACE) 10 MG capsule TAKE 1 CAPSULE BY MOUTH  DAILY 90 capsule 3     No current facility-administered medications for this visit  Review of Systems   Constitutional: Negative for activity change, appetite change, diaphoresis, fatigue and fever  HENT: Positive for dental problem  Negative for hearing loss  Eyes: Positive for visual disturbance  Wears corrective lenses   Respiratory: Negative for apnea, cough, chest tightness, shortness of breath and wheezing  Cardiovascular: Negative for chest pain, palpitations and leg swelling  Gastrointestinal: Negative for abdominal distention, abdominal pain, anal bleeding, constipation, diarrhea, nausea and vomiting  Endocrine: Negative for cold intolerance, heat intolerance, polydipsia, polyphagia and polyuria  Genitourinary: Negative for difficulty urinating, dysuria, flank pain, hematuria and urgency  Musculoskeletal: Negative for arthralgias, back pain, gait problem, joint swelling and myalgias  Skin: Negative for color change, rash and wound  Allergic/Immunologic: Negative for environmental allergies, food allergies and immunocompromised state  Neurological: Negative for dizziness, seizures, syncope, speech difficulty, numbness and headaches  Hematological: Negative for adenopathy  Does not bruise/bleed easily  Psychiatric/Behavioral: Negative for agitation, behavioral problems, hallucinations, sleep disturbance and suicidal ideas  Objective:  Vitals:    08/19/21 0808   BP: 132/70   BP Location: Left arm   Patient Position: Sitting   Cuff Size: Large   Temp: (!) 96 6 °F (35 9 °C)   TempSrc: Temporal   Weight: 133 kg (294 lb)   Height: 5' 11" (1 803 m)     Body mass index is 41 kg/m²  Physical Exam  Constitutional:       General: He is not in acute distress  Appearance: He is well-developed  He is obese  He is not diaphoretic  HENT:      Head: Normocephalic  Right Ear: External ear normal       Left Ear: External ear normal       Nose: Nose normal    Eyes:      General: No scleral icterus  Right eye: No discharge  Left eye: No discharge  Conjunctiva/sclera: Conjunctivae normal       Pupils: Pupils are equal, round, and reactive to light  Neck:      Thyroid: No thyromegaly  Trachea: No tracheal deviation  Cardiovascular:      Rate and Rhythm: Normal rate and regular rhythm  Pulses: no weak pulses          Dorsalis pedis pulses are 2+ on the right side and 2+ on the left side  Posterior tibial pulses are 2+ on the right side and 2+ on the left side  Heart sounds: Normal heart sounds  No murmur heard  No friction rub  No gallop  Pulmonary:      Effort: Pulmonary effort is normal  No respiratory distress  Breath sounds: Normal breath sounds  No wheezing  Abdominal:      General: Bowel sounds are normal       Palpations: Abdomen is soft  There is no mass  Tenderness: There is no abdominal tenderness  There is no guarding  Musculoskeletal:         General: No deformity  Cervical back: Normal range of motion  Feet:      Right foot:      Skin integrity: No ulcer, skin breakdown, erythema, warmth, callus or dry skin  Left foot:      Skin integrity: No ulcer, skin breakdown, erythema, warmth, callus or dry skin  Lymphadenopathy:      Cervical: No cervical adenopathy  Skin:     General: Skin is warm and dry  Findings: No erythema or rash  Neurological:      Mental Status: He is alert and oriented to person, place, and time  Cranial Nerves: No cranial nerve deficit  Psychiatric:         Thought Content: Thought content normal        Patient's shoes and socks removed  Right Foot/Ankle   Right Foot Inspection  Skin Exam: skin normal and skin intact no dry skin, no warmth, no callus, no erythema, no maceration, no abnormal color, no pre-ulcer, no ulcer and no callus                          Toe Exam: ROM and strength within normal limits  Sensory   Vibration: intact  Proprioception: intact   Monofilament testing: intact  Vascular  Capillary refills: < 3 seconds  The right DP pulse is 2+  The right PT pulse is 2+  Left Foot/Ankle  Left Foot Inspection  Skin Exam: skin normal and skin intactno dry skin, no warmth, no erythema, no maceration, normal color, no pre-ulcer, no ulcer and no callus                         Toe Exam: ROM and strength within normal limits                   Sensory   Vibration: intact  Proprioception: intact  Monofilament: intact  Vascular  Capillary refills: < 3 seconds  The left DP pulse is 2+  The left PT pulse is 2+  Assign Risk Category:  No deformity present; No loss of protective sensation;  No weak pulses       Risk: 0

## 2021-09-03 ENCOUNTER — OFFICE VISIT (OUTPATIENT)
Dept: PODIATRY | Facility: CLINIC | Age: 71
End: 2021-09-03
Payer: MEDICARE

## 2021-09-03 VITALS
WEIGHT: 288.2 LBS | HEIGHT: 71 IN | BODY MASS INDEX: 40.35 KG/M2 | SYSTOLIC BLOOD PRESSURE: 126 MMHG | HEART RATE: 47 BPM | DIASTOLIC BLOOD PRESSURE: 82 MMHG

## 2021-09-03 DIAGNOSIS — B35.1 ONYCHOMYCOSIS: ICD-10-CM

## 2021-09-03 DIAGNOSIS — E11.40 TYPE 2 DIABETES MELLITUS WITH DIABETIC NEUROPATHY, WITHOUT LONG-TERM CURRENT USE OF INSULIN (HCC): Primary | ICD-10-CM

## 2021-09-03 PROCEDURE — 11721 DEBRIDE NAIL 6 OR MORE: CPT | Performed by: PODIATRIST

## 2021-09-03 NOTE — PROGRESS NOTES
PATIENT:  Jerry Copeland  1950    ASSESSMENT/PLAN:  1  Type 2 diabetes mellitus with diabetic neuropathy, without long-term current use of insulin ([facility] [address] )     2  Onychomycosis  Debridement         Orders Placed This Encounter   Procedures    Debridement      Disease prevention and related risk factors of diabetes were identified and discussed  The patient was educated in proper foot wear for diabetics  Also educated in daily foot assessment and routine diabetic foot care  The patient will follow up in 9 weeks for further diabetic foot exam and care     PROCEDURE:  All mycotic toenails were reduced and debrided in length, width, and girth using a nail nipper and dremel  HPI:  Jerry Copeland is a 78 y  o year old male seen for diabetic foot exam   The patient has class findings with DPN  BS was up a little  No foot ulcer  Complained of thick nails with discomfort  The patient denied any acute pedal disorder          PAST MEDICAL HISTORY:  Past Medical History:   Diagnosis Date    Asthma     resolved: 08/14/17    Closed fracture of fifth metacarpal bone of right hand     last assessed: 06/30/15    Deep vein thrombosis (HCC)     Diabetes mellitus ([facility] [address] )     Diverticulitis     Fracture of metacarpal shaft     Hemopneumothorax, left     last assessed: 06/02/15    Hyperlipidemia     Hypertension     Inguinal hernia     Lumbar transverse process fracture (HCC)     Pilonidal cyst     Pleural effusion     last assessed: 07/01/15    Rib fractures     last assessed: 06/02/15    Status post fall     Superficial thrombophlebitis of leg     last assessed: 03/19/14       PAST SURGICAL HISTORY:  Past Surgical History:   Procedure Laterality Date    COLON SURGERY      COLONOSCOPY      CORONARY ANGIOPLASTY WITH STENT PLACEMENT  2009    PTCA/RITA to RCA    FRACTURE SURGERY  05/04/2015    Closed treatment of fracture of metacarpal bone, right 5t metacarpal fracture Dr Miguelina Espinoza  KNEE ARTHROSCOPY W/ MENISCAL REPAIR      Lateral meniscus    LAPAROSCOPY  2015    Exploratory, extensive lysis of adhesions Dr Kitty Velazquez      Surgical lysis of intestinal adhesions    RECTAL SURGERY      REVISION COLOSTOMY      THORACENTESIS  2015    with imaging guidance left, removed 1300cc of fluid w/o problem lower base of lun06    TONSILLECTOMY AND ADENOIDECTOMY          ALLERGIES:  Patient has no known allergies  MEDICATIONS:  Current Outpatient Medications   Medication Sig Dispense Refill    atorvastatin (LIPITOR) 40 mg tablet TAKE 1 TABLET BY MOUTH  DAILY 90 tablet 3    furosemide (LASIX) 20 mg tablet TAKE 1 TABLET BY MOUTH  DAILY 90 tablet 3    metFORMIN (GLUCOPHAGE-XR) 500 mg 24 hr tablet Take 1 tablet (500 mg total) by mouth 3 (three) times a day with meals 270 tablet 3    metoprolol tartrate (LOPRESSOR) 25 mg tablet TAKE 1 TABLET BY MOUTH  TWICE DAILY 180 tablet 3    nitroglycerin (NITROSTAT) 0 4 mg SL tablet Place 1 tablet (0 4 mg total) under the tongue every 5 (five) minutes as needed for chest pain 30 tablet 3    potassium chloride (Klor-Con) 10 mEq tablet TAKE 1 TABLET BY MOUTH  DAILY 90 tablet 3    ramipril (ALTACE) 10 MG capsule TAKE 1 CAPSULE BY MOUTH  DAILY 90 capsule 3     No current facility-administered medications for this visit         SOCIAL HISTORY:  Social History     Socioeconomic History    Marital status: /Civil Union     Spouse name: None    Number of children: None    Years of education: None    Highest education level: None   Occupational History    Occupation: Retired   Tobacco Use    Smoking status: Former Smoker     Packs/day: 1 00     Years: 30 00     Pack years: 30 00     Types: Cigarettes     Quit date:      Years since quittin 2    Smokeless tobacco: Never Used   Vaping Use    Vaping Use: Never used   Substance and Sexual Activity    Alcohol use: Not Currently     Alcohol/week: 0 0 standard drinks     Comment: Social    Drug use: No    Sexual activity: Yes     Partners: Female     Birth control/protection: None   Other Topics Concern    None   Social History Narrative    Always uses seat belt    No living will     Social Determinants of Health     Financial Resource Strain:     Difficulty of Paying Living Expenses:    Food Insecurity:     Worried About Running Out of Food in the Last Year:     [address] in the Last Year:    Transportation Needs:     Lack of Transportation (Medical):  Lack of Transportation (Non-Medical):    Physical Activity:     Days of Exercise per Week:     Minutes of Exercise per Session:    Stress:     Feeling of Stress :    Social Connections:     Frequency of Communication with Friends and Family:     Frequency of Social Gatherings with Friends and Family:     Attends Gnosticism Services:     Active Member of Clubs or Organizations:     Attends Club or Organization Meetings:     Marital Status:    Intimate Partner Violence:     Fear of Current or Ex-Partner:     Emotionally Abused:     Physically Abused:     Sexually Abused:         REVIEW OF SYSTEMS:  GENERAL: NAD, afebrile  HEART: No chest pain, or palpitation  RESPIRATORY:  No acute SOB or cough  GI: No Nausea, vomit or diarrhea  NEUROLOGIC: No syncope or acute weakness    PHYSICAL EXAM:  VASCULAR EXAM  Dorsalis pedis  +1, Posterior tibial artery  absent  The patient has class findings with skin atrophy, lack of digital hair, and nail dystrophy  There is +1 lower extremity edema bilaterally  Venous stasis skin changes noted BLE  NEUROLOGIC EXAM  Sensation is intact to light touch  No focal neurologic deficit  DERMATOLOGIC EXAM:   No ulcer or cellulitis noted  The patient has hypertrophic toenails with discoloration, onycholysis, and subungal debris  MUSCULOSKELETAL EXAM:   No acute joint pain, edema, or redness  No acute musculoskeletal problem    Patient has deformity including bunion and hammertoe

## 2021-09-08 DIAGNOSIS — E11.9 CONTROLLED TYPE 2 DIABETES MELLITUS WITHOUT COMPLICATION, WITHOUT LONG-TERM CURRENT USE OF INSULIN (HCC): ICD-10-CM

## 2021-09-08 RX ORDER — METFORMIN HYDROCHLORIDE 500 MG/1
500 TABLET, EXTENDED RELEASE ORAL 2 TIMES DAILY WITH MEALS
Qty: 270 TABLET | Refills: 0
Start: 2021-09-08 | End: 2022-01-13 | Stop reason: SDUPTHER

## 2021-11-05 ENCOUNTER — APPOINTMENT (OUTPATIENT)
Dept: LAB | Facility: HOSPITAL | Age: 71
End: 2021-11-05
Attending: FAMILY MEDICINE
Payer: MEDICARE

## 2021-11-09 ENCOUNTER — OFFICE VISIT (OUTPATIENT)
Dept: FAMILY MEDICINE CLINIC | Facility: CLINIC | Age: 71
End: 2021-11-09
Payer: MEDICARE

## 2021-11-09 VITALS
SYSTOLIC BLOOD PRESSURE: 142 MMHG | HEIGHT: 71 IN | WEIGHT: 286 LBS | BODY MASS INDEX: 40.04 KG/M2 | DIASTOLIC BLOOD PRESSURE: 78 MMHG | TEMPERATURE: 96.9 F

## 2021-11-09 DIAGNOSIS — Z13.5 SCREENING FOR DIABETIC RETINOPATHY: ICD-10-CM

## 2021-11-09 DIAGNOSIS — Z00.00 MEDICARE ANNUAL WELLNESS VISIT, SUBSEQUENT: Primary | ICD-10-CM

## 2021-11-09 PROCEDURE — 92250 FUNDUS PHOTOGRAPHY W/I&R: CPT | Performed by: FAMILY MEDICINE

## 2021-11-09 PROCEDURE — G0439 PPPS, SUBSEQ VISIT: HCPCS | Performed by: FAMILY MEDICINE

## 2021-11-12 ENCOUNTER — OFFICE VISIT (OUTPATIENT)
Dept: PODIATRY | Facility: CLINIC | Age: 71
End: 2021-11-12
Payer: MEDICARE

## 2021-11-12 VITALS
SYSTOLIC BLOOD PRESSURE: 115 MMHG | HEART RATE: 53 BPM | WEIGHT: 287 LBS | BODY MASS INDEX: 40.18 KG/M2 | DIASTOLIC BLOOD PRESSURE: 73 MMHG | HEIGHT: 71 IN

## 2021-11-12 DIAGNOSIS — E11.40 TYPE 2 DIABETES MELLITUS WITH DIABETIC NEUROPATHY, WITHOUT LONG-TERM CURRENT USE OF INSULIN (HCC): Primary | ICD-10-CM

## 2021-11-12 DIAGNOSIS — B35.1 ONYCHOMYCOSIS: ICD-10-CM

## 2021-11-12 DIAGNOSIS — L85.1 ACQUIRED KERATODERMA: ICD-10-CM

## 2021-11-12 PROCEDURE — 11055 PARING/CUTG B9 HYPRKER LES 1: CPT | Performed by: PODIATRIST

## 2021-11-12 PROCEDURE — 11721 DEBRIDE NAIL 6 OR MORE: CPT | Performed by: PODIATRIST

## 2022-01-11 DIAGNOSIS — I10 ESSENTIAL HYPERTENSION: ICD-10-CM

## 2022-01-13 DIAGNOSIS — E11.9 CONTROLLED TYPE 2 DIABETES MELLITUS WITHOUT COMPLICATION, WITHOUT LONG-TERM CURRENT USE OF INSULIN (HCC): ICD-10-CM

## 2022-01-13 RX ORDER — METFORMIN HYDROCHLORIDE 500 MG/1
500 TABLET, EXTENDED RELEASE ORAL 2 TIMES DAILY WITH MEALS
Qty: 180 TABLET | Refills: 3 | Status: SHIPPED | OUTPATIENT
Start: 2022-01-13

## 2022-01-18 RX ORDER — SODIUM, POTASSIUM,MAG SULFATES 17.5-3.13G
1 SOLUTION, RECONSTITUTED, ORAL ORAL ONCE
Qty: 354 ML | Refills: 0 | Status: SHIPPED | COMMUNITY
Start: 2022-01-18 | End: 2022-03-15 | Stop reason: ALTCHOICE

## 2022-01-20 ENCOUNTER — TELEPHONE (OUTPATIENT)
Dept: FAMILY MEDICINE CLINIC | Facility: CLINIC | Age: 72
End: 2022-01-20

## 2022-01-20 NOTE — TELEPHONE ENCOUNTER
----- Message from Lucian Felix sent at 1/7/2022  1:41 PM EST -----  Regarding: FW: Wellness Visit Eye Examination    ----- Message -----  From: Miguel Hernandez  Sent: 1/7/2022   1:28 PM EST  To: [facility]OVESA[facility]  Subject: Wellness Visit Eye Examination                   Good afternoon, Doctor  As I mentioned in my previous message, I am being dunned for payment for a vision test that was performed as part of my Medicare annual wellness visit in [place] [address]  As far as I can determine from my reading of the Medicare requirements for performing this test I meet all of them  Monet Hodge was tasked to contact Billing on December 17  I have not seen any action taken and received another message from Billing today  Can you help me out? Thanks    Gala Blood

## 2022-01-20 NOTE — TELEPHONE ENCOUNTER
I sent request to billing to have an eye exam for dos-11/9/21 to be removed per patients request   Per YUE Palacio request has been completed 1/20/22    I did speak with pt to make aware and [facility]

## 2022-01-20 NOTE — TELEPHONE ENCOUNTER
----- Message from Alison Kessler sent at 1/7/2022  1:41 PM EST -----  Regarding: FW: Wellness Visit Eye Examination    ----- Message -----  From: Dwight Ireland  Sent: 1/7/2022   1:28 PM EST  To: [facility]  Subject: Wellness Visit Eye Examination                   Good afternoon, Doctor  As I mentioned in my previous message, I am being dunned for payment for a vision test that was performed as part of my Medicare annual wellness visit in [facility]  As far as I can determine from my reading of the Medicare requirements for performing this test I meet all of them  Karolyn Moreira was tasked to contact Billing on December 17  I have not seen any action taken and received another message from Billing today  Can you help me out? Thanks    Santosh Gordon

## 2022-01-21 ENCOUNTER — OFFICE VISIT (OUTPATIENT)
Dept: PODIATRY | Facility: CLINIC | Age: 72
End: 2022-01-21
Payer: MEDICARE

## 2022-01-21 VITALS
HEART RATE: 50 BPM | HEIGHT: 71 IN | BODY MASS INDEX: 40.32 KG/M2 | WEIGHT: 288 LBS | DIASTOLIC BLOOD PRESSURE: 78 MMHG | SYSTOLIC BLOOD PRESSURE: 143 MMHG

## 2022-01-21 DIAGNOSIS — M20.42 HAMMER TOES OF BOTH FEET: ICD-10-CM

## 2022-01-21 DIAGNOSIS — M21.619 BUNION: ICD-10-CM

## 2022-01-21 DIAGNOSIS — M20.41 HAMMER TOES OF BOTH FEET: ICD-10-CM

## 2022-01-21 DIAGNOSIS — E11.40 TYPE 2 DIABETES MELLITUS WITH DIABETIC NEUROPATHY, WITHOUT LONG-TERM CURRENT USE OF INSULIN (HCC): Primary | ICD-10-CM

## 2022-01-21 PROCEDURE — 99213 OFFICE O/P EST LOW 20 MIN: CPT | Performed by: PODIATRIST

## 2022-01-21 NOTE — PROGRESS NOTES
PATIENT:  Hilda Emery  1950    ASSESSMENT/PLAN:  1  Type 2 diabetes mellitus with diabetic neuropathy, without long-term current use of insulin (HCC)  Diabetic Shoe Inserts    Diabetic Shoe   2  Bunion  Diabetic Shoe Inserts    Diabetic Shoe   3  Hammer toes of both feet  Diabetic Shoe Inserts    Diabetic Shoe        Patient was counseled on the condition and diagnosis  Educated disease prevention and risks related to diabetes  Educated proper daily foot care and exam   Instructed proper skin care / protection and footwear  Instructed to identify any signs of infection and related foot problem  The recent blood work was reviewed and the last HbA1c was 6 6  Discussed proper blood glucose control with diet and exercise  All nails and skin lesions were debrided  Continue jose  Discussed proper footwear to accommodate the deformity  Referred him to [facility] for DM shoes concerning risks with diabetic neuropathy and foot deformity  The patient will follow up in 9 weeks for further diabetic foot exam and care  HPI:  Hilda Emery is a 73 y  o year old male seen for diabetic foot exam   The patient has class findings with DPN  BS is under control  The patient denied any acute pedal disorder, redness, acute swelling, or recent injury           PAST MEDICAL HISTORY:  Past Medical History:   Diagnosis Date    Asthma     resolved: 08/14/17    Closed fracture of fifth metacarpal bone of right hand     last assessed: 06/30/15    Deep vein thrombosis (HCC)     Diabetes mellitus ([address] )     Diverticulitis     Fracture of metacarpal shaft     Hemopneumothorax, left     last assessed: 06/02/15    Hyperlipidemia     Hypertension     Inguinal hernia     Lumbar transverse process fracture (HCC)     Pilonidal cyst     Pleural effusion     last assessed: 07/01/15    Rib fractures     last assessed: 06/02/15    Status post fall     Superficial thrombophlebitis of leg     last assessed: 03/19/14 PAST SURGICAL HISTORY:  Past Surgical History:   Procedure Laterality Date    COLON SURGERY      COLONOSCOPY      CORONARY ANGIOPLASTY WITH STENT PLACEMENT  2009    PTCA/RITA to RCA    FRACTURE SURGERY  05/04/2015    Closed treatment of fracture of metacarpal bone, right 5t metacarpal fracture Dr Jose Hoff ARTHROSCOPY W/ MENISCAL REPAIR      Lateral meniscus    LAPAROSCOPY  05/18/2015    Exploratory, extensive lysis of adhesions Dr Francis Rodríguez      Surgical lysis of intestinal adhesions    RECTAL SURGERY      REVISION COLOSTOMY      THORACENTESIS  06/02/2015    with imaging guidance left, removed 1300cc of fluid w/o problem lower base of lun06    TONSILLECTOMY AND ADENOIDECTOMY          ALLERGIES:  Patient has no known allergies  MEDICATIONS:  Current Outpatient Medications   Medication Sig Dispense Refill    atorvastatin (LIPITOR) 40 mg tablet TAKE 1 TABLET BY MOUTH  DAILY 90 tablet 3    furosemide (LASIX) 20 mg tablet TAKE 1 TABLET BY MOUTH  DAILY 90 tablet 3    metFORMIN (GLUCOPHAGE-XR) 500 mg 24 hr tablet Take 1 tablet (500 mg total) by mouth 2 (two) times a day with meals 180 tablet 3    metoprolol tartrate (LOPRESSOR) 25 mg tablet Take 1 tablet (25 mg total) by mouth 2 (two) times a day 180 tablet 3    nitroglycerin (NITROSTAT) 0 4 mg SL tablet Place 1 tablet (0 4 mg total) under the tongue every 5 (five) minutes as needed for chest pain 30 tablet 3    potassium chloride (Klor-Con) 10 mEq tablet TAKE 1 TABLET BY MOUTH  DAILY 90 tablet 3    ramipril (ALTACE) 10 MG capsule TAKE 1 CAPSULE BY MOUTH  DAILY 90 capsule 3    Na Sulfate-K Sulfate-Mg Sulf (Suprep Bowel Prep Kit) 17 5-3 13-1 6 GM/177ML SOLN Take 1 kit by mouth once for 1 dose 354 mL 0     No current facility-administered medications for this visit         SOCIAL HISTORY:  Social History     Socioeconomic History    Marital status: /Civil Union     Spouse name: None    Number of children: None    Years of education: None    Highest education level: None   Occupational History    Occupation: Retired   Tobacco Use    Smoking status: Former Smoker     Packs/day: 1 00     Years: 30 00     Pack years: 30 00     Types: Cigarettes     Quit date:      Years since quittin 6    Smokeless tobacco: Never Used   Vaping Use    Vaping Use: Never used   Substance and Sexual Activity    Alcohol use: Not Currently     Alcohol/week: 0 0 standard drinks     Comment: Social    Drug use: No    Sexual activity: Yes     Partners: Female     Birth control/protection: None   Other Topics Concern    None   Social History Narrative    Always uses seat belt    No living will     Social Determinants of Health     Financial Resource Strain: Not on file   Food Insecurity: Not on file   Transportation Needs: Not on file   Physical Activity: Not on file   Stress: Not on file   Social Connections: Not on file   Intimate Partner Violence: Not on file   Housing Stability: Not on file        REVIEW OF SYSTEMS:  GENERAL: NAD, afebrile  HEART: No chest pain, or palpitation  RESPIRATORY:  No acute SOB or cough  GI: No Nausea, vomit or diarrhea  NEUROLOGIC: No syncope or acute weakness    PHYSICAL EXAM:  VASCULAR EXAM  Dorsalis pedis  +1, Posterior tibial artery  absent  The patient has class findings with skin atrophy, lack of digital hair, and nail dystrophy  There is +1 lower extremity edema bilaterally  Venous stasis skin changes noted BLE  NEUROLOGIC EXAM  AAO X 3  Sensation is intact to light touch  Sensation is intact to 10gm monofilament  No focal neurologic deficit  DERMATOLOGIC EXAM:   No ulcer or cellulitis noted  The patient has hypertrophic toenails with discoloration, onycholysis, and subungal debris  Hyperkeratotic skin lesions on lateral aspect of right hallux and medial right 2nd toe  No abscess  MUSCULOSKELETAL EXAM:   No acute joint pain    No acute edema or redness  No acute musculoskeletal problem  Patient has deformity including bunion and hammertoe  Diabetic Foot Exam    Patient's shoes and socks removed  Right Foot/Ankle   Right Foot Inspection  Skin Exam: skin normal, skin intact, dry skin, callus and callus  No warmth, no erythema, no maceration, no abnormal color, no pre-ulcer and no ulcer  Toe Exam: right toe deformity  No swelling, no tenderness and erythema    Sensory   Vibration: diminished  Proprioception: intact  Monofilament testing: intact    Vascular  Capillary refills: < 3 seconds  The right DP pulse is 1+  The right PT pulse is 0  Left Foot/Ankle  Left Foot Inspection  Skin Exam: skin normal, skin intact and dry skin  No warmth, no erythema, no maceration, normal color, no pre-ulcer, no ulcer and no callus  Toe Exam: left toe deformity  No swelling, no tenderness and no erythema  Sensory   Vibration: diminished  Proprioception: intact  Monofilament testing: intact    Vascular  Capillary refills: < 3 seconds  The left DP pulse is 1+  The left PT pulse is 0       Assign Risk Category  Deformity present  No loss of protective sensation  No weak pulses  Risk: 1

## 2022-02-03 ENCOUNTER — OFFICE VISIT (OUTPATIENT)
Dept: CARDIOLOGY CLINIC | Facility: HOSPITAL | Age: 72
End: 2022-02-03
Payer: MEDICARE

## 2022-02-03 VITALS
WEIGHT: 290 LBS | SYSTOLIC BLOOD PRESSURE: 150 MMHG | BODY MASS INDEX: 40.6 KG/M2 | DIASTOLIC BLOOD PRESSURE: 80 MMHG | HEART RATE: 60 BPM | HEIGHT: 71 IN

## 2022-02-03 DIAGNOSIS — E78.5 DYSLIPIDEMIA: ICD-10-CM

## 2022-02-03 DIAGNOSIS — I10 BENIGN ESSENTIAL HYPERTENSION: ICD-10-CM

## 2022-02-03 DIAGNOSIS — E66.01 MORBID OBESITY (HCC): ICD-10-CM

## 2022-02-03 DIAGNOSIS — I25.10 CORONARY ARTERY DISEASE INVOLVING NATIVE CORONARY ARTERY OF NATIVE HEART WITHOUT ANGINA PECTORIS: Primary | ICD-10-CM

## 2022-02-03 DIAGNOSIS — Z95.5 PRESENCE OF DRUG-ELUTING STENT IN RIGHT CORONARY ARTERY: ICD-10-CM

## 2022-02-03 PROCEDURE — 93000 ELECTROCARDIOGRAM COMPLETE: CPT | Performed by: INTERNAL MEDICINE

## 2022-02-03 PROCEDURE — 99214 OFFICE O/P EST MOD 30 MIN: CPT | Performed by: INTERNAL MEDICINE

## 2022-02-03 RX ORDER — NITROGLYCERIN 0.4 MG/1
0.4 TABLET SUBLINGUAL
Qty: 30 TABLET | Refills: 3 | Status: SHIPPED | OUTPATIENT
Start: 2022-02-03

## 2022-02-03 NOTE — PROGRESS NOTES
Cardiology Follow Up    Malick Frank  1950  [id number]  [address]  [address] [facility] [address] [phone]    1  Coronary artery disease involving native coronary artery of native heart without angina pectoris  POCT ECG    nitroglycerin (NITROSTAT) 0 4 mg SL tablet   2  Presence of drug-eluting stent in right coronary artery     3  Benign essential hypertension     4  Dyslipidemia     5  Morbid obesity [facility])         Discussion/Summary:  Mr Sabra Luque is a pleasant 61-year-old gentleman who presents to the office today for routine followup       Since his last visit he continues to feel well from a cardiac standpoint  His activity is mainly limited by his knee pain      His blood pressure is elevated in the office today  However he did not take his antihypertensive medication this morning as he forgot  Hence no changes were made to his regimen  He is due to see his primary care provider in the near future who will re-evaluate his blood pressure  In the meantime a low-salt diet was reinforced  His most recent lipids reveal acceptable numbers on his current statin regimen  No changes were advised      After his last visit he underwent an echocardiogram which was unremarkable except for mild pulmonary hypertension  I will see him back in the office in one year sooner if deemed necessary  Interval History:   Mr Sabra Luque is a pleasant 61-year-old gentleman who presents to the office today for followup       Since his last visit one year ago he has been feeling well  He continues to be limited by bilateral knee pain  He can ascend a flight of steps very slowly without any cardiopulmonary symptoms of chest pain or shortness of breath    He denies any signs or symptoms of congestive heart failure including lower extremity edema, paroxysmal nocturnal dyspnea, orthopnea, acute weight gain or increasing abdominal girth  He denies lightheadedness, syncope or presyncope  He denies palpitations  He denies symptoms of claudication  Problem List     Benign essential hypertension    Overview Signed 2018 10:43 AM by Qi Recio, DO     On good medications ramipril doing well         CAD (coronary artery disease)    Diabetes mellitus type 2, controlled ([facility] )    Overview Signed 2018 10:43 AM by Qi Recio, DO     Blood sugars in the range of 150         Lab Results   Component Value Date    HGBA1C 6 6 (H) 2021       No results for input(s): POCGLU in the last 72 hours      Blood Sugar Average: Last 72 hrs:          Dyslipidemia    Morbid obesity ([facility] )    Sciatica        Past Medical History:   Diagnosis Date    Asthma     resolved: 17    Closed fracture of fifth metacarpal bone of right hand     last assessed: 06/30/15    Deep vein thrombosis (HCC)     Diabetes mellitus ([facility] )     Diverticulitis     Fracture of metacarpal shaft     Hemopneumothorax, left     last assessed: 06/02/15    Hyperlipidemia     Hypertension     Inguinal hernia     Lumbar transverse process fracture (HCC)     Pilonidal cyst     Pleural effusion     last assessed: 07/01/15    Rib fractures     last assessed: 06/02/15    Status post fall     Superficial thrombophlebitis of leg     last assessed: 14     Social History     Socioeconomic History    Marital status: /Civil Union     Spouse name: Not on file    Number of children: Not on file    Years of education: Not on file    Highest education level: Not on file   Occupational History    Occupation: Retired   Tobacco Use    Smoking status: Former Smoker     Packs/day: 1 00     Years: 30 00     Pack years: 30 00     Types: Cigarettes     Quit date:      Years since quittin 6    Smokeless tobacco: Never Used   Vaping Use    Vaping Use: Never used   Substance and Sexual Activity    Alcohol use: Not Currently Alcohol/week: 0 0 standard drinks     Comment: Social    Drug use: No    Sexual activity: Yes     Partners: Female     Birth control/protection: None   Other Topics Concern    Not on file   Social History Narrative    Always uses seat belt    No living will     Social Determinants of Health     Financial Resource Strain: Not on file   Food Insecurity: Not on file   Transportation Needs: Not on file   Physical Activity: Not on file   Stress: Not on file   Social Connections: Not on file   Intimate Partner Violence: Not on file   Housing Stability: Not on file      Family History   Problem Relation Age of Onset    Coronary artery disease Mother     Heart disease Father         Benign hypertensive    Hyperlipidemia Father      Past Surgical History:   Procedure Laterality Date    COLON SURGERY      COLONOSCOPY      CORONARY ANGIOPLASTY WITH STENT PLACEMENT  2009    PTCA/RITA to RCA    FRACTURE SURGERY  05/04/2015    Closed treatment of fracture of metacarpal bone, right 5t metacarpal fracture Dr Mendiola Light ARTHROSCOPY W/ MENISCAL REPAIR      Lateral meniscus    LAPAROSCOPY  05/18/2015    Exploratory, extensive lysis of adhesions Dr Tam Morales      Surgical lysis of intestinal adhesions    RECTAL SURGERY      REVISION COLOSTOMY      THORACENTESIS  06/02/2015    with imaging guidance left, removed 1300cc of fluid w/o problem lower base of lun06    TONSILLECTOMY AND ADENOIDECTOMY         Current Outpatient Medications:     atorvastatin (LIPITOR) 40 mg tablet, TAKE 1 TABLET BY MOUTH  DAILY, Disp: 90 tablet, Rfl: 3    furosemide (LASIX) 20 mg tablet, TAKE 1 TABLET BY MOUTH  DAILY, Disp: 90 tablet, Rfl: 3    metFORMIN (GLUCOPHAGE-XR) 500 mg 24 hr tablet, Take 1 tablet (500 mg total) by mouth 2 (two) times a day with meals, Disp: 180 tablet, Rfl: 3    metoprolol tartrate (LOPRESSOR) 25 mg tablet, Take 1 tablet (25 mg total) by mouth 2 (two) times a day, Disp: 180 tablet, Rfl: 3    nitroglycerin (NITROSTAT) 0 4 mg SL tablet, Place 1 tablet (0 4 mg total) under the tongue every 5 (five) minutes as needed for chest pain, Disp: 30 tablet, Rfl: 3    potassium chloride (Klor-Con) 10 mEq tablet, TAKE 1 TABLET BY MOUTH  DAILY, Disp: 90 tablet, Rfl: 3    ramipril (ALTACE) 10 MG capsule, TAKE 1 CAPSULE BY MOUTH  DAILY, Disp: 90 capsule, Rfl: 3    Na Sulfate-K Sulfate-Mg Sulf (Suprep Bowel Prep Kit) 17 5-3 13-1 6 GM/177ML SOLN, Take 1 kit by mouth once for 1 dose, Disp: 354 mL, Rfl: 0  No Known Allergies    Labs:     Chemistry        Component Value Date/Time     06/28/2017 1108    K 3 9 11/05/2021 0901    K 4 6 06/28/2017 1108     11/05/2021 0901    CL 99 06/28/2017 1108    CO2 28 11/05/2021 0901    CO2 24 06/28/2017 1108    BUN 17 11/05/2021 0901    BUN 15 06/28/2017 1108    CREATININE 0 95 11/05/2021 0901    CREATININE 0 81 06/28/2017 1108        Component Value Date/Time    CALCIUM 8 4 11/05/2021 0901    CALCIUM 8 9 06/28/2017 1108    ALKPHOS 117 (H) 12/09/2017 0940    ALKPHOS 127 (H) 05/14/2015 1003    AST 16 12/09/2017 0940    AST 18 05/14/2015 1003    ALT 24 12/09/2017 0940    ALT 27 05/14/2015 1003    BILITOT 1 50 (H) 05/14/2015 1003            Lab Results   Component Value Date    CHOL 108 07/14/2016    CHOL 191 10/28/2015    CHOL 117 11/16/2013     Lab Results   Component Value Date    HDL 47 11/05/2021    HDL 46 07/30/2021    HDL 53 04/12/2021     Lab Results   Component Value Date    LDLCALC 66 11/05/2021    LDLCALC 61 07/30/2021    LDLCALC 63 04/12/2021     Lab Results   Component Value Date    TRIG 77 11/05/2021    TRIG 92 07/30/2021    TRIG 92 04/12/2021     No results found for: CHOLHDL    Imaging: No results found      ECG:  Normal sinus rhythm with isolated PVC, poor anterior R-wave progression      Review of Systems   Cardiovascular: Negative for chest pain, claudication, cyanosis, dyspnea on exertion, irregular heartbeat, leg swelling and near-syncope  Musculoskeletal: Positive for arthritis  All other systems reviewed and are negative  Vitals:    02/03/22 0852   BP: 150/80   Pulse:      Vitals:    02/03/22 0821   Weight: 132 kg (290 lb)     Height: 5' 11" (180 3 cm)   Body mass index is 40 45 kg/m²      Physical Exam:  General:  Alert and cooperative, appears stated age  HEENT:  PERRLA, EOMI, no scleral icterus, no conjunctival pallor  Neck:  No lymphadenopathy, no thyromegaly, no carotid bruits, no elevated JVP  Heart:  Regular rate and rhythm, normal S1/S2, no S3/S4, no murmur  Lungs:  Clear to auscultation bilaterally   Abdomen:  Soft, non-tender, positive bowel sounds, no rebound or guarding,   no organomegaly   Extremities:  No clubbing, cyanosis or edema   Vascular:  2+ pedal pulses  Skin:  No rashes or lesions on exposed skin  Neurologic:  Cranial nerves II-XII grossly intact without focal deficits

## 2022-03-09 ENCOUNTER — RA CDI HCC (OUTPATIENT)
Dept: OTHER | Facility: HOSPITAL | Age: 72
End: 2022-03-09

## 2022-03-14 ENCOUNTER — LAB (OUTPATIENT)
Dept: LAB | Facility: HOSPITAL | Age: 72
End: 2022-03-14
Attending: FAMILY MEDICINE
Payer: MEDICARE

## 2022-03-14 DIAGNOSIS — E11.8 TYPE 2 DIABETES MELLITUS WITH COMPLICATION (HCC): ICD-10-CM

## 2022-03-14 LAB
ANION GAP SERPL CALCULATED.3IONS-SCNC: 7 MMOL/L (ref 4–13)
BUN SERPL-MCNC: 17 MG/DL (ref 5–25)
CALCIUM SERPL-MCNC: 8.3 MG/DL (ref 8.3–10.1)
CHLORIDE SERPL-SCNC: 103 MMOL/L (ref 100–108)
CHOLEST SERPL-MCNC: 121 MG/DL
CO2 SERPL-SCNC: 27 MMOL/L (ref 21–32)
CREAT SERPL-MCNC: 0.95 MG/DL (ref 0.6–1.3)
CREAT UR-MCNC: 39.2 MG/DL
EST. AVERAGE GLUCOSE BLD GHB EST-MCNC: 148 MG/DL
GFR SERPL CREATININE-BSD FRML MDRD: 81 ML/MIN/1.73SQ M
GLUCOSE P FAST SERPL-MCNC: 166 MG/DL (ref 65–99)
HBA1C MFR BLD: 6.8 %
HDLC SERPL-MCNC: 49 MG/DL
LDLC SERPL CALC-MCNC: 65 MG/DL (ref 0–100)
MICROALBUMIN UR-MCNC: 5.4 MG/L (ref 0–20)
MICROALBUMIN/CREAT 24H UR: 14 MG/G CREATININE (ref 0–30)
NONHDLC SERPL-MCNC: 72 MG/DL
POTASSIUM SERPL-SCNC: 4.8 MMOL/L (ref 3.5–5.3)
SODIUM SERPL-SCNC: 137 MMOL/L (ref 136–145)
TRIGL SERPL-MCNC: 33 MG/DL

## 2022-03-14 PROCEDURE — 82570 ASSAY OF URINE CREATININE: CPT

## 2022-03-14 PROCEDURE — 83036 HEMOGLOBIN GLYCOSYLATED A1C: CPT

## 2022-03-14 PROCEDURE — 80048 BASIC METABOLIC PNL TOTAL CA: CPT

## 2022-03-14 PROCEDURE — 82043 UR ALBUMIN QUANTITATIVE: CPT

## 2022-03-14 PROCEDURE — 36415 COLL VENOUS BLD VENIPUNCTURE: CPT

## 2022-03-14 PROCEDURE — 80061 LIPID PANEL: CPT

## 2022-03-15 ENCOUNTER — OFFICE VISIT (OUTPATIENT)
Dept: FAMILY MEDICINE CLINIC | Facility: CLINIC | Age: 72
End: 2022-03-15
Payer: MEDICARE

## 2022-03-15 VITALS
DIASTOLIC BLOOD PRESSURE: 70 MMHG | SYSTOLIC BLOOD PRESSURE: 132 MMHG | BODY MASS INDEX: 41.16 KG/M2 | TEMPERATURE: 97 F | WEIGHT: 294 LBS | HEIGHT: 71 IN

## 2022-03-15 DIAGNOSIS — E66.01 MORBID OBESITY (HCC): ICD-10-CM

## 2022-03-15 DIAGNOSIS — I10 BENIGN ESSENTIAL HYPERTENSION: ICD-10-CM

## 2022-03-15 DIAGNOSIS — E11.8 TYPE 2 DIABETES MELLITUS WITH COMPLICATION (HCC): Primary | ICD-10-CM

## 2022-03-15 DIAGNOSIS — I25.10 CORONARY ARTERY DISEASE INVOLVING NATIVE CORONARY ARTERY OF NATIVE HEART WITHOUT ANGINA PECTORIS: ICD-10-CM

## 2022-03-15 PROCEDURE — 99214 OFFICE O/P EST MOD 30 MIN: CPT | Performed by: FAMILY MEDICINE

## 2022-03-15 NOTE — PROGRESS NOTES
BMI Counseling: Body mass index is 41 kg/m²  The BMI is above normal  Nutrition recommendations include decreasing portion sizes, encouraging healthy choices of fruits and vegetables, moderation in carbohydrate intake and increasing intake of lean protein  Rationale for BMI follow-up plan is due to patient being overweight or obese  Depression Screening and Follow-up Plan: Patient was screened for depression during today's encounter  They screened negative with a PHQ-2 score of 0  Assessment/Plan:    Problem List Items Addressed This Visit        Endocrine    Type 2 diabetes mellitus with complication ([address] ) - Primary       Cardiovascular and Mediastinum    Benign essential hypertension    Coronary artery disease involving native heart without angina pectoris       Other    Morbid obesity ([address] )           Diagnoses and all orders for this visit:    Type 2 diabetes mellitus with complication ([address] )    Benign essential hypertension    Coronary artery disease involving native coronary artery of native heart without angina pectoris    Morbid obesity ([address] )        No problem-specific Assessment & Plan notes found for this encounter  Subjective:      Patient ID: Brittany Churchill is a 73 y o  male  Follow up on diabetes, hypertension  Doing well A1c 6 8   Goes to podiatrist  Had eye exam      The following portions of the patient's history were reviewed and updated as appropriate:   He has a past medical history of Asthma, Closed fracture of fifth metacarpal bone of right hand, Deep vein thrombosis ([address] ), Diabetes mellitus ([address] ), Diverticulitis, Fracture of metacarpal shaft, Hemopneumothorax, left, Hyperlipidemia, Hypertension, Inguinal hernia, Lumbar transverse process fracture ([address] ), Pilonidal cyst, Pleural effusion, Rib fractures, Status post fall, and Superficial thrombophlebitis of leg ,  does not have any pertinent problems on file  ,   has a past surgical history that includes Coronary angioplasty with stent (2009); Fracture surgery (05/04/2015); Revision Colostomy; LAPAROSCOPY (05/18/2015); Hernia repair; Knee arthroscopy w/ meniscal repair; Rectal surgery; Other surgical history; Thoracentesis (06/02/2015); Tonsillectomy and adenoidectomy; Colonoscopy; and Colon surgery  ,  family history includes Coronary artery disease in his mother; Heart disease in his father; Hyperlipidemia in his father  ,   reports that he quit smoking about 23 years ago  His smoking use included cigarettes  He has a 30 00 pack-year smoking history  He has never used smokeless tobacco  He reports previous alcohol use  He reports that he does not use drugs  ,  has No Known Allergies     Current Outpatient Medications   Medication Sig Dispense Refill    atorvastatin (LIPITOR) 40 mg tablet TAKE 1 TABLET BY MOUTH  DAILY 90 tablet 3    furosemide (LASIX) 20 mg tablet TAKE 1 TABLET BY MOUTH  DAILY 90 tablet 3    metFORMIN (GLUCOPHAGE-XR) 500 mg 24 hr tablet Take 1 tablet (500 mg total) by mouth 2 (two) times a day with meals 180 tablet 3    metoprolol tartrate (LOPRESSOR) 25 mg tablet Take 1 tablet (25 mg total) by mouth 2 (two) times a day 180 tablet 3    nitroglycerin (NITROSTAT) 0 4 mg SL tablet Place 1 tablet (0 4 mg total) under the tongue every 5 (five) minutes as needed for chest pain 30 tablet 3    potassium chloride (Klor-Con) 10 mEq tablet TAKE 1 TABLET BY MOUTH  DAILY 90 tablet 3    ramipril (ALTACE) 10 MG capsule TAKE 1 CAPSULE BY MOUTH  DAILY 90 capsule 3     No current facility-administered medications for this visit  Review of Systems   Constitutional: Negative for activity change, appetite change, diaphoresis, fatigue and fever  HENT: Negative  Eyes: Negative  Respiratory: Negative for apnea, cough, chest tightness, shortness of breath and wheezing  Cardiovascular: Negative for chest pain, palpitations and leg swelling     Gastrointestinal: Negative for abdominal distention, abdominal pain, anal bleeding, constipation, diarrhea, nausea and vomiting  Endocrine: Negative for cold intolerance, heat intolerance, polydipsia, polyphagia and polyuria  Genitourinary: Negative for difficulty urinating, dysuria, flank pain, hematuria and urgency  Musculoskeletal: Negative for arthralgias, back pain, gait problem, joint swelling and myalgias  Skin: Negative for color change, rash and wound  Allergic/Immunologic: Negative for environmental allergies, food allergies and immunocompromised state  Neurological: Negative for dizziness, seizures, syncope, speech difficulty, numbness and headaches  Hematological: Negative for adenopathy  Does not bruise/bleed easily  Psychiatric/Behavioral: Negative for agitation, behavioral problems, hallucinations, sleep disturbance and suicidal ideas  Objective:  Vitals:    03/15/22 0843   BP: 132/70   BP Location: Left arm   Patient Position: Sitting   Cuff Size: Large   Temp: (!) 97 °F (36 1 °C)   TempSrc: Temporal   Weight: 133 kg (294 lb)   Height: 5' 11" (1 803 m)     Body mass index is 41 kg/m²  Physical Exam  Constitutional:       General: He is not in acute distress  Appearance: He is well-developed  He is not diaphoretic  HENT:      Head: Normocephalic  Right Ear: External ear normal       Left Ear: External ear normal       Nose: Nose normal    Eyes:      General: No scleral icterus  Right eye: No discharge  Left eye: No discharge  Conjunctiva/sclera: Conjunctivae normal       Pupils: Pupils are equal, round, and reactive to light  Neck:      Thyroid: No thyromegaly  Trachea: No tracheal deviation  Cardiovascular:      Rate and Rhythm: Normal rate and regular rhythm  Heart sounds: Normal heart sounds  No murmur heard  No friction rub  No gallop  Pulmonary:      Effort: Pulmonary effort is normal  No respiratory distress  Breath sounds: Normal breath sounds  No wheezing     Abdominal:      General: Bowel sounds are normal       Palpations: Abdomen is soft  There is no mass  Tenderness: There is no abdominal tenderness  There is no guarding  Musculoskeletal:         General: No deformity  Cervical back: Normal range of motion  Lymphadenopathy:      Cervical: No cervical adenopathy  Skin:     General: Skin is warm and dry  Findings: No erythema or rash  Neurological:      Mental Status: He is alert and oriented to person, place, and time  Cranial Nerves: No cranial nerve deficit  Psychiatric:         Thought Content:  Thought content normal

## 2022-04-01 ENCOUNTER — OFFICE VISIT (OUTPATIENT)
Dept: PODIATRY | Facility: CLINIC | Age: 72
End: 2022-04-01
Payer: MEDICARE

## 2022-04-01 VITALS
HEART RATE: 49 BPM | SYSTOLIC BLOOD PRESSURE: 126 MMHG | HEIGHT: 71 IN | DIASTOLIC BLOOD PRESSURE: 82 MMHG | WEIGHT: 294 LBS | BODY MASS INDEX: 41.16 KG/M2

## 2022-04-01 DIAGNOSIS — E11.40 TYPE 2 DIABETES MELLITUS WITH DIABETIC NEUROPATHY, WITHOUT LONG-TERM CURRENT USE OF INSULIN (HCC): Primary | ICD-10-CM

## 2022-04-01 DIAGNOSIS — B35.1 ONYCHOMYCOSIS: ICD-10-CM

## 2022-04-01 PROCEDURE — 11721 DEBRIDE NAIL 6 OR MORE: CPT | Performed by: PODIATRIST

## 2022-04-01 NOTE — PROGRESS NOTES
PATIENT:  Monet Cronin  1950    ASSESSMENT/PLAN:  1  Type 2 diabetes mellitus with diabetic neuropathy, without long-term current use of insulin ([facility] [address] )     2  Onychomycosis  Debridement         Orders Placed This Encounter   Procedures    Debridement        Disease prevention and related risk factors of diabetes were identified and discussed  The patient was educated in proper foot wear for diabetics  Also educated in daily foot assessment and routine diabetic foot care  The patient will follow up in 9 weeks for further diabetic foot exam and care  Discussed possible nail procedure  Procedure: All mycotic toenails were reduced and debrided in length, width, and girth using a nail nipper and dremel  Patient tolerated procedure(s) well without complications  HPI:  Monet Cronin is a 73 y  o year old male seen for diabetic foot exam   The patient has class findings with DPN  BS is under control  Complained of thick nails with discomfort  The patient denied any acute pedal disorder          PAST MEDICAL HISTORY:  Past Medical History:   Diagnosis Date    Asthma     resolved: 08/14/17    Closed fracture of fifth metacarpal bone of right hand     last assessed: 06/30/15    Deep vein thrombosis (HCC)     Diabetes mellitus ([facility] [address] )     Diverticulitis     Fracture of metacarpal shaft     Hemopneumothorax, left     last assessed: 06/02/15    Hyperlipidemia     Hypertension     Inguinal hernia     Lumbar transverse process fracture ([facility] [address] )     Pilonidal cyst     Pleural effusion     last assessed: 07/01/15    Rib fractures     last assessed: 06/02/15    Status post fall     Superficial thrombophlebitis of leg     last assessed: 03/19/14       PAST SURGICAL HISTORY:  Past Surgical History:   Procedure Laterality Date    COLON SURGERY      COLONOSCOPY      CORONARY ANGIOPLASTY WITH STENT PLACEMENT  2009    PTCA/RITA to RCA    FRACTURE SURGERY  05/04/2015    Closed treatment of fracture of metacarpal bone, right 5t metacarpal fracture Dr Heraclio Handley ARTHROSCOPY W/ MENISCAL REPAIR      Lateral meniscus    LAPAROSCOPY  2015    Exploratory, extensive lysis of adhesions Dr Deirdre Angel      Surgical lysis of intestinal adhesions    RECTAL SURGERY      REVISION COLOSTOMY      THORACENTESIS  2015    with imaging guidance left, removed 1300cc of fluid w/o problem lower base of lun06    TONSILLECTOMY AND ADENOIDECTOMY          ALLERGIES:  Patient has no known allergies  MEDICATIONS:  Current Outpatient Medications   Medication Sig Dispense Refill    atorvastatin (LIPITOR) 40 mg tablet TAKE 1 TABLET BY MOUTH  DAILY 90 tablet 3    furosemide (LASIX) 20 mg tablet TAKE 1 TABLET BY MOUTH  DAILY 90 tablet 3    metFORMIN (GLUCOPHAGE-XR) 500 mg 24 hr tablet Take 1 tablet (500 mg total) by mouth 2 (two) times a day with meals 180 tablet 3    metoprolol tartrate (LOPRESSOR) 25 mg tablet Take 1 tablet (25 mg total) by mouth 2 (two) times a day 180 tablet 3    nitroglycerin (NITROSTAT) 0 4 mg SL tablet Place 1 tablet (0 4 mg total) under the tongue every 5 (five) minutes as needed for chest pain 30 tablet 3    potassium chloride (Klor-Con) 10 mEq tablet TAKE 1 TABLET BY MOUTH  DAILY 90 tablet 3    ramipril (ALTACE) 10 MG capsule TAKE 1 CAPSULE BY MOUTH  DAILY 90 capsule 3     No current facility-administered medications for this visit         SOCIAL HISTORY:  Social History     Socioeconomic History    Marital status: /Civil Union     Spouse name: None    Number of children: None    Years of education: None    Highest education level: None   Occupational History    Occupation: Retired   Tobacco Use    Smoking status: Former Smoker     Packs/day: 1 00     Years: 30 00     Pack years: 30 00     Types: Cigarettes     Quit date:      Years since quittin 8    Smokeless tobacco: Never Used   Vaping Use    Vaping Use: Never used   Substance and Sexual Activity    Alcohol use: Not Currently     Alcohol/week: 0 0 standard drinks     Comment: Social    Drug use: No    Sexual activity: Yes     Partners: Female     Birth control/protection: None   Other Topics Concern    None   Social History Narrative    Always uses seat belt    No living will     Social Determinants of Health     Financial Resource Strain: Not on file   Food Insecurity: Not on file   Transportation Needs: Not on file   Physical Activity: Not on file   Stress: Not on file   Social Connections: Not on file   Intimate Partner Violence: Not on file   Housing Stability: Not on file        REVIEW OF SYSTEMS:  GENERAL: NAD, afebrile  HEART: No chest pain, or palpitation  RESPIRATORY:  No acute SOB or cough  GI: No Nausea, vomit or diarrhea  NEUROLOGIC: No syncope or acute weakness    PHYSICAL EXAM:  VASCULAR EXAM  Dorsalis pedis  +1, Posterior tibial artery  absent  The patient has class findings with skin atrophy, lack of digital hair, and nail dystrophy  There is +1 lower extremity edema bilaterally  Venous stasis skin changes noted BLE  NEUROLOGIC EXAM  AAO X 3  Sensation is intact to light touch  No focal neurologic deficit  DERMATOLOGIC EXAM:   No ulcer or cellulitis noted  The patient has hypertrophic toenails with discoloration, onycholysis, and subungal debris  MUSCULOSKELETAL EXAM:   No acute joint pain, edema, or redness  No acute musculoskeletal problem  Patient has deformity including bunion and hammertoe

## 2022-04-21 DIAGNOSIS — E78.2 MIXED HYPERLIPIDEMIA: ICD-10-CM

## 2022-04-21 DIAGNOSIS — I10 ESSENTIAL HYPERTENSION: ICD-10-CM

## 2022-04-22 RX ORDER — POTASSIUM CHLORIDE 750 MG/1
TABLET, FILM COATED, EXTENDED RELEASE ORAL
Qty: 90 TABLET | Refills: 3 | Status: SHIPPED | OUTPATIENT
Start: 2022-04-22

## 2022-04-22 RX ORDER — ATORVASTATIN CALCIUM 40 MG/1
TABLET, FILM COATED ORAL
Qty: 90 TABLET | Refills: 3 | Status: SHIPPED | OUTPATIENT
Start: 2022-04-22

## 2022-04-22 RX ORDER — RAMIPRIL 10 MG/1
CAPSULE ORAL
Qty: 90 CAPSULE | Refills: 3 | Status: SHIPPED | OUTPATIENT
Start: 2022-04-22

## 2022-06-15 DIAGNOSIS — I25.10 CORONARY ARTERY DISEASE INVOLVING NATIVE CORONARY ARTERY OF NATIVE HEART WITHOUT ANGINA PECTORIS: Primary | ICD-10-CM

## 2022-06-15 DIAGNOSIS — M54.9 CHRONIC BACK PAIN, UNSPECIFIED BACK LOCATION, UNSPECIFIED BACK PAIN LATERALITY: ICD-10-CM

## 2022-06-15 DIAGNOSIS — I73.9 PERIPHERAL VASCULAR OCCLUSIVE DISEASE (HCC): ICD-10-CM

## 2022-06-15 DIAGNOSIS — M54.30 SCIATICA, UNSPECIFIED LATERALITY: ICD-10-CM

## 2022-06-15 DIAGNOSIS — R26.2 AMBULATORY DYSFUNCTION: ICD-10-CM

## 2022-06-15 DIAGNOSIS — G89.29 CHRONIC BACK PAIN, UNSPECIFIED BACK LOCATION, UNSPECIFIED BACK PAIN LATERALITY: ICD-10-CM

## 2022-07-16 DIAGNOSIS — I10 ESSENTIAL HYPERTENSION: ICD-10-CM

## 2022-07-18 RX ORDER — FUROSEMIDE 20 MG/1
TABLET ORAL
Qty: 90 TABLET | Refills: 3 | Status: SHIPPED | OUTPATIENT
Start: 2022-07-18

## 2022-08-04 ENCOUNTER — OFFICE VISIT (OUTPATIENT)
Dept: PODIATRY | Facility: CLINIC | Age: 72
End: 2022-08-04
Payer: MEDICARE

## 2022-08-04 VITALS
BODY MASS INDEX: 41.3 KG/M2 | HEIGHT: 71 IN | DIASTOLIC BLOOD PRESSURE: 78 MMHG | HEART RATE: 76 BPM | SYSTOLIC BLOOD PRESSURE: 156 MMHG | WEIGHT: 295 LBS

## 2022-08-04 DIAGNOSIS — I80.01 THROMBOPHLEBITIS OF SUPERFICIAL VEINS OF RIGHT LOWER EXTREMITY: Primary | ICD-10-CM

## 2022-08-04 DIAGNOSIS — B35.1 ONYCHOMYCOSIS: ICD-10-CM

## 2022-08-04 DIAGNOSIS — E11.40 TYPE 2 DIABETES MELLITUS WITH DIABETIC NEUROPATHY, WITHOUT LONG-TERM CURRENT USE OF INSULIN (HCC): ICD-10-CM

## 2022-08-04 PROCEDURE — 99213 OFFICE O/P EST LOW 20 MIN: CPT | Performed by: PODIATRIST

## 2022-08-04 PROCEDURE — 11721 DEBRIDE NAIL 6 OR MORE: CPT | Performed by: PODIATRIST

## 2022-08-04 RX ORDER — CEPHALEXIN 500 MG/1
500 CAPSULE ORAL EVERY 6 HOURS SCHEDULED
Qty: 28 CAPSULE | Refills: 0 | Status: SHIPPED | OUTPATIENT
Start: 2022-08-04 | End: 2022-08-11

## 2022-08-04 NOTE — PROGRESS NOTES
PATIENT:  Yesi Mtz  1950    ASSESSMENT/PLAN:  1  Thrombophlebitis of superficial veins of right lower extremity  cephalexin (KEFLEX) 500 mg capsule   2  Type 2 diabetes mellitus with diabetic neuropathy, without long-term current use of insulin ([address] )     3  Onychomycosis  Debridement         Orders Placed This Encounter   Procedures    Debridement        Disease prevention and related risk factors of diabetes were identified and discussed  The patient was educated in proper foot wear for diabetics  Also educated in daily foot assessment and routine diabetic foot care  Reviewed / discussed recent blood work  HbA1c was 6 8  Tight BS control with diet and exercise  He has mild thrombophlebitis of great saphenous vein right leg  Rx: Keflex  Instructed warm compression  Pt to call in 1-2 weeks if his condition does not get better  The patient will follow up in 9 weeks for further diabetic foot exam and care  Procedure: All mycotic toenails were reduced and debrided in length, width, and girth using a nail nipper and dremel  Patient tolerated procedure(s) well without complications  HPI:  Yesi Mtz is a 73 y  o year old male seen for diabetic foot exam   The patient has class findings with DPN  BS is under control  Complained of thick nails with discomfort  He noticed some tenderness around right medial leg since Monday  No redness  No injury          PAST MEDICAL HISTORY:  Past Medical History:   Diagnosis Date    Asthma     resolved: 08/14/17    Closed fracture of fifth metacarpal bone of right hand     last assessed: 06/30/15    Deep vein thrombosis (HCC)     Diabetes mellitus ([address] )     Diverticulitis     Fracture of metacarpal shaft     Hemopneumothorax, left     last assessed: 06/02/15    Hyperlipidemia     Hypertension     Inguinal hernia     Lumbar transverse process fracture (HCC)     Pilonidal cyst     Pleural effusion     last assessed: 07/01/15    Rib fractures     last assessed: 06/02/15    Status post fall     Superficial thrombophlebitis of leg     last assessed: 03/19/14       PAST SURGICAL HISTORY:  Past Surgical History:   Procedure Laterality Date    COLON SURGERY      COLONOSCOPY      CORONARY ANGIOPLASTY WITH STENT PLACEMENT  2009    PTCA/RITA to RCA    FRACTURE SURGERY  05/04/2015    Closed treatment of fracture of metacarpal bone, right 5t metacarpal fracture Dr Fozia Bradford ARTHROSCOPY W/ MENISCAL REPAIR      Lateral meniscus    LAPAROSCOPY  05/18/2015    Exploratory, extensive lysis of adhesions Dr Krishan Anand      Surgical lysis of intestinal adhesions    RECTAL SURGERY      REVISION COLOSTOMY      THORACENTESIS  06/02/2015    with imaging guidance left, removed 1300cc of fluid w/o problem lower base of lun06    TONSILLECTOMY AND ADENOIDECTOMY          ALLERGIES:  Patient has no known allergies  MEDICATIONS:  Current Outpatient Medications   Medication Sig Dispense Refill    atorvastatin (LIPITOR) 40 mg tablet TAKE 1 TABLET BY MOUTH  DAILY 90 tablet 3    cephalexin (KEFLEX) 500 mg capsule Take 1 capsule (500 mg total) by mouth every 6 (six) hours for 7 days 28 capsule 0    furosemide (LASIX) 20 mg tablet TAKE 1 TABLET BY MOUTH  DAILY 90 tablet 3    metFORMIN (GLUCOPHAGE-XR) 500 mg 24 hr tablet Take 1 tablet (500 mg total) by mouth 2 (two) times a day with meals 180 tablet 3    metoprolol tartrate (LOPRESSOR) 25 mg tablet Take 1 tablet (25 mg total) by mouth 2 (two) times a day 180 tablet 3    nitroglycerin (NITROSTAT) 0 4 mg SL tablet Place 1 tablet (0 4 mg total) under the tongue every 5 (five) minutes as needed for chest pain 30 tablet 3    potassium chloride (Klor-Con) 10 mEq tablet TAKE 1 TABLET BY MOUTH  DAILY 90 tablet 3    ramipril (ALTACE) 10 MG capsule TAKE 1 CAPSULE BY MOUTH  DAILY 90 capsule 3     No current facility-administered medications for this visit         SOCIAL HISTORY:  Social History     Socioeconomic History    Marital status: /Civil Union     Spouse name: None    Number of children: None    Years of education: None    Highest education level: None   Occupational History    Occupation: Retired   Tobacco Use    Smoking status: Former Smoker     Packs/day: 1 00     Years: 30      Pack years: 30 00     Types: Cigarettes     Quit date:      Years since quittin     Smokeless tobacco: Never Used   Vaping Use    Vaping Use: Never used   Substance and Sexual Activity    Alcohol use: Not Currently     Alcohol/week: 0 0 standard drinks     Comment: Social    Drug use: No    Sexual activity: Yes     Partners: Female     Birth control/protection: None   Other Topics Concern    None   Social History Narrative    Always uses seat belt    No living will     Social Determinants of Health     Financial Resource Strain: Not on file   Food Insecurity: Not on file   Transportation Needs: Not on file   Physical Activity: Not on file   Stress: Not on file   Social Connections: Not on file   Intimate Partner Violence: Not on file   Housing Stability: Not on file        REVIEW OF SYSTEMS:  GENERAL: NAD, afebrile  HEART: No chest pain, or palpitation  RESPIRATORY:  No acute SOB or cough  GI: No Nausea, vomit or diarrhea  NEUROLOGIC: No syncope or acute weakness    PHYSICAL EXAM:  VASCULAR EXAM  Dorsalis pedis  +1, Posterior tibial artery  absent  The patient has class findings with skin atrophy, lack of digital hair, and nail dystrophy  There is +1 lower extremity edema bilaterally  Venous stasis skin changes noted BLE  Mild induration and tenderness over varicosity of great saphenous vein right medial proximal leg  NEUROLOGIC EXAM  AAO X 3  Sensation is intact to light touch  No focal neurologic deficit  MMT intact  DERMATOLOGIC EXAM:   No ulcer  No cellulitis noted    The patient has hypertrophic toenails with discoloration, onycholysis, and subungal debris  No abscess  MUSCULOSKELETAL EXAM:   No acute joint pain  No acute joint edema, or redness  No acute musculoskeletal problem  Patient has deformity including bunion and hammertoe

## 2022-08-08 ENCOUNTER — LAB (OUTPATIENT)
Dept: LAB | Facility: HOSPITAL | Age: 72
End: 2022-08-08
Attending: FAMILY MEDICINE
Payer: MEDICARE

## 2022-08-08 DIAGNOSIS — E11.8 TYPE 2 DIABETES MELLITUS WITH COMPLICATION (HCC): Primary | ICD-10-CM

## 2022-08-08 DIAGNOSIS — E11.8 TYPE 2 DIABETES MELLITUS WITH COMPLICATION (HCC): ICD-10-CM

## 2022-08-08 DIAGNOSIS — E78.2 MIXED HYPERLIPIDEMIA: ICD-10-CM

## 2022-08-08 LAB
ANION GAP SERPL CALCULATED.3IONS-SCNC: 9 MMOL/L (ref 4–13)
BUN SERPL-MCNC: 14 MG/DL (ref 5–25)
CALCIUM SERPL-MCNC: 8.2 MG/DL (ref 8.3–10.1)
CHLORIDE SERPL-SCNC: 103 MMOL/L (ref 96–108)
CHOLEST SERPL-MCNC: 132 MG/DL
CO2 SERPL-SCNC: 28 MMOL/L (ref 21–32)
CREAT SERPL-MCNC: 0.99 MG/DL (ref 0.6–1.3)
CREAT UR-MCNC: 105 MG/DL
EST. AVERAGE GLUCOSE BLD GHB EST-MCNC: 160 MG/DL
GFR SERPL CREATININE-BSD FRML MDRD: 77 ML/MIN/1.73SQ M
GLUCOSE P FAST SERPL-MCNC: 176 MG/DL (ref 65–99)
HBA1C MFR BLD: 7.2 %
HDLC SERPL-MCNC: 48 MG/DL
LDLC SERPL CALC-MCNC: 72 MG/DL (ref 0–100)
MICROALBUMIN UR-MCNC: 10.9 MG/L (ref 0–20)
MICROALBUMIN/CREAT 24H UR: 10 MG/G CREATININE (ref 0–30)
NONHDLC SERPL-MCNC: 84 MG/DL
POTASSIUM SERPL-SCNC: 4.6 MMOL/L (ref 3.5–5.3)
SODIUM SERPL-SCNC: 140 MMOL/L (ref 135–147)
TRIGL SERPL-MCNC: 62 MG/DL

## 2022-08-08 PROCEDURE — 80061 LIPID PANEL: CPT

## 2022-08-08 PROCEDURE — 83036 HEMOGLOBIN GLYCOSYLATED A1C: CPT

## 2022-08-08 PROCEDURE — 80048 BASIC METABOLIC PNL TOTAL CA: CPT

## 2022-08-08 PROCEDURE — 82570 ASSAY OF URINE CREATININE: CPT

## 2022-08-08 PROCEDURE — 82043 UR ALBUMIN QUANTITATIVE: CPT

## 2022-08-09 ENCOUNTER — RA CDI HCC (OUTPATIENT)
Dept: OTHER | Facility: HOSPITAL | Age: 72
End: 2022-08-09

## 2022-08-09 NOTE — PROGRESS NOTES
[facility] [address]  coding opportunities       Chart reviewed, no opportunity found: CHART REVIEWED, NO OPPORTUNITY FOUND        Patients Insurance     Medicare Insurance: Medicare

## 2022-08-15 ENCOUNTER — OFFICE VISIT (OUTPATIENT)
Dept: FAMILY MEDICINE CLINIC | Facility: CLINIC | Age: 72
End: 2022-08-15
Payer: MEDICARE

## 2022-08-15 VITALS
BODY MASS INDEX: 41.16 KG/M2 | DIASTOLIC BLOOD PRESSURE: 84 MMHG | HEIGHT: 71 IN | WEIGHT: 294 LBS | OXYGEN SATURATION: 97 % | HEART RATE: 57 BPM | TEMPERATURE: 97.4 F | SYSTOLIC BLOOD PRESSURE: 172 MMHG

## 2022-08-15 DIAGNOSIS — H54.3 IMPAIRED VISION IN BOTH EYES: ICD-10-CM

## 2022-08-15 DIAGNOSIS — E11.8 TYPE 2 DIABETES MELLITUS WITH COMPLICATION (HCC): Primary | ICD-10-CM

## 2022-08-15 DIAGNOSIS — M17.0 PRIMARY OSTEOARTHRITIS OF KNEES, BILATERAL: ICD-10-CM

## 2022-08-15 DIAGNOSIS — E78.5 DYSLIPIDEMIA: ICD-10-CM

## 2022-08-15 DIAGNOSIS — I25.118 CORONARY ARTERY DISEASE OF NATIVE ARTERY OF NATIVE HEART WITH STABLE ANGINA PECTORIS (HCC): ICD-10-CM

## 2022-08-15 DIAGNOSIS — I10 BENIGN ESSENTIAL HYPERTENSION: ICD-10-CM

## 2022-08-15 DIAGNOSIS — I74.3 EMBOLISM AND THROMBOSIS OF ARTERIES OF THE LOWER EXTREMITIES (HCC): ICD-10-CM

## 2022-08-15 DIAGNOSIS — Z23 NEED FOR PNEUMOCOCCAL VACCINATION: ICD-10-CM

## 2022-08-15 PROCEDURE — 99214 OFFICE O/P EST MOD 30 MIN: CPT | Performed by: FAMILY MEDICINE

## 2022-08-15 NOTE — PROGRESS NOTES
Depression Screening and Follow-up Plan: Patient was screened for depression during today's encounter  They screened negative with a PHQ-2 score of 1  Assessment/Plan:    Problem List Items Addressed This Visit        Endocrine    Type 2 diabetes mellitus with complication ([address] ) - Primary       Cardiovascular and Mediastinum    Benign essential hypertension       Other    Dyslipidemia           Diagnoses and all orders for this visit:    Type 2 diabetes mellitus with complication ([address] )    Benign essential hypertension    Dyslipidemia        No problem-specific Assessment & Plan notes found for this encounter  Subjective:      Patient ID: Dwight Ireland is a 73 y o  male  Mr  Drew Ortega is here for a follow-up of visit and he feels well with the exception a has horrible osteoarthritis of both knees patient has recently lost his wife so he is pretty much consumed with trying to get things in order an but now it will be time for him to start paying attention to his health his BMI is 41 blood pressure 172/84      The following portions of the patient's history were reviewed and updated as appropriate:   He has a past medical history of Asthma, Closed fracture of fifth metacarpal bone of right hand, Deep vein thrombosis ([address] ), Diabetes mellitus ([address] ), Diverticulitis, Fracture of metacarpal shaft, Hemopneumothorax, left, Hyperlipidemia, Hypertension, Inguinal hernia, Lumbar transverse process fracture ([address] ), Pilonidal cyst, Pleural effusion, Rib fractures, Status post fall, and Superficial thrombophlebitis of leg ,  does not have any pertinent problems on file  ,   has a past surgical history that includes Coronary angioplasty with stent (2009); Fracture surgery (05/04/2015); Revision Colostomy; LAPAROSCOPY (05/18/2015); Hernia repair; Knee arthroscopy w/ meniscal repair; Rectal surgery; Other surgical history; Thoracentesis (06/02/2015); Tonsillectomy and adenoidectomy; Colonoscopy; and Colon surgery  ,  family history includes Coronary artery disease in his mother; Heart disease in his father; Hyperlipidemia in his father  ,   reports that he quit smoking about 24 years ago  His smoking use included cigarettes  He has a 30 00 pack-year smoking history  He has never used smokeless tobacco  He reports previous alcohol use  He reports that he does not use drugs  ,  has No Known Allergies     Current Outpatient Medications   Medication Sig Dispense Refill    atorvastatin (LIPITOR) 40 mg tablet TAKE 1 TABLET BY MOUTH  DAILY 90 tablet 3    furosemide (LASIX) 20 mg tablet TAKE 1 TABLET BY MOUTH  DAILY 90 tablet 3    metFORMIN (GLUCOPHAGE-XR) 500 mg 24 hr tablet Take 1 tablet (500 mg total) by mouth 2 (two) times a day with meals 180 tablet 3    metoprolol tartrate (LOPRESSOR) 25 mg tablet Take 1 tablet (25 mg total) by mouth 2 (two) times a day 180 tablet 3    nitroglycerin (NITROSTAT) 0 4 mg SL tablet Place 1 tablet (0 4 mg total) under the tongue every 5 (five) minutes as needed for chest pain 30 tablet 3    potassium chloride (Klor-Con) 10 mEq tablet TAKE 1 TABLET BY MOUTH  DAILY 90 tablet 3    ramipril (ALTACE) 10 MG capsule TAKE 1 CAPSULE BY MOUTH  DAILY 90 capsule 3     No current facility-administered medications for this visit  Review of Systems   Constitutional: Negative for activity change, appetite change, diaphoresis, fatigue and fever  HENT: Negative  Negative for dental problem and hearing loss  Eyes: Positive for visual disturbance  Wears corrective lenses has not had an eye exam for glaucoma macular degeneration or cataracts   Respiratory: Negative for apnea, cough, chest tightness, shortness of breath and wheezing  Cardiovascular: Negative for chest pain, palpitations and leg swelling  Gastrointestinal: Negative for abdominal distention, abdominal pain, anal bleeding, constipation, diarrhea, nausea and vomiting     Endocrine: Negative for cold intolerance, heat intolerance, polydipsia, polyphagia and polyuria  Genitourinary: Negative for difficulty urinating, dysuria, flank pain, hematuria and urgency  Musculoskeletal: Positive for arthralgias  Negative for back pain, gait problem, joint swelling and myalgias  Skin: Negative for color change, rash and wound  Allergic/Immunologic: Negative for environmental allergies, food allergies and immunocompromised state  Neurological: Negative for dizziness, seizures, syncope, speech difficulty, numbness and headaches  Hematological: Negative for adenopathy  Does not bruise/bleed easily  Psychiatric/Behavioral: Negative for agitation, behavioral problems, hallucinations, sleep disturbance and suicidal ideas  Objective:  Vitals:    08/15/22 0816   BP: (!) 172/84   BP Location: Left arm   Patient Position: Sitting   Cuff Size: Standard   Pulse: 57   Temp: (!) 97 4 °F (36 3 °C)   TempSrc: Temporal   SpO2: 97%   Weight: 133 kg (294 lb)   Height: 5' 11" (1 803 m)     Body mass index is 41 kg/m²  Physical Exam  Constitutional:       General: He is not in acute distress  Appearance: He is well-developed  He is not diaphoretic  HENT:      Head: Normocephalic  Right Ear: External ear normal       Left Ear: External ear normal       Nose: Nose normal    Eyes:      General: No scleral icterus  Right eye: No discharge  Left eye: No discharge  Conjunctiva/sclera: Conjunctivae normal       Pupils: Pupils are equal, round, and reactive to light  Neck:      Thyroid: No thyromegaly  Trachea: No tracheal deviation  Cardiovascular:      Rate and Rhythm: Normal rate and regular rhythm  Heart sounds: Normal heart sounds  No murmur heard  No friction rub  No gallop  Pulmonary:      Effort: Pulmonary effort is normal  No respiratory distress  Breath sounds: Normal breath sounds  No wheezing  Abdominal:      General: Bowel sounds are normal       Palpations: Abdomen is soft  There is no mass  Tenderness: There is no abdominal tenderness  There is no guarding  Musculoskeletal:         General: No deformity  Cervical back: Normal range of motion  Lymphadenopathy:      Cervical: No cervical adenopathy  Skin:     General: Skin is warm and dry  Findings: No erythema or rash  Neurological:      Mental Status: He is alert and oriented to person, place, and time  Cranial Nerves: No cranial nerve deficit  Psychiatric:         Thought Content:  Thought content normal

## 2022-09-15 ENCOUNTER — PATIENT MESSAGE (OUTPATIENT)
Dept: FAMILY MEDICINE CLINIC | Facility: CLINIC | Age: 72
End: 2022-09-15

## 2022-10-14 ENCOUNTER — OFFICE VISIT (OUTPATIENT)
Dept: PODIATRY | Facility: CLINIC | Age: 72
End: 2022-10-14
Payer: MEDICARE

## 2022-10-14 VITALS — BODY MASS INDEX: 39.2 KG/M2 | WEIGHT: 280 LBS | HEIGHT: 71 IN

## 2022-10-14 DIAGNOSIS — B35.1 ONYCHOMYCOSIS: ICD-10-CM

## 2022-10-14 DIAGNOSIS — E11.40 TYPE 2 DIABETES MELLITUS WITH DIABETIC NEUROPATHY, WITHOUT LONG-TERM CURRENT USE OF INSULIN (HCC): Primary | ICD-10-CM

## 2022-10-14 PROCEDURE — 11721 DEBRIDE NAIL 6 OR MORE: CPT | Performed by: PODIATRIST

## 2022-10-14 NOTE — PROGRESS NOTES
PATIENT:  Ladarius Rodriguez  1950    ASSESSMENT/PLAN:  1  Type 2 diabetes mellitus with diabetic neuropathy, without long-term current use of insulin ([facility] [address] )     2  Onychomycosis  Debridement         Orders Placed This Encounter   Procedures   • Debridement        Disease prevention and related risk factors of diabetes were identified and discussed  The patient was educated in proper foot wear for diabetics  Also educated in daily foot assessment and routine diabetic foot care  The patient will follow up in 9 weeks for further diabetic foot exam and care  Procedure: All mycotic toenails were reduced and debrided in length, width, and girth using a nail nipper and dremel  Patient tolerated procedure(s) well without complications  HPI:  Ladarius Rodriguez is a 73 y  o year old male seen for diabetic foot exam   The patient has class findings with DPN  BS is under control  Complained of thick nails with discomfort  Right leg pain resolved  No new complaint        PAST MEDICAL HISTORY:  Past Medical History:   Diagnosis Date   • Asthma     resolved: 08/14/17   • Closed fracture of fifth metacarpal bone of right hand     last assessed: 06/30/15   • Deep vein thrombosis (HCC)    • Diabetes mellitus ([facility] [address] )    • Diverticulitis    • Fracture of metacarpal shaft    • Hemopneumothorax, left     last assessed: 06/02/15   • Hyperlipidemia    • Hypertension    • Inguinal hernia    • Lumbar transverse process fracture (HCC)    • Pilonidal cyst    • Pleural effusion     last assessed: 07/01/15   • Rib fractures     last assessed: 06/02/15   • Status post fall    • Superficial thrombophlebitis of leg     last assessed: 03/19/14       PAST SURGICAL HISTORY:  Past Surgical History:   Procedure Laterality Date   • COLON SURGERY     • COLONOSCOPY     • CORONARY ANGIOPLASTY WITH STENT PLACEMENT  2009    PTCA/RITA to RCA   • FRACTURE SURGERY  05/04/2015    Closed treatment of fracture of metacarpal bone, right 5t metacarpal fracture Dr Pedro Abernathy   • HERNIA REPAIR     • KNEE ARTHROSCOPY W/ MENISCAL REPAIR      Lateral meniscus   • LAPAROSCOPY  2015    Exploratory, extensive lysis of adhesions Dr Genevieve Buckley   • OTHER SURGICAL HISTORY      Surgical lysis of intestinal adhesions   • RECTAL SURGERY     • REVISION COLOSTOMY     • THORACENTESIS  2015    with imaging guidance left, removed 1300cc of fluid w/o problem lower base of lun06   • TONSILLECTOMY AND ADENOIDECTOMY          ALLERGIES:  Patient has no known allergies  MEDICATIONS:  Current Outpatient Medications   Medication Sig Dispense Refill   • atorvastatin (LIPITOR) 40 mg tablet TAKE 1 TABLET BY MOUTH  DAILY 90 tablet 3   • furosemide (LASIX) 20 mg tablet TAKE 1 TABLET BY MOUTH  DAILY 90 tablet 3   • metFORMIN (GLUCOPHAGE-XR) 500 mg 24 hr tablet Take 1 tablet (500 mg total) by mouth 2 (two) times a day with meals 180 tablet 3   • metoprolol tartrate (LOPRESSOR) 25 mg tablet Take 1 tablet (25 mg total) by mouth 2 (two) times a day 180 tablet 3   • nitroglycerin (NITROSTAT) 0 4 mg SL tablet Place 1 tablet (0 4 mg total) under the tongue every 5 (five) minutes as needed for chest pain 30 tablet 3   • potassium chloride (Klor-Con) 10 mEq tablet TAKE 1 TABLET BY MOUTH  DAILY 90 tablet 3   • ramipril (ALTACE) 10 MG capsule TAKE 1 CAPSULE BY MOUTH  DAILY 90 capsule 3     No current facility-administered medications for this visit         SOCIAL HISTORY:  Social History     Socioeconomic History   • Marital status: /Civil Union     Spouse name: None   • Number of children: None   • Years of education: None   • Highest education level: None   Occupational History   • Occupation: Retired   Tobacco Use   • Smoking status: Former Smoker     Packs/day: 1 00     Years: 30 00     Pack years: 30 00     Types: Cigarettes     Quit date:      Years since quittin 3   • Smokeless tobacco: Never Used   Vaping Use   • Vaping Use: Never used   Substance and Sexual Activity   • Alcohol use: Not Currently     Alcohol/week: 0 0 standard drinks     Comment: Social   • Drug use: No   • Sexual activity: Yes     Partners: Female     Birth control/protection: None   Other Topics Concern   • None   Social History Narrative    Always uses seat belt    No living will     Social Determinants of Health     Financial Resource Strain: Not on file   Food Insecurity: Not on file   Transportation Needs: Not on file   Physical Activity: Not on file   Stress: Not on file   Social Connections: Not on file   Intimate Partner Violence: Not on file   Housing Stability: Not on file        REVIEW OF SYSTEMS:  GENERAL: NAD, afebrile  HEART: No chest pain, or palpitation  RESPIRATORY:  No acute SOB or cough  GI: No Nausea, vomit or diarrhea  NEUROLOGIC: No syncope or acute weakness    PHYSICAL EXAM:  VASCULAR EXAM  Dorsalis pedis  +1, Posterior tibial artery  absent  The patient has class findings with skin atrophy, lack of digital hair, and nail dystrophy  There is +1 lower extremity edema bilaterally  Venous stasis skin changes noted BLE  NEUROLOGIC EXAM  AAO X 3  Sensation is intact to light touch  No focal neurologic deficit  MMT intact  DERMATOLOGIC EXAM:   No ulcer  No cellulitis noted  The patient has hypertrophic toenails with discoloration, onycholysis, and subungal debris  No abscess  MUSCULOSKELETAL EXAM:   No acute joint pain  No acute joint edema, or redness  No acute musculoskeletal problem  Patient has deformity including bunion and hammertoe

## 2022-10-27 DIAGNOSIS — E11.9 CONTROLLED TYPE 2 DIABETES MELLITUS WITHOUT COMPLICATION, WITHOUT LONG-TERM CURRENT USE OF INSULIN (HCC): ICD-10-CM

## 2022-10-27 RX ORDER — METFORMIN HYDROCHLORIDE 500 MG/1
TABLET, EXTENDED RELEASE ORAL
Qty: 180 TABLET | Refills: 3 | Status: SHIPPED | OUTPATIENT
Start: 2022-10-27

## 2022-11-06 ENCOUNTER — OFFICE VISIT (OUTPATIENT)
Dept: URGENT CARE | Facility: MEDICAL CENTER | Age: 72
End: 2022-11-06

## 2022-11-06 VITALS
WEIGHT: 284.8 LBS | OXYGEN SATURATION: 97 % | HEART RATE: 67 BPM | SYSTOLIC BLOOD PRESSURE: 128 MMHG | RESPIRATION RATE: 20 BRPM | BODY MASS INDEX: 39.72 KG/M2 | TEMPERATURE: 97.9 F | DIASTOLIC BLOOD PRESSURE: 82 MMHG

## 2022-11-06 DIAGNOSIS — R21 RASH: Primary | ICD-10-CM

## 2022-11-06 RX ORDER — VALACYCLOVIR HYDROCHLORIDE 1 G/1
1000 TABLET, FILM COATED ORAL 3 TIMES DAILY
Qty: 21 TABLET | Refills: 0 | Status: SHIPPED | OUTPATIENT
Start: 2022-11-06 | End: 2022-11-13

## 2022-11-06 RX ORDER — METHYLPREDNISOLONE 4 MG/1
TABLET ORAL
Qty: 1 EACH | Refills: 0 | Status: SHIPPED | OUTPATIENT
Start: 2022-11-06

## 2022-11-06 NOTE — PROGRESS NOTES
[facility]        NAME: Jeffery Ryder is a 73 y o  male  : 1950    MRN: [id number]  DATE: 2022  TIME: 11:52 AM    Assessment and Plan   Rash [R21]  1  Rash  methylPREDNISolone 4 MG tablet therapy pack    valACYclovir (VALTREX) 1,000 mg tablet         Patient Instructions     Start Medrol dose pack  If rash starts to become painful or blister start Valtrex  Follow up with PCP in 3-5 days  Proceed to  ER if symptoms worsen  Chief Complaint     Chief Complaint   Patient presents with   • Rash     Right side awoke with rash at 0430 this AM   • Hand Pain     Swelling and discoloration to right hand         History of Present Illness       Patient woke up with a itchy rash to his right-side at 4:30 this morning  No changes in lotions soaps or detergents  Denies pain  History of chickenpox as a child  No blistering  Also noted swelling of his right hand while here, was stung by a bee 3 days ago  Review of Systems   Review of Systems   Constitutional: Negative for fever  Skin: Positive for rash  Hematological: Negative for adenopathy           Current Medications       Current Outpatient Medications:   •  atorvastatin (LIPITOR) 40 mg tablet, TAKE 1 TABLET BY MOUTH  DAILY, Disp: 90 tablet, Rfl: 3  •  furosemide (LASIX) 20 mg tablet, TAKE 1 TABLET BY MOUTH  DAILY, Disp: 90 tablet, Rfl: 3  •  metFORMIN (GLUCOPHAGE-XR) 500 mg 24 hr tablet, TAKE 1 TABLET BY MOUTH  TWICE DAILY WITH MEALS, Disp: 180 tablet, Rfl: 3  •  methylPREDNISolone 4 MG tablet therapy pack, Use as directed on package, Disp: 1 each, Rfl: 0  •  metoprolol tartrate (LOPRESSOR) 25 mg tablet, Take 1 tablet (25 mg total) by mouth 2 (two) times a day, Disp: 180 tablet, Rfl: 3  •  nitroglycerin (NITROSTAT) 0 4 mg SL tablet, Place 1 tablet (0 4 mg total) under the tongue every 5 (five) minutes as needed for chest pain, Disp: 30 tablet, Rfl: 3  •  potassium chloride (Klor-Con) 10 mEq tablet, TAKE 1 TABLET BY MOUTH DAILY, Disp: 90 tablet, Rfl: 3  •  ramipril (ALTACE) 10 MG capsule, TAKE 1 CAPSULE BY MOUTH  DAILY, Disp: 90 capsule, Rfl: 3  •  valACYclovir (VALTREX) 1,000 mg tablet, Take 1 tablet (1,000 mg total) by mouth 3 (three) times a day for 7 days, Disp: 21 tablet, Rfl: 0    Current Allergies     Allergies as of 11/06/2022   • (No Known Allergies)            The following portions of the patient's history were reviewed and updated as appropriate: allergies, current medications, past family history, past medical history, past social history, past surgical history and problem list      Past Medical History:   Diagnosis Date   • Asthma     resolved: 08/14/17   • Closed fracture of fifth metacarpal bone of right hand     last assessed: 06/30/15   • Deep vein thrombosis (HCC)    • Diabetes mellitus ([facility] [address] )    • Diverticulitis    • Fracture of metacarpal shaft    • Hemopneumothorax, left     last assessed: 06/02/15   • Hyperlipidemia    • Hypertension    • Inguinal hernia    • Lumbar transverse process fracture ([facility] [address] )    • Pilonidal cyst    • Pleural effusion     last assessed: 07/01/15   • Rib fractures     last assessed: 06/02/15   • Status post fall    • Superficial thrombophlebitis of leg     last assessed: 03/19/14       Past Surgical History:   Procedure Laterality Date   • COLON SURGERY     • COLONOSCOPY     • CORONARY ANGIOPLASTY WITH STENT PLACEMENT  2009    PTCA/RITA to RCA   • FRACTURE SURGERY  05/04/2015    Closed treatment of fracture of metacarpal bone, right 5t metacarpal fracture Dr Harrison Saxena   • HERNIA REPAIR     • KNEE ARTHROSCOPY W/ MENISCAL REPAIR      Lateral meniscus   • LAPAROSCOPY  05/18/2015    Exploratory, extensive lysis of adhesions Dr Carin Lundborg   • OTHER SURGICAL HISTORY      Surgical lysis of intestinal adhesions   • RECTAL SURGERY     • REVISION COLOSTOMY     • THORACENTESIS  06/02/2015    with imaging guidance left, removed 1300cc of fluid w/o problem lower base of lun06   • TONSILLECTOMY AND ADENOIDECTOMY         Family History   Problem Relation Age of Onset   • Coronary artery disease Mother    • Heart disease Father         Benign hypertensive   • Hyperlipidemia Father          Medications have been verified  Objective   /82   Pulse 67   Temp 97 9 °F (36 6 °C)   Resp 20   Wt 129 kg (284 lb 12 8 oz)   SpO2 97%   BMI 39 72 kg/m²   No LMP for male patient  Physical Exam     Physical Exam  Vitals and nursing note reviewed  Constitutional:       Appearance: Normal appearance  HENT:      Head: Normocephalic and atraumatic  Cardiovascular:      Rate and Rhythm: Normal rate and regular rhythm  Pulmonary:      Effort: Pulmonary effort is normal    Skin:     General: Skin is warm  Findings: Rash (Macular area erythema right flank extending into right abdomen  , no vesicles) present  Neurological:      Mental Status: He is alert  Contact dermatitis versus early shingles  Patient advised to start Medrol Dosepak and if rash becomes painful or starts blistering he is to start Valtrex

## 2022-11-28 ENCOUNTER — LAB (OUTPATIENT)
Dept: LAB | Facility: HOSPITAL | Age: 72
End: 2022-11-28
Attending: FAMILY MEDICINE

## 2022-11-28 DIAGNOSIS — E11.8 TYPE 2 DIABETES MELLITUS WITH COMPLICATION (HCC): ICD-10-CM

## 2022-11-28 LAB
ANION GAP SERPL CALCULATED.3IONS-SCNC: 9 MMOL/L (ref 4–13)
BUN SERPL-MCNC: 16 MG/DL (ref 5–25)
CALCIUM SERPL-MCNC: 8.3 MG/DL (ref 8.3–10.1)
CHLORIDE SERPL-SCNC: 104 MMOL/L (ref 96–108)
CO2 SERPL-SCNC: 26 MMOL/L (ref 21–32)
CREAT SERPL-MCNC: 0.85 MG/DL (ref 0.6–1.3)
CREAT UR-MCNC: 146 MG/DL
GFR SERPL CREATININE-BSD FRML MDRD: 88 ML/MIN/1.73SQ M
GLUCOSE P FAST SERPL-MCNC: 177 MG/DL (ref 65–99)
MICROALBUMIN UR-MCNC: 19.4 MG/L (ref 0–20)
MICROALBUMIN/CREAT 24H UR: 13 MG/G CREATININE (ref 0–30)
POTASSIUM SERPL-SCNC: 4.1 MMOL/L (ref 3.5–5.3)
SODIUM SERPL-SCNC: 139 MMOL/L (ref 135–147)

## 2022-11-30 ENCOUNTER — OFFICE VISIT (OUTPATIENT)
Dept: FAMILY MEDICINE CLINIC | Facility: CLINIC | Age: 72
End: 2022-11-30

## 2022-11-30 VITALS
SYSTOLIC BLOOD PRESSURE: 142 MMHG | TEMPERATURE: 96.3 F | WEIGHT: 278 LBS | OXYGEN SATURATION: 97 % | BODY MASS INDEX: 38.92 KG/M2 | HEART RATE: 53 BPM | DIASTOLIC BLOOD PRESSURE: 80 MMHG | HEIGHT: 71 IN

## 2022-11-30 DIAGNOSIS — I10 BENIGN ESSENTIAL HYPERTENSION: ICD-10-CM

## 2022-11-30 DIAGNOSIS — Z00.00 MEDICARE ANNUAL WELLNESS VISIT, SUBSEQUENT: Primary | ICD-10-CM

## 2022-11-30 DIAGNOSIS — E11.8 TYPE 2 DIABETES MELLITUS WITH COMPLICATION (HCC): ICD-10-CM

## 2022-11-30 RX ORDER — CHLORHEXIDINE GLUCONATE 213 G/1000ML
SOLUTION TOPICAL
COMMUNITY

## 2022-11-30 NOTE — PROGRESS NOTES
Assessment and Plan:     Problem List Items Addressed This Visit    None      Depression Screening and Follow-up Plan: Patient was screened for depression during today's encounter  They screened negative with a PHQ-2 score of 0  Preventive health issues were discussed with patient, and age appropriate screening tests were ordered as noted in patient's After Visit Summary  Personalized health advice and appropriate referrals for health education or preventive services given if needed, as noted in patient's After Visit Summary  History of Present Illness:     Patient presents for a Medicare Wellness Visit    Mr Sharmila Prince is here for a visit this is the purpose of eye Medicare well visit but any acute issues will be addressed during the Medicare well visit he also will be having knee surgery so he will have to come in after the new year for a medical clearance     Patient Care Team:  Leydi Abraham DO as PCP - General     Review of Systems:     Review of Systems   Constitutional: Negative for activity change, appetite change, diaphoresis, fatigue and fever  HENT: Negative  Negative for dental problem  Eyes: Positive for visual disturbance  Patient had a recent eye exam with Dr Leydi Abraham which was good no diabetic retinopathy   Respiratory: Negative for apnea, cough, chest tightness, shortness of breath and wheezing  Cardiovascular: Negative for chest pain, palpitations and leg swelling  Patient anticipates a visit with Dr Jacquelin Lemus he will need a cardiac clearance for upcoming knee replacement surgery   Gastrointestinal: Positive for constipation  Negative for abdominal distention, abdominal pain, anal bleeding, diarrhea, nausea and vomiting  Colonoscopy will be done next year   Endocrine: Negative for cold intolerance, heat intolerance, polydipsia, polyphagia and polyuria  Genitourinary: Negative for difficulty urinating, dysuria, flank pain, hematuria and urgency     Musculoskeletal: Positive for arthralgias  Negative for back pain, gait problem, joint swelling and myalgias  Skin: Negative for color change, rash and wound  Allergic/Immunologic: Negative for environmental allergies, food allergies and immunocompromised state  Neurological: Negative for dizziness, seizures, syncope, speech difficulty, numbness and headaches  Hematological: Negative for adenopathy  Does not bruise/bleed easily  Psychiatric/Behavioral: Negative for agitation, behavioral problems, hallucinations, sleep disturbance and suicidal ideas          Problem List:     Patient Active Problem List   Diagnosis   • Benign essential hypertension   • Coronary artery disease of native artery of native heart with stable angina pectoris (HCC)   • Type 2 diabetes mellitus with complication (HCC)   • Dyslipidemia   • Morbid obesity ([facility] [address] )   • Sciatica   • Presence of drug-eluting stent in right coronary artery   • Embolism and thrombosis of arteries of the lower extremities [facility])      Past Medical and Surgical History:     Past Medical History:   Diagnosis Date   • Asthma     resolved: 08/14/17   • Closed fracture of fifth metacarpal bone of right hand     last assessed: 06/30/15   • Deep vein thrombosis ([facility])    • Diabetes mellitus ([facility] )    • Diverticulitis    • Fracture of metacarpal shaft    • Hemopneumothorax, left     last assessed: 06/02/15   • Hyperlipidemia    • Hypertension    • Inguinal hernia    • Lumbar transverse process fracture ([facility])    • Pilonidal cyst    • Pleural effusion     last assessed: 07/01/15   • Rib fractures     last assessed: 06/02/15   • Status post fall    • Superficial thrombophlebitis of leg     last assessed: 03/19/14     Past Surgical History:   Procedure Laterality Date   • COLON SURGERY     • COLONOSCOPY     • CORONARY ANGIOPLASTY WITH STENT PLACEMENT  2009    PTCA/RITA to RCA   • FRACTURE SURGERY  05/04/2015    Closed treatment of fracture of metacarpal bone, right 5t metacarpal fracture   Alethea   • HERNIA REPAIR     • KNEE ARTHROSCOPY W/ MENISCAL REPAIR      Lateral meniscus   • LAPAROSCOPY  2015    Exploratory, extensive lysis of adhesions Dr Maria E Islas   • OTHER SURGICAL HISTORY      Surgical lysis of intestinal adhesions   • RECTAL SURGERY     • REVISION COLOSTOMY     • THORACENTESIS  2015    with imaging guidance left, removed 1300cc of fluid w/o problem lower base of lun06   • TONSILLECTOMY AND ADENOIDECTOMY        Family History:     Family History   Problem Relation Age of Onset   • Coronary artery disease Mother    • Heart disease Father         Benign hypertensive   • Hyperlipidemia Father       Social History:     Social History     Socioeconomic History   • Marital status: /Civil Union     Spouse name: None   • Number of children: None   • Years of education: None   • Highest education level: None   Occupational History   • Occupation: Retired   Tobacco Use   • Smoking status: Former     Packs/day:  00     Years: 30 00     Pack years: 30 00     Types: Cigarettes     Quit date:      Years since quittin 5   • Smokeless tobacco: Never   Vaping Use   • Vaping Use: Never used   Substance and Sexual Activity   • Alcohol use: Not Currently     Alcohol/week: 0 0 standard drinks     Comment: Social   • Drug use: No   • Sexual activity: Yes     Partners: Female     Birth control/protection: None   Other Topics Concern   • None   Social History Narrative    Always uses seat belt    No living will     Social Determinants of Health     Financial Resource Strain: Low Risk    • Difficulty of Paying Living Expenses: Not very hard   Food Insecurity: Not on file   Transportation Needs: No Transportation Needs   • Lack of Transportation (Medical): No   • Lack of Transportation (Non-Medical):  No   Physical Activity: Not on file   Stress: Not on file   Social Connections: Not on file   Intimate Partner Violence: Not on file   Housing Stability: Not on file      Medications and Allergies:     Current Outpatient Medications   Medication Sig Dispense Refill   • atorvastatin (LIPITOR) 40 mg tablet TAKE 1 TABLET BY MOUTH  DAILY 90 tablet 3   • FIBER ADULT GUMMIES PO Take 2 tablets by mouth daily     • furosemide (LASIX) 20 mg tablet TAKE 1 TABLET BY MOUTH  DAILY 90 tablet 3   • metFORMIN (GLUCOPHAGE-XR) 500 mg 24 hr tablet TAKE 1 TABLET BY MOUTH  TWICE DAILY WITH MEALS 180 tablet 3   • metoprolol tartrate (LOPRESSOR) 25 mg tablet Take 1 tablet (25 mg total) by mouth 2 (two) times a day 180 tablet 3   • potassium chloride (Klor-Con) 10 mEq tablet TAKE 1 TABLET BY MOUTH  DAILY 90 tablet 3   • ramipril (ALTACE) 10 MG capsule TAKE 1 CAPSULE BY MOUTH  DAILY 90 capsule 3   • chlorhexidine (Hibiclens) 4 % external liquid Hibiclens 4 % topical liquid   wash surgical site for 5 days prior to surgery (Patient not taking: Reported on 11/30/2022)     • methylPREDNISolone 4 MG tablet therapy pack Use as directed on package (Patient not taking: Reported on 11/30/2022) 1 each 0   • mupirocin (BACTROBAN) 2 % ointment mupirocin 2 % topical ointment   Apply twice a day to each nostril starting five days before surgery (Patient not taking: Reported on 11/30/2022)     • nitroglycerin (NITROSTAT) 0 4 mg SL tablet Place 1 tablet (0 4 mg total) under the tongue every 5 (five) minutes as needed for chest pain (Patient not taking: Reported on 11/30/2022) 30 tablet 3   • valACYclovir (VALTREX) 1,000 mg tablet Take 1 tablet (1,000 mg total) by mouth 3 (three) times a day for 7 days 21 tablet 0     No current facility-administered medications for this visit       No Known Allergies   Immunizations:     Immunization History   Administered Date(s) Administered   • COVID-19 MODERNA VACC 0 5 ML IM 01/20/2021, 02/17/2021, 10/25/2021, 08/08/2022   • INFLUENZA 10/31/2019, 10/08/2020, 10/25/2021, 08/15/2022   • Influenza Quadrivalent Preservative Free 3 years and older IM 08/24/2015, 10/05/2016, 08/14/2017   • Influenza Split High Dose Preservative Free IM 10/31/2019   • Influenza, high dose seasonal 0 7 mL 10/24/2018, 10/08/2020   • Influenza, seasonal, injectable 1950, 09/27/2012, 10/01/2013, 10/01/2014   • Pneumococcal Conjugate 13-Valent 10/31/2019   • Pneumococcal Polysaccharide PPV23 10/13/2013   • Zoster 09/09/2015      Health Maintenance:         Topic Date Due   • Colorectal Cancer Screening  10/09/2023   • Hepatitis C Screening  Completed         Topic Date Due   • Hepatitis B Vaccine (1 of 3 - 3-dose series) Never done   • Pneumococcal Vaccine: 65+ Years (3 - PPSV23 if available, else PCV20) 10/31/2020      Medicare Screening Tests and Risk Assessments:     Mike Ivy is here for his Subsequent Wellness visit  Health Risk Assessment:   Patient rates overall health as good  Patient feels that their physical health rating is slightly better  Patient is satisfied with their life  Eyesight was rated as same  Hearing was rated as same  Patient feels that their emotional and mental health rating is same  Patients states they are never, rarely angry  Patient states they are never, rarely unusually tired/fatigued  Pain experienced in the last 7 days has been some  Patient's pain rating has been 4/10  Patient states that he has experienced no weight loss or gain in last 6 months  Depression Screening:   PHQ-2 Score: 0      Fall Risk Screening: In the past year, patient has experienced: no history of falling in past year      Home Safety:  Patient does not have trouble with stairs inside or outside of their home  Patient has working smoke alarms and has working carbon monoxide detector  Home safety hazards include: none  none    Nutrition:   Current diet is Low Carb and Limited junk food  Medications:   Patient is currently taking over-the-counter supplements  OTC medications include: gummy fiber chews  Patient is able to manage medications       Activities of Daily Living (ADLs)/Instrumental Activities of Daily Living (IADLs):   Walk and transfer into and out of bed and chair?: Yes  Dress and groom yourself?: Yes    Bathe or shower yourself?: Yes    Feed yourself? Yes  Do your laundry/housekeeping?: Yes  Manage your money, pay your bills and track your expenses?: Yes  Make your own meals?: Yes    Do your own shopping?: Yes    ADL comments: none    Previous Hospitalizations:   Any hospitalizations or ED visits within the last 12 months?: No      Hospitalization Comments: Urgent Care visit October for rash    Advance Care Planning:   Living will: No    Durable POA for healthcare: No    Advanced directive: No    Advanced directive counseling given: Yes    Five wishes given: No    Patient declined ACP directive: No    End of Life Decisions reviewed with patient: Yes    Provider agrees with end of life decisions: Yes      Cognitive Screening:   Provider or family/friend/caregiver concerned regarding cognition?: No    PREVENTIVE SCREENINGS      Cardiovascular Screening:    General: Screening Not Indicated and History Lipid Disorder      Diabetes Screening:     General: Screening Not Indicated and History Diabetes      Colorectal Cancer Screening:     General: Screening Current      Osteoporosis Screening:    General: Screening Not Indicated      Abdominal Aortic Aneurysm (AAA) Screening:    Risk factors include: age between 73-67 yo and tobacco use        Lung Cancer Screening:     General: Screening Not Indicated      Hepatitis C Screening:    General: Screening Current    Screening, Brief Intervention, and Referral to Treatment (SBIRT)    Screening  Typical number of drinks in a day: 9  Typical number of drinks in a week: 0  Interpretation: Low risk drinking behavior  AUDIT-C Screenin) How often did you have a drink containing alcohol in the past year? never  2) How many drinks did you have on a typical day when you were drinking in the past year? 0  3) How often did you have 6 or more drinks on one occasion in the past year? never    AUDIT-C Score: 0  Interpretation: Score 0-3 (male): Negative screen for alcohol misuse    Single Item Drug Screening:  How often have you used an illegal drug (including marijuana) or a prescription medication for non-medical reasons in the past year? never    Single Item Drug Screen Score: 0  Interpretation: Negative screen for possible drug use disorder    No results found  Physical Exam:     /80 (BP Location: Left arm, Patient Position: Sitting, Cuff Size: Standard)   Pulse (!) 53   Temp (!) 96 3 °F (35 7 °C) (Temporal)   Ht 5' 11" (1 803 m)   Wt 126 kg (278 lb)   SpO2 97%   BMI 38 77 kg/m²     Physical Exam  Constitutional:       Appearance: He is well-developed and well-nourished  HENT:      Head: Normocephalic and atraumatic  Right Ear: External ear normal       Left Ear: External ear normal       Nose: Nose normal       Mouth/Throat:      Mouth: Oropharynx is clear and moist    Eyes:      Extraocular Movements: EOM normal       Conjunctiva/sclera: Conjunctivae normal       Pupils: Pupils are equal, round, and reactive to light  Cardiovascular:      Rate and Rhythm: Normal rate and regular rhythm  Pulses: Intact distal pulses  Heart sounds: Normal heart sounds  No murmur heard  No friction rub  Pulmonary:      Effort: Pulmonary effort is normal  No respiratory distress  Breath sounds: Normal breath sounds  No wheezing or rales  Chest:      Chest wall: No tenderness  Abdominal:      General: Bowel sounds are normal       Palpations: Abdomen is soft  Musculoskeletal:         General: Normal range of motion  Cervical back: Normal range of motion and neck supple  Skin:     General: Skin is warm and dry  Capillary Refill: Capillary refill takes 2 to 3 seconds  Neurological:      Mental Status: He is alert and oriented to person, place, and time     Psychiatric:         Mood and Affect: Mood and affect normal          Behavior: Behavior normal  Thought Content:  Thought content normal          Judgment: Judgment normal           Williams Anchors, DO

## 2022-11-30 NOTE — PATIENT INSTRUCTIONS
Medicare Preventive Visit Patient Instructions  Thank you for completing your Welcome to Medicare Visit or Medicare Annual Wellness Visit today  Your next wellness visit will be due in one year (12/1/2023)  The screening/preventive services that you may require over the next 5-10 years are detailed below  Some tests may not apply to you based off risk factors and/or age  Screening tests ordered at today's visit but not completed yet may show as past due  Also, please note that scanned in results may not display below  Preventive Screenings:  Service Recommendations Previous Testing/Comments   Colorectal Cancer Screening  · Colonoscopy    · Fecal Occult Blood Test (FOBT)/Fecal Immunochemical Test (FIT)  · Fecal DNA/Cologuard Test  · Flexible Sigmoidoscopy Age: 39-70 years old   Colonoscopy: every 10 years (May be performed more frequently if at higher risk)  OR  FOBT/FIT: every 1 year  OR  Cologuard: every 3 years  OR  Sigmoidoscopy: every 5 years  Screening may be recommended earlier than age 39 if at higher risk for colorectal cancer  Also, an individualized decision between you and your healthcare provider will decide whether screening between the ages of 74-80 would be appropriate   Colonoscopy: 10/09/2020  FOBT/FIT: Not on file  Cologuard: Not on file  Sigmoidoscopy: Not on file    Screening Current     Prostate Cancer Screening Individualized decision between patient and health care provider in men between ages of 53-78   Medicare will cover every 12 months beginning on the day after your 50th birthday PSA: No results in last 5 years           Hepatitis C Screening Once for adults born between 1945 and 1965  More frequently in patients at high risk for Hepatitis C Hep C Antibody: 12/05/2018    Screening Current   Diabetes Screening 1-2 times per year if you're at risk for diabetes or have pre-diabetes Fasting glucose: 177 mg/dL (11/28/2022)  A1C: 6 4 % (11/28/2022)  Screening Not Indicated  History Diabetes Cholesterol Screening Once every 5 years if you don't have a lipid disorder  May order more often based on risk factors  Lipid panel: 08/08/2022  Screening Not Indicated  History Lipid Disorder      Other Preventive Screenings Covered by Medicare:  1  Abdominal Aortic Aneurysm (AAA) Screening: covered once if your at risk  You're considered to be at risk if you have a family history of AAA or a male between the age of 73-68 who smoking at least 100 cigarettes in your lifetime  2  Lung Cancer Screening: covers low dose CT scan once per year if you meet all of the following conditions: (1) Age 50-69; (2) No signs or symptoms of lung cancer; (3) Current smoker or have quit smoking within the last 15 years; (4) You have a tobacco smoking history of at least 20 pack years (packs per day x number of years you smoked); (5) You get a written order from a healthcare provider  3  Glaucoma Screening: covered annually if you're considered high risk: (1) You have diabetes OR (2) Family history of glaucoma OR (3)  aged 48 and older OR (3)  American aged 72 and older  3  Osteoporosis Screening: covered every 2 years if you meet one of the following conditions: (1) Have a vertebral abnormality; (2) On glucocorticoid therapy for more than 3 months; (3) Have primary hyperparathyroidism; (4) On osteoporosis medications and need to assess response to drug therapy  5  HIV Screening: covered annually if you're between the age of 12-76  Also covered annually if you are younger than 13 and older than 72 with risk factors for HIV infection  For pregnant patients, it is covered up to 3 times per pregnancy      Immunizations:  Immunization Recommendations   Influenza Vaccine Annual influenza vaccination during flu season is recommended for all persons aged >= 6 months who do not have contraindications   Pneumococcal Vaccine   * Pneumococcal conjugate vaccine = PCV13 (Prevnar 13), PCV15 (Vaxneuvance), PCV20 (Prevnar 20)  * Pneumococcal polysaccharide vaccine = PPSV23 (Pneumovax) Adults 2364 years old: 1-3 doses may be recommended based on certain risk factors  Adults 72 years old: 1-2 doses may be recommended based off what pneumonia vaccine you previously received   Hepatitis B Vaccine 3 dose series if at intermediate or high risk (ex: diabetes, end stage renal disease, liver disease)   Tetanus (Td) Vaccine - COST NOT COVERED BY MEDICARE PART B Following completion of primary series, a booster dose should be given every 10 years to maintain immunity against tetanus  Td may also be given as tetanus wound prophylaxis  Tdap Vaccine - COST NOT COVERED BY MEDICARE PART B Recommended at least once for all adults  For pregnant patients, recommended with each pregnancy  Shingles Vaccine (Shingrix) - COST NOT COVERED BY MEDICARE PART B  2 shot series recommended in those aged 48 and above     Health Maintenance Due:      Topic Date Due   • Colorectal Cancer Screening  10/09/2023   • Hepatitis C Screening  Completed     Immunizations Due:      Topic Date Due   • Hepatitis B Vaccine (1 of 3 - 3-dose series) Never done   • Pneumococcal Vaccine: 65+ Years (3 - PPSV23 if available, else PCV20) 10/31/2020     Advance Directives   What are advance directives? Advance directives are legal documents that state your wishes and plans for medical care  These plans are made ahead of time in case you lose your ability to make decisions for yourself  Advance directives can apply to any medical decision, such as the treatments you want, and if you want to donate organs  What are the types of advance directives? There are many types of advance directives, and each state has rules about how to use them  You may choose a combination of any of the following:  · Living will: This is a written record of the treatment you want  You can also choose which treatments you do not want, which to limit, and which to stop at a certain time   This includes surgery, medicine, IV fluid, and tube feedings  · Durable power of  for healthcare [facility]): This is a written record that states who you want to make healthcare choices for you when you are unable to make them for yourself  This person, called a proxy, is usually a family member or a friend  You may choose more than 1 proxy  · Do not resuscitate (DNR) order:  A DNR order is used in case your heart stops beating or you stop breathing  It is a request not to have certain forms of treatment, such as CPR  A DNR order may be included in other types of advance directives  · Medical directive: This covers the care that you want if you are in a coma, near death, or unable to make decisions for yourself  You can list the treatments you want for each condition  Treatment may include pain medicine, surgery, blood transfusions, dialysis, IV or tube feedings, and a ventilator (breathing machine)  · Values history: This document has questions about your views, beliefs, and how you feel and think about life  This information can help others choose the care that you would choose  Why are advance directives important? An advance directive helps you control your care  Although spoken wishes may be used, it is better to have your wishes written down  Spoken wishes can be misunderstood, or not followed  Treatments may be given even if you do not want them  An advance directive may make it easier for your family to make difficult choices about your care  Weight Management   Why it is important to manage your weight:  Being overweight increases your risk of health conditions such as heart disease, high blood pressure, type 2 diabetes, and certain types of cancer  It can also increase your risk for osteoarthritis, sleep apnea, and other respiratory problems  Aim for a slow, steady weight loss  Even a small amount of weight loss can lower your risk of health problems    How to lose weight safely:  A safe and healthy way to lose weight is to eat fewer calories and get regular exercise  You can lose up about 1 pound a week by decreasing the number of calories you eat by 500 calories each day  Healthy meal plan for weight management:  A healthy meal plan includes a variety of foods, contains fewer calories, and helps you stay healthy  A healthy meal plan includes the following:  · Eat whole-grain foods more often  A healthy meal plan should contain fiber  Fiber is the part of grains, fruits, and vegetables that is not broken down by your body  Whole-grain foods are healthy and provide extra fiber in your diet  Some examples of whole-grain foods are whole-wheat breads and pastas, oatmeal, brown rice, and bulgur  · Eat a variety of vegetables every day  Include dark, leafy greens such as spinach, kale, jhonathan greens, and mustard greens  Eat yellow and orange vegetables such as carrots, sweet potatoes, and winter squash  · Eat a variety of fruits every day  Choose fresh or canned fruit (canned in its own juice or light syrup) instead of juice  Fruit juice has very little or no fiber  · Eat low-fat dairy foods  Drink fat-free (skim) milk or 1% milk  Eat fat-free yogurt and low-fat cottage cheese  Try low-fat cheeses such as mozzarella and other reduced-fat cheeses  · Choose meat and other protein foods that are low in fat  Choose beans or other legumes such as split peas or lentils  Choose fish, skinless poultry (chicken or turkey), or lean cuts of red meat (beef or pork)  Before you cook meat or poultry, cut off any visible fat  · Use less fat and oil  Try baking foods instead of frying them  Add less fat, such as margarine, sour cream, regular salad dressing and mayonnaise to foods  Eat fewer high-fat foods  Some examples of high-fat foods include french fries, doughnuts, ice cream, and cakes  · Eat fewer sweets  Limit foods and drinks that are high in sugar   This includes candy, cookies, regular soda, and sweetened drinks  Exercise:  Exercise at least 30 minutes per day on most days of the week  Some examples of exercise include walking, biking, dancing, and swimming  You can also fit in more physical activity by taking the stairs instead of the elevator or parking farther away from stores  Ask your healthcare provider about the best exercise plan for you  © Copyright [address] 2018 Information is for End User's use only and may not be sold, redistributed or otherwise used for commercial purposes   All illustrations and images included in CareNotes® are the copyrighted property of A D A M , [employer]  or [address] [place] [address]

## 2022-12-23 ENCOUNTER — OFFICE VISIT (OUTPATIENT)
Dept: PODIATRY | Facility: CLINIC | Age: 72
End: 2022-12-23

## 2022-12-23 VITALS
DIASTOLIC BLOOD PRESSURE: 80 MMHG | HEART RATE: 55 BPM | BODY MASS INDEX: 38.92 KG/M2 | SYSTOLIC BLOOD PRESSURE: 148 MMHG | HEIGHT: 71 IN | WEIGHT: 278 LBS

## 2022-12-23 DIAGNOSIS — B35.1 ONYCHOMYCOSIS: ICD-10-CM

## 2022-12-23 DIAGNOSIS — E11.40 TYPE 2 DIABETES MELLITUS WITH DIABETIC NEUROPATHY, WITHOUT LONG-TERM CURRENT USE OF INSULIN (HCC): Primary | ICD-10-CM

## 2022-12-23 DIAGNOSIS — L85.1 ACQUIRED KERATODERMA: ICD-10-CM

## 2022-12-23 NOTE — PROGRESS NOTES
PATIENT:  Rachid Chau  1950    ASSESSMENT/PLAN:  1  Type 2 diabetes mellitus with diabetic neuropathy, without long-term current use of insulin ([facility] [address] )        2  Onychomycosis  Debridement      3  Acquired keratoderma  Debridement            Orders Placed This Encounter   Procedures   • Debridement        Disease prevention and related risk factors of diabetes were identified and discussed  The patient was educated in proper foot wear for diabetics  Also educated in daily foot assessment and routine diabetic foot care  The patient will follow up in 9 weeks for further diabetic foot exam and care  Procedure: All mycotic toenails were reduced and debrided in length, width, and girth using a nail nipper and dremel  All hyperkeratotic skin lesion(s) were sharply pared with a scalpel with no bleeding or evidence of ulceration  Patient tolerated procedure(s) well without complications  HPI:  Rachid Chau is a 81 y  o year old male seen for diabetic foot exam   The patient has class findings with DPN  BS is under control  Complained of thick nails and callus with pain          PAST MEDICAL HISTORY:  Past Medical History:   Diagnosis Date   • Asthma     resolved: 08/14/17   • Closed fracture of fifth metacarpal bone of right hand     last assessed: 06/30/15   • Deep vein thrombosis (HCC)    • Diabetes mellitus ([facility] [address] )    • Diverticulitis    • Fracture of metacarpal shaft    • Hemopneumothorax, left     last assessed: 06/02/15   • Hyperlipidemia    • Hypertension    • Inguinal hernia    • Lumbar transverse process fracture (HCC)    • Pilonidal cyst    • Pleural effusion     last assessed: 07/01/15   • Rib fractures     last assessed: 06/02/15   • Status post fall    • Superficial thrombophlebitis of leg     last assessed: 03/19/14       PAST SURGICAL HISTORY:  Past Surgical History:   Procedure Laterality Date   • COLON SURGERY     • COLONOSCOPY     • CORONARY ANGIOPLASTY WITH STENT PLACEMENT 2009    PTCA/RITA to RCA   • FRACTURE SURGERY  05/04/2015    Closed treatment of fracture of metacarpal bone, right 5t metacarpal fracture Dr Adeline Carrion   • [address]     • KNEE ARTHROSCOPY W/ MENISCAL REPAIR      Lateral meniscus   • LAPAROSCOPY  05/18/2015    Exploratory, extensive lysis of adhesions Dr Carmelita Reilly   • OTHER SURGICAL HISTORY      Surgical lysis of intestinal adhesions   • RECTAL SURGERY     • REVISION COLOSTOMY     • THORACENTESIS  06/02/2015    with imaging guidance left, removed 1300cc of fluid w/o problem lower base of lun06   • TONSILLECTOMY AND ADENOIDECTOMY          ALLERGIES:  Patient has no known allergies      MEDICATIONS:  Current Outpatient Medications   Medication Sig Dispense Refill   • atorvastatin (LIPITOR) 40 mg tablet TAKE 1 TABLET BY MOUTH  DAILY 90 tablet 3   • FIBER ADULT GUMMIES PO Take 2 tablets by mouth daily     • furosemide (LASIX) 20 mg tablet TAKE 1 TABLET BY MOUTH  DAILY 90 tablet 3   • metFORMIN (GLUCOPHAGE-XR) 500 mg 24 hr tablet TAKE 1 TABLET BY MOUTH  TWICE DAILY WITH MEALS 180 tablet 3   • metoprolol tartrate (LOPRESSOR) 25 mg tablet Take 1 tablet (25 mg total) by mouth 2 (two) times a day 180 tablet 3   • potassium chloride (Klor-Con) 10 mEq tablet TAKE 1 TABLET BY MOUTH  DAILY 90 tablet 3   • ramipril (ALTACE) 10 MG capsule TAKE 1 CAPSULE BY MOUTH  DAILY 90 capsule 3   • chlorhexidine (Hibiclens) 4 % external liquid Hibiclens 4 % topical liquid   wash surgical site for 5 days prior to surgery (Patient not taking: Reported on 11/30/2022)     • mupirocin (BACTROBAN) 2 % ointment mupirocin 2 % topical ointment   Apply twice a day to each nostril starting five days before surgery (Patient not taking: Reported on 11/30/2022)     • nitroglycerin (NITROSTAT) 0 4 mg SL tablet Place 1 tablet (0 4 mg total) under the tongue every 5 (five) minutes as needed for chest pain (Patient not taking: Reported on 11/30/2022) 30 tablet 3   • valACYclovir (VALTREX) 1,000 mg tablet Take 1 tablet (1,000 mg total) by mouth 3 (three) times a day for 7 days 21 tablet 0     No current facility-administered medications for this visit  SOCIAL HISTORY:  Social History     Socioeconomic History   • Marital status: /Civil Union     Spouse name: None   • Number of children: None   • Years of education: None   • Highest education level: None   Occupational History   • Occupation: Retired   Tobacco Use   • Smoking status: Former     Packs/day: 1 00     Years: 30 00     Pack years: 30 00     Types: Cigarettes     Quit date:      Years since quittin 5   • Smokeless tobacco: Never   Vaping Use   • Vaping Use: Never used   Substance and Sexual Activity   • Alcohol use: Not Currently     Alcohol/week: 0 0 standard drinks     Comment: Social   • Drug use: No   • Sexual activity: Yes     Partners: Female     Birth control/protection: None   Other Topics Concern   • None   Social History Narrative    Always uses seat belt    No living will     Social Determinants of Health     Financial Resource Strain: Low Risk    • Difficulty of Paying Living Expenses: Not very hard   Food Insecurity: Not on file   Transportation Needs: No Transportation Needs   • Lack of Transportation (Medical): No   • Lack of Transportation (Non-Medical): No   Physical Activity: Not on file   Stress: Not on file   Social Connections: Not on file   Intimate Partner Violence: Not on file   Housing Stability: Not on file        REVIEW OF SYSTEMS:  GENERAL: NAD, afebrile  HEART: No chest pain, or palpitation  RESPIRATORY:  No acute SOB or cough  GI: No Nausea, vomit or diarrhea  NEUROLOGIC: No syncope or acute weakness    PHYSICAL EXAM:  VASCULAR EXAM  Dorsalis pedis  +1, Posterior tibial artery  absent  The patient has class findings with skin atrophy, lack of digital hair, and nail dystrophy  There is +1 lower extremity edema bilaterally  Venous stasis skin changes noted BLE  NEUROLOGIC EXAM  AAO X 3    Sensation is intact to light touch  No focal neurologic deficit  MMT intact  DERMATOLOGIC EXAM:   No ulcer  No cellulitis noted  The patient has hypertrophic toenails with discoloration, onycholysis, and subungal debris  No abscess  HPK X 1 right 2nd toe medially  MUSCULOSKELETAL EXAM:   No acute joint pain  No acute joint edema, or redness  No acute musculoskeletal problem  Patient has deformity including bunion and hammertoe

## 2022-12-29 DIAGNOSIS — I10 ESSENTIAL HYPERTENSION: ICD-10-CM

## 2023-02-03 ENCOUNTER — OFFICE VISIT (OUTPATIENT)
Dept: CARDIOLOGY CLINIC | Facility: HOSPITAL | Age: 73
End: 2023-02-03

## 2023-02-03 VITALS
DIASTOLIC BLOOD PRESSURE: 70 MMHG | WEIGHT: 274 LBS | HEART RATE: 50 BPM | HEIGHT: 71 IN | BODY MASS INDEX: 38.36 KG/M2 | SYSTOLIC BLOOD PRESSURE: 134 MMHG

## 2023-02-03 DIAGNOSIS — Z95.5 PRESENCE OF DRUG-ELUTING STENT IN RIGHT CORONARY ARTERY: ICD-10-CM

## 2023-02-03 DIAGNOSIS — I10 BENIGN ESSENTIAL HYPERTENSION: ICD-10-CM

## 2023-02-03 DIAGNOSIS — E78.5 DYSLIPIDEMIA: ICD-10-CM

## 2023-02-03 DIAGNOSIS — I25.10 CORONARY ARTERY DISEASE INVOLVING NATIVE CORONARY ARTERY OF NATIVE HEART WITHOUT ANGINA PECTORIS: Primary | ICD-10-CM

## 2023-02-03 DIAGNOSIS — E66.01 CLASS 2 SEVERE OBESITY DUE TO EXCESS CALORIES WITH SERIOUS COMORBIDITY AND BODY MASS INDEX (BMI) OF 38.0 TO 38.9 IN ADULT (HCC): ICD-10-CM

## 2023-02-03 NOTE — PROGRESS NOTES
Cardiology Follow Up    Rachid hCau  1950  [id number]  [address]  [address] [facility] [id number] [phone]    1  Coronary artery disease involving native coronary artery of native heart without angina pectoris  POCT ECG    Lipid Panel With Direct LDL      2  Presence of drug-eluting stent in right coronary artery        3  Benign essential hypertension        4  Dyslipidemia  Lipid Panel With Direct LDL      5  Class 2 severe obesity due to excess calories with serious comorbidity and body mass index (BMI) of 38 0 to 38 9 in adult [facility])            Discussion/Summary:  Mr Alfreda Guzman is a pleasant 68-year-old gentleman who presents to the office today for routine followup  Since his last visit he continues to feel well from a cardiac standpoint  His activity is mainly limited by his knee pain      His blood pressure is acceptable in the office today  No changes were made to his antihypertensive medication regimen  A low-salt diet was reinforced  Continued weight loss was encouraged  His most recent lipids reveal a slightly suboptimal LDL in the setting of his coronary disease  He reports a 30 pound weight loss through dietary modifications  I will reassess his lipids and if they remain suboptimal he will need escalation of statin regimen  Regarding his preoperative evaluation, he is asymptomatic  He can proceed to the operating room at acceptable risk without further testing  I will see him back in the office in one year sooner if deemed necessary  Interval History:   Mr Alfreda Guzman is a pleasant 68-year-old gentleman who presents to the office today for followup       Since his last visit one year ago he has been feeling well  He continues to be limited by bilateral knee pain  However he is scheduled to undergo a right total knee replacement in March    He can ascend a flight of steps very slowly without any cardiopulmonary symptoms of chest pain or shortness of breath  He also walks his dog for about two blocks on flat ground  He denies any cardiopulmonary symptoms in doing so  He denies any signs or symptoms of congestive heart failure including lower extremity edema, paroxysmal nocturnal dyspnea, orthopnea, acute weight gain or increasing abdominal girth  He denies lightheadedness, syncope or presyncope  He denies palpitations  He denies symptoms of claudication  He reports that he has lost about 30 pounds since his last visit with me through dietary modifications  His wife unexpectedly passed away since his last visit  Problem List     Benign essential hypertension    Overview Signed 11/19/2018 10:43 AM by Derrick Amor, DO     On good medications ramipril doing well         CAD (coronary artery disease)    Diabetes mellitus type 2, controlled ([facility] )    Overview Signed 11/19/2018 10:43 AM by Derrick Amor, DO     Blood sugars in the range of 150         Lab Results   Component Value Date    HGBA1C 6 4 (H) 11/28/2022       No results for input(s): POCGLU in the last 72 hours      Blood Sugar Average: Last 72 hrs:          Dyslipidemia    Morbid obesity ([facility] )    Sciatica        Past Medical History:   Diagnosis Date   • Asthma     resolved: 08/14/17   • Closed fracture of fifth metacarpal bone of right hand     last assessed: 06/30/15   • Deep vein thrombosis (HCC)    • Diabetes mellitus ([facility] )    • Diverticulitis    • Fracture of metacarpal shaft    • Hemopneumothorax, left     last assessed: 06/02/15   • Hyperlipidemia    • Hypertension    • Inguinal hernia    • Lumbar transverse process fracture (HCC)    • Pilonidal cyst    • Pleural effusion     last assessed: 07/01/15   • Rib fractures     last assessed: 06/02/15   • Status post fall    • Superficial thrombophlebitis of leg     last assessed: 03/19/14     Social History     Socioeconomic History   • Marital status: /Civil Union     Spouse name: Not on file   • Number of children: Not on file   • Years of education: Not on file   • Highest education level: Not on file   Occupational History   • Occupation: Retired   Tobacco Use   • Smoking status: Former     Packs/day: 1 00     Years: 30 00     Pack years: 30 00     Types: Cigarettes     Quit date:      Years since quittin 6   • Smokeless tobacco: Never   Vaping Use   • Vaping Use: Never used   Substance and Sexual Activity   • Alcohol use: Not Currently     Alcohol/week: 0 0 standard drinks     Comment: Social   • Drug use: No   • Sexual activity: Yes     Partners: Female     Birth control/protection: None   Other Topics Concern   • Not on file   Social History Narrative    Always uses seat belt    No living will     Social Determinants of Health     Financial Resource Strain: Low Risk    • Difficulty of Paying Living Expenses: Not very hard   Food Insecurity: Not on file   Transportation Needs: No Transportation Needs   • Lack of Transportation (Medical): No   • Lack of Transportation (Non-Medical):  No   Physical Activity: Not on file   Stress: Not on file   Social Connections: Not on file   Intimate Partner Violence: Not on file   Housing Stability: Not on file      Family History   Problem Relation Age of Onset   • Coronary artery disease Mother    • Heart disease Father         Benign hypertensive   • Hyperlipidemia Father      Past Surgical History:   Procedure Laterality Date   • COLON SURGERY     • COLONOSCOPY     • CORONARY ANGIOPLASTY WITH STENT PLACEMENT      PTCA/RITA to RCA   • FRACTURE SURGERY  2015    Closed treatment of fracture of metacarpal bone, right 5t metacarpal fracture Dr Surinder Rangel   • [address]     • KNEE ARTHROSCOPY W/ MENISCAL REPAIR      Lateral meniscus   • LAPAROSCOPY  2015    Exploratory, extensive lysis of adhesions Dr Eric Graham   • OTHER SURGICAL HISTORY      Surgical lysis of intestinal adhesions   • RECTAL SURGERY     • REVISION COLOSTOMY     • THORACENTESIS  06/02/2015    with imaging guidance left, removed 1300cc of fluid w/o problem lower base of lun06   • TONSILLECTOMY AND ADENOIDECTOMY         Current Outpatient Medications:   •  atorvastatin (LIPITOR) 40 mg tablet, TAKE 1 TABLET BY MOUTH  DAILY, Disp: 90 tablet, Rfl: 3  •  FIBER ADULT GUMMIES PO, Take 2 tablets by mouth daily, Disp: , Rfl:   •  furosemide (LASIX) 20 mg tablet, TAKE 1 TABLET BY MOUTH  DAILY, Disp: 90 tablet, Rfl: 3  •  metFORMIN (GLUCOPHAGE-XR) 500 mg 24 hr tablet, TAKE 1 TABLET BY MOUTH  TWICE DAILY WITH MEALS, Disp: 180 tablet, Rfl: 3  •  metoprolol tartrate (LOPRESSOR) 25 mg tablet, TAKE 1 TABLET BY MOUTH  TWICE DAILY, Disp: 180 tablet, Rfl: 3  •  mupirocin (BACTROBAN) 2 % ointment, , Disp: , Rfl:   •  nitroglycerin (NITROSTAT) 0 4 mg SL tablet, Place 1 tablet (0 4 mg total) under the tongue every 5 (five) minutes as needed for chest pain, Disp: 30 tablet, Rfl: 3  •  potassium chloride (Klor-Con) 10 mEq tablet, TAKE 1 TABLET BY MOUTH  DAILY, Disp: 90 tablet, Rfl: 3  •  ramipril (ALTACE) 10 MG capsule, TAKE 1 CAPSULE BY MOUTH  DAILY, Disp: 90 capsule, Rfl: 3  •  valACYclovir (VALTREX) 1,000 mg tablet, Take 1 tablet (1,000 mg total) by mouth 3 (three) times a day for 7 days, Disp: 21 tablet, Rfl: 0  •  chlorhexidine (Hibiclens) 4 % external liquid, Hibiclens 4 % topical liquid  wash surgical site for 5 days prior to surgery (Patient not taking: Reported on 2/3/2023), Disp: , Rfl:   No Known Allergies    Labs:     Chemistry        Component Value Date/Time     06/28/2017 1108    K 4 1 11/28/2022 0922    K 4 6 06/28/2017 1108     11/28/2022 0922    CL 99 06/28/2017 1108    CO2 26 11/28/2022 0922    CO2 24 06/28/2017 1108    BUN 16 11/28/2022 0922    BUN 15 06/28/2017 1108    CREATININE 0 85 11/28/2022 0922    CREATININE 0 81 06/28/2017 1108        Component Value Date/Time    CALCIUM 8 3 11/28/2022 0922    CALCIUM 8 9 06/28/2017 1108    ALKPHOS 117 (H) 12/09/2017 0940    ALKPHOS 127 (H) 05/14/2015 1003    AST 16 12/09/2017 0940    AST 18 05/14/2015 1003    ALT 24 12/09/2017 0940    ALT 27 05/14/2015 1003    BILITOT 1 50 (H) 05/14/2015 1003            Lab Results   Component Value Date    CHOL 108 07/14/2016    CHOL 191 10/28/2015    CHOL 117 11/16/2013     Lab Results   Component Value Date    HDL 48 08/08/2022    HDL 49 03/14/2022    HDL 47 11/05/2021     Lab Results   Component Value Date    LDLCALC 72 08/08/2022    LDLCALC 65 03/14/2022    LDLCALC 66 11/05/2021     Lab Results   Component Value Date    TRIG 62 08/08/2022    TRIG 33 03/14/2022    TRIG 77 11/05/2021     No results found for: CHOLHDL    Imaging: No results found  ECG: Sinus bradycardia, nonspecific ST and T wave abnormality      Review of Systems   Cardiovascular: Negative for chest pain, claudication, dyspnea on exertion, irregular heartbeat, leg swelling, near-syncope, orthopnea, palpitations and paroxysmal nocturnal dyspnea  Musculoskeletal: Positive for arthritis and joint pain  All other systems reviewed and are negative  Vitals:    02/03/23 1440   BP: 134/70   Pulse:      Vitals:    02/03/23 1406   Weight: 124 kg (274 lb)     Height: 5' 11" (180 3 cm)   Body mass index is 38 22 kg/m²      Physical Exam:  General:  Alert and cooperative, appears stated age  HEENT:  PERRLA, EOMI, no scleral icterus, no conjunctival pallor  Neck:  No lymphadenopathy, no thyromegaly, no carotid bruits, no elevated JVP  Heart:  Regular rate and rhythm, normal S1/S2, no S3/S4, no murmur  Lungs:  Clear to auscultation bilaterally   Abdomen:  Soft, non-tender, positive bowel sounds, no rebound or guarding,   no organomegaly   Extremities:  No clubbing, cyanosis or edema   Vascular:  2+ pedal pulses  Skin:  No rashes or lesions on exposed skin  Neurologic:  Cranial nerves II-XII grossly intact without focal deficits

## 2023-02-20 ENCOUNTER — LAB (OUTPATIENT)
Dept: LAB | Facility: HOSPITAL | Age: 73
End: 2023-02-20
Attending: FAMILY MEDICINE

## 2023-02-20 DIAGNOSIS — E78.5 DYSLIPIDEMIA: ICD-10-CM

## 2023-02-20 DIAGNOSIS — I25.10 CORONARY ARTERY DISEASE INVOLVING NATIVE CORONARY ARTERY OF NATIVE HEART WITHOUT ANGINA PECTORIS: ICD-10-CM

## 2023-02-20 DIAGNOSIS — E11.8 TYPE 2 DIABETES MELLITUS WITH COMPLICATION (HCC): ICD-10-CM

## 2023-02-20 LAB
ANION GAP SERPL CALCULATED.3IONS-SCNC: 10 MMOL/L (ref 4–13)
BUN SERPL-MCNC: 16 MG/DL (ref 5–25)
CALCIUM SERPL-MCNC: 8.4 MG/DL (ref 8.3–10.1)
CHLORIDE SERPL-SCNC: 102 MMOL/L (ref 96–108)
CHOLEST SERPL-MCNC: 120 MG/DL
CO2 SERPL-SCNC: 26 MMOL/L (ref 21–32)
CREAT SERPL-MCNC: 0.79 MG/DL (ref 0.6–1.3)
CREAT UR-MCNC: 87.4 MG/DL
GFR SERPL CREATININE-BSD FRML MDRD: 90 ML/MIN/1.73SQ M
GLUCOSE P FAST SERPL-MCNC: 141 MG/DL (ref 65–99)
HDLC SERPL-MCNC: 53 MG/DL
LDLC SERPL CALC-MCNC: 51 MG/DL (ref 0–100)
MICROALBUMIN UR-MCNC: 8.1 MG/L (ref 0–20)
MICROALBUMIN/CREAT 24H UR: 9 MG/G CREATININE (ref 0–30)
POTASSIUM SERPL-SCNC: 4.1 MMOL/L (ref 3.5–5.3)
SODIUM SERPL-SCNC: 138 MMOL/L (ref 135–147)
TRIGL SERPL-MCNC: 78 MG/DL

## 2023-02-22 ENCOUNTER — CONSULT (OUTPATIENT)
Dept: FAMILY MEDICINE CLINIC | Facility: CLINIC | Age: 73
End: 2023-02-22

## 2023-02-22 ENCOUNTER — LAB (OUTPATIENT)
Dept: LAB | Facility: HOSPITAL | Age: 73
End: 2023-02-22
Attending: FAMILY MEDICINE

## 2023-02-22 VITALS
TEMPERATURE: 96.8 F | WEIGHT: 271 LBS | HEART RATE: 55 BPM | BODY MASS INDEX: 37.94 KG/M2 | RESPIRATION RATE: 16 BRPM | OXYGEN SATURATION: 97 % | SYSTOLIC BLOOD PRESSURE: 132 MMHG | HEIGHT: 71 IN | DIASTOLIC BLOOD PRESSURE: 78 MMHG

## 2023-02-22 DIAGNOSIS — R30.0 DYSURIA: Primary | ICD-10-CM

## 2023-02-22 DIAGNOSIS — Z01.818 PREOP EXAMINATION: Primary | ICD-10-CM

## 2023-02-22 DIAGNOSIS — M17.11 PRIMARY OSTEOARTHRITIS OF RIGHT KNEE: ICD-10-CM

## 2023-02-22 DIAGNOSIS — E11.8 TYPE 2 DIABETES MELLITUS WITH COMPLICATION (HCC): ICD-10-CM

## 2023-02-22 DIAGNOSIS — I74.3 EMBOLISM AND THROMBOSIS OF ARTERIES OF THE LOWER EXTREMITIES (HCC): ICD-10-CM

## 2023-02-22 DIAGNOSIS — I10 BENIGN ESSENTIAL HYPERTENSION: ICD-10-CM

## 2023-02-22 DIAGNOSIS — I25.10 CORONARY ARTERY DISEASE INVOLVING NATIVE CORONARY ARTERY OF NATIVE HEART WITHOUT ANGINA PECTORIS: ICD-10-CM

## 2023-02-22 LAB
CREAT UR-MCNC: 138 MG/DL
MICROALBUMIN UR-MCNC: 10.4 MG/L (ref 0–20)
MICROALBUMIN/CREAT 24H UR: 8 MG/G CREATININE (ref 0–30)

## 2023-02-22 NOTE — PROGRESS NOTES
Subjective:     Norma Ly is a 81 y o  male who presents to the office today for a preoperative consultation at the request of surgeon James Juarez MD who plans on performing total knee arthroplasty on March 8  This consultation is requested for the specific conditions prompting preoperative evaluation (i e  because of potential affect on operative risk): Type 2 diabetes well controlled, coronary artery disease asymptomatic, essential hypertension well-controlled, mixed hyperlipidemia well-controlled, history of DVT left leg  Planned anesthesia: general  The patient has the following known anesthesia issues:  no prior problems with endotracheal intubation or anesthesia  Patients bleeding risk: no recent abnormal bleeding  The following portions of the patient's history were reviewed and updated as appropriate:   He  has a past medical history of Asthma, Closed fracture of fifth metacarpal bone of right hand, Deep vein thrombosis ([address] ), Diabetes mellitus ([address] ), Diverticulitis, Fracture of metacarpal shaft, Hemopneumothorax, left, Hyperlipidemia, Hypertension, Inguinal hernia, Lumbar transverse process fracture ([address] ), Pilonidal cyst, Pleural effusion, Rib fractures, Status post fall, and Superficial thrombophlebitis of leg  He   Patient Active Problem List    Diagnosis Date Noted   • Embolism and thrombosis of arteries of the lower extremities ([address] ) 08/15/2022   • Presence of drug-eluting stent in right coronary artery 12/03/2018   • Coronary artery disease involving native coronary artery of native heart without angina pectoris    • Sciatica 01/06/2014   • Dyslipidemia 12/02/2013   • Class 2 severe obesity due to excess calories with serious comorbidity in adult [facility]) 11/20/2013   • Type 2 diabetes mellitus with complication ([address] ) 25/39/3726   • Benign essential hypertension 07/27/2012     He  has a past surgical history that includes Coronary angioplasty with stent (2009);  Fracture surgery (05/04/2015); Revision Colostomy; LAPAROSCOPY (05/18/2015); Hernia repair; Knee arthroscopy w/ meniscal repair; Rectal surgery; Other surgical history; Thoracentesis (06/02/2015); Tonsillectomy and adenoidectomy; Colonoscopy; and Colon surgery  His family history includes Coronary artery disease in his mother; Heart disease in his father; Hyperlipidemia in his father  He  reports that he quit smoking about 24 years ago  His smoking use included cigarettes  He has a 30 00 pack-year smoking history  He has never used smokeless tobacco  He reports that he does not currently use alcohol  He reports that he does not use drugs  Current Outpatient Medications   Medication Sig Dispense Refill   • atorvastatin (LIPITOR) 40 mg tablet TAKE 1 TABLET BY MOUTH  DAILY 90 tablet 3   • FIBER ADULT GUMMIES PO Take 2 tablets by mouth daily     • furosemide (LASIX) 20 mg tablet TAKE 1 TABLET BY MOUTH  DAILY 90 tablet 3   • metFORMIN (GLUCOPHAGE-XR) 500 mg 24 hr tablet TAKE 1 TABLET BY MOUTH  TWICE DAILY WITH MEALS 180 tablet 3   • metoprolol tartrate (LOPRESSOR) 25 mg tablet TAKE 1 TABLET BY MOUTH  TWICE DAILY 180 tablet 3   • potassium chloride (Klor-Con) 10 mEq tablet TAKE 1 TABLET BY MOUTH  DAILY 90 tablet 3   • ramipril (ALTACE) 10 MG capsule TAKE 1 CAPSULE BY MOUTH  DAILY 90 capsule 3   • chlorhexidine (Hibiclens) 4 % external liquid Hibiclens 4 % topical liquid   wash surgical site for 5 days prior to surgery (Patient not taking: Reported on 2/3/2023)     • mupirocin (BACTROBAN) 2 % ointment  (Patient not taking: Reported on 2/22/2023)     • nitroglycerin (NITROSTAT) 0 4 mg SL tablet Place 1 tablet (0 4 mg total) under the tongue every 5 (five) minutes as needed for chest pain (Patient not taking: Reported on 2/22/2023) 30 tablet 3   • valACYclovir (VALTREX) 1,000 mg tablet Take 1 tablet (1,000 mg total) by mouth 3 (three) times a day for 7 days 21 tablet 0     No current facility-administered medications for this visit       Current Outpatient Medications on File Prior to Visit   Medication Sig   • atorvastatin (LIPITOR) 40 mg tablet TAKE 1 TABLET BY MOUTH  DAILY   • FIBER ADULT GUMMIES PO Take 2 tablets by mouth daily   • furosemide (LASIX) 20 mg tablet TAKE 1 TABLET BY MOUTH  DAILY   • metFORMIN (GLUCOPHAGE-XR) 500 mg 24 hr tablet TAKE 1 TABLET BY MOUTH  TWICE DAILY WITH MEALS   • metoprolol tartrate (LOPRESSOR) 25 mg tablet TAKE 1 TABLET BY MOUTH  TWICE DAILY   • potassium chloride (Klor-Con) 10 mEq tablet TAKE 1 TABLET BY MOUTH  DAILY   • ramipril (ALTACE) 10 MG capsule TAKE 1 CAPSULE BY MOUTH  DAILY   • chlorhexidine (Hibiclens) 4 % external liquid Hibiclens 4 % topical liquid   wash surgical site for 5 days prior to surgery (Patient not taking: Reported on 2/3/2023)   • mupirocin (BACTROBAN) 2 % ointment  (Patient not taking: Reported on 2/22/2023)   • nitroglycerin (NITROSTAT) 0 4 mg SL tablet Place 1 tablet (0 4 mg total) under the tongue every 5 (five) minutes as needed for chest pain (Patient not taking: Reported on 2/22/2023)   • valACYclovir (VALTREX) 1,000 mg tablet Take 1 tablet (1,000 mg total) by mouth 3 (three) times a day for 7 days     No current facility-administered medications on file prior to visit  He has No Known Allergies     Past Medical History:   Diagnosis Date   • Asthma     resolved: 08/14/17   • Closed fracture of fifth metacarpal bone of right hand     last assessed: 06/30/15   • Deep vein thrombosis (HCC)    • Diabetes mellitus ([facility] )    • Diverticulitis    • Fracture of metacarpal shaft    • Hemopneumothorax, left     last assessed: 06/02/15   • Hyperlipidemia    • Hypertension    • Inguinal hernia    • Lumbar transverse process fracture (HCC)    • Pilonidal cyst    • Pleural effusion     last assessed: 07/01/15   • Rib fractures     last assessed: 06/02/15   • Status post fall    • Superficial thrombophlebitis of leg     last assessed: 03/19/14       Past Surgical History:   Procedure Laterality Date   • COLON SURGERY     • COLONOSCOPY     • CORONARY ANGIOPLASTY WITH STENT PLACEMENT  2009    PTCA/RITA to RCA   • FRACTURE SURGERY  05/04/2015    Closed treatment of fracture of metacarpal bone, right 5t metacarpal fracture Dr Evangelista Lemus   • HERNIA REPAIR     • KNEE ARTHROSCOPY W/ MENISCAL REPAIR      Lateral meniscus   • LAPAROSCOPY  05/18/2015    Exploratory, extensive lysis of adhesions Dr Bell Found   • OTHER SURGICAL HISTORY      Surgical lysis of intestinal adhesions   • RECTAL SURGERY     • REVISION COLOSTOMY     • THORACENTESIS  06/02/2015    with imaging guidance left, removed 1300cc of fluid w/o problem lower base of lun06   • TONSILLECTOMY AND ADENOIDECTOMY         Review of Systems  A comprehensive review of systems was negative except for: Cardiovascular: positive for Stent right coronary artery  Musculoskeletal: positive for  joint swelling, joint stiffness and trouble walking  Endocrine: positive for diabetic symptoms including Controlled type 2 diabetes     Objective:  Physical Exam    Cardiographics  ECG: Tachyarrhythmia asymptomatic nonspecific ST-T wave changes no change from prior ECGs  Echocardiogram: Available for preoperative evaluation    Imaging  Chest x-ray: Available for preoperative evaluation     Lab Review   Lab on 02/20/2023   Component Date Value   • Sodium 02/20/2023 138    • Potassium 02/20/2023 4 1    • Chloride 02/20/2023 102    • CO2 02/20/2023 26    • ANION GAP 02/20/2023 10    • BUN 02/20/2023 16    • Creatinine 02/20/2023 0 79    • Glucose, Fasting 02/20/2023 141 (H)    • Calcium 02/20/2023 8 4    • eGFR 02/20/2023 90    • Creatinine, Ur 02/20/2023 87 4    • Microalbum  ,U,Random 02/20/2023 8 1    • Microalb Creat Ratio 02/20/2023 9    • Cholesterol 02/20/2023 120    • Triglycerides 02/20/2023 78    • HDL, Direct 02/20/2023 53    • LDL Calculated 02/20/2023 51         Assessment:     81 y o  male with planned surgery as above      Known risk factors for perioperative complications: Coronary disease  Diabetes mellitus    Difficulty with intubation is not anticipated  Cardiac Risk Estimation: Minimal    Current medications which may produce withdrawal symptoms if withheld perioperatively:  None    Plan:  Patient is CLEARED for surgery without any additional cardiac testing  1  Preoperative workup as follows preoperative labs have been reviewed and are within normal limits  2  Change in medication regimen before surgery: none, continue medication regimen including morning of surgery, with sip of water  3  Prophylaxis for cardiac events with perioperative beta-blockers: not indicated  4  Invasive hemodynamic monitoring perioperatively: not indicated  5  Deep vein thrombosis prophylaxis postoperatively:Prior history of DVT left leg recommend Xarelto or Eliquis for postoperative DVT prophylaxis  6  Other measures: Consultation with Hospitalist for in-hospital postoperative management of Operative blood sugar management, cardiac surveillance postoperatively, blood pressure management postoperatively

## 2023-02-23 LAB
BACTERIA UR QL AUTO: ABNORMAL /HPF
BILIRUB UR QL STRIP: NEGATIVE
CLARITY UR: CLEAR
COLOR UR: YELLOW
GLUCOSE UR STRIP-MCNC: NEGATIVE MG/DL
HGB UR QL STRIP.AUTO: NEGATIVE
KETONES UR STRIP-MCNC: ABNORMAL MG/DL
LEUKOCYTE ESTERASE UR QL STRIP: NEGATIVE
NITRITE UR QL STRIP: NEGATIVE
NON-SQ EPI CELLS URNS QL MICRO: ABNORMAL /HPF
PH UR STRIP.AUTO: 6 [PH]
PROT UR STRIP-MCNC: NEGATIVE MG/DL
RBC #/AREA URNS AUTO: ABNORMAL /HPF
SP GR UR STRIP.AUTO: 1.02 (ref 1–1.03)
UROBILINOGEN UR QL STRIP.AUTO: 0.2 E.U./DL
WBC #/AREA URNS AUTO: ABNORMAL /HPF

## 2023-02-23 NOTE — RESULT ENCOUNTER NOTE
Please call the lab I ordered a urinalysis with microscopic not a urine microalbumin they again repeated a urine microalbumin and did the wrong test have them call the patient get another sample and do the correct test

## 2023-02-24 ENCOUNTER — OFFICE VISIT (OUTPATIENT)
Dept: PODIATRY | Facility: CLINIC | Age: 73
End: 2023-02-24

## 2023-02-24 VITALS
WEIGHT: 269 LBS | HEART RATE: 47 BPM | HEIGHT: 71 IN | SYSTOLIC BLOOD PRESSURE: 157 MMHG | DIASTOLIC BLOOD PRESSURE: 78 MMHG | BODY MASS INDEX: 37.66 KG/M2

## 2023-02-24 DIAGNOSIS — E11.40 TYPE 2 DIABETES MELLITUS WITH DIABETIC NEUROPATHY, WITHOUT LONG-TERM CURRENT USE OF INSULIN (HCC): Primary | ICD-10-CM

## 2023-02-24 NOTE — PROGRESS NOTES
PATIENT:  Shruthi Lane  1950    ASSESSMENT/PLAN:  1  Type 2 diabetes mellitus with diabetic neuropathy, without long-term current use of insulin ([facility] )             Patient was counseled on the condition and diagnosis  Educated disease prevention and risks related to diabetes  Educated proper daily foot care and exam   Instructed proper skin care / protection and footwear  Instructed to identify any signs of infection and related foot problem  The recent blood work was reviewed and the last HbA1c was 6 1  Discussed proper blood glucose control with diet and exercise  All nails were debrided  Continue jose  Discussed proper footwear to accommodate the deformity  The patient will follow up in 9 weeks for further diabetic foot exam and care  HPI:  Shruthi Lane is a 81 y  o year old male seen for diabetic foot exam   The patient has class findings with DPN  BS is under control  The patient denied any acute pedal disorder, redness, acute swelling, or recent injury           PAST MEDICAL HISTORY:  Past Medical History:   Diagnosis Date   • Asthma     resolved: 08/14/17   • Closed fracture of fifth metacarpal bone of right hand     last assessed: 06/30/15   • Deep vein thrombosis (HCC)    • Diabetes mellitus ([facility] )    • Diverticulitis    • Fracture of metacarpal shaft    • Hemopneumothorax, left     last assessed: 06/02/15   • Hyperlipidemia    • Hypertension    • Inguinal hernia    • Lumbar transverse process fracture (HCC)    • Pilonidal cyst    • Pleural effusion     last assessed: 07/01/15   • Rib fractures     last assessed: 06/02/15   • Status post fall    • Superficial thrombophlebitis of leg     last assessed: 03/19/14       PAST SURGICAL HISTORY:  Past Surgical History:   Procedure Laterality Date   • COLON SURGERY     • COLONOSCOPY     • CORONARY ANGIOPLASTY WITH STENT PLACEMENT  2009    PTCA/RITA to RCA   • FRACTURE SURGERY  05/04/2015    Closed treatment of fracture of metacarpal bone, right 5t metacarpal fracture Dr Jasmina Trinidad   • HERNIA REPAIR     • KNEE ARTHROSCOPY W/ MENISCAL REPAIR      Lateral meniscus   • LAPAROSCOPY  05/18/2015    Exploratory, extensive lysis of adhesions Dr Spencer Martínez   • OTHER SURGICAL HISTORY      Surgical lysis of intestinal adhesions   • RECTAL SURGERY     • REVISION COLOSTOMY     • THORACENTESIS  06/02/2015    with imaging guidance left, removed 1300cc of fluid w/o problem lower base of lun06   • TONSILLECTOMY AND ADENOIDECTOMY          ALLERGIES:  Patient has no known allergies  MEDICATIONS:  Current Outpatient Medications   Medication Sig Dispense Refill   • atorvastatin (LIPITOR) 40 mg tablet TAKE 1 TABLET BY MOUTH  DAILY 90 tablet 3   • FIBER ADULT GUMMIES PO Take 2 tablets by mouth daily     • furosemide (LASIX) 20 mg tablet TAKE 1 TABLET BY MOUTH  DAILY 90 tablet 3   • metFORMIN (GLUCOPHAGE-XR) 500 mg 24 hr tablet TAKE 1 TABLET BY MOUTH  TWICE DAILY WITH MEALS 180 tablet 3   • metoprolol tartrate (LOPRESSOR) 25 mg tablet TAKE 1 TABLET BY MOUTH  TWICE DAILY 180 tablet 3   • potassium chloride (Klor-Con) 10 mEq tablet TAKE 1 TABLET BY MOUTH  DAILY 90 tablet 3   • ramipril (ALTACE) 10 MG capsule TAKE 1 CAPSULE BY MOUTH  DAILY 90 capsule 3   • chlorhexidine (Hibiclens) 4 % external liquid Hibiclens 4 % topical liquid   wash surgical site for 5 days prior to surgery (Patient not taking: Reported on 2/3/2023)     • mupirocin (BACTROBAN) 2 % ointment  (Patient not taking: Reported on 2/22/2023)     • nitroglycerin (NITROSTAT) 0 4 mg SL tablet Place 1 tablet (0 4 mg total) under the tongue every 5 (five) minutes as needed for chest pain (Patient not taking: Reported on 2/22/2023) 30 tablet 3   • valACYclovir (VALTREX) 1,000 mg tablet Take 1 tablet (1,000 mg total) by mouth 3 (three) times a day for 7 days 21 tablet 0     No current facility-administered medications for this visit         SOCIAL HISTORY:  Social History     Socioeconomic History   • Marital status: /Civil [employer]     Spouse name: None   • Number of children: None   • Years of education: None   • Highest education level: None   Occupational History   • Occupation: Retired   Tobacco Use   • Smoking status: Former     Packs/day:  00     Years: 30      Pack years: 30      Types: Cigarettes     Quit date:      Years since quittin 7   • Smokeless tobacco: Never   Vaping Use   • Vaping Use: Never used   Substance and Sexual Activity   • Alcohol use: Not Currently     Alcohol/week: 0 0 standard drinks     Comment: Social   • Drug use: No   • Sexual activity: Yes     Partners: Female     Birth control/protection: None   Other Topics Concern   • None   Social History Narrative    Always uses seat belt    No living will     Social Determinants of Health     Financial Resource Strain: Low Risk    • Difficulty of Paying Living Expenses: Not very hard   Food Insecurity: Not on file   Transportation Needs: No Transportation Needs   • Lack of Transportation (Medical): No   • Lack of Transportation (Non-Medical): No   Physical Activity: Not on file   Stress: Not on file   Social Connections: Not on file   Intimate Partner Violence: Not on file   Housing Stability: Not on file        REVIEW OF SYSTEMS:  GENERAL: NAD, afebrile  HEART: No chest pain, or palpitation  RESPIRATORY:  No acute SOB or cough  GI: No Nausea, vomit or diarrhea  NEUROLOGIC: No syncope or acute weakness    PHYSICAL EXAM:  VASCULAR EXAM  Dorsalis pedis  +1, Posterior tibial artery  absent  The patient has class findings with skin atrophy, lack of digital hair, and nail dystrophy  There is +1 lower extremity edema bilaterally  Venous stasis skin changes noted BLE  NEUROLOGIC EXAM  AAO X 3  Sensation is intact to light touch  Sensation is intact to 10gm monofilament  No focal neurologic deficit  DERMATOLOGIC EXAM:   No ulcer  No cellulitis    The patient has hypertrophic toenails with discoloration, onycholysis, and subungal debris  No abscess  MUSCULOSKELETAL EXAM:   No acute joint pain  No acute edema or redness  No acute musculoskeletal problem  Patient has deformity including bunion and hammertoe  Diabetic Foot Exam    Patient's shoes and socks removed  Right Foot/Ankle   Right Foot Inspection  Skin Exam: skin normal, skin intact and dry skin  No warmth, no erythema, no maceration, no abnormal color, no pre-ulcer and no ulcer  Toe Exam: right toe deformity  No swelling, no tenderness and erythema    Sensory   Vibration: diminished  Proprioception: intact  Monofilament testing: intact    Vascular  Capillary refills: < 3 seconds  The right DP pulse is 1+  The right PT pulse is 0  Left Foot/Ankle  Left Foot Inspection  Skin Exam: skin normal, skin intact and dry skin  No warmth, no erythema, no maceration, normal color, no pre-ulcer and no ulcer  Toe Exam: left toe deformity  No swelling, no tenderness and no erythema  Sensory   Vibration: diminished  Proprioception: intact  Monofilament testing: intact    Vascular  Capillary refills: < 3 seconds  The left DP pulse is 1+  The left PT pulse is 0       Assign Risk Category  Deformity present  No loss of protective sensation  No weak pulses  Risk: 1

## 2023-03-02 ENCOUNTER — APPOINTMENT (OUTPATIENT)
Dept: LAB | Facility: HOSPITAL | Age: 73
End: 2023-03-02

## 2023-03-02 DIAGNOSIS — Z01.812 PRE-OPERATIVE LABORATORY EXAMINATION: ICD-10-CM

## 2023-03-02 DIAGNOSIS — Z01.818 OTHER SPECIFIED PRE-OPERATIVE EXAMINATION: ICD-10-CM

## 2023-03-02 LAB
ALBUMIN SERPL BCP-MCNC: 3.9 G/DL (ref 3.5–5)
ALP SERPL-CCNC: 92 U/L (ref 34–104)
ALT SERPL W P-5'-P-CCNC: 12 U/L (ref 7–52)
ANION GAP SERPL CALCULATED.3IONS-SCNC: 11 MMOL/L (ref 4–13)
AST SERPL W P-5'-P-CCNC: 13 U/L (ref 13–39)
BASOPHILS # BLD AUTO: 0.03 THOUSANDS/ÂΜL (ref 0–0.1)
BASOPHILS NFR BLD AUTO: 0 % (ref 0–1)
BILIRUB SERPL-MCNC: 0.86 MG/DL (ref 0.2–1)
BUN SERPL-MCNC: 16 MG/DL (ref 5–25)
CALCIUM SERPL-MCNC: 8.6 MG/DL (ref 8.4–10.2)
CHLORIDE SERPL-SCNC: 105 MMOL/L (ref 96–108)
CO2 SERPL-SCNC: 24 MMOL/L (ref 21–32)
CREAT SERPL-MCNC: 1.26 MG/DL (ref 0.6–1.3)
EOSINOPHIL # BLD AUTO: 0.09 THOUSAND/ÂΜL (ref 0–0.61)
EOSINOPHIL NFR BLD AUTO: 1 % (ref 0–6)
ERYTHROCYTE [DISTWIDTH] IN BLOOD BY AUTOMATED COUNT: 12.4 % (ref 11.6–15.1)
GFR SERPL CREATININE-BSD FRML MDRD: 57 ML/MIN/1.73SQ M
GLUCOSE SERPL-MCNC: 108 MG/DL (ref 65–140)
HCT VFR BLD AUTO: 46 % (ref 36.5–49.3)
HGB BLD-MCNC: 15.8 G/DL (ref 12–17)
IMM GRANULOCYTES # BLD AUTO: 0.02 THOUSAND/UL (ref 0–0.2)
IMM GRANULOCYTES NFR BLD AUTO: 0 % (ref 0–2)
LYMPHOCYTES # BLD AUTO: 2.08 THOUSANDS/ÂΜL (ref 0.6–4.47)
LYMPHOCYTES NFR BLD AUTO: 27 % (ref 14–44)
MCH RBC QN AUTO: 33.3 PG (ref 26.8–34.3)
MCHC RBC AUTO-ENTMCNC: 34.3 G/DL (ref 31.4–37.4)
MCV RBC AUTO: 97 FL (ref 82–98)
MONOCYTES # BLD AUTO: 0.54 THOUSAND/ÂΜL (ref 0.17–1.22)
MONOCYTES NFR BLD AUTO: 7 % (ref 4–12)
NEUTROPHILS # BLD AUTO: 4.96 THOUSANDS/ÂΜL (ref 1.85–7.62)
NEUTS SEG NFR BLD AUTO: 65 % (ref 43–75)
NRBC BLD AUTO-RTO: 0 /100 WBCS
PLATELET # BLD AUTO: 198 THOUSANDS/UL (ref 149–390)
PMV BLD AUTO: 10.7 FL (ref 8.9–12.7)
POTASSIUM SERPL-SCNC: 4 MMOL/L (ref 3.5–5.3)
PROT SERPL-MCNC: 6 G/DL (ref 6.4–8.4)
RBC # BLD AUTO: 4.74 MILLION/UL (ref 3.88–5.62)
SODIUM SERPL-SCNC: 140 MMOL/L (ref 135–147)
WBC # BLD AUTO: 7.72 THOUSAND/UL (ref 4.31–10.16)

## 2023-03-03 ENCOUNTER — APPOINTMENT (OUTPATIENT)
Dept: LAB | Facility: HOSPITAL | Age: 73
End: 2023-03-03

## 2023-03-22 DIAGNOSIS — I10 ESSENTIAL HYPERTENSION: ICD-10-CM

## 2023-03-23 DIAGNOSIS — E78.2 MIXED HYPERLIPIDEMIA: ICD-10-CM

## 2023-03-23 DIAGNOSIS — I10 ESSENTIAL HYPERTENSION: ICD-10-CM

## 2023-03-23 RX ORDER — POTASSIUM CHLORIDE 750 MG/1
TABLET, FILM COATED, EXTENDED RELEASE ORAL
Qty: 90 TABLET | Refills: 3 | Status: SHIPPED | OUTPATIENT
Start: 2023-03-23

## 2023-03-23 RX ORDER — ATORVASTATIN CALCIUM 40 MG/1
TABLET, FILM COATED ORAL
Qty: 90 TABLET | Refills: 3 | Status: SHIPPED | OUTPATIENT
Start: 2023-03-23

## 2023-03-23 RX ORDER — RAMIPRIL 10 MG/1
CAPSULE ORAL
Qty: 90 CAPSULE | Refills: 3 | Status: SHIPPED | OUTPATIENT
Start: 2023-03-23

## 2023-04-04 ENCOUNTER — EVALUATION (OUTPATIENT)
Dept: PHYSICAL THERAPY | Facility: HOME HEALTHCARE | Age: 73
End: 2023-04-04

## 2023-04-04 DIAGNOSIS — Z96.651 HISTORY OF TOTAL RIGHT KNEE REPLACEMENT: Primary | ICD-10-CM

## 2023-04-04 NOTE — PROGRESS NOTES
"PT Evaluation     Today's date: 2023  Patient name: Milady Penn  : 1950  MRN: [id number]  Referring provider: Manuel Barboza MD  Dx:   Encounter Diagnosis     ICD-10-CM    1  History of total right knee replacement  Z96 651                      Assessment  Assessment details: Pt Aisha Galan is a 81 y o  who presents to OPPT s/p a R TKR completed on 3/8/23  Pt presents with limited knee ROM, decreased LE strength, edema, and gait dysfunction  Pt is currently using AD for mobility and limited to ambulation tolerance < 20-30 minutes  Stair negotiation with non-reciprocal gait pattern and use of HR  No reported falls since discharge home  Pt would benefit from skilled therapy services to address outlined impairments, work towards goals, and restore pts PLOF  Thank you! Impairments: abnormal gait, abnormal or restricted ROM, activity intolerance, difficulty understanding, impaired balance, impaired physical strength, lacks appropriate home exercise program, pain with function, safety issue, weight-bearing intolerance and poor posture   Understanding of Dx/Px/POC: good   Prognosis: good    Goals  STGs to be achieved in 4 weeks:  -Pt to demonstrate reduced subjective pain rating \"at worst\" by at least 2-3 points from Initial Eval to allow for reduced pain with ADLs and improved functional activity tolerance    -Pt to demonstrate improved R knee ROM > 5-10* in order to maximize joint mobility and function and allow for progression of exercise program and achievement of goals    -Pt to demonstrate increased MMT of R LE by at least 1/2-1 grade in order to improve safety and stability with ADLs and functional mobility       LTGs to be achieved in 6-8 weeks:  -Pt will be I with HEP in order to continue to improve quality of life and independence and reduce risk for re-injury    -Pt to demonstrate return to activities of daily living without limiations or restrictions    -Pt able to ambulation > 30 minutes " without use of AD for return to independent community activities    -Pt to demonstrate return to stair negotiation with reciprocal gait pattern to return to PLOF  -Pt to demonstrate improved function as noted by achieving or exceeding predicted score on FOTO outcomes assessment tool  Plan  Plan details: PT provided pt with detailed HEP program; pt verbalized understanding of program    Patient would benefit from: skilled physical therapy  Planned modality interventions: cryotherapy  Planned therapy interventions: manual therapy, neuromuscular re-education, patient education, self care, home exercise program, gait training, functional ROM exercises, flexibility, strengthening, stretching, therapeutic exercise, balance and balance/weight bearing training  Frequency: 2x week  Duration in weeks: 8  Plan of Care beginning date: 2023  Plan of Care expiration date: 2023  Treatment plan discussed with: patient        Subjective Evaluation    History of Present Illness  Mechanism of injury: Pt reporting that the R knee was painful for several years  He had tried injections which only provided about 6-8 weeks relief and then pain would return  He underwent a R TKR completed on 3/8/23  He was discharged home and had home therapy services 3- 4 weeks with recent discharge  He followed up with MD on 4/3/23 and was given script to continue with OPPT for further improvement to overall mobility     Quality of life: good    Pain  At best pain ratin  At worst pain ratin  Quality: dull ache and tight  Relieving factors: rest  Aggravating factors: standing, walking and stair climbing    Social Support  Steps to enter house: yes  Stairs in house: no   Lives in: multiple-level home  Lives with: alone    Employment status: not working    Diagnostic Tests  X-ray: normal  Treatments  Previous treatment: injection treatment  Current treatment: physical therapy  Patient Goals  Patient goals for therapy: decreased edema, decreased pain, improved balance, increased motion, increased strength and independence with ADLs/IADLs          Objective     Observations     Right Knee   Positive for edema and incision  Additional Observation Details  Incision clean and dry; no s/s of infection at present     Neurological Testing     Sensation     Knee   Left Knee   Intact: light touch    Right Knee   Intact: light touch     Active Range of Motion   Left Knee   Flexion: 110 degrees   Extension: -10 degrees     Right Knee   Flexion: 95 degrees   Extension: -13 degrees     Strength/Myotome Testing     Left Knee   Flexion: 5  Extension: 5  Quadriceps contraction: good    Right Knee   Flexion: 4  Extension: 4-  Quadriceps contraction: good    Ambulation   Weight-Bearing Status   Assistive device used: single point cane    Additional Weight-Bearing Status Details  SPC for community ambulation     Ambulation: Stairs   Pattern: reciprocal  Railings: one rail  Pattern: non-reciprocal  Railings: one rail    Observational Gait   Decreased walking speed and stride length                Re-eval Date: 5/4/23     Precautions: none       Manuals 4/4       R knee flex/ext         R HS and calf                         Neuro Re-Ed         Bosu lunges         Balance as appropriate                                                 Ther Ex        NuStep  L2  10 minutes        Standing hip flex/abd/ext        Squats        LAQ        Hip add        Hip abd        SLR        S/L SLR        Clamshells        Bridges                                 Ther Activity        Step-ups  Fwd/Lat        Step-down                        Gait Training                        Modalities        CP prn

## 2023-04-04 NOTE — LETTER
2023    MD Jason La    Patient: Theresa Stratton   YOB: 1950   Date of Visit: 2023     Encounter Diagnosis     ICD-10-CM    1  History of total right knee replacement  Z96 651           Dear Dr Robina Nieto: Thank you for your recent referral of Theresa Stratton  Please review the attached evaluation summary from Jorge's recent visit  Please verify that you agree with the plan of care by signing the attached order  If you have any questions or concerns, please do not hesitate to call  I sincerely appreciate the opportunity to share in the care of one of your patients and hope to have another opportunity to work with you in the near future  Sincerely,    Ramesh Luciano, PT      Referring Provider:      I certify that I have read the below Plan of Care and certify the need for these services furnished under this plan of treatment while under my care  MD Jason La  Via Fax: [phone]          PT Evaluation     Today's date: 2023  Patient name: Theresa Stratton  : 1950  MRN: [id number]  Referring provider: Flora Orozco MD  Dx:   Encounter Diagnosis     ICD-10-CM    1  History of total right knee replacement  Z96 651                      Assessment  Assessment details: Pt Holly Meza is a 81 y o  who presents to OPPT s/p a R TKR completed on 3/8/23  Pt presents with limited knee ROM, decreased LE strength, edema, and gait dysfunction  Pt is currently using AD for mobility and limited to ambulation tolerance < 20-30 minutes  Stair negotiation with non-reciprocal gait pattern and use of HR  No reported falls since discharge home  Pt would benefit from skilled therapy services to address outlined impairments, work towards goals, and restore pts PLOF  Thank you!    Impairments: abnormal gait, abnormal or restricted ROM, activity "intolerance, difficulty understanding, impaired balance, impaired physical strength, lacks appropriate home exercise program, pain with function, safety issue, weight-bearing intolerance and poor posture   Understanding of Dx/Px/POC: good   Prognosis: good    Goals  STGs to be achieved in 4 weeks:  -Pt to demonstrate reduced subjective pain rating \"at worst\" by at least 2-3 points from Initial Eval to allow for reduced pain with ADLs and improved functional activity tolerance    -Pt to demonstrate improved R knee ROM > 5-10* in order to maximize joint mobility and function and allow for progression of exercise program and achievement of goals    -Pt to demonstrate increased MMT of R LE by at least 1/2-1 grade in order to improve safety and stability with ADLs and functional mobility  LTGs to be achieved in 6-8 weeks:  -Pt will be I with HEP in order to continue to improve quality of life and independence and reduce risk for re-injury    -Pt to demonstrate return to activities of daily living without limiations or restrictions    -Pt able to ambulation > 30 minutes without use of AD for return to independent community activities    -Pt to demonstrate return to stair negotiation with reciprocal gait pattern to return to PLOF  -Pt to demonstrate improved function as noted by achieving or exceeding predicted score on FOTO outcomes assessment tool         Plan  Plan details: PT provided pt with detailed HEP program; pt verbalized understanding of program    Patient would benefit from: skilled physical therapy  Planned modality interventions: cryotherapy  Planned therapy interventions: manual therapy, neuromuscular re-education, patient education, self care, home exercise program, gait training, functional ROM exercises, flexibility, strengthening, stretching, therapeutic exercise, balance and balance/weight bearing training  Frequency: 2x week  Duration in weeks: 8  Plan of Care beginning date: 4/4/2023  Plan of Care " expiration date: 2023  Treatment plan discussed with: patient        Subjective Evaluation    History of Present Illness  Mechanism of injury: Pt reporting that the R knee was painful for several years  He had tried injections which only provided about 6-8 weeks relief and then pain would return  He underwent a R TKR completed on 3/8/23  He was discharged home and had home therapy services 3- 4 weeks with recent discharge  He followed up with MD on 4/3/23 and was given script to continue with OPPT for further improvement to overall mobility  Quality of life: good    Pain  At best pain ratin  At worst pain ratin  Quality: dull ache and tight  Relieving factors: rest  Aggravating factors: standing, walking and stair climbing    Social Support  Steps to enter house: yes  Stairs in house: no   Lives in: multiple-level home  Lives with: alone    Employment status: not working    Diagnostic Tests  X-ray: normal  Treatments  Previous treatment: injection treatment  Current treatment: physical therapy  Patient Goals  Patient goals for therapy: decreased edema, decreased pain, improved balance, increased motion, increased strength and independence with ADLs/IADLs          Objective     Observations     Right Knee   Positive for edema and incision       Additional Observation Details  Incision clean and dry; no s/s of infection at present     Neurological Testing     Sensation     Knee   Left Knee   Intact: light touch    Right Knee   Intact: light touch     Active Range of Motion   Left Knee   Flexion: 110 degrees   Extension: -10 degrees     Right Knee   Flexion: 95 degrees   Extension: -13 degrees     Strength/Myotome Testing     Left Knee   Flexion: 5  Extension: 5  Quadriceps contraction: good    Right Knee   Flexion: 4  Extension: 4-  Quadriceps contraction: good    Ambulation   Weight-Bearing Status   Assistive device used: single point cane    Additional Weight-Bearing Status Details  [facility] ambulation     Ambulation: Stairs   Pattern: reciprocal  Railings: one rail  Pattern: non-reciprocal  Railings: one rail    Observational Gait   Decreased walking speed and stride length               Re-eval Date: 5/4/23     Precautions: none       Manuals 4/4       R knee flex/ext         R HS and calf                         Neuro Re-Ed         Bosu lunges         Balance as appropriate                                                 Ther Ex        NuStep  L2  10 minutes        Standing hip flex/abd/ext        Squats        LAQ        Hip add        Hip abd        SLR        S/L SLR        Clamshells        Bridges                                 Ther Activity        Step-ups  Fwd/Lat        Step-down                        Gait Training                        Modalities        CP prn

## 2023-04-07 ENCOUNTER — OFFICE VISIT (OUTPATIENT)
Dept: PHYSICAL THERAPY | Facility: HOME HEALTHCARE | Age: 73
End: 2023-04-07

## 2023-04-07 DIAGNOSIS — Z96.651 HISTORY OF TOTAL RIGHT KNEE REPLACEMENT: Primary | ICD-10-CM

## 2023-04-07 NOTE — PROGRESS NOTES
"Daily Note     Today's date: 2023  Patient name: Lucy Augustine  : 1950  MRN: [id number]  Referring provider: Claudetta Mallick, MD  Dx:   Encounter Diagnosis     ICD-10-CM    1  History of total right knee replacement  Z96 651           Start Time: 745  Stop Time: 830  Total time in clinic (min): 45 minutes    Subjective: \"My knee is ok  It's a little sore  I can go up the steps with a cane, step over step but I can't going down  My pain is in my thigh, not my knee  \"      Objective: See treatment diary below      Assessment: Tolerated treatment well  Initiated exercise program this date  Pt with good motion and mobility; does not use AD  Occasional cueing during squats for form  Will continue to progress as able to address deficits  Patient demonstrated fatigue post treatment and would benefit from continued PT      Plan: Continue per plan of care  Progress treatment as tolerated         Re-eval Date: 23     Precautions: none       Manuals       R knee flex/ext   PROM to end range stretch and quad stretch 10'      R HS and calf                         Neuro Re-Ed         Bosu lunges         Balance as appropriate                                                 Ther Ex        NuStep  L2  10 minutes  L3 10'      Standing hip flex/abd/ext  2x10/3-5\"      Squats  2x10/3-5\"      LAQ  2x10/3-5\"      Hip add  20x/3-5\"      Hip abd        SLR  QS 20x/3-5\"    flex  2x10/3-5\"        S/L SLR        Clamshells        Bridges                                 Ther Activity        Step-ups  Fwd/Lat  6\" 2x10      Step-down                        Gait Training                        Modalities        CP prn                      "

## 2023-04-18 ENCOUNTER — APPOINTMENT (OUTPATIENT)
Dept: PHYSICAL THERAPY | Facility: HOME HEALTHCARE | Age: 73
End: 2023-04-18

## 2023-04-25 ENCOUNTER — OFFICE VISIT (OUTPATIENT)
Dept: PHYSICAL THERAPY | Facility: HOME HEALTHCARE | Age: 73
End: 2023-04-25

## 2023-04-25 DIAGNOSIS — Z96.651 HISTORY OF TOTAL RIGHT KNEE REPLACEMENT: Primary | ICD-10-CM

## 2023-04-25 NOTE — PROGRESS NOTES
"Daily Note     Today's date: 2023  Patient name: Aggie Ojeda  : 1950  MRN: [id number]  Referring provider: Zain Damon MD  Dx:   Encounter Diagnosis     ICD-10-CM    1  History of total right knee replacement  Z96 651                      Subjective: Pt reports he has very little pain R knee  Objective: See treatment diary below      Assessment: Tolerated treatment well  No increased pain reported R knee t/o session or at end of session  Some verbal cues needed to perform exercises correctly  NuStep completed for ROM and strengthening R knee/LE and aerobic conditioning  Patient would benefit from continued PT to improve ROM, strength and overall functional mobility  Plan: Continue per plan of care  Re-eval Date: 23     Precautions: none       Manuals    R knee flex/ext  10' 10' 10' 10' 10'   R HS and calf                         Neuro Re-Ed     Bosu lunges         Balance as appropriate                         Ther Ex -   NuStep   Sp02  HR L3 10'  97%  77bpm      L3 10'  Spo2 97%  HR 68 bpm L3  10' L3 10'   Spo2 97%  HR 88 bpm L3  10'  99%  78 bpm   Standing hip flex/abd/ext 2#  1x10 perry 2# 1x10 Perry 1x10 ea perry 1x20 ea Perry 1x20 ea  Wt NV ?    Squats 2x10 2x10 2x10 2x10  2x10   LAQ 2# 2x10  perry 2# 2x10 Perry 2x10 2x10 2# 2x10  perry   HS curl Red 2x10  perry Red 2x10 Perry   Red 2x10  perry   Hip add 2x10 2x 10 2x10 2x10 2x10   Hip abd Red x20 Red x 20  Red x 20 Redx20   QS 5\" x20  W/heel slide 5\" x 20  5\"  x20 5\"x20 5\" x20   SLR flex 2x10 2x10   2x10 2x10 2x10   S/L SLR        Heel slides   flex 1x20  w/QS 1x20   w/QS 1x15 1x15 1x20   Clamshells        Bridges  NV                               Ther Activity --  4-17   Step-ups  Fwd/Lat C fwd  1x15 C Fwd   1x15 C fwd 1x15 C Fwd  1x15  C Fwd   1x15   Step-down 1x15 1x15  C 1x15 C 1 x 15  C 1x15                   Gait Training                        Modalities        CP prn      "

## 2023-04-27 ENCOUNTER — OFFICE VISIT (OUTPATIENT)
Dept: PHYSICAL THERAPY | Facility: HOME HEALTHCARE | Age: 73
End: 2023-04-27

## 2023-04-27 DIAGNOSIS — Z96.651 HISTORY OF TOTAL RIGHT KNEE REPLACEMENT: Primary | ICD-10-CM

## 2023-04-27 NOTE — PROGRESS NOTES
"Daily Note     Today's date: 2023  Patient name: Brianne Lam  : 1950  MRN: [id number]  Referring provider: Ashley Clemons MD  Dx:   Encounter Diagnosis     ICD-10-CM    1  History of total right knee replacement  Z96 651                      Subjective: Pt notes that he is doing well, can go up and down the stairs with normal pattern now  Objective: See treatment diary below      Assessment: Pt with good tolerance to program with new strengthening activities added  ROM approaching WFL with mild tightness noted at both end range flexion/extension  Progressing towards independent program with possible discharge next week  Plan: Continue per plan of care  Re-eval Date: 23     Precautions: none       Manuals -   R knee flex/ext  10' 10' 10'   + Hamstring  10' 10'   R HS and calf                         Neuro Re-Ed     Bosu lunges         Balance as appropriate                         Ther Ex    NuStep   Sp02  HR L3 10'  97%  77bpm      L3 10'  Spo2 97%  HR 68 bpm L3 10 minutes  SpO2 98%  HR 76 bpm  L3 10'   Spo2 97%  HR 88 bpm L3  10'  99%  78 bpm   Standing hip flex/abd/ext 2#  1x10 perry 2# 1x10 Perry 2# x 20 perry  1x20 ea Perry 1x20 ea  Wt NV ?    Squats 2x10 2x10 2 x 10  2x10  2x10   LAQ 2# 2x10  perry 2# 2x10 Perry 2# x 20  2x10 2# 2x10  perry   HS curl Red 2x10  perry Red 2x10 Perry   Red 2x10  perry   Hip add 2x10 2x 10  2x10 2x10   Hip abd Red x20 Red x 20  Red x 20 Redx20   QS 5\" x20  W/heel slide 5\" x 20   5\"x20 5\" x20   SLR flex 2x10 2x10   2# x 20  2x10 2x10   S/L SLR   X 20      Heel slides   flex 1x20  w/QS 1x20   w/QS  1x15 1x20   Clamshells        Bridges  NV  X 20                              Ther Activity -    4-17   Step-ups  Fwd/Lat C fwd  1x15 C Fwd   1x15 C Fwd & Lat  X 20  C Fwd  1x15  C Fwd   1x15   Step-down 1x15 1x15   C 1 x 15  C 1x15                   Gait Training                        Modalities        CP prn               "

## 2023-05-02 ENCOUNTER — OFFICE VISIT (OUTPATIENT)
Dept: PHYSICAL THERAPY | Facility: HOME HEALTHCARE | Age: 73
End: 2023-05-02

## 2023-05-02 DIAGNOSIS — Z96.651 HISTORY OF TOTAL RIGHT KNEE REPLACEMENT: Primary | ICD-10-CM

## 2023-05-02 NOTE — PROGRESS NOTES
"Daily Note     Today's date: 2023  Patient name: Bi Mitchell  : 1950  MRN: [id number]  Referring provider: Terrie Joshi MD  Dx:   Encounter Diagnosis     ICD-10-CM    1  History of total right knee replacement  Z96 651                  Subjective:  Pt reports he doesn't have any pain this morning  Objective: See treatment diary below      Assessment: Tolerated treatment well  Pt with no c/o pain R knee t/o session  Pt progressing toward independent HEP  Patient would benefit from continued PT      Plan: Continue per plan of care  Possible D/C next session  Re-eval Date: 23     Precautions: none       Manuals    R knee flex/ext  10' 10' 10'   + Hamstring  10'  + hamstring  10'   R HS and calf                         Neuro Re-Ed     Bosu lunges         Balance as appropriate                         Ther Ex    NuStep   Sp02  HR L3 10'  97%  77bpm      L3 10'  Spo2 97%  HR 68 bpm L3 10 minutes  SpO2 98%  HR 76 bpm  L3 10'   Spo2 97%  HR 88 bpm L3  10'  99%  78 bpm   Standing hip flex/abd/ext 2#  1x10 perry 2# 1x10 Perry 2# x 20 perry  2# 1x20 ea Perry 1x20 ea  Wt NV ?    Squats 2x10 2x10 2 x 10  2x10  2x10   LAQ 2# 2x10  perry 2# 2x10 Perry 2# x 20  2# 2x10 2# 2x10  perry   HS curl Red 2x10  perry Red 2x10 Perry   Red 2x10  perry   Hip add 2x10 2x 10   2x10   Hip abd Red x20 Red x 20   Redx20   QS 5\" x20  W/heel slide 5\" x 20    5\" x20   SLR flex 2x10 2x10   2# x 20  2# 2x10 2x10   S/L SLR   X 20  X 20    Heel slides   flex 1x20  w/QS 1x20   w/QS   1x20   Clamshells        Bridges  NV  X 20  X 20                            Ther Activity -    4-17   Step-ups  Fwd/Lat C fwd  1x15 C Fwd   1x15 C Fwd & Lat  X 20  C Fwd/Lat 1x20 ea  C Fwd   1x15   Step-down 1x15 1x15    C 1x15                   Gait Training                        Modalities        CP prn                      "

## 2023-05-04 ENCOUNTER — OFFICE VISIT (OUTPATIENT)
Dept: PHYSICAL THERAPY | Facility: HOME HEALTHCARE | Age: 73
End: 2023-05-04

## 2023-05-04 DIAGNOSIS — Z96.651 HISTORY OF TOTAL RIGHT KNEE REPLACEMENT: Primary | ICD-10-CM

## 2023-05-04 NOTE — PROGRESS NOTES
"PT Discharge    Today's date: 2023  Patient name: Bren Tan  : 1950  MRN: [id number]  Referring provider: Jarad Claire MD  Dx:   Encounter Diagnosis     ICD-10-CM    1  History of total right knee replacement  Z96 651                      Assessment  Assessment details: Pt Chelsy Russ is a 81 y o  who presents to OPPT s/p a R TKR completed on 3/8/23  Pt presents with limited knee ROM, decreased LE strength, edema, and gait dysfunction  Pt is currently using AD for mobility and limited to ambulation tolerance < 20-30 minutes  Stair negotiation with non-reciprocal gait pattern and use of HR  No reported falls since discharge home  Pt would benefit from skilled therapy services to address outlined impairments, work towards goals, and restore pts PLOF  Thank you! UPDATE: pt demonstrating R knee strength and mobility WFL at this time  He has had no complaints of pain in the knee over the past 1-2 weeks and reports return to daily activities at home  He is able to ambulate without use of AD and can complete stair negotiation with reciprocal gait pattern  Goals have been met  Pt agreeable to DC from OPPT at this time with encouraged continuation with HEP for maintenance  Thank you  Understanding of Dx/Px/POC: good   Prognosis: good    Goals  STGs to be achieved in 4 weeks:  -Pt to demonstrate reduced subjective pain rating \"at worst\" by at least 2-3 points from Initial Eval to allow for reduced pain with ADLs and improved functional activity tolerance  - MET  -Pt to demonstrate improved R knee ROM > 5-10* in order to maximize joint mobility and function and allow for progression of exercise program and achievement of goals   - MET  -Pt to demonstrate increased MMT of R LE by at least 1/2-1 grade in order to improve safety and stability with ADLs and functional mobility  - MET    LTGs to be achieved in 6-8 weeks:  -Pt will be I with HEP in order to continue to improve quality of life and " independence and reduce risk for re-injury  - MET  -Pt to demonstrate return to activities of daily living without limiations or restrictions  - MET  -Pt able to ambulation > 30 minutes without use of AD for return to independent community activities  - MET  -Pt to demonstrate return to stair negotiation with reciprocal gait pattern to return to PLOF  _ MET  -Pt to demonstrate improved function as noted by achieving or exceeding predicted score on FOTO outcomes assessment tool  - MET      Plan  Plan details: DC from OPPT   Treatment plan discussed with: patient        Subjective Evaluation    History of Present Illness  Mechanism of injury: Pt reporting that the R knee was painful for several years  He had tried injections which only provided about 6-8 weeks relief and then pain would return  He underwent a R TKR completed on 3/8/23  He was discharged home and had home therapy services 3- 4 weeks with recent discharge  He followed up with MD on 4/3/23 and was given script to continue with OPPT for further improvement to overall mobility  UPDATE: pt reporting that he has been feeling good, feels ready to be done with therapy  Quality of life: good    Pain  At best pain ratin  At worst pain ratin  Quality: tight  Relieving factors: rest  Progression: resolved    Social Support  Steps to enter house: yes  Stairs in house: no   Lives in: multiple-level home  Lives with: alone    Employment status: not working    Diagnostic Tests  X-ray: normal  Treatments  Previous treatment: injection treatment  Current treatment: physical therapy  Patient Goals  Patient goals for therapy: decreased edema, decreased pain, improved balance, increased motion, increased strength and independence with ADLs/IADLs          Objective     Observations     Right Knee   Positive for incision  Negative for edema       Additional Observation Details  Incision clean and dry; no s/s of infection at present     Neurological Testing "    Sensation     Knee   Left Knee   Intact: light touch    Right Knee   Intact: light touch     Active Range of Motion   Left Knee   Flexion: 110 degrees   Extension: -10 degrees     Right Knee   Flexion: 105 degrees   Extension: -7 degrees     Strength/Myotome Testing     Left Knee   Flexion: 5  Extension: 5  Quadriceps contraction: good    Right Knee   Flexion: 5  Extension: 5  Quadriceps contraction: good    Ambulation   Weight-Bearing Status     Additional Weight-Bearing Status Details  No use of AD  Tolerance > 30 minutes     Ambulation: Stairs   Pattern: reciprocal  Railings: one rail  Pattern: reciprocal  Railings: one rail    Observational Gait   Walking speed and stride length within functional limits                Re-eval Date: 5/4/23  Precautions: none       Manuals 4-20 4/25 4/27 5/2 5/4   R knee flex/ext  10' 10' 10'   + Hamstring  10'  + hamstring     R HS and calf                         Neuro Re-Ed  4-20       Bosu lunges         Balance as appropriate                         Ther Ex 4-20       NuStep   Sp02  HR L3 10'  97%  77bpm      L3 10'  Spo2 97%  HR 68 bpm L3 10 minutes  SpO2 98%  HR 76 bpm  L3 10'   Spo2 97%  HR 88 bpm L3 10 minutes        Updated HEP and reviewed handouts including  following **   Standing hip flex/abd/ext 2#  1x10 perry 2# 1x10 Perry 2# x 20 perry  2# 1x20 ea Perry **   Squats 2x10 2x10 2 x 10  2x10  **   LAQ 2# 2x10  perry 2# 2x10 Perry 2# x 20  2# 2x10    HS curl Red 2x10  perry Red 2x10 Perry      Hip add 2x10 2x 10      Hip abd Red x20 Red x 20      QS 5\" x20  W/heel slide 5\" x 20       SLR flex 2x10 2x10   2# x 20  2# 2x10 **   S/L SLR   X 20  X 20 **   Heel slides   flex 1x20  w/QS 1x20   w/QS      Clamshells        Bridges  NV  X 20  X 20 **                           Ther Activity 4-20       Step-ups  Fwd/Lat C fwd  1x15 C Fwd   1x15 C Fwd & Lat  X 20  C Fwd/Lat 1x20 ea  **   Step-down 1x15 1x15                       Gait Training                        Modalities        CP prn     "

## 2023-05-05 ENCOUNTER — OFFICE VISIT (OUTPATIENT)
Dept: PODIATRY | Facility: CLINIC | Age: 73
End: 2023-05-05

## 2023-05-05 VITALS
BODY MASS INDEX: 36.12 KG/M2 | DIASTOLIC BLOOD PRESSURE: 75 MMHG | HEIGHT: 71 IN | WEIGHT: 258 LBS | SYSTOLIC BLOOD PRESSURE: 126 MMHG | HEART RATE: 51 BPM

## 2023-05-05 DIAGNOSIS — L85.1 ACQUIRED KERATODERMA: ICD-10-CM

## 2023-05-05 DIAGNOSIS — B35.1 ONYCHOMYCOSIS: ICD-10-CM

## 2023-05-05 DIAGNOSIS — E11.40 TYPE 2 DIABETES MELLITUS WITH DIABETIC NEUROPATHY, WITHOUT LONG-TERM CURRENT USE OF INSULIN (HCC): Primary | ICD-10-CM

## 2023-05-05 NOTE — PROGRESS NOTES
PATIENT:  Leonardo Bates  1950    ASSESSMENT/PLAN:  1  Type 2 diabetes mellitus with diabetic neuropathy, without long-term current use of insulin ([address] )        2  Onychomycosis  Debridement      3  Acquired keratoderma  Debridement            Orders Placed This Encounter   Procedures    Debridement        Disease prevention and related risk factors of diabetes were identified and discussed  The patient was educated in proper foot wear for diabetics  Also educated in daily foot assessment and routine diabetic foot care  The patient will follow up in 9 weeks for further diabetic foot exam and care  Procedure: All mycotic toenails were reduced and debrided in length, width, and girth using a nail nipper and dremel  All hyperkeratotic skin lesion(s) were sharply pared with a scalpel with no bleeding or evidence of ulceration  Patient tolerated procedure(s) well without complications  HPI:  Leonardo Bates is a 81 y  o year old male seen for diabetic foot exam   The patient has class findings with DPN  BS is under control  Complained of thick nails and callus  No acute pedal disorder or injury          PAST MEDICAL HISTORY:  Past Medical History:   Diagnosis Date    Asthma     resolved: 08/14/17    Closed fracture of fifth metacarpal bone of right hand     last assessed: 06/30/15    Deep vein thrombosis (HCC)     Diabetes mellitus ([address] )     Diverticulitis     Fracture of metacarpal shaft     Hemopneumothorax, left     last assessed: 06/02/15    Hyperlipidemia     Hypertension     Inguinal hernia     Lumbar transverse process fracture (HCC)     Pilonidal cyst     Pleural effusion     last assessed: 07/01/15    Rib fractures     last assessed: 06/02/15    Status post fall     Superficial thrombophlebitis of leg     last assessed: 03/19/14       PAST SURGICAL HISTORY:  Past Surgical History:   Procedure Laterality Date    COLON SURGERY      COLONOSCOPY      CORONARY ANGIOPLASTY WITH STENT PLACEMENT  2009    PTCA/RITA to RCA   [place] [place] FRACTURE SURGERY  05/04/2015    Closed treatment of fracture of metacarpal bone, right 5t metacarpal fracture Dr Kaylee Morales ARTHROSCOPY W/ MENISCAL REPAIR      Lateral meniscus    LAPAROSCOPY  05/18/2015    Exploratory, extensive lysis of adhesions Dr Mukesh Garduno      Surgical lysis of intestinal adhesions    RECTAL SURGERY      REVISION COLOSTOMY      THORACENTESIS  06/02/2015    with imaging guidance left, removed 1300cc of fluid w/o problem lower base of lun06    TONSILLECTOMY AND ADENOIDECTOMY          ALLERGIES:  Patient has no known allergies      MEDICATIONS:  Current Outpatient Medications   Medication Sig Dispense Refill    atorvastatin (LIPITOR) 40 mg tablet TAKE 1 TABLET BY MOUTH  DAILY 90 tablet 3    FIBER ADULT GUMMIES PO Take 2 tablets by mouth daily      furosemide (LASIX) 20 mg tablet TAKE 1 TABLET BY MOUTH  DAILY 90 tablet 3    metFORMIN (GLUCOPHAGE-XR) 500 mg 24 hr tablet TAKE 1 TABLET BY MOUTH  TWICE DAILY WITH MEALS 180 tablet 3    metoprolol tartrate (LOPRESSOR) 25 mg tablet TAKE 1 TABLET BY MOUTH  TWICE DAILY 180 tablet 3    potassium chloride (Klor-Con) 10 mEq tablet TAKE 1 TABLET BY MOUTH  DAILY 90 tablet 3    ramipril (ALTACE) 10 MG capsule TAKE 1 CAPSULE BY MOUTH  DAILY 90 capsule 3    chlorhexidine (Hibiclens) 4 % external liquid Hibiclens 4 % topical liquid   wash surgical site for 5 days prior to surgery (Patient not taking: Reported on 2/3/2023)      mupirocin (BACTROBAN) 2 % ointment  (Patient not taking: Reported on 2/22/2023)      nitroglycerin (NITROSTAT) 0 4 mg SL tablet Place 1 tablet (0 4 mg total) under the tongue every 5 (five) minutes as needed for chest pain (Patient not taking: Reported on 2/22/2023) 30 tablet 3    valACYclovir (VALTREX) 1,000 mg tablet Take 1 tablet (1,000 mg total) by mouth 3 (three) times a day for 7 days 21 tablet 0     No current facility-administered medications for this visit  SOCIAL HISTORY:  Social History     Socioeconomic History    Marital status: /Civil Union     Spouse name: None    Number of children: None    Years of education: None    Highest education level: None   Occupational History    Occupation: Retired   Tobacco Use    Smoking status: Former     Packs/day: 1 00     Years: 30 00     Pack years: 30 00     Types: Cigarettes     Quit date:      Years since quittin 9    Smokeless tobacco: Never   Vaping Use    Vaping Use: Never used   Substance and Sexual Activity    Alcohol use: Not Currently     Alcohol/week: 0 0 standard drinks     Comment: Social    Drug use: No    Sexual activity: Yes     Partners: Female     Birth control/protection: None   Other Topics Concern    None   Social History Narrative    Always uses seat belt    No living will     Social Determinants of Health     Financial Resource Strain: Low Risk     Difficulty of Paying Living Expenses: Not very hard   Food Insecurity: Not on file   Transportation Needs: No Transportation Needs    Lack of Transportation (Medical): No    Lack of Transportation (Non-Medical): No   Physical Activity: Not on file   Stress: Not on file   Social Connections: Not on file   Intimate Partner Violence: Not on file   Housing Stability: Not on file        REVIEW OF SYSTEMS:  GENERAL: NAD, afebrile  HEART: No chest pain, or palpitation  RESPIRATORY:  No acute SOB or cough  GI: No Nausea, vomit or diarrhea  NEUROLOGIC: No syncope or acute weakness    PHYSICAL EXAM:  VASCULAR EXAM  Dorsalis pedis  +1, Posterior tibial artery  absent  The patient has class findings with skin atrophy, lack of digital hair, and nail dystrophy  There is +1 lower extremity edema bilaterally  Venous stasis skin changes noted BLE  NEUROLOGIC EXAM  AAO X 3  Sensation is intact to light touch  No focal neurologic deficit  MMT intact            DERMATOLOGIC EXAM:   No ulcer  No cellulitis noted  The patient has hypertrophic toenails with discoloration, onycholysis, and subungal debris  No abscess  HPK X 1 right 2nd toe medially  MUSCULOSKELETAL EXAM:   No acute joint pain  No acute joint edema, or redness  No acute musculoskeletal problem  Patient has deformity including bunion and hammertoe

## 2023-05-12 DIAGNOSIS — N52.9 ERECTILE DYSFUNCTION, UNSPECIFIED ERECTILE DYSFUNCTION TYPE: Primary | ICD-10-CM

## 2023-05-12 RX ORDER — SILDENAFIL 25 MG/1
TABLET, FILM COATED ORAL
Qty: 10 TABLET | Refills: 0 | Status: SHIPPED | OUTPATIENT
Start: 2023-05-12

## 2023-06-18 DIAGNOSIS — I10 ESSENTIAL HYPERTENSION: ICD-10-CM

## 2023-06-19 RX ORDER — FUROSEMIDE 20 MG/1
TABLET ORAL
Qty: 90 TABLET | Refills: 3 | Status: SHIPPED | OUTPATIENT
Start: 2023-06-19

## 2023-06-20 DIAGNOSIS — R07.9 CHEST PAIN, UNSPECIFIED TYPE: Primary | ICD-10-CM

## 2023-06-20 RX ORDER — NITROGLYCERIN 0.4 MG/1
0.4 TABLET SUBLINGUAL
Qty: 20 TABLET | Refills: 0 | Status: SHIPPED | OUTPATIENT
Start: 2023-06-20

## 2023-07-14 ENCOUNTER — OFFICE VISIT (OUTPATIENT)
Dept: PODIATRY | Facility: CLINIC | Age: 73
End: 2023-07-14
Payer: MEDICARE

## 2023-07-14 VITALS
WEIGHT: 255 LBS | HEIGHT: 71 IN | BODY MASS INDEX: 35.7 KG/M2 | DIASTOLIC BLOOD PRESSURE: 70 MMHG | HEART RATE: 51 BPM | SYSTOLIC BLOOD PRESSURE: 108 MMHG

## 2023-07-14 DIAGNOSIS — B35.1 ONYCHOMYCOSIS: ICD-10-CM

## 2023-07-14 DIAGNOSIS — E11.40 TYPE 2 DIABETES MELLITUS WITH DIABETIC NEUROPATHY, WITHOUT LONG-TERM CURRENT USE OF INSULIN (HCC): Primary | ICD-10-CM

## 2023-07-14 PROCEDURE — 11721 DEBRIDE NAIL 6 OR MORE: CPT | Performed by: PODIATRIST

## 2023-07-14 NOTE — PROGRESS NOTES
PATIENT:  Jenny Benz  1950    ASSESSMENT/PLAN:  1. Type 2 diabetes mellitus with diabetic neuropathy, without long-term current use of insulin ([address])        2. Onychomycosis  Debridement            Orders Placed This Encounter   Procedures   • Debridement        Disease prevention and related risk factors of diabetes were identified and discussed. The patient was educated in proper foot wear for diabetics. Educated in daily foot assessment and routine diabetic foot care. The patient will follow up in 9 weeks for further diabetic foot exam and care. Procedure: All mycotic toenails were reduced and debrided in length, width, and girth using a nail nipper and dremel. Patient tolerated procedure(s) well without complications. HPI:  Jenny Benz is a 81 y. o.year old male seen for diabetic foot exam.  The patient has class findings with DPN. BS is under control. Complained of thick nails and callus. No acute pedal disorder.     PAST MEDICAL HISTORY:  Past Medical History:   Diagnosis Date   • Asthma     resolved: 08/14/17   • Closed fracture of fifth metacarpal bone of right hand     last assessed: 06/30/15   • Deep vein thrombosis (HCC)    • Diabetes mellitus ([address])    • Diverticulitis    • Fracture of metacarpal shaft    • Hemopneumothorax, left     last assessed: 06/02/15   • Hyperlipidemia    • Hypertension    • Inguinal hernia    • Lumbar transverse process fracture (HCC)    • Pilonidal cyst    • Pleural effusion     last assessed: 07/01/15   • Rib fractures     last assessed: 06/02/15   • Status post fall    • Superficial thrombophlebitis of leg     last assessed: 03/19/14       PAST SURGICAL HISTORY:  Past Surgical History:   Procedure Laterality Date   • COLON SURGERY     • COLONOSCOPY     • CORONARY ANGIOPLASTY WITH STENT PLACEMENT  2009    PTCA/RITA to RCA   • FRACTURE SURGERY  05/04/2015    Closed treatment of fracture of metacarpal bone, right 5t metacarpal fracture Dr. Sami Stevens • HERNIA REPAIR     • KNEE ARTHROSCOPY W/ MENISCAL REPAIR      Lateral meniscus   • LAPAROSCOPY  2015    Exploratory, extensive lysis of adhesions Dr. Richardson Lira   • OTHER SURGICAL HISTORY      Surgical lysis of intestinal adhesions   • RECTAL SURGERY     • REVISION COLOSTOMY     • THORACENTESIS  2015    with imaging guidance left, removed 1300cc of fluid w/o problem lower base of lun06   • TONSILLECTOMY AND ADENOIDECTOMY          ALLERGIES:  Patient has no known allergies. MEDICATIONS:  Current Outpatient Medications   Medication Sig Dispense Refill   • atorvastatin (LIPITOR) 40 mg tablet TAKE 1 TABLET BY MOUTH  DAILY 90 tablet 3   • FIBER ADULT GUMMIES PO Take 2 tablets by mouth daily     • furosemide (LASIX) 20 mg tablet TAKE 1 TABLET BY MOUTH  DAILY 90 tablet 3   • metFORMIN (GLUCOPHAGE-XR) 500 mg 24 hr tablet TAKE 1 TABLET BY MOUTH  TWICE DAILY WITH MEALS 180 tablet 3   • metoprolol tartrate (LOPRESSOR) 25 mg tablet TAKE 1 TABLET BY MOUTH  TWICE DAILY 180 tablet 3   • nitroglycerin (NITROSTAT) 0.4 mg SL tablet Place 1 tablet (0.4 mg total) under the tongue every 5 (five) minutes as needed for chest pain 20 tablet 0   • potassium chloride (Klor-Con) 10 mEq tablet TAKE 1 TABLET BY MOUTH  DAILY 90 tablet 3   • ramipril (ALTACE) 10 MG capsule TAKE 1 CAPSULE BY MOUTH  DAILY 90 capsule 3   • sildenafil (VIAGRA) 25 MG tablet 1 tablet 90 minutes before anticipated intercourse maximum dose 1  per 24 hours 10 tablet 0     No current facility-administered medications for this visit. SOCIAL HISTORY:  Social History     Socioeconomic History   • Marital status:       Spouse name: None   • Number of children: None   • Years of education: None   • Highest education level: None   Occupational History   • Occupation: Retired   Tobacco Use   • Smoking status: Former     Packs/day: 1.00     Years: 30.00     Total pack years: 30.00     Types: Cigarettes     Quit date:      Years since quittin. • Smokeless tobacco: Never   Vaping Use   • Vaping Use: Never used   Substance and Sexual Activity   • Alcohol use: Not Currently     Alcohol/week: 0.0 standard drinks of alcohol     Comment: Social   • Drug use: No   • Sexual activity: Yes     Partners: Female     Birth control/protection: None   Other Topics Concern   • None   Social History Narrative    Always uses seat belt    No living will     Social Determinants of Health     Financial Resource Strain: Low Risk  (11/27/2022)    Overall Financial Resource Strain (CARDIA)    • Difficulty of Paying Living Expenses: Not very hard   Food Insecurity: Not on file   Transportation Needs: No Transportation Needs (11/27/2022)    PRAPARE - Transportation    • Lack of Transportation (Medical): No    • Lack of Transportation (Non-Medical): No   Physical Activity: Not on file   Stress: Not on file   Social Connections: Not on file   Intimate Partner Violence: Not on file   Housing Stability: Not on file        REVIEW OF SYSTEMS:  GENERAL: NAD, afebrile  HEART: No chest pain, or palpitation  RESPIRATORY:  No acute SOB or cough  GI: No Nausea, vomit or diarrhea  NEUROLOGIC: No syncope or acute weakness    PHYSICAL EXAM:  VASCULAR EXAM  Dorsalis pedis  +1, Posterior tibial artery  absent. The patient has class findings with skin atrophy, lack of digital hair, and nail dystrophy. There is mild lower extremity edema bilaterally. Venous stasis skin changes noted BLE. NEUROLOGIC EXAM  AAO X 3. Sensation is intact to light touch. No focal neurologic deficit. MMT intact. DERMATOLOGIC EXAM:   No ulcer. No cellulitis noted. The patient has hypertrophic toenails with discoloration, onycholysis, and subungal debris. MUSCULOSKELETAL EXAM:   No acute joint pain. No acute joint edema, or redness. No acute musculoskeletal problem. Patient has deformity including bunion and hammertoe.

## 2023-08-02 ENCOUNTER — LAB (OUTPATIENT)
Dept: LAB | Facility: HOSPITAL | Age: 73
End: 2023-08-02
Payer: MEDICARE

## 2023-08-02 DIAGNOSIS — E11.8 TYPE 2 DIABETES MELLITUS WITH COMPLICATION (HCC): ICD-10-CM

## 2023-08-02 LAB
ANION GAP SERPL CALCULATED.3IONS-SCNC: 7 MMOL/L
BUN SERPL-MCNC: 13 MG/DL (ref 5–25)
CALCIUM SERPL-MCNC: 8.5 MG/DL (ref 8.4–10.2)
CHLORIDE SERPL-SCNC: 104 MMOL/L (ref 96–108)
CO2 SERPL-SCNC: 28 MMOL/L (ref 21–32)
CREAT SERPL-MCNC: 0.79 MG/DL (ref 0.6–1.3)
GFR SERPL CREATININE-BSD FRML MDRD: 90 ML/MIN/1.73SQ M
GLUCOSE P FAST SERPL-MCNC: 125 MG/DL (ref 65–99)
POTASSIUM SERPL-SCNC: 3.8 MMOL/L (ref 3.5–5.3)
SODIUM SERPL-SCNC: 139 MMOL/L (ref 135–147)

## 2023-08-02 PROCEDURE — 80048 BASIC METABOLIC PNL TOTAL CA: CPT

## 2023-08-02 PROCEDURE — 36415 COLL VENOUS BLD VENIPUNCTURE: CPT

## 2023-09-05 ENCOUNTER — RA CDI HCC (OUTPATIENT)
Dept: OTHER | Facility: HOSPITAL | Age: 73
End: 2023-09-05

## 2023-09-11 ENCOUNTER — LAB (OUTPATIENT)
Dept: LAB | Facility: HOSPITAL | Age: 73
End: 2023-09-11
Payer: MEDICARE

## 2023-09-11 ENCOUNTER — OFFICE VISIT (OUTPATIENT)
Dept: FAMILY MEDICINE CLINIC | Facility: CLINIC | Age: 73
End: 2023-09-11
Payer: MEDICARE

## 2023-09-11 VITALS
SYSTOLIC BLOOD PRESSURE: 174 MMHG | WEIGHT: 253 LBS | BODY MASS INDEX: 35.42 KG/M2 | TEMPERATURE: 97.6 F | OXYGEN SATURATION: 99 % | DIASTOLIC BLOOD PRESSURE: 98 MMHG | HEIGHT: 71 IN | HEART RATE: 63 BPM

## 2023-09-11 DIAGNOSIS — E11.8 TYPE 2 DIABETES MELLITUS WITH COMPLICATION (HCC): ICD-10-CM

## 2023-09-11 DIAGNOSIS — E03.9 ACQUIRED HYPOTHYROIDISM: ICD-10-CM

## 2023-09-11 DIAGNOSIS — I10 BENIGN ESSENTIAL HYPERTENSION: ICD-10-CM

## 2023-09-11 DIAGNOSIS — I25.118 CORONARY ARTERY DISEASE OF NATIVE ARTERY OF NATIVE HEART WITH STABLE ANGINA PECTORIS (HCC): ICD-10-CM

## 2023-09-11 DIAGNOSIS — N52.9 ERECTILE DYSFUNCTION, UNSPECIFIED ERECTILE DYSFUNCTION TYPE: ICD-10-CM

## 2023-09-11 DIAGNOSIS — E11.69 ERECTILE DYSFUNCTION ASSOCIATED WITH TYPE 2 DIABETES MELLITUS: ICD-10-CM

## 2023-09-11 DIAGNOSIS — Z23 NEED FOR INFLUENZA VACCINATION: Primary | ICD-10-CM

## 2023-09-11 DIAGNOSIS — N52.1 ERECTILE DYSFUNCTION ASSOCIATED WITH TYPE 2 DIABETES MELLITUS: ICD-10-CM

## 2023-09-11 LAB
ANION GAP SERPL CALCULATED.3IONS-SCNC: 6 MMOL/L
BUN SERPL-MCNC: 15 MG/DL (ref 5–25)
CALCIUM SERPL-MCNC: 8.8 MG/DL (ref 8.4–10.2)
CHLORIDE SERPL-SCNC: 108 MMOL/L (ref 96–108)
CHOLEST SERPL-MCNC: 124 MG/DL
CO2 SERPL-SCNC: 27 MMOL/L (ref 21–32)
CREAT SERPL-MCNC: 0.76 MG/DL (ref 0.6–1.3)
EST. AVERAGE GLUCOSE BLD GHB EST-MCNC: 140 MG/DL
GFR SERPL CREATININE-BSD FRML MDRD: 92 ML/MIN/1.73SQ M
GLUCOSE P FAST SERPL-MCNC: 154 MG/DL (ref 65–99)
HBA1C MFR BLD: 6.5 %
HDLC SERPL-MCNC: 48 MG/DL
LDLC SERPL CALC-MCNC: 65 MG/DL (ref 0–100)
NONHDLC SERPL-MCNC: 76 MG/DL
POTASSIUM SERPL-SCNC: 4 MMOL/L (ref 3.5–5.3)
PROLACTIN SERPL-MCNC: 6.7 NG/ML
SODIUM SERPL-SCNC: 141 MMOL/L (ref 135–147)
TRIGL SERPL-MCNC: 54 MG/DL
TSH SERPL DL<=0.05 MIU/L-ACNC: 1.63 UIU/ML (ref 0.45–4.5)

## 2023-09-11 PROCEDURE — 84270 ASSAY OF SEX HORMONE GLOBUL: CPT

## 2023-09-11 PROCEDURE — 84153 ASSAY OF PSA TOTAL: CPT

## 2023-09-11 PROCEDURE — G0008 ADMIN INFLUENZA VIRUS VAC: HCPCS | Performed by: FAMILY MEDICINE

## 2023-09-11 PROCEDURE — 99214 OFFICE O/P EST MOD 30 MIN: CPT | Performed by: FAMILY MEDICINE

## 2023-09-11 PROCEDURE — 83036 HEMOGLOBIN GLYCOSYLATED A1C: CPT | Performed by: FAMILY MEDICINE

## 2023-09-11 PROCEDURE — 84443 ASSAY THYROID STIM HORMONE: CPT | Performed by: FAMILY MEDICINE

## 2023-09-11 PROCEDURE — 84146 ASSAY OF PROLACTIN: CPT | Performed by: FAMILY MEDICINE

## 2023-09-11 PROCEDURE — 80061 LIPID PANEL: CPT | Performed by: FAMILY MEDICINE

## 2023-09-11 PROCEDURE — 84410 TESTOSTERONE BIOAVAILABLE: CPT | Performed by: FAMILY MEDICINE

## 2023-09-11 PROCEDURE — 84154 ASSAY OF PSA FREE: CPT

## 2023-09-11 PROCEDURE — 90662 IIV NO PRSV INCREASED AG IM: CPT | Performed by: FAMILY MEDICINE

## 2023-09-11 PROCEDURE — 36415 COLL VENOUS BLD VENIPUNCTURE: CPT

## 2023-09-11 PROCEDURE — 80048 BASIC METABOLIC PNL TOTAL CA: CPT

## 2023-09-11 RX ORDER — SILDENAFIL 100 MG/1
100 TABLET, FILM COATED ORAL DAILY PRN
Qty: 10 TABLET | Refills: 0 | Status: SHIPPED | OUTPATIENT
Start: 2023-09-11

## 2023-09-11 NOTE — PROGRESS NOTES
Assessment/Plan:labs drawn for ED    Problem List Items Addressed This Visit        Endocrine    Type 2 diabetes mellitus with complication [facility])       Cardiovascular and Mediastinum    Benign essential hypertension    Coronary artery disease of native artery of native heart with stable angina pectoris (HCC)    Relevant Medications    sildenafil (VIAGRA) 100 mg tablet   Other Visit Diagnoses     Need for influenza vaccination    -  Primary    Relevant Orders    influenza vaccine, high-dose, PF 0.7 mL (FLUZONE HIGH-DOSE) (Completed)    Erectile dysfunction, unspecified erectile dysfunction type        Relevant Medications    sildenafil (VIAGRA) 100 mg tablet    Other Relevant Orders    Hemoglobin A1C    PSA Total (Reflex To Free)    Testosterone, Free and Weakly Bound    TSH, 3rd generation with Free T4 reflex    Prolactin    Lipid panel    Basic metabolic panel    Sex Hormone Binding Globulin    Erectile dysfunction associated with type 2 diabetes mellitus (HCC)        Relevant Orders    Hemoglobin A1C    Acquired hypothyroidism        Relevant Orders    TSH, 3rd generation with Free T4 reflex           Diagnoses and all orders for this visit:    Need for influenza vaccination  -     influenza vaccine, high-dose, PF 0.7 mL (FLUZONE HIGH-DOSE)    Type 2 diabetes mellitus with complication (HCC)    Benign essential hypertension    Erectile dysfunction, unspecified erectile dysfunction type  -     Hemoglobin A1C  -     PSA Total (Reflex To Free)  -     Testosterone, Free and Weakly Bound  -     TSH, 3rd generation with Free T4 reflex  -     Prolactin  -     Lipid panel  -     Basic metabolic panel; Future  -     Sex Hormone Binding Globulin; Future  -     sildenafil (VIAGRA) 100 mg tablet;  Take 1 tablet (100 mg total) by mouth daily as needed for erectile dysfunction    Coronary artery disease of native artery of native heart with stable angina pectoris (HCC)    Erectile dysfunction associated with type 2 diabetes mellitus ([address])  -     Hemoglobin A1C    Acquired hypothyroidism  -     TSH, 3rd generation with Free T4 reflex        No problem-specific Assessment & Plan notes found for this encounter. Subjective:      Patient ID: Tony Gutierres is a 81 y.o. male. Follow-up regarding diabetes hemoglobin A1c is good he is dissatisfied with it but I think it is actually really good he is his foot exams are soon to be done his eye exam with Dr. Torres Halstead will soon be done not having any issues he is now started a walking program just got a new dog doing pretty well we will bump his dose of his sildenafil up the 25 mg dose was not effective also issued he is a warning to avoid nitroglycerin within 24 hours of Viagra dose and have ordered appropriate diagnostic studies      The following portions of the patient's history were reviewed and updated as appropriate:   He has a past medical history of Asthma, Closed fracture of fifth metacarpal bone of right hand, Deep vein thrombosis ([address]), Diabetes mellitus ([address]), Diverticulitis, Fracture of metacarpal shaft, Hemopneumothorax, left, Hyperlipidemia, Hypertension, Inguinal hernia, Lumbar transverse process fracture ([address]), Pilonidal cyst, Pleural effusion, Rib fractures, Status post fall, and Superficial thrombophlebitis of leg.,  does not have any pertinent problems on file. ,   has a past surgical history that includes Coronary angioplasty with stent (2009); Fracture surgery (05/04/2015); Revision Colostomy; LAPAROSCOPY (05/18/2015); Hernia repair; Knee arthroscopy w/ meniscal repair; Rectal surgery; Other surgical history; Thoracentesis (06/02/2015); Tonsillectomy and adenoidectomy; Colonoscopy; and Colon surgery. ,  family history includes Coronary artery disease in his mother; Heart disease in his father; Hyperlipidemia in his father. ,   reports that he quit smoking about 25 years ago. His smoking use included cigarettes. He has a 30.00 pack-year smoking history.  He has never used smokeless tobacco. He reports that he does not currently use alcohol. He reports that he does not use drugs. ,  has No Known Allergies. .  Current Outpatient Medications   Medication Sig Dispense Refill   • atorvastatin (LIPITOR) 40 mg tablet TAKE 1 TABLET BY MOUTH  DAILY 90 tablet 3   • FIBER ADULT GUMMIES PO Take 2 tablets by mouth daily     • furosemide (LASIX) 20 mg tablet TAKE 1 TABLET BY MOUTH  DAILY 90 tablet 3   • metFORMIN (GLUCOPHAGE-XR) 500 mg 24 hr tablet TAKE 1 TABLET BY MOUTH  TWICE DAILY WITH MEALS 180 tablet 3   • metoprolol tartrate (LOPRESSOR) 25 mg tablet TAKE 1 TABLET BY MOUTH  TWICE DAILY 180 tablet 3   • nitroglycerin (NITROSTAT) 0.4 mg SL tablet Place 1 tablet (0.4 mg total) under the tongue every 5 (five) minutes as needed for chest pain 20 tablet 0   • potassium chloride (Klor-Con) 10 mEq tablet TAKE 1 TABLET BY MOUTH  DAILY 90 tablet 3   • ramipril (ALTACE) 10 MG capsule TAKE 1 CAPSULE BY MOUTH  DAILY 90 capsule 3   • sildenafil (VIAGRA) 100 mg tablet Take 1 tablet (100 mg total) by mouth daily as needed for erectile dysfunction 10 tablet 0     No current facility-administered medications for this visit. Review of Systems   Constitutional: Negative for activity change, appetite change, diaphoresis, fatigue and fever. HENT: Negative. Eyes: Negative. Respiratory: Negative for apnea, cough, chest tightness, shortness of breath and wheezing. Cardiovascular: Negative for chest pain, palpitations and leg swelling. Gastrointestinal: Negative for abdominal distention, abdominal pain, anal bleeding, constipation, diarrhea, nausea and vomiting. Endocrine: Negative for cold intolerance, heat intolerance, polydipsia, polyphagia and polyuria. Genitourinary: Negative for difficulty urinating, dysuria, flank pain, hematuria and urgency. Musculoskeletal: Negative for arthralgias, back pain, gait problem, joint swelling and myalgias.    Skin: Negative for color change, rash and wound. Allergic/Immunologic: Negative for environmental allergies, food allergies and immunocompromised state. Neurological: Negative for dizziness, seizures, syncope, speech difficulty, numbness and headaches. Hematological: Negative for adenopathy. Does not bruise/bleed easily. Psychiatric/Behavioral: Negative for agitation, behavioral problems, hallucinations, sleep disturbance and suicidal ideas. Objective:  Vitals:    09/11/23 0701   BP: (!) 174/98   BP Location: Left arm   Patient Position: Sitting   Cuff Size: Standard   Pulse: 63   Temp: 97.6 °F (36.4 °C)   TempSrc: Temporal   SpO2: 99%   Weight: 115 kg (253 lb)   Height: 5' 11" (1.803 m)     Body mass index is 35.29 kg/m². Physical Exam  Constitutional:       General: He is not in acute distress. Appearance: He is well-developed. He is not diaphoretic. HENT:      Head: Normocephalic. Right Ear: External ear normal.      Left Ear: External ear normal.      Nose: Nose normal.   Eyes:      General: No scleral icterus. Right eye: No discharge. Left eye: No discharge. Conjunctiva/sclera: Conjunctivae normal.      Pupils: Pupils are equal, round, and reactive to light. Neck:      Thyroid: No thyromegaly. Trachea: No tracheal deviation. Cardiovascular:      Rate and Rhythm: Normal rate and regular rhythm. Pulses: no weak pulses          Dorsalis pedis pulses are 2+ on the right side and 2+ on the left side. Posterior tibial pulses are 2+ on the right side and 2+ on the left side. Heart sounds: Normal heart sounds. No murmur heard. No friction rub. No gallop. Pulmonary:      Effort: Pulmonary effort is normal. No respiratory distress. Breath sounds: Normal breath sounds. No wheezing. Abdominal:      General: Bowel sounds are normal.      Palpations: Abdomen is soft. There is no mass. Tenderness: There is no abdominal tenderness. There is no guarding.    Musculoskeletal: General: No deformity. Cervical back: Normal range of motion. Feet:      Right foot:      Skin integrity: No ulcer, skin breakdown, erythema, warmth, callus or dry skin. Left foot:      Skin integrity: No ulcer, skin breakdown, erythema, warmth, callus or dry skin. Lymphadenopathy:      Cervical: No cervical adenopathy. Skin:     General: Skin is warm and dry. Findings: No erythema or rash. Neurological:      Mental Status: He is alert and oriented to person, place, and time. Cranial Nerves: No cranial nerve deficit. Psychiatric:         Thought Content: Thought content normal.       Patient's shoes and socks removed. Right Foot/Ankle   Right Foot Inspection  Skin Exam: skin normal and skin intact. No dry skin, no warmth, no callus, no erythema, no maceration, no abnormal color, no pre-ulcer, no ulcer and no callus. Toe Exam: ROM and strength within normal limits. Sensory   Vibration: intact  Proprioception: intact  Monofilament testing: intact    Vascular  Capillary refills: < 3 seconds  The right DP pulse is 2+. The right PT pulse is 2+. Left Foot/Ankle  Left Foot Inspection  Skin Exam: skin normal and skin intact. No dry skin, no warmth, no erythema, no maceration, normal color, no pre-ulcer, no ulcer and no callus. Toe Exam: ROM and strength within normal limits. Sensory   Vibration: intact  Proprioception: intact  Monofilament testing: intact    Vascular  Capillary refills: < 3 seconds  The left DP pulse is 2+. The left PT pulse is 2+.      Assign Risk Category  No deformity present  No loss of protective sensation  No weak pulses  Risk: 0

## 2023-09-12 LAB
PSA FREE MFR SERPL: 26.7 %
PSA FREE SERPL-MCNC: 0.4 NG/ML
PSA SERPL-MCNC: 1.5 NG/ML (ref 0–4)
SHBG SERPL-SCNC: 43.3 NMOL/L (ref 19.3–76.4)

## 2023-09-14 LAB
DEPRECATED TESTOST FREE FR SERPL: 100.1 NG/DL (ref 40–250)
TESTOST SERPL-MCNC: 470 NG/DL (ref 264–916)
TESTOSTERONE.FREE+WB MFR SERPL: 21.3 % (ref 9–46)

## 2023-09-22 ENCOUNTER — OFFICE VISIT (OUTPATIENT)
Dept: PODIATRY | Facility: CLINIC | Age: 73
End: 2023-09-22
Payer: MEDICARE

## 2023-09-22 VITALS
SYSTOLIC BLOOD PRESSURE: 127 MMHG | HEIGHT: 71 IN | BODY MASS INDEX: 36.4 KG/M2 | DIASTOLIC BLOOD PRESSURE: 71 MMHG | HEART RATE: 48 BPM | WEIGHT: 260 LBS

## 2023-09-22 DIAGNOSIS — E11.40 TYPE 2 DIABETES MELLITUS WITH DIABETIC NEUROPATHY, WITHOUT LONG-TERM CURRENT USE OF INSULIN (HCC): Primary | ICD-10-CM

## 2023-09-22 DIAGNOSIS — B35.1 ONYCHOMYCOSIS: ICD-10-CM

## 2023-09-22 PROCEDURE — 11721 DEBRIDE NAIL 6 OR MORE: CPT | Performed by: PODIATRIST

## 2023-09-22 NOTE — PROGRESS NOTES
PATIENT:  Hannah Lundy  1950    ASSESSMENT/PLAN:  1. Type 2 diabetes mellitus with diabetic neuropathy, without long-term current use of insulin ([address])        2. Onychomycosis  Debridement            Orders Placed This Encounter   Procedures   • Debridement        Disease prevention and related risk factors of diabetes were identified and discussed. The patient was educated in proper foot wear for diabetics. Educated in daily foot assessment and routine diabetic foot care. The patient will follow up in 9 weeks for further diabetic foot exam and care. Procedure: All mycotic toenails were reduced and debrided in length, width, and girth using a nail nipper and dremel. Patient tolerated procedure(s) well without complications. HPI:  Hannah Lundy is a 81 y. o.year old male seen for diabetic foot exam.  The patient has class findings with DPN. BS is under control. Complained of thick nails and callus. No acute pedal disorder.     PAST MEDICAL HISTORY:  Past Medical History:   Diagnosis Date   • Asthma     resolved: 08/14/17   • Closed fracture of fifth metacarpal bone of right hand     last assessed: 06/30/15   • Deep vein thrombosis (HCC)    • Diabetes mellitus ([address])    • Diverticulitis    • Fracture of metacarpal shaft    • Hemopneumothorax, left     last assessed: 06/02/15   • Hyperlipidemia    • Hypertension    • Inguinal hernia    • Lumbar transverse process fracture (HCC)    • Pilonidal cyst    • Pleural effusion     last assessed: 07/01/15   • Rib fractures     last assessed: 06/02/15   • Status post fall    • Superficial thrombophlebitis of leg     last assessed: 03/19/14       PAST SURGICAL HISTORY:  Past Surgical History:   Procedure Laterality Date   • COLON SURGERY     • COLONOSCOPY     • CORONARY ANGIOPLASTY WITH STENT PLACEMENT  2009    PTCA/RITA to RCA   • FRACTURE SURGERY  05/04/2015    Closed treatment of fracture of metacarpal bone, right 5t metacarpal fracture Dr. Ramona Motta • HERNIA REPAIR     • KNEE ARTHROSCOPY W/ MENISCAL REPAIR      Lateral meniscus   • LAPAROSCOPY  2015    Exploratory, extensive lysis of adhesions Dr. John Dey   • OTHER SURGICAL HISTORY      Surgical lysis of intestinal adhesions   • RECTAL SURGERY     • REVISION COLOSTOMY     • THORACENTESIS  2015    with imaging guidance left, removed 1300cc of fluid w/o problem lower base of lun06   • TONSILLECTOMY AND ADENOIDECTOMY          ALLERGIES:  Patient has no known allergies. MEDICATIONS:  Current Outpatient Medications   Medication Sig Dispense Refill   • atorvastatin (LIPITOR) 40 mg tablet TAKE 1 TABLET BY MOUTH  DAILY 90 tablet 3   • FIBER ADULT GUMMIES PO Take 2 tablets by mouth daily     • furosemide (LASIX) 20 mg tablet TAKE 1 TABLET BY MOUTH  DAILY 90 tablet 3   • metFORMIN (GLUCOPHAGE-XR) 500 mg 24 hr tablet TAKE 1 TABLET BY MOUTH  TWICE DAILY WITH MEALS 180 tablet 3   • metoprolol tartrate (LOPRESSOR) 25 mg tablet TAKE 1 TABLET BY MOUTH  TWICE DAILY 180 tablet 3   • nitroglycerin (NITROSTAT) 0.4 mg SL tablet Place 1 tablet (0.4 mg total) under the tongue every 5 (five) minutes as needed for chest pain 20 tablet 0   • potassium chloride (Klor-Con) 10 mEq tablet TAKE 1 TABLET BY MOUTH  DAILY 90 tablet 3   • ramipril (ALTACE) 10 MG capsule TAKE 1 CAPSULE BY MOUTH  DAILY 90 capsule 3   • sildenafil (VIAGRA) 100 mg tablet Take 1 tablet (100 mg total) by mouth daily as needed for erectile dysfunction 10 tablet 0     No current facility-administered medications for this visit. SOCIAL HISTORY:  Social History     Socioeconomic History   • Marital status:       Spouse name: None   • Number of children: None   • Years of education: None   • Highest education level: None   Occupational History   • Occupation: Retired   Tobacco Use   • Smoking status: Former     Packs/day: 1.00     Years: 30.00     Total pack years: 30.00     Types: Cigarettes     Quit date:      Years since quittin.3 • Smokeless tobacco: Never   Vaping Use   • Vaping Use: Never used   Substance and Sexual Activity   • Alcohol use: Not Currently     Alcohol/week: 0.0 standard drinks of alcohol     Comment: Social   • Drug use: No   • Sexual activity: Yes     Partners: Female     Birth control/protection: None   Other Topics Concern   • None   Social History Narrative    Always uses seat belt    No living will     Social Determinants of Health     Financial Resource Strain: Low Risk  (11/27/2022)    Overall Financial Resource Strain (CARDIA)    • Difficulty of Paying Living Expenses: Not very hard   Food Insecurity: Not on file   Transportation Needs: No Transportation Needs (11/27/2022)    PRAPARE - Transportation    • Lack of Transportation (Medical): No    • Lack of Transportation (Non-Medical): No   Physical Activity: Not on file   Stress: Not on file   Social Connections: Not on file   Intimate Partner Violence: Not on file   Housing Stability: Not on file        REVIEW OF SYSTEMS:  GENERAL: NAD, afebrile  HEART: No chest pain, or palpitation  RESPIRATORY:  No acute SOB or cough  GI: No Nausea, vomit or diarrhea  NEUROLOGIC: No syncope or acute weakness    PHYSICAL EXAM:  VASCULAR EXAM  Dorsalis pedis  +1, Posterior tibial artery  absent. The patient has class findings with skin atrophy, lack of digital hair, and nail dystrophy. There is mild lower extremity edema bilaterally. Venous stasis skin changes noted BLE. NEUROLOGIC EXAM  AAO X 3. Sensation is intact to light touch. No focal neurologic deficit. MMT intact. DERMATOLOGIC EXAM:   No ulcer. No cellulitis noted. The patient has hypertrophic toenails with discoloration, onycholysis, and subungal debris. MUSCULOSKELETAL EXAM:   No acute joint pain. No acute joint edema, or redness. No acute musculoskeletal problem. Patient has deformity including bunion and hammertoe.

## 2023-09-28 DIAGNOSIS — E11.9 CONTROLLED TYPE 2 DIABETES MELLITUS WITHOUT COMPLICATION, WITHOUT LONG-TERM CURRENT USE OF INSULIN (HCC): ICD-10-CM

## 2023-09-29 RX ORDER — METFORMIN HYDROCHLORIDE 500 MG/1
TABLET, EXTENDED RELEASE ORAL
Qty: 180 TABLET | Refills: 3 | Status: SHIPPED | OUTPATIENT
Start: 2023-09-29

## 2023-10-16 DIAGNOSIS — Z12.11 SCREENING FOR COLON CANCER: Primary | ICD-10-CM

## 2023-10-27 ENCOUNTER — TELEPHONE (OUTPATIENT)
Age: 73
End: 2023-10-27

## 2023-10-27 NOTE — TELEPHONE ENCOUNTER
10/27/23  Screened by: Ruchi Luque    Referring Provider Dr. Brianne Wright    Pre- Screening: Yes    There is no height or weight on file to calculate BMI. Has patient been referred for a routine screening Colonoscopy? yes  Is the patient between 43-73 years old? yes      Previous Colonoscopy yes   If yes:    Date: 2020    Facility:     Reason:       SCHEDULING STAFF: If the patient is between 39yrs-51yrs, please advise patient to confirm benefits/coverage with their insurance company for a routine screening colonoscopy, some insurance carriers will only cover at [address] [place] [address] or older. If the patient is over 66years old, please schedule an office visit. Does the patient want to see a Gastroenterologist prior to their procedure OR are they having any GI symptoms? no    Has the patient been hospitalized or had abdominal surgery in the past 6 months? no    Does the patient use supplemental oxygen? no    Does the patient take Coumadin, Lovenox, Plavix, Elliquis, Xarelto, or other blood thinning medication? no    Has the patient had a stroke, cardiac event, or stent placed in the past year? no    PT PASSED OA    SCHEDULING STAFF: If patient answers NO to above questions, then schedule procedure. If patient answers YES to above questions, then schedule office appointment. If patient is between 45yrs - 49yrs, please advise patient that we will have to confirm benefits & coverage with their insurance company for a routine screening colonoscopy.

## 2023-10-27 NOTE — TELEPHONE ENCOUNTER
Scheduled date of EGD/colonoscopy (as of today): 12/08/2023  Physician performing EGD/colonoscopy: Dr. Curry Rob  Location of EGD/colonoscopy: [place]  Desired bowel prep reviewed with patient: NYDIA/LINSEY  Sent prep instructions via [employer]  Instructions reviewed with patient by: [facility]  Clearances: pt is diabetic, needs am apt

## 2023-11-28 ENCOUNTER — RA CDI HCC (OUTPATIENT)
Dept: OTHER | Facility: HOSPITAL | Age: 73
End: 2023-11-28

## 2023-12-01 ENCOUNTER — OFFICE VISIT (OUTPATIENT)
Dept: PODIATRY | Facility: CLINIC | Age: 73
End: 2023-12-01
Payer: MEDICARE

## 2023-12-01 VITALS — BODY MASS INDEX: 34.72 KG/M2 | WEIGHT: 248 LBS | HEIGHT: 71 IN

## 2023-12-01 DIAGNOSIS — B35.1 ONYCHOMYCOSIS: ICD-10-CM

## 2023-12-01 DIAGNOSIS — L85.1 ACQUIRED KERATODERMA: ICD-10-CM

## 2023-12-01 DIAGNOSIS — E11.40 TYPE 2 DIABETES MELLITUS WITH DIABETIC NEUROPATHY, WITHOUT LONG-TERM CURRENT USE OF INSULIN (HCC): Primary | ICD-10-CM

## 2023-12-01 PROCEDURE — 11721 DEBRIDE NAIL 6 OR MORE: CPT | Performed by: PODIATRIST

## 2023-12-01 PROCEDURE — 11056 PARNG/CUTG B9 HYPRKR LES 2-4: CPT | Performed by: PODIATRIST

## 2023-12-01 NOTE — PROGRESS NOTES
PATIENT:  Agueda Grullon  1950    ASSESSMENT/PLAN:  1. Type 2 diabetes mellitus with diabetic neuropathy, without long-term current use of insulin ([address])        2. Acquired keratoderma  Debridement      3. Onychomycosis  Debridement              Orders Placed This Encounter   Procedures    Debridement        Disease prevention and related risk factors of diabetes were identified and discussed. The patient was educated in proper foot wear for diabetics. Educated in daily foot assessment and routine diabetic foot care. Dispensed toe spacers. The patient will follow up in 9 weeks for further diabetic foot exam and care. Procedure: All mycotic toenails were reduced and debrided in length, width, and girth using a nail nipper and dremel. All hyperkeratotic skin lesion(s) were sharply pared with a scalpel with no bleeding or evidence of ulceration. Patient tolerated procedure(s) well without complications. HPI:  Agueda Grullon is a 81 y. o.year old male seen for diabetic foot exam.  The patient has class findings with DPN. BS is under control. Complained of thick nails and calluses in 1st interspace. No acute pedal disorder.     PAST MEDICAL HISTORY:  Past Medical History:   Diagnosis Date    Asthma     resolved: 08/14/17    Closed fracture of fifth metacarpal bone of right hand     last assessed: 06/30/15    Deep vein thrombosis (HCC)     Diabetes mellitus ([address])     Diverticulitis     Fracture of metacarpal shaft     Hemopneumothorax, left     last assessed: 06/02/15    Hyperlipidemia     Hypertension     Inguinal hernia     Lumbar transverse process fracture (HCC)     Pilonidal cyst     Pleural effusion     last assessed: 07/01/15    Rib fractures     last assessed: 06/02/15    Status post fall     Superficial thrombophlebitis of leg     last assessed: 03/19/14       PAST SURGICAL HISTORY:  Past Surgical History:   Procedure Laterality Date    COLON SURGERY      COLONOSCOPY      CORONARY ANGIOPLASTY WITH STENT PLACEMENT  2009    PTCA/RITA to RCA    FRACTURE SURGERY  05/04/2015    Closed treatment of fracture of metacarpal bone, right 5t metacarpal fracture Dr. Carmelina Cortes ARTHROSCOPY W/ MENISCAL REPAIR      Lateral meniscus    LAPAROSCOPY  05/18/2015    Exploratory, extensive lysis of adhesions Dr. Nassar Fess      Surgical lysis of intestinal adhesions    RECTAL SURGERY      REVISION COLOSTOMY      THORACENTESIS  06/02/2015    with imaging guidance left, removed 1300cc of fluid w/o problem lower base of lun06    TONSILLECTOMY AND ADENOIDECTOMY          ALLERGIES:  Patient has no known allergies. MEDICATIONS:  Current Outpatient Medications   Medication Sig Dispense Refill    atorvastatin (LIPITOR) 40 mg tablet TAKE 1 TABLET BY MOUTH  DAILY 90 tablet 3    FIBER ADULT GUMMIES PO Take 2 tablets by mouth daily      furosemide (LASIX) 20 mg tablet TAKE 1 TABLET BY MOUTH  DAILY 90 tablet 3    metFORMIN (GLUCOPHAGE-XR) 500 mg 24 hr tablet TAKE 1 TABLET BY MOUTH TWICE  DAILY WITH MEALS 180 tablet 3    metoprolol tartrate (LOPRESSOR) 25 mg tablet TAKE 1 TABLET BY MOUTH  TWICE DAILY 180 tablet 3    nitroglycerin (NITROSTAT) 0.4 mg SL tablet Place 1 tablet (0.4 mg total) under the tongue every 5 (five) minutes as needed for chest pain 20 tablet 0    potassium chloride (Klor-Con) 10 mEq tablet TAKE 1 TABLET BY MOUTH  DAILY 90 tablet 3    ramipril (ALTACE) 10 MG capsule TAKE 1 CAPSULE BY MOUTH  DAILY 90 capsule 3    sildenafil (VIAGRA) 100 mg tablet Take 1 tablet (100 mg total) by mouth daily as needed for erectile dysfunction 10 tablet 0     No current facility-administered medications for this visit. SOCIAL HISTORY:  Social History     Socioeconomic History    Marital status:       Spouse name: None    Number of children: None    Years of education: None    Highest education level: None   Occupational History    Occupation: Retired   Tobacco Use Smoking status: Former     Packs/day: 1.00     Years: 30.00     Total pack years: 30.00     Types: Cigarettes     Quit date: [address]     Years since quittin.5    Smokeless tobacco: Never   Vaping Use    Vaping Use: Never used   Substance and Sexual Activity    Alcohol use: Not Currently     Alcohol/week: 0.0 standard drinks of alcohol     Comment: Social    Drug use: No    Sexual activity: Yes     Partners: Female     Birth control/protection: None   Other Topics Concern    None   Social History Narrative    Always uses seat belt    No living will     Social Determinants of Health     Financial Resource Strain: Medium Risk (2023)    Overall Financial Resource Strain (CARDIA)     Difficulty of Paying Living Expenses: Somewhat hard   Food Insecurity: Not on file   Transportation Needs: Unmet Transportation Needs (2023)    PRAPARE - Transportation     Lack of Transportation (Medical): Yes     Lack of Transportation (Non-Medical): Yes   Physical Activity: Not on file   Stress: Not on file   Social Connections: Not on file   Intimate Partner Violence: Not on file   Housing Stability: Not on file        REVIEW OF SYSTEMS:  GENERAL: NAD, afebrile  HEART: No chest pain, or palpitation  RESPIRATORY:  No acute SOB or cough  GI: No Nausea, vomit or diarrhea  NEUROLOGIC: No syncope or acute weakness    PHYSICAL EXAM:  VASCULAR EXAM  Dorsalis pedis  +1, Posterior tibial artery  absent. The patient has class findings with skin atrophy, lack of digital hair, and nail dystrophy. There is mild lower extremity edema bilaterally. Venous stasis skin changes noted BLE. NEUROLOGIC EXAM  AAO X 3. Sensation is intact to light touch. No focal neurologic deficit. DERMATOLOGIC EXAM:   No ulcer. No cellulitis noted. The patient has hypertrophic toenails with discoloration, onycholysis, and subungal debris. HPK X 2 right medial 2nd toe and lateral 1st toe. MUSCULOSKELETAL EXAM:   No acute joint pain.   No acute joint edema, or redness. No acute musculoskeletal problem. Patient has deformity including bunion and hammertoe.

## 2023-12-02 DIAGNOSIS — I10 ESSENTIAL HYPERTENSION: ICD-10-CM

## 2023-12-04 ENCOUNTER — OFFICE VISIT (OUTPATIENT)
Dept: FAMILY MEDICINE CLINIC | Facility: CLINIC | Age: 73
End: 2023-12-04
Payer: MEDICARE

## 2023-12-04 VITALS
HEIGHT: 71 IN | OXYGEN SATURATION: 98 % | DIASTOLIC BLOOD PRESSURE: 88 MMHG | SYSTOLIC BLOOD PRESSURE: 180 MMHG | WEIGHT: 245 LBS | HEART RATE: 78 BPM | BODY MASS INDEX: 34.3 KG/M2 | TEMPERATURE: 96.6 F

## 2023-12-04 DIAGNOSIS — Z00.00 MEDICARE ANNUAL WELLNESS VISIT, SUBSEQUENT: Primary | ICD-10-CM

## 2023-12-04 DIAGNOSIS — I10 ESSENTIAL HYPERTENSION: ICD-10-CM

## 2023-12-04 PROCEDURE — G0439 PPPS, SUBSEQ VISIT: HCPCS | Performed by: FAMILY MEDICINE

## 2023-12-04 RX ORDER — AMLODIPINE BESYLATE 5 MG/1
5 TABLET ORAL DAILY
Qty: 30 TABLET | Refills: 5 | Status: SHIPPED | OUTPATIENT
Start: 2023-12-04 | End: 2023-12-06 | Stop reason: SDUPTHER

## 2023-12-04 NOTE — PROGRESS NOTES
Assessment and Plan:   Blood pressure is elevated will add 5 mg of amlodipine patient will check his blood pressures he will call us in 2 weeks with blood pressure readings  Problem List Items Addressed This Visit    None    BMI Counseling: Body mass index is 34.17 kg/m². The BMI is above normal. Nutrition recommendations include decreasing portion sizes, encouraging healthy choices of fruits and vegetables, moderation in carbohydrate intake and increasing intake of lean protein. Rationale for BMI follow-up plan is due to patient being overweight or obese. Depression Screening and Follow-up Plan: Patient was screened for depression during today's encounter. They screened negative with a PHQ-2 score of 0. Preventive health issues were discussed with patient, and age appropriate screening tests were ordered as noted in patient's After Visit Summary. Personalized health advice and appropriate referrals for health education or preventive services given if needed, as noted in patient's After Visit Summary. History of Present Illness:     Patient presents for a Medicare Wellness Visit    Mr. Enrique Wood is here for a follow-up visit this is also a Medicare subsequent well visit       Patient Care Team:  Colt Hernandez DO as PCP - General     Review of Systems:     Review of Systems   Constitutional:  Negative for activity change, appetite change, diaphoresis, fatigue and fever. HENT: Negative. Negative for ear discharge and hearing loss. Eyes:  Positive for visual disturbance. Patient wears corrective lenses he does not have glaucoma or macular degeneration   Respiratory:  Negative for apnea, cough, chest tightness, shortness of breath and wheezing. Cardiovascular:  Negative for chest pain, palpitations and leg swelling.         Single-vessel coronary disease follows with Dr. Arturo Hua   Gastrointestinal:  Negative for abdominal distention, abdominal pain, anal bleeding, constipation, diarrhea, nausea and vomiting. Has a scheduled colonoscopy January 19   Endocrine: Negative for cold intolerance, heat intolerance, polydipsia, polyphagia and polyuria. Blood sugars seem to be well-controlled   Genitourinary:  Negative for difficulty urinating, dysuria, flank pain, hematuria and urgency. Musculoskeletal:  Positive for arthralgias. Negative for back pain, gait problem, joint swelling and myalgias. Skin:  Negative for color change, rash and wound. Patient is experiencing bruising secondary to crêpe skin and a dog   Allergic/Immunologic: Negative for environmental allergies, food allergies and immunocompromised state. Neurological:  Negative for dizziness, seizures, syncope, speech difficulty, numbness and headaches. Hematological:  Negative for adenopathy. Does not bruise/bleed easily. Psychiatric/Behavioral:  Negative for agitation, behavioral problems, hallucinations, sleep disturbance and suicidal ideas.          Problem List:     Patient Active Problem List   Diagnosis    Benign essential hypertension    Coronary artery disease of native artery of native heart with stable angina pectoris (HCC)    Type 2 diabetes mellitus with complication (HCC)    Dyslipidemia    Class 2 severe obesity due to excess calories with serious comorbidity in adult [facility])    Sciatica    Presence of drug-eluting stent in right coronary artery    Embolism and thrombosis of arteries of the lower extremities ([address])      Past Medical and Surgical History:     Past Medical History:   Diagnosis Date    Asthma     resolved: 08/14/17    Closed fracture of fifth metacarpal bone of right hand     last assessed: 06/30/15    Deep vein thrombosis (HCC)     Diabetes mellitus ([address])     Diverticulitis     Fracture of metacarpal shaft     Hemopneumothorax, left     last assessed: 06/02/15    Hyperlipidemia     Hypertension     Inguinal hernia     Lumbar transverse process fracture (HCC)     Pilonidal cyst     Pleural effusion last assessed: 07/01/15    Rib fractures     last assessed: 06/02/15    Status post fall     Superficial thrombophlebitis of leg     last assessed: 14     Past Surgical History:   Procedure Laterality Date    COLON SURGERY      COLONOSCOPY      CORONARY ANGIOPLASTY WITH STENT PLACEMENT      PTCA/RITA to RCA    FRACTURE SURGERY  2015    Closed treatment of fracture of metacarpal bone, right 5t metacarpal fracture Dr. Stephen Melgar ARTHROSCOPY W/ MENISCAL REPAIR      Lateral meniscus    LAPAROSCOPY  2015    Exploratory, extensive lysis of adhesions Dr. Hua Feast HISTORY      Surgical lysis of intestinal adhesions    RECTAL SURGERY      REVISION COLOSTOMY      THORACENTESIS  2015    with imaging guidance left, removed 1300cc of fluid w/o problem lower base of lun06    TONSILLECTOMY AND ADENOIDECTOMY        Family History:     Family History   Problem Relation Age of Onset    Coronary artery disease Mother     Heart disease Father         Benign hypertensive    Hyperlipidemia Father       Social History:     Social History     Socioeconomic History    Marital status:       Spouse name: None    Number of children: None    Years of education: None    Highest education level: None   Occupational History    Occupation: Retired   Tobacco Use    Smoking status: Former     Packs/day: 1.00     Years: 30.00     Total pack years: 30.00     Types: Cigarettes     Quit date:      Years since quittin.5    Smokeless tobacco: Never   Vaping Use    Vaping Use: Never used   Substance and Sexual Activity    Alcohol use: Not Currently     Alcohol/week: 0.0 standard drinks of alcohol     Comment: Social    Drug use: No    Sexual activity: Yes     Partners: Female     Birth control/protection: None   Other Topics Concern    None   Social History Narrative    Always uses seat belt    No living will     Social Determinants of Health     Financial Resource Strain: Medium Risk (11/27/2023)    Overall Financial Resource Strain (CARDIA)     Difficulty of Paying Living Expenses: Somewhat hard   Food Insecurity: Not on file   Transportation Needs: Unmet Transportation Needs (11/27/2023)    PRAPARE - Transportation     Lack of Transportation (Medical): Yes     Lack of Transportation (Non-Medical): Yes   Physical Activity: Not on file   Stress: Not on file   Social Connections: Not on file   Intimate Partner Violence: Not on file   Housing Stability: Not on file      Medications and Allergies:     Current Outpatient Medications   Medication Sig Dispense Refill    atorvastatin (LIPITOR) 40 mg tablet TAKE 1 TABLET BY MOUTH  DAILY 90 tablet 3    FIBER ADULT GUMMIES PO Take 2 tablets by mouth daily      furosemide (LASIX) 20 mg tablet TAKE 1 TABLET BY MOUTH  DAILY 90 tablet 3    metFORMIN (GLUCOPHAGE-XR) 500 mg 24 hr tablet TAKE 1 TABLET BY MOUTH TWICE  DAILY WITH MEALS 180 tablet 3    metoprolol tartrate (LOPRESSOR) 25 mg tablet TAKE 1 TABLET BY MOUTH  TWICE DAILY 180 tablet 3    nitroglycerin (NITROSTAT) 0.4 mg SL tablet Place 1 tablet (0.4 mg total) under the tongue every 5 (five) minutes as needed for chest pain 20 tablet 0    potassium chloride (Klor-Con) 10 mEq tablet TAKE 1 TABLET BY MOUTH  DAILY 90 tablet 3    ramipril (ALTACE) 10 MG capsule TAKE 1 CAPSULE BY MOUTH  DAILY 90 capsule 3    sildenafil (VIAGRA) 100 mg tablet Take 1 tablet (100 mg total) by mouth daily as needed for erectile dysfunction 10 tablet 0     No current facility-administered medications for this visit.      No Known Allergies   Immunizations:     Immunization History   Administered Date(s) Administered    COVID-19 MODERNA VACC 0.5 ML IM 01/20/2021, 02/17/2021, 10/25/2021, 08/08/2022    INFLUENZA 10/31/2019, 10/08/2020, 10/25/2021, 08/15/2022    Influenza Quadrivalent Preservative Free 3 years and older IM 08/24/2015, 10/05/2016, 08/14/2017    Influenza Split High Dose Preservative Free IM 10/31/2019 Influenza, high dose seasonal 0.7 mL 10/24/2018, 10/08/2020, 09/11/2023    Influenza, seasonal, injectable 1950, 09/27/2012, 10/01/2013, 10/01/2014    Pneumococcal Conjugate 13-Valent 10/31/2019    Pneumococcal Polysaccharide PPV23 10/13/2013    Zoster 09/09/2015      Health Maintenance:         Topic Date Due    Colorectal Cancer Screening  10/09/2023    Hepatitis C Screening  Completed         Topic Date Due    Pneumococcal Vaccine: 65+ Years (3 - PPSV23 if available, else PCV20) 10/31/2020    COVID-19 Vaccine (5 - Miguel Angel Lav series) 10/03/2022      Medicare Screening Tests and Risk Assessments:     Escobar Encinas is here for his Subsequent Wellness visit. Health Risk Assessment:   Patient rates overall health as good. Patient feels that their physical health rating is same. Patient is satisfied with their life. Eyesight was rated as slightly worse. Hearing was rated as same. Patient feels that their emotional and mental health rating is slightly better. Patients states they are never, rarely angry. Patient states they are never, rarely unusually tired/fatigued. Pain experienced in the last 7 days has been none. Patient states that he has experienced no weight loss or gain in last 6 months. none    Depression Screening:   PHQ-2 Score: 0      Fall Risk Screening: In the past year, patient has experienced: history of falling in past year      Home Safety:  Patient does not have trouble with stairs inside or outside of their home. Patient has working smoke alarms and has working carbon monoxide detector. Home safety hazards include: none. Nutrition:   Current diet is Diabetic, No Added Salt and Limited junk food. Medications:   Patient is not currently taking any over-the-counter supplements. Patient is able to manage medications.      Activities of Daily Living (ADLs)/Instrumental Activities of Daily Living (IADLs):   Walk and transfer into and out of bed and chair?: Yes  Dress and groom yourself?: Yes Bathe or shower yourself?: Yes    Feed yourself? Yes  Do your laundry/housekeeping?: Yes  Manage your money, pay your bills and track your expenses?: Yes  Make your own meals?: Yes    Do your own shopping?: Yes    Previous Hospitalizations:   Any hospitalizations or ED visits within the last 12 months?: Yes    How many hospitalizations have you had in the last year?: 1-2    Advance Care Planning:   Living will: No    Durable POA for healthcare: No    Advanced directive: No    Advanced directive counseling given: Yes    Five wishes given: No    Patient declined ACP directive: No    End of Life Decisions reviewed with patient: Yes    Provider agrees with end of life decisions: Yes      Cognitive Screening:   Provider or family/friend/caregiver concerned regarding cognition?: No    PREVENTIVE SCREENINGS      Cardiovascular Screening:    General: Screening Not Indicated and History Lipid Disorder      Diabetes Screening:     General: Screening Not Indicated and History Diabetes      Colorectal Cancer Screening:     General: Screening Current      Prostate Cancer Screening:    General: Screening Current      Osteoporosis Screening:    General: Screening Not Indicated      Abdominal Aortic Aneurysm (AAA) Screening:    Risk factors include: age between 70-75 yo and tobacco use        Lung Cancer Screening:     General: Screening Not Indicated      Hepatitis C Screening:    General: Screening Current    Screening, Brief Intervention, and Referral to Treatment (SBIRT)    Screening  Typical number of drinks in a day: 0  Typical number of drinks in a week: 1  Interpretation: Low risk drinking behavior.     AUDIT-C Screenin) How often did you have a drink containing alcohol in the past year? 2 to 4 times a month  2) How many drinks did you have on a typical day when you were drinking in the past year? 0  3) How often did you have 6 or more drinks on one occasion in the past year? never    AUDIT-C Score: 2  Interpretation: Score 0-3 (male): Negative screen for alcohol misuse    Single Item Drug Screening:  How often have you used an illegal drug (including marijuana) or a prescription medication for non-medical reasons in the past year? never    Single Item Drug Screen Score: 0  Interpretation: Negative screen for possible drug use disorder    No results found. Physical Exam:     BP (!) 180/88 (BP Location: Left arm, Patient Position: Sitting, Cuff Size: Standard)   Pulse 78   Temp (!) 96.6 °F (35.9 °C) (Temporal)   Ht 5' 11" (1.803 m)   Wt 111 kg (245 lb)   SpO2 98%   BMI 34.17 kg/m²     Physical Exam  Constitutional:       Appearance: He is well-developed. HENT:      Head: Normocephalic and atraumatic. Right Ear: External ear normal.      Left Ear: External ear normal.      Nose: Nose normal.   Eyes:      Conjunctiva/sclera: Conjunctivae normal.      Pupils: Pupils are equal, round, and reactive to light. Cardiovascular:      Rate and Rhythm: Normal rate and regular rhythm. Pulses: no weak pulses          Dorsalis pedis pulses are 2+ on the right side and 2+ on the left side. Posterior tibial pulses are 2+ on the right side and 2+ on the left side. Heart sounds: Normal heart sounds. No murmur heard. No friction rub. Pulmonary:      Effort: Pulmonary effort is normal. No respiratory distress. Breath sounds: Normal breath sounds. No wheezing or rales. Chest:      Chest wall: No tenderness. Abdominal:      General: Bowel sounds are normal.      Palpations: Abdomen is soft. Musculoskeletal:         General: Normal range of motion. Cervical back: Normal range of motion and neck supple. Feet:      Right foot:      Skin integrity: No ulcer, skin breakdown, erythema, warmth, callus or dry skin. Left foot:      Skin integrity: No ulcer, skin breakdown, erythema, warmth, callus or dry skin. Skin:     General: Skin is warm and dry.       Capillary Refill: Capillary refill takes 2 to 3 seconds. Neurological:      Mental Status: He is alert and oriented to person, place, and time. Psychiatric:         Behavior: Behavior normal.         Thought Content: Thought content normal.         Judgment: Judgment normal.        Patient's shoes and socks removed. Right Foot/Ankle   Right Foot Inspection  Skin Exam: skin normal and skin intact. No dry skin, no warmth, no callus, no erythema, no maceration, no abnormal color, no pre-ulcer, no ulcer and no callus. Toe Exam: ROM and strength within normal limits. Sensory   Vibration: intact  Proprioception: intact  Monofilament testing: intact    Vascular  Capillary refills: < 3 seconds  The right DP pulse is 2+. The right PT pulse is 2+. Left Foot/Ankle  Left Foot Inspection  Skin Exam: skin normal and skin intact. No dry skin, no warmth, no erythema, no maceration, normal color, no pre-ulcer, no ulcer and no callus. Toe Exam: ROM and strength within normal limits. Sensory   Vibration: intact  Proprioception: intact  Monofilament testing: intact    Vascular  Capillary refills: < 3 seconds  The left DP pulse is 2+. The left PT pulse is 2+.      Assign Risk Category  No deformity present  No loss of protective sensation  No weak pulses  Risk: 0     Naheed Burroughs, DO

## 2023-12-04 NOTE — PATIENT INSTRUCTIONS
Medicare Preventive Visit Patient Instructions  Thank you for completing your Welcome to Medicare Visit or Medicare Annual Wellness Visit today. Your next wellness visit will be due in one year (12/4/2024). The screening/preventive services that you may require over the next 5-10 years are detailed below. Some tests may not apply to you based off risk factors and/or age. Screening tests ordered at today's visit but not completed yet may show as past due. Also, please note that scanned in results may not display below. Preventive Screenings:  Service Recommendations Previous Testing/Comments   Colorectal Cancer Screening  Colonoscopy    Fecal Occult Blood Test (FOBT)/Fecal Immunochemical Test (FIT)  Fecal DNA/Cologuard Test  Flexible Sigmoidoscopy Age: 43-73 years old   Colonoscopy: every 10 years (May be performed more frequently if at higher risk)  OR  FOBT/FIT: every 1 year  OR  Cologuard: every 3 years  OR  Sigmoidoscopy: every 5 years  Screening may be recommended earlier than age 39 if at higher risk for colorectal cancer. Also, an individualized decision between you and your healthcare provider will decide whether screening between the ages of 77-80 would be appropriate.  Colonoscopy: 10/09/2020  FOBT/FIT: Not on file  Cologuard: Not on file  Sigmoidoscopy: Not on file    Screening Current     Prostate Cancer Screening Individualized decision between patient and health care provider in men between ages of 53-66   Medicare will cover every 12 months beginning on the day after your 50th birthday PSA: 1.5 ng/mL     Screening Current     Hepatitis C Screening Once for adults born between 1945 and 1965  More frequently in patients at high risk for Hepatitis C Hep C Antibody: 12/05/2018    Screening Current   Diabetes Screening 1-2 times per year if you're at risk for diabetes or have pre-diabetes Fasting glucose: 154 mg/dL (9/11/2023)  A1C: 6.5 % (9/11/2023)  Screening Not Indicated  History Diabetes   Cholesterol Screening Once every 5 years if you don't have a lipid disorder. May order more often based on risk factors. Lipid panel: 09/11/2023  Screening Not Indicated  History Lipid Disorder      Other Preventive Screenings Covered by Medicare:  Abdominal Aortic Aneurysm (AAA) Screening: covered once if your at risk. You're considered to be at risk if you have a family history of AAA or a male between the age of 70-76 who smoking at least 100 cigarettes in your lifetime. Lung Cancer Screening: covers low dose CT scan once per year if you meet all of the following conditions: (1) Age 48-67; (2) No signs or symptoms of lung cancer; (3) Current smoker or have quit smoking within the last 15 years; (4) You have a tobacco smoking history of at least 20 pack years (packs per day x number of years you smoked); (5) You get a written order from a healthcare provider. Glaucoma Screening: covered annually if you're considered high risk: (1) You have diabetes OR (2) Family history of glaucoma OR (3)  aged 48 and older OR (3)  American aged 72 and older  Osteoporosis Screening: covered every 2 years if you meet one of the following conditions: (1) Have a vertebral abnormality; (2) On glucocorticoid therapy for more than 3 months; (3) Have primary hyperparathyroidism; (4) On osteoporosis medications and need to assess response to drug therapy. HIV Screening: covered annually if you're between the age of 14-79. Also covered annually if you are younger than 13 and older than 72 with risk factors for HIV infection. For pregnant patients, it is covered up to 3 times per pregnancy.     Immunizations:  Immunization Recommendations   Influenza Vaccine Annual influenza vaccination during flu season is recommended for all persons aged >= 6 months who do not have contraindications   Pneumococcal Vaccine   * Pneumococcal conjugate vaccine = PCV13 (Prevnar 13), PCV15 (Vaxneuvance), PCV20 (Prevnar 20)  * Pneumococcal polysaccharide vaccine = PPSV23 (Pneumovax) Adults 72-09 yo with certain risk factors or if 69+ yo  If never received any pneumonia vaccine: recommend Prevnar 20 (PCV20)  Give PCV20 if previously received 1 dose of PCV13 or PPSV23   Hepatitis B Vaccine 3 dose series if at intermediate or high risk (ex: diabetes, end stage renal disease, liver disease)   Respiratory syncytial virus (RSV) Vaccine - COVERED BY MEDICARE PART D  * RSVPreF3 (Arexvy) CDC recommends that adults 61years of age and older may receive a single dose of RSV vaccine using shared clinical decision-making (SCDM)   Tetanus (Td) Vaccine - COST NOT COVERED BY MEDICARE PART B Following completion of primary series, a booster dose should be given every 10 years to maintain immunity against tetanus. Td may also be given as tetanus wound prophylaxis. Tdap Vaccine - COST NOT COVERED BY MEDICARE PART B Recommended at least once for all adults. For pregnant patients, recommended with each pregnancy. Shingles Vaccine (Shingrix) - COST NOT COVERED BY MEDICARE PART B  2 shot series recommended in those 19 years and older who have or will have weakened immune systems or those 50 years and older     Health Maintenance Due:      Topic Date Due   • Colorectal Cancer Screening  10/09/2023   • Hepatitis C Screening  Completed     Immunizations Due:      Topic Date Due   • Pneumococcal Vaccine: 65+ Years (3 - PPSV23 if available, else PCV20) 10/31/2020   • COVID-19 Vaccine (5 - Moderna series) 10/03/2022     Advance Directives   What are advance directives? Advance directives are legal documents that state your wishes and plans for medical care. These plans are made ahead of time in case you lose your ability to make decisions for yourself. Advance directives can apply to any medical decision, such as the treatments you want, and if you want to donate organs. What are the types of advance directives?   There are many types of advance directives, and each state has rules about how to use them. You may choose a combination of any of the following:  Living will: This is a written record of the treatment you want. You can also choose which treatments you do not want, which to limit, and which to stop at a certain time. This includes surgery, medicine, IV fluid, and tube feedings. Durable power of  for [facility]): This is a written record that states who you want to make healthcare choices for you when you are unable to make them for yourself. This person, called a proxy, is usually a family member or a friend. You may choose more than 1 proxy. Do not resuscitate (DNR) order:  A DNR order is used in case your heart stops beating or you stop breathing. It is a request not to have certain forms of treatment, such as CPR. A DNR order may be included in other types of advance directives. Medical directive: This covers the care that you want if you are in a coma, near death, or unable to make decisions for yourself. You can list the treatments you want for each condition. Treatment may include pain medicine, surgery, blood transfusions, dialysis, IV or tube feedings, and a ventilator (breathing machine). Values history: This document has questions about your views, beliefs, and how you feel and think about life. This information can help others choose the care that you would choose. Why are advance directives important? An advance directive helps you control your care. Although spoken wishes may be used, it is better to have your wishes written down. Spoken wishes can be misunderstood, or not followed. Treatments may be given even if you do not want them. An advance directive may make it easier for your family to make difficult choices about your care. Fall Prevention    Fall prevention  includes ways to make your home and other areas safer. It also includes ways you can move more carefully to prevent a fall.  Health conditions that cause changes in your blood pressure, vision, or muscle strength and coordination may increase your risk for falls. Medicines may also increase your risk for falls if they make you dizzy, weak, or sleepy. Fall prevention tips:   Stand or sit up slowly. Use assistive devices as directed. Wear shoes that fit well and have soles that . Wear a personal alarm. Stay active. Manage your medical conditions. Home Safety Tips:  Add items to prevent falls in the bathroom. Keep paths clear. Install bright lights in your home. Keep items you use often on shelves within reach. Paint or place reflective tape on the edges of your stairs. Weight Management   Why it is important to manage your weight:  Being overweight increases your risk of health conditions such as heart disease, high blood pressure, type 2 diabetes, and certain types of cancer. It can also increase your risk for osteoarthritis, sleep apnea, and other respiratory problems. Aim for a slow, steady weight loss. Even a small amount of weight loss can lower your risk of health problems. How to lose weight safely:  A safe and healthy way to lose weight is to eat fewer calories and get regular exercise. You can lose up about 1 pound a week by decreasing the number of calories you eat by 500 calories each day. Healthy meal plan for weight management:  A healthy meal plan includes a variety of foods, contains fewer calories, and helps you stay healthy. A healthy meal plan includes the following:  Eat whole-grain foods more often. A healthy meal plan should contain fiber. Fiber is the part of grains, fruits, and vegetables that is not broken down by your body. Whole-grain foods are healthy and provide extra fiber in your diet. Some examples of whole-grain foods are whole-wheat breads and pastas, oatmeal, brown rice, and bulgur. Eat a variety of vegetables every day. Include dark, leafy greens such as spinach, kale, jhonathan greens, and mustard greens. Eat yellow and orange vegetables such as carrots, sweet potatoes, and winter squash. Eat a variety of fruits every day. Choose fresh or canned fruit (canned in its own juice or light syrup) instead of juice. Fruit juice has very little or no fiber. Eat low-fat dairy foods. Drink fat-free (skim) milk or 1% milk. Eat fat-free yogurt and low-fat cottage cheese. Try low-fat cheeses such as mozzarella and other reduced-fat cheeses. Choose meat and other protein foods that are low in fat. Choose beans or other legumes such as split peas or lentils. Choose fish, skinless poultry (chicken or turkey), or lean cuts of red meat (beef or pork). Before you cook meat or poultry, cut off any visible fat. Use less fat and oil. Try baking foods instead of frying them. Add less fat, such as margarine, sour cream, regular salad dressing and mayonnaise to foods. Eat fewer high-fat foods. Some examples of high-fat foods include french fries, doughnuts, ice cream, and cakes. Eat fewer sweets. Limit foods and drinks that are high in sugar. This includes candy, cookies, regular soda, and sweetened drinks. Exercise:  Exercise at least 30 minutes per day on most days of the week. Some examples of exercise include walking, biking, dancing, and swimming. You can also fit in more physical activity by taking the stairs instead of the elevator or parking farther away from stores. Ask your healthcare provider about the best exercise plan for you. © Copyright [employer] 2018 Information is for End User's use only and may not be sold, redistributed or otherwise used for commercial purposes.  All illustrations and images included in CareNotes® are the copyrighted property of [employer] or  [employer]

## 2023-12-06 DIAGNOSIS — I10 ESSENTIAL HYPERTENSION: ICD-10-CM

## 2023-12-06 RX ORDER — AMLODIPINE BESYLATE 5 MG/1
5 TABLET ORAL DAILY
Qty: 90 TABLET | Refills: 1 | Status: SHIPPED | OUTPATIENT
Start: 2023-12-06

## 2023-12-17 ENCOUNTER — APPOINTMENT (EMERGENCY)
Dept: CT IMAGING | Facility: HOSPITAL | Age: 73
End: 2023-12-17
Payer: MEDICARE

## 2023-12-17 ENCOUNTER — HOSPITAL ENCOUNTER (OUTPATIENT)
Facility: HOSPITAL | Age: 73
Setting detail: OBSERVATION
Discharge: HOME | End: 2023-12-18
Attending: EMERGENCY MEDICINE | Admitting: INTERNAL MEDICINE
Payer: MEDICARE

## 2023-12-17 ENCOUNTER — APPOINTMENT (EMERGENCY)
Dept: RADIOLOGY | Facility: HOSPITAL | Age: 73
End: 2023-12-17
Payer: MEDICARE

## 2023-12-17 DIAGNOSIS — F10.920 ALCOHOLIC INTOXICATION WITHOUT COMPLICATION (HCC): ICD-10-CM

## 2023-12-17 DIAGNOSIS — H10.30 ACUTE BACTERIAL CONJUNCTIVITIS: ICD-10-CM

## 2023-12-17 DIAGNOSIS — R47.9 SPEECH DISTURBANCE, UNSPECIFIED TYPE: ICD-10-CM

## 2023-12-17 DIAGNOSIS — F10.929 ALCOHOL INTOXICATION (HCC): ICD-10-CM

## 2023-12-17 DIAGNOSIS — R29.90 STROKE-LIKE SYMPTOMS: Primary | ICD-10-CM

## 2023-12-17 DIAGNOSIS — Z91.89 AT RISK FOR STROKE: ICD-10-CM

## 2023-12-17 PROBLEM — E87.6 HYPOKALEMIA: Status: ACTIVE | Noted: 2023-12-17

## 2023-12-17 LAB
2HR DELTA HS TROPONIN: 0 NG/L
4HR DELTA HS TROPONIN: 0 NG/L
ANION GAP SERPL CALCULATED.3IONS-SCNC: 9 MMOL/L
APAP SERPL-MCNC: <2 UG/ML (ref 10–20)
APTT PPP: 25 SECONDS (ref 23–37)
BETA-HYDROXYBUTYRATE: 0.1 MMOL/L
BUN SERPL-MCNC: 16 MG/DL (ref 5–25)
CALCIUM SERPL-MCNC: 8.3 MG/DL (ref 8.4–10.2)
CARDIAC TROPONIN I PNL SERPL HS: 6 NG/L
CHLORIDE SERPL-SCNC: 105 MMOL/L (ref 96–108)
CO2 SERPL-SCNC: 25 MMOL/L (ref 21–32)
CREAT SERPL-MCNC: 0.84 MG/DL (ref 0.6–1.3)
ERYTHROCYTE [DISTWIDTH] IN BLOOD BY AUTOMATED COUNT: 12.7 % (ref 11.6–15.1)
ETHANOL SERPL-MCNC: 180 MG/DL
FLUAV RNA RESP QL NAA+PROBE: NEGATIVE
FLUBV RNA RESP QL NAA+PROBE: NEGATIVE
GFR SERPL CREATININE-BSD FRML MDRD: 88 ML/MIN/1.73SQ M
GLUCOSE SERPL-MCNC: 121 MG/DL (ref 65–140)
GLUCOSE SERPL-MCNC: 136 MG/DL (ref 65–140)
GLUCOSE SERPL-MCNC: 139 MG/DL (ref 65–140)
GLUCOSE SERPL-MCNC: 152 MG/DL (ref 65–140)
HCT VFR BLD AUTO: 45.9 % (ref 36.5–49.3)
HGB BLD-MCNC: 14.9 G/DL (ref 12–17)
INR PPP: 0.93 (ref 0.84–1.19)
MCH RBC QN AUTO: 32.3 PG (ref 26.8–34.3)
MCHC RBC AUTO-ENTMCNC: 32.5 G/DL (ref 31.4–37.4)
MCV RBC AUTO: 99 FL (ref 82–98)
PLATELET # BLD AUTO: 186 THOUSANDS/UL (ref 149–390)
PLATELET # BLD AUTO: 191 THOUSANDS/UL (ref 149–390)
PMV BLD AUTO: 10.6 FL (ref 8.9–12.7)
PMV BLD AUTO: 10.9 FL (ref 8.9–12.7)
POTASSIUM SERPL-SCNC: 3.3 MMOL/L (ref 3.5–5.3)
PROTHROMBIN TIME: 12.4 SECONDS (ref 11.6–14.5)
RBC # BLD AUTO: 4.62 MILLION/UL (ref 3.88–5.62)
RSV RNA RESP QL NAA+PROBE: NEGATIVE
SALICYLATES SERPL-MCNC: <5 MG/DL (ref 3–20)
SARS-COV-2 RNA RESP QL NAA+PROBE: NEGATIVE
SODIUM SERPL-SCNC: 139 MMOL/L (ref 135–147)
WBC # BLD AUTO: 7.43 THOUSAND/UL (ref 4.31–10.16)

## 2023-12-17 PROCEDURE — 99285 EMERGENCY DEPT VISIT HI MDM: CPT

## 2023-12-17 PROCEDURE — 84484 ASSAY OF TROPONIN QUANT: CPT | Performed by: PHYSICIAN ASSISTANT

## 2023-12-17 PROCEDURE — 93005 ELECTROCARDIOGRAM TRACING: CPT

## 2023-12-17 PROCEDURE — 82948 REAGENT STRIP/BLOOD GLUCOSE: CPT

## 2023-12-17 PROCEDURE — 36415 COLL VENOUS BLD VENIPUNCTURE: CPT | Performed by: EMERGENCY MEDICINE

## 2023-12-17 PROCEDURE — 85610 PROTHROMBIN TIME: CPT | Performed by: EMERGENCY MEDICINE

## 2023-12-17 PROCEDURE — 85027 COMPLETE CBC AUTOMATED: CPT | Performed by: EMERGENCY MEDICINE

## 2023-12-17 PROCEDURE — 82077 ASSAY SPEC XCP UR&BREATH IA: CPT | Performed by: EMERGENCY MEDICINE

## 2023-12-17 PROCEDURE — 70498 CT ANGIOGRAPHY NECK: CPT

## 2023-12-17 PROCEDURE — 85730 THROMBOPLASTIN TIME PARTIAL: CPT | Performed by: EMERGENCY MEDICINE

## 2023-12-17 PROCEDURE — 99285 EMERGENCY DEPT VISIT HI MDM: CPT | Performed by: EMERGENCY MEDICINE

## 2023-12-17 PROCEDURE — 71045 X-RAY EXAM CHEST 1 VIEW: CPT

## 2023-12-17 PROCEDURE — 80143 DRUG ASSAY ACETAMINOPHEN: CPT | Performed by: EMERGENCY MEDICINE

## 2023-12-17 PROCEDURE — 0241U HB NFCT DS VIR RESP RNA 4 TRGT: CPT | Performed by: EMERGENCY MEDICINE

## 2023-12-17 PROCEDURE — 70496 CT ANGIOGRAPHY HEAD: CPT

## 2023-12-17 PROCEDURE — 84484 ASSAY OF TROPONIN QUANT: CPT | Performed by: EMERGENCY MEDICINE

## 2023-12-17 PROCEDURE — 82010 KETONE BODYS QUAN: CPT | Performed by: EMERGENCY MEDICINE

## 2023-12-17 PROCEDURE — 85049 AUTOMATED PLATELET COUNT: CPT | Performed by: PHYSICIAN ASSISTANT

## 2023-12-17 PROCEDURE — 80048 BASIC METABOLIC PNL TOTAL CA: CPT | Performed by: EMERGENCY MEDICINE

## 2023-12-17 PROCEDURE — 80179 DRUG ASSAY SALICYLATE: CPT | Performed by: EMERGENCY MEDICINE

## 2023-12-17 PROCEDURE — 99222 1ST HOSP IP/OBS MODERATE 55: CPT | Performed by: PHYSICIAN ASSISTANT

## 2023-12-17 RX ORDER — FUROSEMIDE 20 MG/1
20 TABLET ORAL DAILY
Status: DISCONTINUED | OUTPATIENT
Start: 2023-12-18 | End: 2023-12-18 | Stop reason: HOSPADM

## 2023-12-17 RX ORDER — ACETAMINOPHEN 325 MG/1
650 TABLET ORAL EVERY 6 HOURS PRN
Status: DISCONTINUED | OUTPATIENT
Start: 2023-12-17 | End: 2023-12-18 | Stop reason: HOSPADM

## 2023-12-17 RX ORDER — INSULIN LISPRO 100 [IU]/ML
1-6 INJECTION, SOLUTION INTRAVENOUS; SUBCUTANEOUS
Status: DISCONTINUED | OUTPATIENT
Start: 2023-12-18 | End: 2023-12-18 | Stop reason: HOSPADM

## 2023-12-17 RX ORDER — ATORVASTATIN CALCIUM 40 MG/1
40 TABLET, FILM COATED ORAL
Status: DISCONTINUED | OUTPATIENT
Start: 2023-12-18 | End: 2023-12-18 | Stop reason: HOSPADM

## 2023-12-17 RX ORDER — AMLODIPINE BESYLATE 5 MG/1
5 TABLET ORAL DAILY
Status: DISCONTINUED | OUTPATIENT
Start: 2023-12-18 | End: 2023-12-18 | Stop reason: HOSPADM

## 2023-12-17 RX ORDER — INSULIN LISPRO 100 [IU]/ML
1-5 INJECTION, SOLUTION INTRAVENOUS; SUBCUTANEOUS
Status: DISCONTINUED | OUTPATIENT
Start: 2023-12-17 | End: 2023-12-18 | Stop reason: HOSPADM

## 2023-12-17 RX ORDER — HEPARIN SODIUM 5000 [USP'U]/ML
5000 INJECTION, SOLUTION INTRAVENOUS; SUBCUTANEOUS EVERY 8 HOURS SCHEDULED
Status: DISCONTINUED | OUTPATIENT
Start: 2023-12-17 | End: 2023-12-18 | Stop reason: HOSPADM

## 2023-12-17 RX ORDER — POTASSIUM CHLORIDE 750 MG/1
10 TABLET, EXTENDED RELEASE ORAL DAILY
Status: DISCONTINUED | OUTPATIENT
Start: 2023-12-18 | End: 2023-12-18 | Stop reason: HOSPADM

## 2023-12-17 RX ORDER — LISINOPRIL 20 MG/1
40 TABLET ORAL DAILY
Status: DISCONTINUED | OUTPATIENT
Start: 2023-12-18 | End: 2023-12-18 | Stop reason: HOSPADM

## 2023-12-17 RX ORDER — POTASSIUM CHLORIDE 20 MEQ/1
20 TABLET, EXTENDED RELEASE ORAL ONCE
Status: COMPLETED | OUTPATIENT
Start: 2023-12-17 | End: 2023-12-17

## 2023-12-17 RX ADMIN — SODIUM CHLORIDE 500 ML: 0.9 INJECTION, SOLUTION INTRAVENOUS at 20:49

## 2023-12-17 RX ADMIN — METOPROLOL TARTRATE 25 MG: 25 TABLET, FILM COATED ORAL at 20:40

## 2023-12-17 RX ADMIN — IOHEXOL 100 ML: 350 INJECTION, SOLUTION INTRAVENOUS at 17:54

## 2023-12-17 RX ADMIN — POTASSIUM CHLORIDE 20 MEQ: 1500 TABLET, EXTENDED RELEASE ORAL at 20:40

## 2023-12-17 RX ADMIN — HEPARIN SODIUM 5000 UNITS: 5000 INJECTION INTRAVENOUS; SUBCUTANEOUS at 22:11

## 2023-12-17 NOTE — QUICK NOTE
Stroke alert called: 548 PM  Neurology response immediate via phone  LKW : approximately 4 PM  NIHSS 0 now per ED exam however per patient's son- patient with altered speech and more confused with responses prior.  ? Alcohol toxicity related symptoms as patient reports drinking more alcohol recently- ethanol level pending    CT H no contrast not acute  CTA  H/N no LVO - EMI atherosclerotic calcified plaque noted- reviewed personally in PACS and with radiology    At this time cannot rule out TIA, thus recommend admit under TIA/stroke protocol for now  ASA and Plavix 300 load and atorvastatin  MRI brain routine  Permissive HTN for now SBP > 140 to 220 max ideally x 24 hours  Pending ethanol levels if significantly high and patients symptoms resolve with medical management/ clinical course, can decide if stroke protocol can be discontinued if appropriate at a later time    No TNKtPA as NIHSS 0    Further medical management per primary team  D/w ED at time of alert

## 2023-12-18 ENCOUNTER — TRANSITIONAL CARE MANAGEMENT (OUTPATIENT)
Dept: FAMILY MEDICINE CLINIC | Facility: CLINIC | Age: 73
End: 2023-12-18

## 2023-12-18 ENCOUNTER — APPOINTMENT (OUTPATIENT)
Dept: NON INVASIVE DIAGNOSTICS | Facility: HOSPITAL | Age: 73
End: 2023-12-18
Payer: MEDICARE

## 2023-12-18 ENCOUNTER — APPOINTMENT (OUTPATIENT)
Dept: MRI IMAGING | Facility: HOSPITAL | Age: 73
End: 2023-12-18
Payer: MEDICARE

## 2023-12-18 VITALS
WEIGHT: 235.89 LBS | RESPIRATION RATE: 15 BRPM | DIASTOLIC BLOOD PRESSURE: 60 MMHG | HEART RATE: 56 BPM | SYSTOLIC BLOOD PRESSURE: 158 MMHG | OXYGEN SATURATION: 94 % | TEMPERATURE: 98.3 F | BODY MASS INDEX: 33.02 KG/M2 | HEIGHT: 71 IN

## 2023-12-18 LAB
ALBUMIN SERPL BCP-MCNC: 3.5 G/DL (ref 3.5–5)
ALP SERPL-CCNC: 77 U/L (ref 34–104)
ALT SERPL W P-5'-P-CCNC: 11 U/L (ref 7–52)
ANION GAP SERPL CALCULATED.3IONS-SCNC: 10 MMOL/L
APICAL FOUR CHAMBER EJECTION FRACTION: 57 %
AST SERPL W P-5'-P-CCNC: 15 U/L (ref 13–39)
BASOPHILS # BLD AUTO: 0.02 THOUSANDS/ÂΜL (ref 0–0.1)
BASOPHILS NFR BLD AUTO: 0 % (ref 0–1)
BILIRUB SERPL-MCNC: 0.97 MG/DL (ref 0.2–1)
BUN SERPL-MCNC: 11 MG/DL (ref 5–25)
CALCIUM SERPL-MCNC: 8.5 MG/DL (ref 8.4–10.2)
CHLORIDE SERPL-SCNC: 107 MMOL/L (ref 96–108)
CHOLEST SERPL-MCNC: 126 MG/DL
CO2 SERPL-SCNC: 24 MMOL/L (ref 21–32)
CREAT SERPL-MCNC: 0.68 MG/DL (ref 0.6–1.3)
E WAVE DECELERATION TIME: 214 MS
E/A RATIO: 1
EOSINOPHIL # BLD AUTO: 0.11 THOUSAND/ÂΜL (ref 0–0.61)
EOSINOPHIL NFR BLD AUTO: 2 % (ref 0–6)
ERYTHROCYTE [DISTWIDTH] IN BLOOD BY AUTOMATED COUNT: 12.7 % (ref 11.6–15.1)
EST. AVERAGE GLUCOSE BLD GHB EST-MCNC: 137 MG/DL
ETHANOL SERPL-MCNC: <10 MG/DL
GFR SERPL CREATININE-BSD FRML MDRD: 96 ML/MIN/1.73SQ M
GLUCOSE P FAST SERPL-MCNC: 116 MG/DL (ref 65–99)
GLUCOSE SERPL-MCNC: 108 MG/DL (ref 65–140)
GLUCOSE SERPL-MCNC: 116 MG/DL (ref 65–140)
GLUCOSE SERPL-MCNC: 134 MG/DL (ref 65–140)
HBA1C MFR BLD: 6.4 %
HCT VFR BLD AUTO: 44.1 % (ref 36.5–49.3)
HDLC SERPL-MCNC: 58 MG/DL
HGB BLD-MCNC: 14.5 G/DL (ref 12–17)
IMM GRANULOCYTES # BLD AUTO: 0.01 THOUSAND/UL (ref 0–0.2)
IMM GRANULOCYTES NFR BLD AUTO: 0 % (ref 0–2)
LAAS-AP2: 30 CM2
LAAS-AP4: 22.2 CM2
LDLC SERPL CALC-MCNC: 55 MG/DL (ref 0–100)
LEFT ATRIUM VOLUME (MOD BIPLANE): 110 ML
LEFT ATRIUM VOLUME INDEX (MOD BIPLANE): 48.7 ML/M2
LYMPHOCYTES # BLD AUTO: 1.7 THOUSANDS/ÂΜL (ref 0.6–4.47)
LYMPHOCYTES NFR BLD AUTO: 27 % (ref 14–44)
MAGNESIUM SERPL-MCNC: 1.8 MG/DL (ref 1.9–2.7)
MCH RBC QN AUTO: 32.5 PG (ref 26.8–34.3)
MCHC RBC AUTO-ENTMCNC: 32.9 G/DL (ref 31.4–37.4)
MCV RBC AUTO: 99 FL (ref 82–98)
MONOCYTES # BLD AUTO: 0.51 THOUSAND/ÂΜL (ref 0.17–1.22)
MONOCYTES NFR BLD AUTO: 8 % (ref 4–12)
MV E'TISSUE VEL-LAT: 15 CM/S
MV E'TISSUE VEL-SEP: 9 CM/S
MV PEAK A VEL: 0.75 M/S
MV PEAK E VEL: 75 CM/S
MV STENOSIS PRESSURE HALF TIME: 62 MS
MV VALVE AREA P 1/2 METHOD: 3.55
NEUTROPHILS # BLD AUTO: 3.87 THOUSANDS/ÂΜL (ref 1.85–7.62)
NEUTS SEG NFR BLD AUTO: 63 % (ref 43–75)
NRBC BLD AUTO-RTO: 0 /100 WBCS
PHOSPHATE SERPL-MCNC: 3 MG/DL (ref 2.3–4.1)
PLATELET # BLD AUTO: 176 THOUSANDS/UL (ref 149–390)
PMV BLD AUTO: 10.7 FL (ref 8.9–12.7)
POTASSIUM SERPL-SCNC: 3.7 MMOL/L (ref 3.5–5.3)
PROT SERPL-MCNC: 5.6 G/DL (ref 6.4–8.4)
PULM VEIN S/D RATIO: 0.73
PV PEAK D VEL: 0.26 M/S
PV PEAK S VEL: 0.19 M/S
RBC # BLD AUTO: 4.46 MILLION/UL (ref 3.88–5.62)
RIGHT ATRIUM AREA SYSTOLE A4C: 31.1 CM2
RIGHT VENTRICLE ID DIMENSION: 3.3 CM
SL CV LEFT ATRIUM LENGTH A2C: 6.1 CM
SL CV LV EF: 60
SODIUM SERPL-SCNC: 141 MMOL/L (ref 135–147)
TRICUSPID ANNULAR PLANE SYSTOLIC EXCURSION: 3.2 CM
TRICUSPID VALVE PEAK E WAVE VELOCITY: 0.16 M/S
TRIGL SERPL-MCNC: 64 MG/DL
WBC # BLD AUTO: 6.22 THOUSAND/UL (ref 4.31–10.16)

## 2023-12-18 PROCEDURE — 83036 HEMOGLOBIN GLYCOSYLATED A1C: CPT | Performed by: PHYSICIAN ASSISTANT

## 2023-12-18 PROCEDURE — 84100 ASSAY OF PHOSPHORUS: CPT | Performed by: PHYSICIAN ASSISTANT

## 2023-12-18 PROCEDURE — 97162 PT EVAL MOD COMPLEX 30 MIN: CPT

## 2023-12-18 PROCEDURE — 70551 MRI BRAIN STEM W/O DYE: CPT

## 2023-12-18 PROCEDURE — 92610 EVALUATE SWALLOWING FUNCTION: CPT

## 2023-12-18 PROCEDURE — 82077 ASSAY SPEC XCP UR&BREATH IA: CPT | Performed by: PHYSICIAN ASSISTANT

## 2023-12-18 PROCEDURE — 83735 ASSAY OF MAGNESIUM: CPT | Performed by: PHYSICIAN ASSISTANT

## 2023-12-18 PROCEDURE — 82948 REAGENT STRIP/BLOOD GLUCOSE: CPT

## 2023-12-18 PROCEDURE — 85025 COMPLETE CBC W/AUTO DIFF WBC: CPT | Performed by: PHYSICIAN ASSISTANT

## 2023-12-18 PROCEDURE — 99204 OFFICE O/P NEW MOD 45 MIN: CPT | Performed by: PSYCHIATRY & NEUROLOGY

## 2023-12-18 PROCEDURE — 93306 TTE W/DOPPLER COMPLETE: CPT

## 2023-12-18 PROCEDURE — 80061 LIPID PANEL: CPT | Performed by: PHYSICIAN ASSISTANT

## 2023-12-18 PROCEDURE — 93306 TTE W/DOPPLER COMPLETE: CPT | Performed by: INTERNAL MEDICINE

## 2023-12-18 PROCEDURE — 99239 HOSP IP/OBS DSCHRG MGMT >30: CPT | Performed by: INTERNAL MEDICINE

## 2023-12-18 PROCEDURE — 80053 COMPREHEN METABOLIC PANEL: CPT | Performed by: PHYSICIAN ASSISTANT

## 2023-12-18 PROCEDURE — 97166 OT EVAL MOD COMPLEX 45 MIN: CPT

## 2023-12-18 RX ORDER — GAUZE BANDAGE 2" X 2"
100 BANDAGE TOPICAL DAILY
Qty: 30 TABLET | Refills: 0 | Status: SHIPPED | OUTPATIENT
Start: 2023-12-18

## 2023-12-18 RX ORDER — LANOLIN ALCOHOL/MO/W.PET/CERES
800 CREAM (GRAM) TOPICAL ONCE
Qty: 2 TABLET | Refills: 0 | Status: COMPLETED | OUTPATIENT
Start: 2023-12-18 | End: 2023-12-18

## 2023-12-18 RX ORDER — CLOPIDOGREL BISULFATE 75 MG/1
300 TABLET ORAL ONCE
Status: COMPLETED | OUTPATIENT
Start: 2023-12-18 | End: 2023-12-18

## 2023-12-18 RX ORDER — ASPIRIN 325 MG
325 TABLET ORAL ONCE
Status: COMPLETED | OUTPATIENT
Start: 2023-12-18 | End: 2023-12-18

## 2023-12-18 RX ORDER — ASPIRIN 81 MG/1
81 TABLET ORAL DAILY
Qty: 30 TABLET | Refills: 0 | Status: SHIPPED | OUTPATIENT
Start: 2023-12-18

## 2023-12-18 RX ORDER — MULTIVITAMIN
1 TABLET ORAL DAILY
Qty: 30 TABLET | Refills: 0 | Status: SHIPPED | OUTPATIENT
Start: 2023-12-18

## 2023-12-18 RX ORDER — FOLIC ACID 1 MG/1
1 TABLET ORAL DAILY
Qty: 30 TABLET | Refills: 0 | Status: SHIPPED | OUTPATIENT
Start: 2023-12-18

## 2023-12-18 RX ORDER — CLOPIDOGREL BISULFATE 75 MG/1
300 TABLET ORAL DAILY
Status: DISCONTINUED | OUTPATIENT
Start: 2023-12-18 | End: 2023-12-18

## 2023-12-18 RX ADMIN — LISINOPRIL 40 MG: 20 TABLET ORAL at 08:38

## 2023-12-18 RX ADMIN — AMLODIPINE BESYLATE 5 MG: 5 TABLET ORAL at 08:38

## 2023-12-18 RX ADMIN — Medication 800 MG: at 09:43

## 2023-12-18 RX ADMIN — ASPIRIN 325 MG ORAL TABLET 325 MG: 325 PILL ORAL at 13:18

## 2023-12-18 RX ADMIN — HEPARIN SODIUM 5000 UNITS: 5000 INJECTION INTRAVENOUS; SUBCUTANEOUS at 13:18

## 2023-12-18 RX ADMIN — CLOPIDOGREL 300 MG: 75 TABLET ORAL at 13:18

## 2023-12-18 RX ADMIN — FUROSEMIDE 20 MG: 20 TABLET ORAL at 08:38

## 2023-12-18 RX ADMIN — POTASSIUM CHLORIDE 10 MEQ: 750 TABLET, EXTENDED RELEASE ORAL at 08:38

## 2023-12-18 RX ADMIN — HEPARIN SODIUM 5000 UNITS: 5000 INJECTION INTRAVENOUS; SUBCUTANEOUS at 05:13

## 2023-12-18 RX ADMIN — METOPROLOL TARTRATE 25 MG: 25 TABLET, FILM COATED ORAL at 08:38

## 2023-12-18 NOTE — ASSESSMENT & PLAN NOTE
History of CAD  Denies chest pain or discomfort  Continue PTA medications  Continue outpatient cardiology follow-up

## 2023-12-18 NOTE — ED PROVIDER NOTES
History  Chief Complaint   Patient presents with    CVA/TIA-like Symptoms       CVA/TIA-like Symptoms  Presenting symptoms: confusion    Presenting symptoms: no headaches and no weakness    Associated symptoms: no chest pain, no dizziness, no fever, no seizures and no vomiting    This is a 73-year-old male with a past medical history significant for HTN, HLD, history of stroke without residual deficits, history of alcohol use, history of DVT, not on antiplatelet or anticoagulants, presented to the ER from home for evaluation of change in mental status, speech.    Patient presents with family who provide additional history.  They report that the patient was with girlfriend and noted to be in normal state of health until around 1600 and when the patient began to experience acute symptoms described as the following: Intermittent confusion, unsteady gait, garbled speech/word salad.  The symptoms were felt to be acute and new for the patient.  There is no motor weakness or facial droop or fall or trauma at the time.  Patient subsequently does report a mild fall yesterday but denies any head strike.  Given the change and the suspicion for stroke patient was brought to the ER he had refused EMS per family and they were brought him by private vehicle.  It does appear that the patient has been consuming alcoholic beverages today patient describes drinking beer and or cocktail cannot specify exactly how many.  Son reports that the patient has been consuming alcohol recently and has been with a new girlfriend but prior to that does not have a longstanding history of alcohol abuse.  Family reports it seemed like there was wine at the house that he had been drinking.    At this time the patient denies any clinical symptoms which he notices himself.  He denies any numbness tingling weakness visual disturbances.    Prior to Admission Medications   Prescriptions Last Dose Informant Patient Reported? Taking?   FIBER ADULT GUMMIES PO   Self Yes No   Sig: Take 2 tablets by mouth daily   amLODIPine (NORVASC) 5 mg tablet   No No   Sig: Take 1 tablet (5 mg total) by mouth daily   atorvastatin (LIPITOR) 40 mg tablet   No No   Sig: TAKE 1 TABLET BY MOUTH  DAILY   furosemide (LASIX) 20 mg tablet   No No   Sig: TAKE 1 TABLET BY MOUTH  DAILY   metFORMIN (GLUCOPHAGE-XR) 500 mg 24 hr tablet   No No   Sig: TAKE 1 TABLET BY MOUTH TWICE  DAILY WITH MEALS   metoprolol tartrate (LOPRESSOR) 25 mg tablet   No No   Sig: TAKE 1 TABLET BY MOUTH TWICE  DAILY   nitroglycerin (NITROSTAT) 0.4 mg SL tablet   No No   Sig: Place 1 tablet (0.4 mg total) under the tongue every 5 (five) minutes as needed for chest pain   potassium chloride (Klor-Con) 10 mEq tablet   No No   Sig: TAKE 1 TABLET BY MOUTH  DAILY   ramipril (ALTACE) 10 MG capsule   No No   Sig: TAKE 1 CAPSULE BY MOUTH  DAILY   sildenafil (VIAGRA) 100 mg tablet   No No   Sig: Take 1 tablet (100 mg total) by mouth daily as needed for erectile dysfunction      Facility-Administered Medications: None       Past Medical History:   Diagnosis Date    Asthma     resolved: 08/14/17    Closed fracture of fifth metacarpal bone of right hand     last assessed: 06/30/15    Deep vein thrombosis (HCC)     Diabetes mellitus (HCC)     Diverticulitis     Fracture of metacarpal shaft     Hemopneumothorax, left     last assessed: 06/02/15    Hyperlipidemia     Hypertension     Inguinal hernia     Lumbar transverse process fracture (HCC)     Pilonidal cyst     Pleural effusion     last assessed: 07/01/15    Rib fractures     last assessed: 06/02/15    Status post fall     Superficial thrombophlebitis of leg     last assessed: 03/19/14       Past Surgical History:   Procedure Laterality Date    COLON SURGERY      COLONOSCOPY      CORONARY ANGIOPLASTY WITH STENT PLACEMENT  2009    PTCA/RITA to RCA    FRACTURE SURGERY  05/04/2015    Closed treatment of fracture of metacarpal bone, right 5t metacarpal fracture Dr. Claire    HERNIA REPAIR       KNEE ARTHROSCOPY W/ MENISCAL REPAIR      Lateral meniscus    LAPAROSCOPY  2015    Exploratory, extensive lysis of adhesions Dr. Pérez    OTHER SURGICAL HISTORY      Surgical lysis of intestinal adhesions    RECTAL SURGERY      REVISION COLOSTOMY      THORACENTESIS  2015    with imaging guidance left, removed 1300cc of fluid w/o problem lower base of lun06    TONSILLECTOMY AND ADENOIDECTOMY         Family History   Problem Relation Age of Onset    Coronary artery disease Mother     Heart disease Father         Benign hypertensive    Hyperlipidemia Father      I have reviewed and agree with the history as documented.    E-Cigarette/Vaping    E-Cigarette Use Never User      E-Cigarette/Vaping Substances    Nicotine No     THC No     CBD No     Flavoring No     Other No     Unknown No      Social History     Tobacco Use    Smoking status: Former     Current packs/day: 0.00     Average packs/day: 1 pack/day for 30.0 years (30.0 ttl pk-yrs)     Types: Cigarettes     Start date: 1968     Quit date:      Years since quittin.5    Smokeless tobacco: Never   Vaping Use    Vaping status: Never Used   Substance Use Topics    Alcohol use: Not Currently     Alcohol/week: 0.0 standard drinks of alcohol     Comment: Social    Drug use: No       Review of Systems   Constitutional:  Positive for activity change. Negative for appetite change, chills, diaphoresis and fever.   HENT:  Negative for ear pain and sore throat.    Eyes:  Negative for pain and visual disturbance.   Respiratory:  Negative for cough and shortness of breath.    Cardiovascular:  Negative for chest pain and palpitations.   Gastrointestinal:  Negative for abdominal pain and vomiting.   Genitourinary:  Negative for dysuria and hematuria.   Musculoskeletal:  Positive for gait problem. Negative for arthralgias and back pain.   Skin:  Negative for color change and rash.   Neurological:  Positive for speech difficulty. Negative for dizziness,  tremors, seizures, syncope, facial asymmetry, weakness, light-headedness, numbness and headaches.   Psychiatric/Behavioral:  Positive for confusion.    All other systems reviewed and are negative.      Physical Exam  Physical Exam  Vitals and nursing note reviewed. Exam conducted with a chaperone present.   Constitutional:       General: He is not in acute distress.     Appearance: Normal appearance. He is not ill-appearing, toxic-appearing or diaphoretic.   HENT:      Head: Normocephalic and atraumatic.      Right Ear: External ear normal.      Left Ear: External ear normal.      Nose: Nose normal.      Mouth/Throat:      Mouth: Mucous membranes are moist.      Pharynx: Oropharynx is clear.   Eyes:      Conjunctiva/sclera: Conjunctivae normal.   Cardiovascular:      Rate and Rhythm: Normal rate and regular rhythm.      Pulses: Normal pulses.      Heart sounds: Normal heart sounds.   Pulmonary:      Effort: Pulmonary effort is normal.      Breath sounds: Normal breath sounds.   Abdominal:      General: Abdomen is flat.      Tenderness: There is no abdominal tenderness. There is no guarding or rebound.   Musculoskeletal:      Cervical back: Neck supple.   Neurological:      Mental Status: He is alert.         Vital Signs  ED Triage Vitals [12/17/23 1738]   Temperature Pulse Respirations Blood Pressure SpO2   98.8 °F (37.1 °C) 67 16 142/75 96 %      Temp Source Heart Rate Source Patient Position - Orthostatic VS BP Location FiO2 (%)   Temporal Monitor Lying Left arm --      Pain Score       No Pain           Vitals:    12/17/23 1800 12/17/23 1815 12/17/23 1830 12/17/23 1845   BP: 122/63 140/67 116/56 110/68   Pulse: 73 73 73 76   Patient Position - Orthostatic VS:             Visual Acuity  Visual Acuity      Flowsheet Row Most Recent Value   L Pupil Size (mm) 3   R Pupil Size (mm) 3            ED Medications  Medications   iohexol (OMNIPAQUE) 350 MG/ML injection (MULTI-DOSE) 100 mL (100 mL Intravenous Given 12/17/23  1754)       Diagnostic Studies  Results Reviewed       Procedure Component Value Units Date/Time    HS Troponin I 2hr [[id number]]     Lab Status: No result Specimen: Blood     HS Troponin 0hr (reflex protocol) [[id number]]  (Normal) Collected: 12/17/23 1807    Lab Status: Final result Specimen: Blood from Arm, Left Updated: 12/17/23 1840     hs TnI 0hr 6 ng/L     Basic metabolic panel [[id number]]  (Abnormal) Collected: 12/17/23 1810    Lab Status: Final result Specimen: Blood from Arm, Left Updated: 12/17/23 1840     Sodium 139 mmol/L      Potassium 3.3 mmol/L      Chloride 105 mmol/L      CO2 25 mmol/L      ANION GAP 9 mmol/L      BUN 16 mg/dL      Creatinine 0.84 mg/dL      Glucose 152 mg/dL      Calcium 8.3 mg/dL      eGFR 88 ml/min/1.73sq m     Narrative:      National Kidney Disease Foundation guidelines for Chronic Kidney Disease (CKD):     Stage 1 with normal or high GFR (GFR > 90 mL/min/1.73 square meters)    Stage 2 Mild CKD (GFR = 60-89 mL/min/1.73 square meters)    Stage 3A Moderate CKD (GFR = 45-59 mL/min/1.73 square meters)    Stage 3B Moderate CKD (GFR = 30-44 mL/min/1.73 square meters)    Stage 4 Severe CKD (GFR = 15-29 mL/min/1.73 square meters)    Stage 5 End Stage CKD (GFR <15 mL/min/1.73 square meters)  Note: GFR calculation is accurate only with a steady state creatinine    Salicylate level [[id number]]  (Normal) Collected: 12/17/23 1810    Lab Status: Final result Specimen: Blood from Arm, Left Updated: 12/17/23 1840     Salicylate Lvl <5 mg/dL     Acetaminophen level-If concentration is detectable, please discuss with medical  on call. [[id number]]  (Abnormal) Collected: 12/17/23 1810    Lab Status: Final result Specimen: Blood from Arm, Left Updated: 12/17/23 1840     Acetaminophen Level <2 ug/mL     Beta Hydroxybutyrate [[id number]]  (Normal) Collected: 12/17/23 1807    Lab Status: Final result Specimen: Blood from Arm, Left Updated: 12/17/23 1838     BETA-HYDROXYBUTYRATE 0.1 mmol/L      Ethanol [[id number]]  (Abnormal) Collected: 12/17/23 1807    Lab Status: Final result Specimen: Blood from Arm, Left Updated: 12/17/23 1834     Ethanol Lvl 180 mg/dL     Protime-INR [[id number]]  (Normal) Collected: 12/17/23 1807    Lab Status: Final result Specimen: Blood from Arm, Left Updated: 12/17/23 1831     Protime 12.4 seconds      INR 0.93    APTT [[id number]]  (Normal) Collected: 12/17/23 1807    Lab Status: Final result Specimen: Blood from Arm, Left Updated: 12/17/23 1831     PTT 25 seconds     CBC and Platelet [[id number]]  (Abnormal) Collected: 12/17/23 1807    Lab Status: Final result Specimen: Blood from Arm, Left Updated: 12/17/23 1818     WBC 7.43 Thousand/uL      RBC 4.62 Million/uL      Hemoglobin 14.9 g/dL      Hematocrit 45.9 %      MCV 99 fL      MCH 32.3 pg      MCHC 32.5 g/dL      RDW 12.7 %      Platelets 191 Thousands/uL      MPV 10.9 fL     FLU/RSV/COVID - if FLU/RSV clinically relevant [[id number]] Collected: 12/17/23 1807    Lab Status: In process Specimen: Nares from Nose Updated: 12/17/23 1815    Fingerstick Glucose (POCT) [[id number]]  (Normal) Collected: 12/17/23 1744    Lab Status: Final result Updated: 12/17/23 1747     POC Glucose 139 mg/dl                    CT stroke alert brain   Final Result by Dean Medina MD (12/17 1822)      No mass effect, acute intracranial hemorrhage or evidence of recent infarction. Moderate chronic microvascular ischemic change.      Findings were directly discussed with Camille Rosalse  at 6:13 p.m.      Workstation performed: [id number]         CTA stroke alert (head/neck)   Final Result by Dean Medina MD (12/17 1821)      No evidence for high-grade stenosis, focal occlusion or vascular aneurysm of the cervical or intracranial vessels.            Findings were directly discussed with Camille Rosales  at 6:13 p.m.                           Workstation performed: [id number]         X-ray chest 1 view portable    (Results Pending)   MRI ED order    (Results  Pending)              Procedures  CriticalCare Time    Date/Time: 12/17/2023 7:30 PM    Performed by: Beau Cornejo DO  Authorized by: Beau Cornejo DO    Critical care provider statement:     Critical care time (minutes):  35    Critical care time was exclusive of:  Separately billable procedures and treating other patients and teaching time    Critical care was necessary to treat or prevent imminent or life-threatening deterioration of the following conditions:  CNS failure or compromise, metabolic crisis and toxidrome    Critical care was time spent personally by me on the following activities:  Development of treatment plan with patient or surrogate, discussions with consultants, obtaining history from patient or surrogate, discussions with primary provider, evaluation of patient's response to treatment, review of old charts, examination of patient, re-evaluation of patient's condition, ordering and review of radiographic studies, ordering and review of laboratory studies and ordering and performing treatments and interventions    I assumed direction of critical care for this patient from another provider in my specialty: no             ED Course  ED Course as of 12/17/23 1933   Sun Dec 17, 2023   1849 MEDICAL ALCOHOL(!): 180   1920 Case d/w SLMI stroke neurologist on call, Dr. Rosales, via phone conversation. Agrees with plan for CT/CTA. Upon review, she reports no LVO. Ethanol resulted elevated, likely the main cause of patient's clinical symptoms. Apparently a prior small stroke vs TIA in setting of alcohol use in past. Stroke like process not eliminated. Stroke neuro recommends admission, stroke pathway, routine MRI brain, permissive Hypertension 140-220. Pt and family updated on plan, agreeable, case d/w SLIM for admission.                                SBIRT 20yo+      Flowsheet Row Most Recent Value   Initial Alcohol Screen: US AUDIT-C     1. How often do you have a drink containing  alcohol? 0 Filed at: 12/17/2023 1741   2. How many drinks containing alcohol do you have on a typical day you are drinking?  0 Filed at: 12/17/2023 1741   3a. Male UNDER 65: How often do you have five or more drinks on one occasion? 0 Filed at: 12/17/2023 1741   3b. FEMALE Any Age, or MALE 65+: How often do you have 4 or more drinks on one occassion? 0 Filed at: 12/17/2023 1741   Audit-C Score 0 Filed at: 12/17/2023 1741   CRYSTAL: How many times in the past year have you...    Used an illegal drug or used a prescription medication for non-medical reasons? Never Filed at: 12/17/2023 1741                      Medical Decision Making  73-year-old male presenting with change in mental status in the setting of alcohol use.  The history of alcohol use was unclear at the time of and the amount of alcohol ingestion was also unknown.  The patient was assessed as having change in speech and confusion and gait which was new and assessed by the family starting around 1600 today.  The patient presented here with no scoreable deficits on NIH stroke scale with an NIH of 0.  Case was made a stroke alert based on the concern from family and the acute change in mental status and his stroke risk factors including hypertension and hyperlipidemia and a history of prior stroke.  Patient had no worsening of his clinical symptoms and was stable in the ER CT CTA were negative.  The case was again discussed with stroke neurology who felt the patient should still be admitted to stroke pathway for MRI but as the ethanol result came back at 180 and the story became clear that the patient had been having several alcoholic beverages today suspicion for alcohol intoxication as a cause of the patient's symptoms was highly favored.    Problems Addressed:  Alcohol intoxication (HCC): complicated acute illness or injury with systemic symptoms  At risk for stroke: chronic illness or injury  Speech disturbance, unspecified type: acute illness or  injury  Stroke-like symptoms: acute illness or injury    Amount and/or Complexity of Data Reviewed  Labs: ordered. Decision-making details documented in ED Course.  Radiology: ordered.    Risk  Prescription drug management.             Disposition  Final diagnoses:   Stroke-like symptoms   Speech disturbance, unspecified type   Alcohol intoxication (HCC)   At risk for stroke     Time reflects when diagnosis was documented in both MDM as applicable and the Disposition within this note       Time User Action Codes Description Comment    12/17/2023  5:33 PM Beau Cornejo [R29.90] Stroke-like symptoms     12/17/2023  6:50 PM Beau Cornejo [R47.9] Speech disturbance, unspecified type     12/17/2023  6:50 PM Beau Cornejo [F10.929] Alcohol intoxication (HCC)     12/17/2023  6:50 PM Beau Cornejo [Z91.89] At risk for stroke           ED Disposition       ED Disposition   Admit    Condition   Stable    Date/Time   Sun Dec 17, 2023 1930    Comment   Case was discussed with KEYANNA and the patient's admission status was agreed to be Admission Status: observation status to the service of Dr. Ruiz .               Follow-up Information    None         Patient's Medications   Discharge Prescriptions    No medications on file       No discharge procedures on file.    PDMP Review       None            ED Provider  Electronically Signed by             Beau Cornejo DO  12/17/23 1933

## 2023-12-18 NOTE — ASSESSMENT & PLAN NOTE
History of CAD  Denies chest pain or discomfort  Continue PTA medications  Continue outpatient cardiology follow-up   Routing refill request to provider for review/approval because:  Patient needs to be seen because:  LOV 1-3-18 for rash and depression. Told to return in one week for medication management and did not.     Labs not current:  A1C and microalbumin  Reminder letter sent today.   Anushka Cox RN on 9/4/2018 at 3:21 PM

## 2023-12-18 NOTE — CASE MANAGEMENT
Case Management Progress Note    Patient name Jorge Bajwa  Location /424-01 MRN [id number]  : 1950 Date 2023       LOS (days): 0  Geometric Mean LOS (GMLOS) (days):   Days to GMLOS:        OBJECTIVE:        Current admission status: Observation  Preferred Pharmacy:   [id number] - [place], [place] - [address] [place] [address]  Phone: [phone] Fax: [phone]    [facility] - [place] [address] [address]  Phone: [phone] Fax: [phone]    [facility] - [facility] [address]  [address] [address]  Phone: [phone] Fax: [phone]    Primary Care Provider: Travis Bergman DO    Primary Insurance: MEDICARE  Secondary Insurance: AARP    PROGRESS NOTE:attempted to meet with pt but currently at MRI

## 2023-12-18 NOTE — ASSESSMENT & PLAN NOTE
Blood alcohol level at 180  He does admit to having 2 rum and coke  Family reports that they are suspicious that patient has been drinking more than he is indicating  IV fluid bolus  Fall precations  Consider initiating CIWA  Am labs  OT/PT eval   Supportive care

## 2023-12-18 NOTE — ASSESSMENT & PLAN NOTE
Blood alcohol level at 180  He does admit to having 2 rum and coke  Family reports that they are suspicious that patient has been drinking more than he is indicating  IV fluid bolus  Fall precations  Consider initiating CIWA  No signs of withdrawal, alcohol level on discharge 0  Given resources for substance abuse on dischagre

## 2023-12-18 NOTE — PLAN OF CARE
Problem: SAFETY ADULT  Goal: Patient will remain free of falls  Description: INTERVENTIONS:  - Educate patient/family on patient safety including physical limitations  - Instruct patient to call for assistance with activity   - Consult OT/PT to assist with strengthening/mobility   - Keep Call bell within reach  - Keep bed low and locked with side rails adjusted as appropriate  - Keep care items and personal belongings within reach  - Initiate and maintain comfort rounds  - Make Fall Risk Sign visible to staff  - Offer Toileting every 2 Hours, in advance of need  - Initiate/Maintain bed/chair alarm  - Obtain necessary fall risk management equipment: non skid socks  - Apply yellow socks and bracelet for high fall risk patients  - Consider moving patient to room near nurses station  Outcome: Progressing  Goal: Maintain or return to baseline ADL function  Description: INTERVENTIONS:  -  Assess patient's ability to carry out ADLs; assess patient's baseline for ADL function and identify physical deficits which impact ability to perform ADLs (bathing, care of mouth/teeth, toileting, grooming, dressing, etc.)  - Assess/evaluate cause of self-care deficits   - Assess range of motion  - Assess patient's mobility; develop plan if impaired  - Assess patient's need for assistive devices and provide as appropriate  - Encourage maximum independence but intervene and supervise when necessary  - Involve family in performance of ADLs  - Assess for home care needs following discharge   - Consider OT consult to assist with ADL evaluation and planning for discharge  - Provide patient education as appropriate  Outcome: Progressing  Goal: Maintains/Returns to pre admission functional level  Description: INTERVENTIONS:  - Perform AM-PAC 6 Click Basic Mobility/ Daily Activity assessment daily.  - Set and communicate daily mobility goal to care team and patient/family/caregiver.   - Collaborate with rehabilitation services on mobility  goals if consulted  - Perform Range of Motion 3 times a day.  - Reposition patient every 2 hours.  - Dangle patient 3 times a day  - Stand patient 3 times a day  - Ambulate patient 3 times a day  - Out of bed to chair 3 times a day   - Out of bed for meals 3 times a day  - Out of bed for toileting  - Record patient progress and toleration of activity level   Outcome: Progressing     Problem: DISCHARGE PLANNING  Goal: Discharge to home or other facility with appropriate resources  Description: INTERVENTIONS:  - Identify barriers to discharge w/patient and caregiver  - Arrange for needed discharge resources and transportation as appropriate  - Identify discharge learning needs (meds, wound care, etc.)  - Arrange for interpretive services to assist at discharge as needed  - Refer to Case Management Department for coordinating discharge planning if the patient needs post-hospital services based on physician/advanced practitioner order or complex needs related to functional status, cognitive ability, or social support system  Outcome: Progressing     Problem: NEUROSENSORY - ADULT  Goal: Achieves stable or improved neurological status  Description: INTERVENTIONS  - Monitor and report changes in neurological status  - Monitor vital signs such as temperature, blood pressure, glucose, and any other labs ordered   - Initiate measures to prevent increased intracranial pressure  - Monitor for seizure activity and implement precautions if appropriate      Outcome: Progressing     Problem: METABOLIC, FLUID AND ELECTROLYTES - ADULT  Goal: Electrolytes maintained within normal limits  Description: INTERVENTIONS:  - Monitor labs and assess patient for signs and symptoms of electrolyte imbalances  - Administer electrolyte replacement as ordered  - Monitor response to electrolyte replacements, including repeat lab results as appropriate  - Instruct patient on fluid and nutrition as appropriate  Outcome: Progressing  Goal: Fluid  balance maintained  Description: INTERVENTIONS:  - Monitor labs   - Monitor I/O and WT  - Instruct patient on fluid and nutrition as appropriate  - Assess for signs & symptoms of volume excess or deficit  Outcome: Progressing  Goal: Glucose maintained within target range  Description: INTERVENTIONS:  - Monitor Blood Glucose as ordered  - Assess for signs and symptoms of hyperglycemia and hypoglycemia  - Administer ordered medications to maintain glucose within target range  - Assess nutritional intake and initiate nutrition service referral as needed  Outcome: Progressing

## 2023-12-18 NOTE — PHYSICAL THERAPY NOTE
Physical Therapy Evaluation    Patient Name: Jorge Bajwa    Today's Date: 12/18/2023     Problem List  Principal Problem:    Stroke-like symptoms  Active Problems:    Benign essential hypertension    Coronary artery disease of native artery of native heart with stable angina pectoris (HCC)    Type 2 diabetes mellitus with complication (HCC)    Dyslipidemia    Hypokalemia    Alcoholic intoxication without complication (HCC)       Past Medical History  Past Medical History:   Diagnosis Date    Asthma     resolved: 08/14/17    Closed fracture of fifth metacarpal bone of right hand     last assessed: 06/30/15    Deep vein thrombosis (HCC)     Diabetes mellitus (HCC)     Diverticulitis     Fracture of metacarpal shaft     Hemopneumothorax, left     last assessed: 06/02/15    Hyperlipidemia     Hypertension     Inguinal hernia     Lumbar transverse process fracture (HCC)     Pilonidal cyst     Pleural effusion     last assessed: 07/01/15    Rib fractures     last assessed: 06/02/15    Status post fall     Superficial thrombophlebitis of leg     last assessed: 03/19/14        Past Surgical History  Past Surgical History:   Procedure Laterality Date    COLON SURGERY      COLONOSCOPY      CORONARY ANGIOPLASTY WITH STENT PLACEMENT  2009    PTCA/RITA to RCA    FRACTURE SURGERY  05/04/2015    Closed treatment of fracture of metacarpal bone, right 5t metacarpal fracture Dr. Claire    HERNIA REPAIR      KNEE ARTHROSCOPY W/ MENISCAL REPAIR      Lateral meniscus    LAPAROSCOPY  05/18/2015    Exploratory, extensive lysis of adhesions Dr. Pérez    OTHER SURGICAL HISTORY      Surgical lysis of intestinal adhesions    RECTAL SURGERY      REVISION COLOSTOMY      THORACENTESIS  06/02/2015    with imaging guidance left, removed 1300cc of fluid w/o problem lower base of lun06    TONSILLECTOMY AND ADENOIDECTOMY             12/18/23 0919   PT Last Visit   PT Visit Date 12/18/23    Note Type   Note type Evaluation   Pain Assessment   Pain Assessment Tool 0-10   Pain Score No Pain   Restrictions/Precautions   Weight Bearing Precautions Per Order No   Other Precautions Telemetry   Home Living   Type of Home House   Home Layout Two level;Bed/bath upstairs;Ramped entrance   Bathroom Shower/Tub Tub/shower unit   Bathroom Toilet Standard   Bathroom Equipment Shower chair;Toilet raiser   Bathroom Accessibility Accessible   Home Equipment Walker;Stair glide;Other (Comment)  (transport chair)   Additional Comments pt denies use of DME at baseline   Prior Function   Level of [place] Independent with ADLs;Independent with functional mobility;Independent with IADLS   Lives With Alone   IADLs Independent with driving   Falls in the last 6 months 1 to 4   Vocational Retired   General   Family/Caregiver Present No   Cognition   Overall Cognitive Status WFL   Arousal/Participation Alert   Orientation Level Oriented X4   Following Commands Follows all commands and directions without difficulty   Subjective   Subjective pt reports he enjoys reading and gardening in his free time   RLE Assessment   RLE Assessment WFL   LLE Assessment   LLE Assessment WFL   Bed Mobility   Supine to Sit 7  Independent   Sit to Supine   (pt OOB at end of session)   Transfers   Sit to Stand 7  Independent   Stand to Sit 7  Independent   Additional Comments no device used   Ambulation/Elevation   Gait pattern Wide CHANDLER   Gait Assistance 7  Independent   Assistive Device None   Distance 250'   Balance   Static Sitting Good   Dynamic Sitting Good   Static Standing Good   Dynamic Standing Good   Ambulatory Good   Endurance Deficit   Endurance Deficit No   Activity Tolerance   Activity Tolerance Patient tolerated treatment well   Assessment   Prognosis Good   Assessment Patient is a 73 y.o. male evaluated by Physical Therapy s/p admit to [facility] on 12/17/2023 with admitting diagnosis of: Alcohol intoxication, At  risk for stroke, Stroke-like symptoms, Symptoms of cerebrovascular accident, Speech disturbance, unspecified type, and principal problem of: Stroke-like symptoms. PT was consulted to assess patient's functional mobility and discharge needs. Ordered are PT Evaluation and treatment with activity level of: up and OOB as tolerated. Comorbidities affecting patient's physical performance at time of assessment include:  HTN ,CAD, DM,dyslipidemia . Personal factors affecting the patient at time of IE include: lives in 2 story home. Please locate objective findings from PT assessment regarding body systems outlined above. Upon evaluation, pt able to perform all functional mobility independently without assistive device. Pt reports feeling back to baseline and was able to ambulate 250' without difficulty; also demonstrating WFL strength, balance, and endurance. Continued PT intervention not indicated at this time; will d/c PT orders. The patient's AM-PAC Basic Mobility Inpatient Short Form Raw Score is 24. A Raw score of greater than 16 suggests the patient may benefit from discharge to home. Please also refer to the recommendation of the Physical Therapist for safe discharge planning. Co treatment with OT secondary to complex medical condition of pt, possible A of 2 required to achieve and maintain transitional movements, requiring the need of skilled therapeutic intervention of 2 therapists to achieve delivery of services.   Goals   Patient Goals to go home   Plan   PT Frequency Other (Comment)  (eval only)   Discharge Recommendation   Rehab Resource Intensity Level, PT No post-acute rehabilitation needs   AM-PAC Basic Mobility Inpatient   Turning in Flat Bed Without Bedrails 4   Lying on Back to Sitting on Edge of Flat Bed Without Bedrails 4   Moving Bed to Chair 4   Standing Up From Chair Using Arms 4   Walk in Room 4   Climb 3-5 Stairs With Railing 4   Basic Mobility Inpatient Raw Score 24   Basic Mobility Standardized  Score 57.68   Highest Level Of Mobility   -[facility] Goal 8: Walk 250 feet or more   -HLM Achieved 8: Walk 250 feet ot more   End of Consult   Patient Position at End of Consult Bedside chair;All needs within reach

## 2023-12-18 NOTE — H&P
[facility]  H&P  Name: Jorge Bajwa 73 y.o. male I MRN: [id number]  Unit/Bed#: 424-01 I Date of Admission: 12/17/2023   Date of Service: 12/18/2023 I Hospital Day: 0      Assessment/Plan   * Stroke-like symptoms  Assessment & Plan  Presented to the ER for evaluation f complaint of altered mental status, Slurred speech  Stroke alert called upon arrival to the ER  NIHSS of 0 upon arrival to the ER.  No TNKtPA given  CTA head and neck showed  CT head shows  Admit on observation  Continue stroke pathway for now per neurology recommendations  Telemetry monitoring  Neurochecks every 4 hours  Continue statin, aspirin  Check MRI,ECHO in the am  OT/PT/Speech eval  Check A1c, lipid panel  Neurology consult  Supportive care      Alcoholic intoxication without complication (HCC)  Assessment & Plan  Blood alcohol level at 180  He does admit to having 2 rum and coke  Family reports that they are suspicious that patient has been drinking more than he is indicating  IV fluid bolus  Fall precations  Consider initiating CIWA  Am labs  OT/PT eval   Supportive care     Type 2 diabetes mellitus with complication (HCC)  Assessment & Plan  Lab Results   Component Value Date    HGBA1C 6.5 (H) 09/11/2023   Glucose level ar 152  Hold oral diabetic medication  fingerstick glucose 4 times daily AC/HS  Sliding scale insulin  Check A1C  Monitor while admitted             Hypokalemia  Assessment & Plan  Potassium level at 3.3  Replace potassium  Monitor potassium levels     Dyslipidemia  Assessment & Plan  Stable  Continue statin therapy    Coronary artery disease of native artery of native heart with stable angina pectoris (HCC)  Assessment & Plan  History of CAD  Denies chest pain or discomfort  Continue PTA medications  Continue outpatient cardiology follow-up    Benign essential hypertension  Assessment & Plan  Blood pressure is stable  Continue PTA medication  Allow permissive hypertension as recommended by  neurology in the setting of stroke-like symptoms  Monitor blood pressure while admitted            VTE Pharmacologic Prophylaxis:   Moderate Risk (Score 3-4) - Pharmacological DVT Prophylaxis Ordered: heparin.  Code Status: Level 1 - Full Code Level 1 full code   Discussion with family: Updated  (daughter and son in law) at bedside.    Anticipated Length of Stay: Patient will be admitted on an observation basis with an anticipated length of stay of less than 2 midnights secondary to stroke-like symptoms, hypokalemia, alcohol intoxication.    Total Time Spent on Date of Encounter in care of patient: 40 mins. This time was spent on one or more of the following: performing physical exam; counseling and coordination of care; obtaining or reviewing history; documenting in the medical record; reviewing/ordering tests, medications or procedures; communicating with other healthcare professionals and discussing with patient's family/caregivers.    Chief Complaint: Strokelike symptoms (confusion, Slurred speech)    History of Present Illness:  Jorge Bajwa is a 73 y.o. male with a PMH of diabetes, DVT, hypertension, hyperlipidemia who presents to the emergency room for evaluation of complaints of confusion, garbled speech, unsteady gait reported by family. Patient did not believe he had any acute symptoms however family is reporting that definitely has changes in his mental status as well as a speech concern for possible stroke.  Patient does admit to have been drinking early on the day.  Family members also reported there is a suspicion patient may be have creased the amount of alcohol that he has been taking.  Patient reported that he had 2 Rum and  Coke earlier on today.  Family states that several years ago with similar presentation he has been drinking and was noted to have a CVA.  No residual neurological deficits from that previous stroke.    Stroke alert called in the emergency room NIHSS of 0.  No  TNKtPA  given.  CT head and CTA head and neck negative for acute findings.  Labs showed potassium of 3.3, glucose of 152, COVID/RSV/flu negative, blood alcohol levels at 180.  EKG shows a sinus rhythm with sinus arrhythmia at a rate of 62 bpm.    Patient is being admitted observation [facility] for further workup and management of strokelike symptoms, hyperkalemia alcohol intoxication.    Review of Systems:  Review of Systems   Constitutional:  Positive for activity change. Negative for appetite change, chills, fatigue and fever.   Eyes:  Negative for photophobia and visual disturbance.   Respiratory:  Negative for cough, chest tightness, shortness of breath and wheezing.    Cardiovascular:  Negative for chest pain, palpitations and leg swelling.   Gastrointestinal:  Negative for abdominal pain, diarrhea, nausea and vomiting.   Genitourinary:  Negative for difficulty urinating, dysuria and flank pain.   Musculoskeletal:  Negative for arthralgias, back pain, gait problem, neck pain and neck stiffness.   Skin:  Negative for rash and wound.   Neurological:  Positive for speech difficulty. Negative for dizziness, tremors, syncope, facial asymmetry, weakness, light-headedness, numbness and headaches.   Psychiatric/Behavioral:  Positive for confusion. Negative for agitation. The patient is not nervous/anxious.        Past Medical and Surgical History:   Past Medical History:   Diagnosis Date    Asthma     resolved: 08/14/17    Closed fracture of fifth metacarpal bone of right hand     last assessed: 06/30/15    Deep vein thrombosis (HCC)     Diabetes mellitus (HCC)     Diverticulitis     Fracture of metacarpal shaft     Hemopneumothorax, left     last assessed: 06/02/15    Hyperlipidemia     Hypertension     Inguinal hernia     Lumbar transverse process fracture (HCC)     Pilonidal cyst     Pleural effusion     last assessed: 07/01/15    Rib fractures     last assessed: 06/02/15    Status post fall      Superficial thrombophlebitis of leg     last assessed: 03/19/14       Past Surgical History:   Procedure Laterality Date    COLON SURGERY      COLONOSCOPY      CORONARY ANGIOPLASTY WITH STENT PLACEMENT  2009    PTCA/RITA to RCA    FRACTURE SURGERY  05/04/2015    Closed treatment of fracture of metacarpal bone, right 5t metacarpal fracture Dr. Claire    HERNIA REPAIR      KNEE ARTHROSCOPY W/ MENISCAL REPAIR      Lateral meniscus    LAPAROSCOPY  05/18/2015    Exploratory, extensive lysis of adhesions Dr. Pérez    OTHER SURGICAL HISTORY      Surgical lysis of intestinal adhesions    RECTAL SURGERY      REVISION COLOSTOMY      THORACENTESIS  06/02/2015    with imaging guidance left, removed 1300cc of fluid w/o problem lower base of lun06    TONSILLECTOMY AND ADENOIDECTOMY         Meds/Allergies:  Prior to Admission medications    Medication Sig Start Date End Date Taking? Authorizing Provider   amLODIPine (NORVASC) 5 mg tablet Take 1 tablet (5 mg total) by mouth daily 12/6/23   Travis Bergman, DO   atorvastatin (LIPITOR) 40 mg tablet TAKE 1 TABLET BY MOUTH  DAILY 3/23/23   Travis Bergman, DO   FIBER ADULT GUMMIES PO Take 2 tablets by mouth daily    Historical Provider, MD   furosemide (LASIX) 20 mg tablet TAKE 1 TABLET BY MOUTH  DAILY 6/19/23   Travis Bergman, DO   metFORMIN (GLUCOPHAGE-XR) 500 mg 24 hr tablet TAKE 1 TABLET BY MOUTH TWICE  DAILY WITH MEALS 9/29/23   Travis Bergman, DO   metoprolol tartrate (LOPRESSOR) 25 mg tablet TAKE 1 TABLET BY MOUTH TWICE  DAILY 12/4/23   Travis Bergman, DO   nitroglycerin (NITROSTAT) 0.4 mg SL tablet Place 1 tablet (0.4 mg total) under the tongue every 5 (five) minutes as needed for chest pain 6/20/23   Veronica Esparza PA-C   potassium chloride (Klor-Con) 10 mEq tablet TAKE 1 TABLET BY MOUTH  DAILY 3/23/23   Travis Bergman,    ramipril (ALTACE) 10 MG capsule TAKE 1 CAPSULE BY MOUTH  DAILY 3/23/23   Travis Bergman, DO   sildenafil (VIAGRA) 100 mg tablet Take 1 tablet (100 mg total) by mouth  "daily as needed for erectile dysfunction 23   Travis Bergman, DO     I have reviewed home medications with patient personally.    Allergies: No Known Allergies    Social History:  Marital Status:    Occupation: retired   Patient Pre-hospital Living Situation: Home  Patient Pre-hospital Level of Mobility: walks  Patient Pre-hospital Diet Restrictions: none reported   Substance Use History:   Social History     Substance and Sexual Activity   Alcohol Use Not Currently    Alcohol/week: 0.0 standard drinks of alcohol    Comment: Social     Social History     Tobacco Use   Smoking Status Former    Current packs/day: 0.00    Average packs/day: 1 pack/day for 30.0 years (30.0 ttl pk-yrs)    Types: Cigarettes    Start date: 1968    Quit date:     Years since quittin.5   Smokeless Tobacco Never     Social History     Substance and Sexual Activity   Drug Use No       Family History:  Family History   Problem Relation Age of Onset    Coronary artery disease Mother     Heart disease Father         Benign hypertensive    Hyperlipidemia Father        Physical Exam:     Vitals:   Blood Pressure: 143/72 (23 0100)  Pulse: 72 (23 0100)  Temperature: 98 °F (36.7 °C) (23 010)  Temp Source: Oral (23 2300)  Respirations: 16 (23 010)  Height: 5' 11\" (180.3 cm) (23)  Weight - Scale: 108 kg (237 lb 10.5 oz) (23)  SpO2: 94 % (23 0100)    Physical Exam  Constitutional:       General: He is not in acute distress.     Appearance: He is not ill-appearing.   HENT:      Head: Normocephalic and atraumatic.      Nose: No congestion or rhinorrhea.      Mouth/Throat:      Mouth: Mucous membranes are moist.      Pharynx: Oropharynx is clear. No oropharyngeal exudate or posterior oropharyngeal erythema.   Eyes:      General:         Right eye: No discharge.         Left eye: No discharge.      Pupils: Pupils are equal, round, and reactive to light.   Cardiovascular:      " Rate and Rhythm: Normal rate and regular rhythm.      Pulses: Normal pulses.      Heart sounds: No murmur heard.     No friction rub.   Pulmonary:      Effort: Pulmonary effort is normal. No respiratory distress.      Breath sounds: No stridor.   Abdominal:      General: There is no distension.      Palpations: There is no mass.      Tenderness: There is no abdominal tenderness.   Musculoskeletal:         General: No swelling or tenderness.      Cervical back: Normal range of motion and neck supple. No rigidity or tenderness.      Right lower leg: No edema.      Left lower leg: No edema.   Skin:     General: Skin is warm.      Capillary Refill: Capillary refill takes less than 2 seconds.      Coloration: Skin is not jaundiced or pale.      Findings: No erythema.   Neurological:      Mental Status: He is alert and oriented to person, place, and time.      Motor: No weakness.      Coordination: Coordination normal.   Psychiatric:         Mood and Affect: Mood normal.          Additional Data:     Lab Results:  Results from last 7 days   Lab Units 12/17/23  2032 12/17/23  1807   WBC Thousand/uL  --  7.43   HEMOGLOBIN g/dL  --  14.9   HEMATOCRIT %  --  45.9   PLATELETS Thousands/uL 186 191     Results from last 7 days   Lab Units 12/17/23  1810   SODIUM mmol/L 139   POTASSIUM mmol/L 3.3*   CHLORIDE mmol/L 105   CO2 mmol/L 25   BUN mg/dL 16   CREATININE mg/dL 0.84   ANION GAP mmol/L 9   CALCIUM mg/dL 8.3*   GLUCOSE RANDOM mg/dL 152*     Results from last 7 days   Lab Units 12/17/23  1807   INR  0.93     Results from last 7 days   Lab Units 12/17/23  2133 12/17/23  2042 12/17/23  1744   POC GLUCOSE mg/dl 121 136 139               Lines/Drains:  Invasive Devices       Peripheral Intravenous Line  Duration             Peripheral IV 12/17/23 Left Antecubital <1 day                        Imaging: Reviewed radiology reports from this admission including: CT head and CTA head and neck  CT stroke alert brain   Final Result by  Dean Medina MD (12/17 1822)      No mass effect, acute intracranial hemorrhage or evidence of recent infarction. Moderate chronic microvascular ischemic change.      Findings were directly discussed with Camille Rosales  at 6:13 p.m.      Workstation performed: [id number]         CTA stroke alert (head/neck)   Final Result by Dean Medina MD (12/17 1821)      No evidence for high-grade stenosis, focal occlusion or vascular aneurysm of the cervical or intracranial vessels.            Findings were directly discussed with Camille Rosales  at 6:13 p.m.                           Workstation performed: [id number]         X-ray chest 1 view portable    (Results Pending)   MRI ED order    (Results Pending)       EKG and Other Studies Reviewed on Admission:   EKG:  Sinus rhythm with sinus arrhythmia at a rate of 62 bpm.    ** Please Note: This note has been constructed using a voice recognition system. **

## 2023-12-18 NOTE — ASSESSMENT & PLAN NOTE
Lab Results   Component Value Date    HGBA1C 6.5 (H) 09/11/2023     Hold oral diabetic medication while inpatient, resume regular regimen on discharge  fingerstick glucose 4 times daily AC/HS  Sliding scale insulin

## 2023-12-18 NOTE — ASSESSMENT & PLAN NOTE
Blood pressure is stable  Continue PTA medication  Allow permissive hypertension as recommended by neurology in the setting of stroke-like symptoms  Monitor blood pressure while admitted   Resume all meds on discharge

## 2023-12-18 NOTE — ASSESSMENT & PLAN NOTE
Presented to the ER for evaluation f complaint of altered mental status, Slurred speech  Stroke alert called upon arrival to the ER  NIHSS of 0 upon arrival to the ER.  No TNKtPA given  CTA head and neck showed  CT head shows  Admit on observation  Continue stroke pathway for now per neurology recommendations  Telemetry monitoring  Neurochecks every 4 hours  Continue statin, aspirin  Check MRI,ECHO in the am  OT/PT/Speech eval  Check A1c, lipid panel  Neurology consult  Supportive care

## 2023-12-18 NOTE — SPEECH THERAPY NOTE
Speech Language/Pathology  Speech-Language Pathology Bedside Swallow Evaluation    Patient Name: Jorge Bajwa  Today's Date: 12/18/2023     Problem List  Principal Problem:    Stroke-like symptoms  Active Problems:    Benign essential hypertension    Coronary artery disease of native artery of native heart with stable angina pectoris (HCC)    Type 2 diabetes mellitus with complication (HCC)    Dyslipidemia    Hypokalemia    Alcoholic intoxication without complication (HCC)    Past Medical History  Past Medical History:   Diagnosis Date    Asthma     resolved: 08/14/17    Closed fracture of fifth metacarpal bone of right hand     last assessed: 06/30/15    Deep vein thrombosis (HCC)     Diabetes mellitus (HCC)     Diverticulitis     Fracture of metacarpal shaft     Hemopneumothorax, left     last assessed: 06/02/15    Hyperlipidemia     Hypertension     Inguinal hernia     Lumbar transverse process fracture (HCC)     Pilonidal cyst     Pleural effusion     last assessed: 07/01/15    Rib fractures     last assessed: 06/02/15    Status post fall     Superficial thrombophlebitis of leg     last assessed: 03/19/14     Past Surgical History  Past Surgical History:   Procedure Laterality Date    COLON SURGERY      COLONOSCOPY      CORONARY ANGIOPLASTY WITH STENT PLACEMENT  2009    PTCA/RITA to RCA    FRACTURE SURGERY  05/04/2015    Closed treatment of fracture of metacarpal bone, right 5t metacarpal fracture Dr. Claire    HERNIA REPAIR      KNEE ARTHROSCOPY W/ MENISCAL REPAIR      Lateral meniscus    LAPAROSCOPY  05/18/2015    Exploratory, extensive lysis of adhesions Dr. Pérez    OTHER SURGICAL HISTORY      Surgical lysis of intestinal adhesions    RECTAL SURGERY      REVISION COLOSTOMY      THORACENTESIS  06/02/2015    with imaging guidance left, removed 1300cc of fluid w/o problem lower base of lun06    TONSILLECTOMY AND ADENOIDECTOMY       Summary   Pt presented with functional appearing oral and pharyngeal stage  swallowing skills with materials administered today. Motor speech and language also appear WFL. Please reconsult with further changes/concerns.    Risk/s for Aspiration: low     Recommended Diet: regular diet and thin liquids   Recommended Form of Meds: whole with liquid   Aspiration precautions and swallowing strategies: upright posture  Other Recommendations: Continue frequent oral care    Current Medical Status  Per 12/17/23 H&P - Pt is a 73 y.o. male with a PMH of diabetes, DVT, hypertension, hyperlipidemia who presents to the emergency room for evaluation of complaints of confusion, garbled speech, unsteady gait reported by family. Patient did not believe he had any acute symptoms however family is reporting that definitely has changes in his mental status as well as a speech concern for possible stroke.  Patient does admit to have been drinking early on the day.  Family members also reported there is a suspicion patient may be have creased the amount of alcohol that he has been taking.  Patient reported that he had 2 Rum and  Coke earlier on today.  Family states that several years ago with similar presentation he has been drinking and was noted to have a CVA.  No residual neurological deficits from that previous stroke.     Stroke alert called in the emergency room NIHSS of 0.  No TNKtPA  given.  CT head and CTA head and neck negative for acute findings.  Labs showed potassium of 3.3, glucose of 152, COVID/RSV/flu negative, blood alcohol levels at 180.  EKG shows a sinus rhythm with sinus arrhythmia at a rate of 62 bpm.     Patient is being admitted observation status Medr level care for further workup and management of strokelike symptoms, hyperkalemia alcohol intoxication.    Current Precautions:  None    Allergies:  No known food allergies    Special Studies:  12/17/23 CT stroke alert brain impression - No mass effect, acute intracranial hemorrhage or evidence of recent infarction. Moderate chronic  microvascular ischemic change.     12/17/23 CTA stroke alert impression - No evidence for high-grade stenosis, focal occlusion or vascular aneurysm of the cervical or intracranial vessels.     12/17/23 Chest XR impression - No acute cardiopulmonary disease.     Resolution of previous left basal opacity    12/18/23 MRI brain impression - No acute infarct. Chronic microangiopathic change.     Social/Education/Vocational Hx:  Pt lives alone    Swallow Information   Current Risks for Dysphagia & Aspiration: CVA  Current Symptoms/Concerns:  CVA pathway  Current Diet: regular diet and thin liquids   Baseline Diet: regular diet and thin liquids    Baseline Assessment   Behavior/Cognition: alert and oriented x3  Speech/Language Status: able to participate in conversation and able to follow commands  Patient Positioning:  upright on edge of bed  Pain Status/Interventions/Response to Interventions: No report of or nonverbal indications of pain.    Swallow Mechanism Exam  Facial: symmetrical  Labial: WFL  Lingual: WFL  Velum: symmetrical  Mandible: adequate ROM  Dentition: adequate  Vocal quality:clear/adequate   Volitional Cough: strong/productive   Respiratory Status: on RA  Tracheostomy: n/a    Consistencies Assessed and Performance   Consistencies Administered: thin liquids, mechanical soft solids, and hard solids  Materials administered included: water via cup and straw, applesauce, and muffin    Oral Stage: WFL  Mastication was adequate with the materials administered today.  Bolus formation and transfer were functional with no significant oral residue noted.  No overt s/s reduced oral control.    Pharyngeal Stage: WFL  Swallow Mechanics:  Swallowing initiation appeared prompt.  Laryngeal rise was palpated and judged to be within functional limits.  No coughing, throat clearing, change in vocal quality or respiratory status noted today.     Esophageal Concerns: none reported    Strategies and Efficacy: none  necessary    Summary and Recommendations (see above)    Results Reviewed with: patient     Treatment Recommended: not at this time

## 2023-12-18 NOTE — ASSESSMENT & PLAN NOTE
Presented to the ER for evaluation f complaint of altered mental status, Slurred speech  Stroke alert called upon arrival to the ER  NIHSS of 0 upon arrival to the ER.  No TNKtPA given  CTA head and neck unremarkable  Admit on observation  Continue stroke pathway for now per neurology recommendations  Telemetry monitoring  Neurochecks every 4 hours  Aspirin/plavix loaded here  Check MRI,ECHO in the am - MRI negative for stroke  OT/PT/Speech eval  Check A1c, lipid panel  Neuro consult appreciated  Will treat as a TIA, continue aspirin 81 mg daily and continue on home dose of statin on discharge

## 2023-12-18 NOTE — CONSULTS
TeleConsultation - Neurology   Jorge Bajwa 73 y.o. male MRN: [id number]  Unit/Bed#: 424-01 Encounter: [id number]        REQUIRED DOCUMENTATION:     1. This service was provided via Telemedicine.  2. Provider located at [facility].  3. TeleMed provider: Camille Rosales DO.  4. Identify all parties in room with patient during tele consult:  Pt   5.Patient was then informed that this was a Telemedicine visit and that the exam was being conducted confidentially over secure lines. My office door was closed. No one else was in the room.  Patient acknowledged consent and understanding of privacy and security of the Telemedicine visit, and gave us permission to have the assistant stay in the room in order to assist with the history and to conduct the exam.  I informed the patient that I have reviewed their record in Epic and presented the opportunity for them to ask any questions regarding the visit today.  The patient agreed to participate.             Assessment/Plan     Mr. Lucien Bajwa is a pleasant 74 yo male seen with remote hx of lacunar stroke not on antiplatelet at home seen in consultation for transient speech yesterday in the setting of alcohol intoxication.  - Cannot completely rule out TIA  - Pt does have significant chronic ischemic microangiopathic changes and vascular risk factors thus recommend at least ASA 8 1mg daily and c/w home atorvastatin daily  - DM control  - Discussed importance of alcohol use cessation to further reduce stroke risk  - CTA H/N with no significant abnormality  - ECHOcardiogram pending- if this shows no significant pathology, no further in patient neurodiagnostics. Pt can follow up with OP PCP.    IF any sudden onset of one sided weakness facial droop sensory loss speech change vision loss significant dizziness/ inability to walk pt was instructed to call 911/ go to the nearest ED as these can be signs of acute stroke          History of Present Illness     Reason for  "Consult / Principal Problem: transient speech changes yesterday  Hx and PE limited by: tele consult patient does not recall events yesterday clear;y  HPI: Jorge Bajwa is a 73 y.o.  male with CAD s/p stent about a decade ago, remote hx DVT after a long flight, HTN, HLD- on atorvastatin at home who presents with speech change yesterday noted by his friend. Patient states he does not recall the event.  States he feels back to normal yesterday  Reports drinking two to three \"shorts\" of alcohol yesterday which is not typical for him.  His ethanol level was elevated yesterday- 180.  Pt reports similar event 20-24 years ago and was hospitalized and states his PCP called it \"lacunar infarction\". He does not recall if he drank alcohol then or not.  He is not on any antiplatelet at home  No seizure history  Family hx stroke in mom maternal grandmother and maternal cousin  + tobacco use in past stopped about 20 years ago  Reports drinking alcohol maybe once a week few shots at a time but not consistently. States social    Inpatient consult to Neurology  Consult performed by: Camille Rosales DO  Consult ordered by: Kumar Gallo PA-C           Review of Systems 10 point ROS completed and negative other than that noted above    Historical Information   Past Medical History:   Diagnosis Date    Asthma     resolved: 08/14/17    Closed fracture of fifth metacarpal bone of right hand     last assessed: 06/30/15    Deep vein thrombosis (HCC)     Diabetes mellitus (HCC)     Diverticulitis     Fracture of metacarpal shaft     Hemopneumothorax, left     last assessed: 06/02/15    Hyperlipidemia     Hypertension     Inguinal hernia     Lumbar transverse process fracture (HCC)     Pilonidal cyst     Pleural effusion     last assessed: 07/01/15    Rib fractures     last assessed: 06/02/15    Status post fall     Superficial thrombophlebitis of leg     last assessed: 03/19/14     Past Surgical History:   Procedure Laterality Date    " "COLON SURGERY      COLONOSCOPY      CORONARY ANGIOPLASTY WITH STENT PLACEMENT      PTCA/RITA to RCA    FRACTURE SURGERY  2015    Closed treatment of fracture of metacarpal bone, right 5t metacarpal fracture Dr. Claire    HERNIA REPAIR      KNEE ARTHROSCOPY W/ MENISCAL REPAIR      Lateral meniscus    LAPAROSCOPY  2015    Exploratory, extensive lysis of adhesions Dr. Pérez    OTHER SURGICAL HISTORY      Surgical lysis of intestinal adhesions    RECTAL SURGERY      REVISION COLOSTOMY      THORACENTESIS  2015    with imaging guidance left, removed 1300cc of fluid w/o problem lower base of lun06    TONSILLECTOMY AND ADENOIDECTOMY       Social History   Social History     Substance and Sexual Activity   Alcohol Use Not Currently    Alcohol/week: 0.0 standard drinks of alcohol    Comment: Social     Social History     Substance and Sexual Activity   Drug Use No     E-Cigarette/Vaping    E-Cigarette Use Never User      E-Cigarette/Vaping Substances    Nicotine No     THC No     CBD No     Flavoring No     Other No     Unknown No      Social History     Tobacco Use   Smoking Status Former    Current packs/day: 0.00    Average packs/day: 1 pack/day for 30.0 years (30.0 ttl pk-yrs)    Types: Cigarettes    Start date: 1968    Quit date:     Years since quittin.5   Smokeless Tobacco Never     Family History:   Family History   Problem Relation Age of Onset    Coronary artery disease Mother     Heart disease Father         Benign hypertensive    Hyperlipidemia Father        Review of previous medical records was  completed.     Meds/Allergies   all current active meds have been reviewed    No Known Allergies    Objective   Vitals:Blood pressure 158/60, pulse 56, temperature 98.3 °F (36.8 °C), resp. rate 15, height 5' 11\" (1.803 m), weight 107 kg (235 lb 14.3 oz), SpO2 94%.,Body mass index is 32.9 kg/m².    Intake/Output Summary (Last 24 hours) at 2023 2519  Last data filed at 2023 " 0900  Gross per 24 hour   Intake 420 ml   Output --   Net 420 ml       Invasive Devices:   Invasive Devices       Peripheral Intravenous Line  Duration             Peripheral IV 12/17/23 Left Antecubital <1 day                    Physical Exam  Neurologic Exam    Modified PE as this is a video consultation:  Gen:   NAD.  HEENT:  No Septal deviation EOMI NCAT.  Resp:  Symmetric chest rise and patient in no obvious respiratory distress  MSK: ROM normal  Skin: No rash noted in visualized portion of this exam    Neuroexam:  AO X 4. No aphasia or dysarthria confusion noted.  Patient is able to follow 2 and 3 step commands with ease  CN 2-12 grossly intact   Motor:  Intact antigravity X 4 extremities. No Pronator drift noted.  Sensation: Intact to light touch, instructed patient on how to do this in all extremities proximal and distal.  Cerebellar: No dysmetria b/l with FNF testing.   Gait:  normal casual gait in the room      Lab Results: I have personally reviewed pertinent reports.    Imaging Studies: I have personally reviewed pertinent films in PACS  EKG, Pathology, and Other Studies: I have personally reviewed pertinent reports.        Code Status: Level 1 - Full Code  Advance Directive and Living Will:      Power of :    POLST:      Counseling / Coordination of Care  Total time spent today 45 minutes. Greater than 50% of total time was spent with the patient and / or family counseling and / or coordination of care. A description of the counseling / coordination of care: as noted above in A/P including discussing alcohol cessation and stroke risk factor modification

## 2023-12-18 NOTE — ASSESSMENT & PLAN NOTE
Lab Results   Component Value Date    HGBA1C 6.5 (H) 09/11/2023   Glucose level ar 152  Hold oral diabetic medication  fingerstick glucose 4 times daily AC/HS  Sliding scale insulin  Check A1C  Monitor while admitted

## 2023-12-18 NOTE — CASE MANAGEMENT
Case Management Assessment & Discharge Planning Note    Patient name Jorge Bajwa  Location /424-01 MRN [id number]  : 1950 Date 2023       Current Admission Date: 2023  Current Admission Diagnosis:Stroke-like symptoms   Patient Active Problem List    Diagnosis Date Noted    Stroke-like symptoms 2023    Hypokalemia 2023    Alcoholic intoxication without complication (HCC) 2023    Embolism and thrombosis of arteries of the lower extremities (HCC) 08/15/2022    Presence of drug-eluting stent in right coronary artery 2018    Coronary artery disease of native artery of native heart with stable angina pectoris (HCC)     Sciatica 2014    Dyslipidemia 2013    Class 2 severe obesity due to excess calories with serious comorbidity in adult ([facility]) 2013    Type 2 diabetes mellitus with complication ([facility]) 2013    Benign essential hypertension 2012      LOS (days): 0  Geometric Mean LOS (GMLOS) (days):   Days to GMLOS:     OBJECTIVE:              Current admission status: Observation       Preferred Pharmacy:   [id number] [address] [address]  Phone: [phone] Fax: [phone]    [facility] - [address] [address]  Phone: [phone] Fax: [phone]    [facility] - [place] [id number] [address]  [address]  [address] [address]  Phone: [phone] Fax: [phone]    Primary Care Provider: Travis Bergman DO    Primary Insurance: MEDICARE  Secondary Insurance: AARP    ASSESSMENT:  Active Health Care Proxies       Yun Mendez Care Representative - Daughter   Primary Phone: [phone] (Mobile)                 Advance Directives  Does patient have a Health Care POA?: No  Was patient offered paperwork?: Yes  (declined)  Does patient currently have a Health Care decision maker?: Yes, please see Health Care Proxy section  Does patient have Advance Directives?: No  Was patient offered paperwork?: Yes (declined)  Primary Contact: Yun Mendez-daughter         Readmission Root Cause  30 Day Readmission: No    Patient Information  Admitted from:: Home  Mental Status: Alert  During Assessment patient was accompanied by: Other-Comment (son in law)  Assessment information provided by:: Patient  Primary Caregiver: Self  Support Systems: Self, Daughter, Spouse/significant other, Friend, Family members  County of Residence: [place]  What city do you live in?: [place]  Home entry access options. Select all that apply.: No steps to enter home  Type of Current Residence: 2 story home  Upon entering residence, is there a bedroom on the main floor (no further steps)?: No  A bedroom is located on the following floor levels of residence (select all that apply):: 2nd Floor  Upon entering residence, is there a bathroom on the main floor (no further steps)?: No  Indicate which floors of current residence have a bathroom (select all the apply):: 2nd Floor  Number of steps to 2nd floor from main floor: One Flight  Living Arrangements: Lives Alone  Is patient a ?: Yes  Is patient active with [facility] ([place] [facility])?: No    Activities of Daily Living Prior to Admission  Functional Status: Independent  Completes ADLs independently?: Yes  Ambulates independently?: Yes  Does patient use assisted devices?: No  Does patient currently own DME?: No  Does patient have a history of Outpatient Therapy (PT/OT)?: No  Does the patient have a history of Short-Term Rehab?: No  Does patient have a history of HHC?: No  Does patient currently have HHC?: No         Patient Information Continued  Income Source: Pension/longterm  Does patient have prescription coverage?: Yes  Does patient receive dialysis treatments?: No  Does patient have a history of  substance abuse?: Currently using (pt states he is only drinking 1-2 days a week. when he does drink its only 1 or 2 drinks. pt states he does not have a problem and plans on not having any further drinks. agreeable for me to provide substance abuse phone number on AVS if he would need.)  History of Withdrawal Symptoms: Denies past symptoms  Does patient have a history of Mental Health Diagnosis?: No         Means of Transportation  Means of Transport to Appts:: Drives Self      Housing Stability: Low Risk  (12/18/2023)    Housing Stability Vital Sign     Unable to Pay for Housing in the Last Year: No     Number of Places Lived in the Last Year: 1     Unstable Housing in the Last Year: No   Food Insecurity: No Food Insecurity (12/18/2023)    Hunger Vital Sign     Worried About Running Out of Food in the Last Year: Never true     Ran Out of Food in the Last Year: Never true   Transportation Needs: No Transportation Needs (12/18/2023)    PRAPARE - Transportation     Lack of Transportation (Medical): No     Lack of Transportation (Non-Medical): No   Recent Concern: Transportation Needs - Unmet Transportation Needs (11/27/2023)    PRAPARE - Transportation     Lack of Transportation (Medical): Yes     Lack of Transportation (Non-Medical): Yes   Utilities: Not At Risk (12/18/2023)    [facility] Utilities     Threatened with loss of utilities: No       DISCHARGE DETAILS:    Discharge planning discussed with:: patient and son in law        CM contacted family/caregiver?: Yes (son in law at bedside)  Were Treatment Team discharge recommendations reviewed with patient/caregiver?: Yes  Did patient/caregiver verbalize understanding of patient care needs?: Yes  Were patient/caregiver advised of the risks associated with not following Treatment Team discharge recommendations?: Yes    Contacts  Contact Method: In Person  Reason/Outcome: Discharge Planning              Other Referral/Resources/Interventions Provided:  Referral Comments:  Provided substance abuse phone number    Would you like to participate in our [facility] service program?  : No - Declined    Treatment Team Recommendation: Home  Discharge Destination Plan:: Home   Cm met with the patient to evaluate the patients prior function and living situation and any barriers to d/c and form a safe d/c plan. Cm also evaluated the patient for any services in the home or needs for services. CM also discussed with patient that their preferences will be taken into account/consideration.  Pt under observation with stroke rule out. Work up in progress. Seen by therapy and no needs noted. If all tests negative pt will be discharging home with no needs. Nurse will review AVS, all follow up providers listed.

## 2023-12-18 NOTE — DISCHARGE SUMMARY
[facility]  Discharge- Jorge Bajwa 1950, 73 y.o. male MRN: [id number]  Unit/Bed#: 424-01 Encounter: [id number]  Primary Care Provider: Travis Bergman DO   Date and time admitted to hospital: 12/17/2023  5:31 PM    Alcoholic intoxication without complication (HCC)  Assessment & Plan  Blood alcohol level at 180  He does admit to having 2 rum and coke  Family reports that they are suspicious that patient has been drinking more than he is indicating  IV fluid bolus  Fall precations  Consider initiating CIWA  No signs of withdrawal, alcohol level on discharge 0  Given resources for substance abuse on dischagre    Hypokalemia  Assessment & Plan  Potassium level at 3.3  Replace potassium      Dyslipidemia  Assessment & Plan    Continue statin therapy - at appropriate high intensity dosage    Type 2 diabetes mellitus with complication (HCC)  Assessment & Plan  Lab Results   Component Value Date    HGBA1C 6.5 (H) 09/11/2023     Hold oral diabetic medication while inpatient, resume regular regimen on discharge  fingerstick glucose 4 times daily AC/HS  Sliding scale insulin              Coronary artery disease of native artery of native heart with stable angina pectoris (HCC)  Assessment & Plan  History of CAD  Denies chest pain or discomfort  Continue PTA medications  Continue outpatient cardiology follow-up    Benign essential hypertension  Assessment & Plan  Blood pressure is stable  Continue PTA medication  Allow permissive hypertension as recommended by neurology in the setting of stroke-like symptoms  Monitor blood pressure while admitted   Resume all meds on discharge    * Stroke-like symptoms  Assessment & Plan  Presented to the ER for evaluation f complaint of altered mental status, Slurred speech  Stroke alert called upon arrival to the ER  NIHSS of 0 upon arrival to the ER.  No TNKtPA given  CTA head and neck unremarkable  Admit on observation  Continue stroke pathway for now per  neurology recommendations  Telemetry monitoring  Neurochecks every 4 hours  Aspirin/plavix loaded here  Check MRI,ECHO in the am - MRI negative for stroke  OT/PT/Speech eval  Check A1c, lipid panel  Neuro consult appreciated  Will treat as a TIA, continue aspirin 81 mg daily and continue on home dose of statin on discharge          Medical Problems       Resolved Problems  Date Reviewed: 12/17/2023   None       Discharging Physician / Practitioner: Sharmin Denson PA-C  PCP: Travis Bergman DO  Admission Date:   Admission Orders (From admission, onward)       Ordered        12/17/23 1930  Place in Observation  Once                          Discharge Date: 12/18/23    Consultations During Hospital Stay:  neurology    Procedures Performed:   none    Significant Findings / Test Results:     MRI brain wo contrast   Final Result      No acute infarct. Chronic microangiopathic change.      Workstation performed: [id number]         X-ray chest 1 view portable   Final Result      No acute cardiopulmonary disease.      Resolution of previous left basal opacity            Workstation performed: [id number]         CT stroke alert brain   Final Result      No mass effect, acute intracranial hemorrhage or evidence of recent infarction. Moderate chronic microvascular ischemic change.      Findings were directly discussed with Camille Rosales  at 6:13 p.m.      Workstation performed: [id number]         CTA stroke alert (head/neck)   Final Result      No evidence for high-grade stenosis, focal occlusion or vascular aneurysm of the cervical or intracranial vessels.            Findings were directly discussed with Camille Rosales  at 6:13 p.m.                           Workstation performed: [id number]               Incidental Findings:   none    Test Results Pending at Discharge (will require follow up):   ECHO     Outpatient Tests Requested:  none    Complications:  none    Reason for Admission: stroke symptoms    Hospital Course:  "  Jorge Bajwa is a 73 y.o. male patient who originally presented to the hospital on 12/17/2023 due to confusion, garbled speech, unsteady gait reported by family. Patient did not believe he had any acute symptoms however family is reporting that definitely has changes in his mental status as well as a speech concern for possible stroke.  Patient does admit to have been drinking early on the day.  Family members also reported there is a suspicion patient may be have creased the amount of alcohol that he has been taking.  Patient reported that he had 2 Rum and  Coke earlier on today.  Family states that several years ago with similar presentation he has been drinking and was noted to have a CVA.  No residual neurological deficits from that previous stroke.     No events overnight, patient is now back to his baseline mental status. MRI negative for acute stroke. Patient to remain on aspirin hereafter. Neuro cleared the patient for discharge today.     The patient, initially admitted to the hospital as inpatient, was discharged earlier than expected given the following: stroke work up completed.    Please see above list of diagnoses and related plan for additional information.     Condition at Discharge: good    Discharge Day Visit / Exam:   Subjective:  patient reports feeling back to baseline today. Does not recall events of yesterday  Vitals: Blood Pressure: 158/60 (12/18/23 1220)  Pulse: 56 (12/18/23 1220)  Temperature: 98.3 °F (36.8 °C) (12/18/23 1057)  Temp Source: Oral (12/18/23 0700)  Respirations: 15 (12/18/23 1057)  Height: 5' 11\" (180.3 cm) (12/18/23 1220)  Weight - Scale: 107 kg (235 lb 14.3 oz) (12/18/23 1220)  SpO2: 94 % (12/18/23 1057)  Exam:   Physical Exam  Vitals and nursing note reviewed.   Constitutional:       General: He is not in acute distress.     Appearance: He is obese. He is ill-appearing.   Cardiovascular:      Rate and Rhythm: Normal rate and regular rhythm.      Pulses: Normal pulses. "      Heart sounds: Normal heart sounds.   Pulmonary:      Effort: Pulmonary effort is normal.      Breath sounds: Normal breath sounds.   Abdominal:      General: Bowel sounds are normal.      Palpations: Abdomen is soft.   Musculoskeletal:      Right lower leg: No edema.      Left lower leg: No edema.   Skin:     General: Skin is warm and dry.   Neurological:      General: No focal deficit present.      Mental Status: He is alert and oriented to person, place, and time. Mental status is at baseline.   Psychiatric:         Mood and Affect: Mood normal.         Behavior: Behavior normal.          Discussion with Family: Patient declined call to .     Discharge instructions/Information to patient and family:   See after visit summary for information provided to patient and family.      Provisions for Follow-Up Care:  See after visit summary for information related to follow-up care and any pertinent home health orders.      Mobility at time of Discharge:   Basic Mobility Inpatient Raw Score: 24  JH-HLM Goal: 8: Walk 250 feet or more  JH-HLM Achieved: 8: Walk 250 feet ot more  HLM Goal achieved. Continue to encourage appropriate mobility.     Disposition:   Home    Planned Readmission: none     Discharge Statement:  I spent 45 minutes discharging the patient. This time was spent on the day of discharge. I had direct contact with the patient on the day of discharge. Greater than 50% of the total time was spent examining patient, answering all patient questions, arranging and discussing plan of care with patient as well as directly providing post-discharge instructions.  Additional time then spent on discharge activities.    Discharge Medications:  See after visit summary for reconciled discharge medications provided to patient and/or family.      **Please Note: This note may have been constructed using a voice recognition system**

## 2023-12-18 NOTE — ASSESSMENT & PLAN NOTE
Blood pressure is stable  Continue PTA medication  Allow permissive hypertension as recommended by neurology in the setting of stroke-like symptoms  Monitor blood pressure while admitted

## 2023-12-19 LAB
ATRIAL RATE: 65 BPM
ATRIAL RATE: 65 BPM
P AXIS: 57 DEGREES
P AXIS: 60 DEGREES
PR INTERVAL: 152 MS
PR INTERVAL: 156 MS
QRS AXIS: 34 DEGREES
QRS AXIS: 40 DEGREES
QRSD INTERVAL: 94 MS
QRSD INTERVAL: 96 MS
QT INTERVAL: 412 MS
QT INTERVAL: 416 MS
QTC INTERVAL: 428 MS
QTC INTERVAL: 432 MS
T WAVE AXIS: 31 DEGREES
T WAVE AXIS: 35 DEGREES
VENTRICULAR RATE: 65 BPM
VENTRICULAR RATE: 65 BPM

## 2023-12-23 ENCOUNTER — APPOINTMENT (EMERGENCY)
Dept: RADIOLOGY | Facility: HOSPITAL | Age: 73
End: 2023-12-23
Payer: MEDICARE

## 2023-12-23 ENCOUNTER — APPOINTMENT (EMERGENCY)
Dept: CT IMAGING | Facility: HOSPITAL | Age: 73
End: 2023-12-23
Payer: MEDICARE

## 2023-12-23 ENCOUNTER — HOSPITAL ENCOUNTER (OUTPATIENT)
Facility: HOSPITAL | Age: 73
Setting detail: OBSERVATION
Discharge: HOME | End: 2023-12-24
Attending: EMERGENCY MEDICINE | Admitting: HOSPITALIST
Payer: MEDICARE

## 2023-12-23 DIAGNOSIS — G93.40 ACUTE ENCEPHALOPATHY: ICD-10-CM

## 2023-12-23 DIAGNOSIS — F10.929 ALCOHOL INTOXICATION (HCC): Primary | ICD-10-CM

## 2023-12-23 DIAGNOSIS — R29.6 UNWITNESSED FALL: ICD-10-CM

## 2023-12-23 DIAGNOSIS — N17.9 AKI (ACUTE KIDNEY INJURY) (HCC): ICD-10-CM

## 2023-12-23 DIAGNOSIS — S20.411A ABRASION OF RIGHT SIDE OF BACK, INITIAL ENCOUNTER: ICD-10-CM

## 2023-12-23 DIAGNOSIS — Z91.81 AT RISK FOR FALL DUE TO COMORBID CONDITION: ICD-10-CM
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
## 2023-12-23 LAB
2HR DELTA HS TROPONIN: 0 NG/L
ABO GROUP BLD: NORMAL
ALBUMIN SERPL BCP-MCNC: 4 G/DL (ref 3.5–5)
ALP SERPL-CCNC: 87 U/L (ref 34–104)
ALT SERPL W P-5'-P-CCNC: 21 U/L (ref 7–52)
ANION GAP SERPL CALCULATED.3IONS-SCNC: 12 MMOL/L
APTT PPP: 24 SECONDS (ref 23–37)
AST SERPL W P-5'-P-CCNC: 17 U/L (ref 13–39)
ATRIAL RATE: 74 BPM
BASE EX.OXY STD BLDV CALC-SCNC: 37.8 % (ref 60–80)
BASE EXCESS BLDV CALC-SCNC: -4.8 MMOL/L
BASOPHILS # BLD AUTO: 0.03 THOUSANDS/ÂΜL (ref 0–0.1)
BASOPHILS NFR BLD AUTO: 0 % (ref 0–1)
BILIRUB SERPL-MCNC: 0.82 MG/DL (ref 0.2–1)
BLD GP AB SCN SERPL QL: NEGATIVE
BUN SERPL-MCNC: 21 MG/DL (ref 5–25)
CALCIUM SERPL-MCNC: 8.4 MG/DL (ref 8.4–10.2)
CARDIAC TROPONIN I PNL SERPL HS: 8 NG/L
CARDIAC TROPONIN I PNL SERPL HS: 8 NG/L
CHLORIDE SERPL-SCNC: 106 MMOL/L (ref 96–108)
CK SERPL-CCNC: 82 U/L (ref 39–308)
CO2 SERPL-SCNC: 21 MMOL/L (ref 21–32)
CREAT SERPL-MCNC: 1.1 MG/DL (ref 0.6–1.3)
EOSINOPHIL # BLD AUTO: 0.03 THOUSAND/ÂΜL (ref 0–0.61)
EOSINOPHIL NFR BLD AUTO: 0 % (ref 0–6)
ERYTHROCYTE [DISTWIDTH] IN BLOOD BY AUTOMATED COUNT: 12.8 % (ref 11.6–15.1)
ETHANOL SERPL-MCNC: 185 MG/DL
GFR SERPL CREATININE-BSD FRML MDRD: 67 ML/MIN/1.73SQ M
GLUCOSE SERPL-MCNC: 133 MG/DL (ref 65–140)
HCO3 BLDV-SCNC: 20.9 MMOL/L (ref 24–30)
HCT VFR BLD AUTO: 49.3 % (ref 36.5–49.3)
HGB BLD-MCNC: 16 G/DL (ref 12–17)
IMM GRANULOCYTES # BLD AUTO: 0.08 THOUSAND/UL (ref 0–0.2)
IMM GRANULOCYTES NFR BLD AUTO: 1 % (ref 0–2)
INR PPP: 0.94 (ref 0.84–1.19)
LYMPHOCYTES # BLD AUTO: 1.34 THOUSANDS/ÂΜL (ref 0.6–4.47)
LYMPHOCYTES NFR BLD AUTO: 13 % (ref 14–44)
MCH RBC QN AUTO: 32.8 PG (ref 26.8–34.3)
MCHC RBC AUTO-ENTMCNC: 32.5 G/DL (ref 31.4–37.4)
MCV RBC AUTO: 101 FL (ref 82–98)
MONOCYTES # BLD AUTO: 0.45 THOUSAND/ÂΜL (ref 0.17–1.22)
MONOCYTES NFR BLD AUTO: 4 % (ref 4–12)
NEUTROPHILS # BLD AUTO: 8.49 THOUSANDS/ÂΜL (ref 1.85–7.62)
NEUTS SEG NFR BLD AUTO: 82 % (ref 43–75)
NRBC BLD AUTO-RTO: 0 /100 WBCS
O2 CT BLDV-SCNC: 9 ML/DL
P AXIS: 78 DEGREES
PCO2 BLDV: 41.3 MM HG (ref 42–50)
PH BLDV: 7.32 [PH] (ref 7.3–7.4)
PLATELET # BLD AUTO: 200 THOUSANDS/UL (ref 149–390)
PMV BLD AUTO: 10.5 FL (ref 8.9–12.7)
PO2 BLDV: 23.9 MM HG (ref 35–45)
POTASSIUM SERPL-SCNC: 3.5 MMOL/L (ref 3.5–5.3)
PR INTERVAL: 154 MS
PROT SERPL-MCNC: 6.2 G/DL (ref 6.4–8.4)
PROTHROMBIN TIME: 12.5 SECONDS (ref 11.6–14.5)
QRS AXIS: 64 DEGREES
QRSD INTERVAL: 92 MS
QT INTERVAL: 386 MS
QTC INTERVAL: 428 MS
RBC # BLD AUTO: 4.88 MILLION/UL (ref 3.88–5.62)
RH BLD: POSITIVE
SODIUM SERPL-SCNC: 139 MMOL/L (ref 135–147)
SPECIMEN EXPIRATION DATE: NORMAL
T WAVE AXIS: 57 DEGREES
VENTRICULAR RATE: 74 BPM
WBC # BLD AUTO: 10.42 THOUSAND/UL (ref 4.31–10.16)

## 2023-12-23 PROCEDURE — 84484 ASSAY OF TROPONIN QUANT: CPT | Performed by: EMERGENCY MEDICINE

## 2023-12-23 PROCEDURE — 70450 CT HEAD/BRAIN W/O DYE: CPT

## 2023-12-23 PROCEDURE — 96365 THER/PROPH/DIAG IV INF INIT: CPT

## 2023-12-23 PROCEDURE — 90715 TDAP VACCINE 7 YRS/> IM: CPT | Performed by: EMERGENCY MEDICINE

## 2023-12-23 PROCEDURE — 36415 COLL VENOUS BLD VENIPUNCTURE: CPT | Performed by: EMERGENCY MEDICINE

## 2023-12-23 PROCEDURE — 71260 CT THORAX DX C+: CPT

## 2023-12-23 PROCEDURE — 85025 COMPLETE CBC W/AUTO DIFF WBC: CPT | Performed by: EMERGENCY MEDICINE

## 2023-12-23 PROCEDURE — 74177 CT ABD & PELVIS W/CONTRAST: CPT

## 2023-12-23 PROCEDURE — 99222 1ST HOSP IP/OBS MODERATE 55: CPT | Performed by: PHYSICIAN ASSISTANT

## 2023-12-23 PROCEDURE — 85730 THROMBOPLASTIN TIME PARTIAL: CPT | Performed by: EMERGENCY MEDICINE

## 2023-12-23 PROCEDURE — 82805 BLOOD GASES W/O2 SATURATION: CPT | Performed by: EMERGENCY MEDICINE

## 2023-12-23 PROCEDURE — 93005 ELECTROCARDIOGRAM TRACING: CPT

## 2023-12-23 PROCEDURE — 86901 BLOOD TYPING SEROLOGIC RH(D): CPT | Performed by: EMERGENCY MEDICINE

## 2023-12-23 PROCEDURE — 86900 BLOOD TYPING SEROLOGIC ABO: CPT | Performed by: EMERGENCY MEDICINE

## 2023-12-23 PROCEDURE — 99285 EMERGENCY DEPT VISIT HI MDM: CPT

## 2023-12-23 PROCEDURE — 82077 ASSAY SPEC XCP UR&BREATH IA: CPT | Performed by: EMERGENCY MEDICINE

## 2023-12-23 PROCEDURE — 71045 X-RAY EXAM CHEST 1 VIEW: CPT

## 2023-12-23 PROCEDURE — 82550 ASSAY OF CK (CPK): CPT | Performed by: EMERGENCY MEDICINE

## 2023-12-23 PROCEDURE — 85610 PROTHROMBIN TIME: CPT | Performed by: EMERGENCY MEDICINE

## 2023-12-23 PROCEDURE — 86850 RBC ANTIBODY SCREEN: CPT | Performed by: EMERGENCY MEDICINE

## 2023-12-23 PROCEDURE — 80053 COMPREHEN METABOLIC PANEL: CPT | Performed by: EMERGENCY MEDICINE

## 2023-12-23 PROCEDURE — 90471 IMMUNIZATION ADMIN: CPT

## 2023-12-23 PROCEDURE — 72125 CT NECK SPINE W/O DYE: CPT

## 2023-12-23 PROCEDURE — 99285 EMERGENCY DEPT VISIT HI MDM: CPT | Performed by: EMERGENCY MEDICINE

## 2023-12-23 RX ORDER — SODIUM CHLORIDE, SODIUM GLUCONATE, SODIUM ACETATE, POTASSIUM CHLORIDE, MAGNESIUM CHLORIDE, SODIUM PHOSPHATE, DIBASIC, AND POTASSIUM PHOSPHATE .53; .5; .37; .037; .03; .012; .00082 G/100ML; G/100ML; G/100ML; G/100ML; G/100ML; G/100ML; G/100ML
1000 INJECTION, SOLUTION INTRAVENOUS ONCE
Status: COMPLETED | OUTPATIENT
Start: 2023-12-23 | End: 2023-12-23

## 2023-12-23 RX ADMIN — TETANUS TOXOID, REDUCED DIPHTHERIA TOXOID AND ACELLULAR PERTUSSIS VACCINE, ADSORBED 0.5 ML: 5; 2.5; 8; 8; 2.5 SUSPENSION INTRAMUSCULAR at 21:17

## 2023-12-23 RX ADMIN — IOHEXOL 100 ML: 350 INJECTION, SOLUTION INTRAVENOUS at 21:01

## 2023-12-23 RX ADMIN — SODIUM CHLORIDE, SODIUM GLUCONATE, SODIUM ACETATE, POTASSIUM CHLORIDE, MAGNESIUM CHLORIDE, SODIUM PHOSPHATE, DIBASIC, AND POTASSIUM PHOSPHATE 1000 ML: .53; .5; .37; .037; .03; .012; .00082 INJECTION, SOLUTION INTRAVENOUS at 22:05

## 2023-12-24 VITALS
SYSTOLIC BLOOD PRESSURE: 146 MMHG | HEART RATE: 66 BPM | BODY MASS INDEX: 32.69 KG/M2 | OXYGEN SATURATION: 92 % | DIASTOLIC BLOOD PRESSURE: 66 MMHG | TEMPERATURE: 96.9 F | RESPIRATION RATE: 18 BRPM | WEIGHT: 234.35 LBS

## 2023-12-24 LAB
4HR DELTA HS TROPONIN: 0 NG/L
ALBUMIN SERPL BCP-MCNC: 3.6 G/DL (ref 3.5–5)
ALP SERPL-CCNC: 75 U/L (ref 34–104)
ALT SERPL W P-5'-P-CCNC: 18 U/L (ref 7–52)
AMPHETAMINES SERPL QL SCN: NEGATIVE
ANION GAP SERPL CALCULATED.3IONS-SCNC: 10 MMOL/L
AST SERPL W P-5'-P-CCNC: 17 U/L (ref 13–39)
BACTERIA UR QL AUTO: NORMAL /HPF
BARBITURATES UR QL: NEGATIVE
BASOPHILS # BLD AUTO: 0.01 THOUSANDS/ÂΜL (ref 0–0.1)
BASOPHILS NFR BLD AUTO: 0 % (ref 0–1)
BENZODIAZ UR QL: NEGATIVE
BILIRUB SERPL-MCNC: 0.89 MG/DL (ref 0.2–1)
BILIRUB UR QL STRIP: NEGATIVE
BUN SERPL-MCNC: 15 MG/DL (ref 5–25)
CALCIUM SERPL-MCNC: 8.2 MG/DL (ref 8.4–10.2)
CARDIAC TROPONIN I PNL SERPL HS: 8 NG/L
CHLORIDE SERPL-SCNC: 107 MMOL/L (ref 96–108)
CLARITY UR: CLEAR
CO2 SERPL-SCNC: 21 MMOL/L (ref 21–32)
COCAINE UR QL: NEGATIVE
COLOR UR: YELLOW
CREAT SERPL-MCNC: 0.7 MG/DL (ref 0.6–1.3)
EOSINOPHIL # BLD AUTO: 0 THOUSAND/ÂΜL (ref 0–0.61)
EOSINOPHIL NFR BLD AUTO: 0 % (ref 0–6)
ERYTHROCYTE [DISTWIDTH] IN BLOOD BY AUTOMATED COUNT: 12.8 % (ref 11.6–15.1)
ETHANOL SERPL-MCNC: 27 MG/DL
GFR SERPL CREATININE-BSD FRML MDRD: 94 ML/MIN/1.73SQ M
GLUCOSE SERPL-MCNC: 108 MG/DL (ref 65–140)
GLUCOSE SERPL-MCNC: 115 MG/DL (ref 65–140)
GLUCOSE SERPL-MCNC: 81 MG/DL (ref 65–140)
GLUCOSE UR STRIP-MCNC: NEGATIVE MG/DL
HCT VFR BLD AUTO: 41.5 % (ref 36.5–49.3)
HGB BLD-MCNC: 13.7 G/DL (ref 12–17)
HGB UR QL STRIP.AUTO: ABNORMAL
IMM GRANULOCYTES # BLD AUTO: 0.01 THOUSAND/UL (ref 0–0.2)
IMM GRANULOCYTES NFR BLD AUTO: 0 % (ref 0–2)
KETONES UR STRIP-MCNC: ABNORMAL MG/DL
LEUKOCYTE ESTERASE UR QL STRIP: NEGATIVE
LYMPHOCYTES # BLD AUTO: 1.12 THOUSANDS/ÂΜL (ref 0.6–4.47)
LYMPHOCYTES NFR BLD AUTO: 12 % (ref 14–44)
MAGNESIUM SERPL-MCNC: 1.9 MG/DL (ref 1.9–2.7)
MCH RBC QN AUTO: 32.5 PG (ref 26.8–34.3)
MCHC RBC AUTO-ENTMCNC: 33 G/DL (ref 31.4–37.4)
MCV RBC AUTO: 99 FL (ref 82–98)
METHADONE UR QL: NEGATIVE
MONOCYTES # BLD AUTO: 0.64 THOUSAND/ÂΜL (ref 0.17–1.22)
MONOCYTES NFR BLD AUTO: 7 % (ref 4–12)
NEUTROPHILS # BLD AUTO: 7.96 THOUSANDS/ÂΜL (ref 1.85–7.62)
NEUTS SEG NFR BLD AUTO: 81 % (ref 43–75)
NITRITE UR QL STRIP: NEGATIVE
NON-SQ EPI CELLS URNS QL MICRO: NORMAL /HPF
NRBC BLD AUTO-RTO: 0 /100 WBCS
OPIATES UR QL SCN: NEGATIVE
OXYCODONE+OXYMORPHONE UR QL SCN: NEGATIVE
PCP UR QL: NEGATIVE
PH UR STRIP.AUTO: 5.5 [PH]
PHOSPHATE SERPL-MCNC: 2.7 MG/DL (ref 2.3–4.1)
PLATELET # BLD AUTO: 160 THOUSANDS/UL (ref 149–390)
PLATELET # BLD AUTO: 163 THOUSANDS/UL (ref 149–390)
PMV BLD AUTO: 10.3 FL (ref 8.9–12.7)
PMV BLD AUTO: 10.8 FL (ref 8.9–12.7)
POTASSIUM SERPL-SCNC: 3.8 MMOL/L (ref 3.5–5.3)
PROT SERPL-MCNC: 5.6 G/DL (ref 6.4–8.4)
PROT UR STRIP-MCNC: NEGATIVE MG/DL
RBC # BLD AUTO: 4.21 MILLION/UL (ref 3.88–5.62)
RBC #/AREA URNS AUTO: NORMAL /HPF
SODIUM SERPL-SCNC: 138 MMOL/L (ref 135–147)
SP GR UR STRIP.AUTO: 1.01 (ref 1–1.03)
THC UR QL: NEGATIVE
UROBILINOGEN UR QL STRIP.AUTO: 0.2 E.U./DL
WBC # BLD AUTO: 9.74 THOUSAND/UL (ref 4.31–10.16)
WBC #/AREA URNS AUTO: NORMAL /HPF

## 2023-12-24 PROCEDURE — 81001 URINALYSIS AUTO W/SCOPE: CPT | Performed by: PHYSICIAN ASSISTANT

## 2023-12-24 PROCEDURE — 85025 COMPLETE CBC W/AUTO DIFF WBC: CPT | Performed by: PHYSICIAN ASSISTANT

## 2023-12-24 PROCEDURE — 80307 DRUG TEST PRSMV CHEM ANLYZR: CPT | Performed by: PHYSICIAN ASSISTANT

## 2023-12-24 PROCEDURE — 82948 REAGENT STRIP/BLOOD GLUCOSE: CPT

## 2023-12-24 PROCEDURE — 84484 ASSAY OF TROPONIN QUANT: CPT | Performed by: PHYSICIAN ASSISTANT

## 2023-12-24 PROCEDURE — 85049 AUTOMATED PLATELET COUNT: CPT | Performed by: PHYSICIAN ASSISTANT

## 2023-12-24 PROCEDURE — 36415 COLL VENOUS BLD VENIPUNCTURE: CPT | Performed by: PHYSICIAN ASSISTANT

## 2023-12-24 PROCEDURE — 99239 HOSP IP/OBS DSCHRG MGMT >30: CPT | Performed by: FAMILY MEDICINE

## 2023-12-24 PROCEDURE — 84100 ASSAY OF PHOSPHORUS: CPT | Performed by: PHYSICIAN ASSISTANT

## 2023-12-24 PROCEDURE — 82077 ASSAY SPEC XCP UR&BREATH IA: CPT | Performed by: PHYSICIAN ASSISTANT

## 2023-12-24 PROCEDURE — 97162 PT EVAL MOD COMPLEX 30 MIN: CPT

## 2023-12-24 PROCEDURE — 80053 COMPREHEN METABOLIC PANEL: CPT | Performed by: PHYSICIAN ASSISTANT

## 2023-12-24 PROCEDURE — 83735 ASSAY OF MAGNESIUM: CPT | Performed by: PHYSICIAN ASSISTANT

## 2023-12-24 RX ORDER — ACETAMINOPHEN 325 MG/1
650 TABLET ORAL EVERY 6 HOURS PRN
Status: DISCONTINUED | OUTPATIENT
Start: 2023-12-24 | End: 2023-12-24 | Stop reason: HOSPADM

## 2023-12-24 RX ORDER — INSULIN LISPRO 100 [IU]/ML
1-5 INJECTION, SOLUTION INTRAVENOUS; SUBCUTANEOUS
Status: DISCONTINUED | OUTPATIENT
Start: 2023-12-24 | End: 2023-12-24 | Stop reason: HOSPADM

## 2023-12-24 RX ORDER — POTASSIUM CHLORIDE 20MEQ/15ML
10 LIQUID (ML) ORAL DAILY
Status: DISCONTINUED | OUTPATIENT
Start: 2023-12-24 | End: 2023-12-24 | Stop reason: HOSPADM

## 2023-12-24 RX ORDER — AMLODIPINE BESYLATE 5 MG/1
5 TABLET ORAL DAILY
Status: DISCONTINUED | OUTPATIENT
Start: 2023-12-24 | End: 2023-12-24 | Stop reason: HOSPADM

## 2023-12-24 RX ORDER — LANOLIN ALCOHOL/MO/W.PET/CERES
100 CREAM (GRAM) TOPICAL DAILY
Status: DISCONTINUED | OUTPATIENT
Start: 2023-12-24 | End: 2023-12-24 | Stop reason: HOSPADM

## 2023-12-24 RX ORDER — SODIUM CHLORIDE 9 MG/ML
75 INJECTION, SOLUTION INTRAVENOUS CONTINUOUS
Status: DISCONTINUED | OUTPATIENT
Start: 2023-12-24 | End: 2023-12-24 | Stop reason: HOSPADM

## 2023-12-24 RX ORDER — INSULIN LISPRO 100 [IU]/ML
1-6 INJECTION, SOLUTION INTRAVENOUS; SUBCUTANEOUS
Status: DISCONTINUED | OUTPATIENT
Start: 2023-12-24 | End: 2023-12-24 | Stop reason: HOSPADM

## 2023-12-24 RX ORDER — ATORVASTATIN CALCIUM 40 MG/1
40 TABLET, FILM COATED ORAL DAILY
Status: DISCONTINUED | OUTPATIENT
Start: 2023-12-24 | End: 2023-12-24 | Stop reason: HOSPADM

## 2023-12-24 RX ORDER — HEPARIN SODIUM 5000 [USP'U]/ML
5000 INJECTION, SOLUTION INTRAVENOUS; SUBCUTANEOUS EVERY 8 HOURS SCHEDULED
Status: DISCONTINUED | OUTPATIENT
Start: 2023-12-24 | End: 2023-12-24 | Stop reason: HOSPADM

## 2023-12-24 RX ORDER — ONDANSETRON 2 MG/ML
4 INJECTION INTRAMUSCULAR; INTRAVENOUS EVERY 6 HOURS PRN
Status: DISCONTINUED | OUTPATIENT
Start: 2023-12-24 | End: 2023-12-24 | Stop reason: HOSPADM

## 2023-12-24 RX ORDER — LISINOPRIL 20 MG/1
40 TABLET ORAL DAILY
Status: DISCONTINUED | OUTPATIENT
Start: 2023-12-24 | End: 2023-12-24 | Stop reason: HOSPADM

## 2023-12-24 RX ORDER — FOLIC ACID 1 MG/1
1 TABLET ORAL DAILY
Status: DISCONTINUED | OUTPATIENT
Start: 2023-12-24 | End: 2023-12-24 | Stop reason: HOSPADM

## 2023-12-24 RX ORDER — FUROSEMIDE 40 MG/1
20 TABLET ORAL DAILY
Status: DISCONTINUED | OUTPATIENT
Start: 2023-12-24 | End: 2023-12-24 | Stop reason: HOSPADM

## 2023-12-24 RX ADMIN — THIAMINE HCL TAB 100 MG 100 MG: 100 TAB at 08:44

## 2023-12-24 RX ADMIN — SODIUM CHLORIDE 75 ML/HR: 0.9 INJECTION, SOLUTION INTRAVENOUS at 00:55

## 2023-12-24 RX ADMIN — HEPARIN SODIUM 5000 UNITS: 5000 INJECTION INTRAVENOUS; SUBCUTANEOUS at 00:58

## 2023-12-24 RX ADMIN — POTASSIUM CHLORIDE 10 MEQ: 1.5 SOLUTION ORAL at 08:42

## 2023-12-24 RX ADMIN — METOPROLOL TARTRATE 25 MG: 25 TABLET, FILM COATED ORAL at 08:42

## 2023-12-24 RX ADMIN — LISINOPRIL 40 MG: 20 TABLET ORAL at 08:42

## 2023-12-24 RX ADMIN — FUROSEMIDE 20 MG: 40 TABLET ORAL at 08:43

## 2023-12-24 RX ADMIN — ATORVASTATIN CALCIUM 40 MG: 40 TABLET, FILM COATED ORAL at 08:42

## 2023-12-24 RX ADMIN — B-COMPLEX W/ C & FOLIC ACID TAB 1 TABLET: TAB at 08:44

## 2023-12-24 RX ADMIN — FOLIC ACID 1 MG: 1 TABLET ORAL at 08:43

## 2023-12-24 RX ADMIN — HEPARIN SODIUM 5000 UNITS: 5000 INJECTION INTRAVENOUS; SUBCUTANEOUS at 08:43

## 2023-12-24 RX ADMIN — ASPIRIN 81 MG: 81 TABLET, COATED ORAL at 08:44

## 2023-12-24 RX ADMIN — AMLODIPINE BESYLATE 5 MG: 5 TABLET ORAL at 08:42

## 2023-12-24 NOTE — ASSESSMENT & PLAN NOTE
Concern for alcohol abuse   Alcohol intoxication as noted blood alcohol levels of 185  Patient unable to disclose how much alcohol he consumed today  Continue IV fluids  Continue daily thiamine, folic acid, multivitamin  Repeat blood alcohol levels in the a.m.  Continue to monitor for withdrawal symptoms  Supportive care

## 2023-12-24 NOTE — DISCHARGE SUMMARY
Discharge Summary - Jorge Bajwa 73 y.o. male MRN: [id number]    Unit/Bed#: RM13 Encounter: [id number]    Admission Date:   Admission Orders (From admission, onward)       Ordered        12/23/23 2244  Place in Observation  Once                            Admitting Diagnosis: Trauma [T14.90XA]    HPI: Jorge Bajwa is a 73 y.o. male with a PMH of diabetes, hyperlipidemia, hypertension who presents to the emergency room via EMS evaluation of complaint of an unwitnessed fall.  Was found by his neighbor at the base of his stairs.  Patient is unaware of details of his fall.  Patient's blood alcohol level at 185.  Patient unable to state whether he has been drinking today or how much did he actually drink.  Patient had no acute complaints upon arrival to the ER.  Family reports that they are concerned patient's safety as she lives alone.  They reported they arrived to meet patient without any clothing on.  Of note patient was admitted recently alcohol intoxication and possible strokelike symptoms.  Patient's workup was negative for stroke on that occasion.        Workup in the ER included labs difficult for WBC of 10.42, medical alcohol level 185.  CT head and CT cervical spine CT chest abdomen and pelvis completed with results as shown below.  Patient was treated in the ER with Isolyte 1 L, Boostrix 0.5 mL IM     Patient is being admitted on observation status [facility] care for further management of alcohol intoxication, fall at home.  Is also concern for patient safety and wellbeing at home as he lives alone.    Procedures Performed: No orders of the defined types were placed in this encounter.      Summary of Hospital Course:     Alcohol intoxication    73-year-old male recently hospitalized for stroke rule out, at which point he was noted to be intoxicated returns to the ED after being found on the floor.  Patient was intoxicated again, underwent a trauma scan with no abnormalities.  Monitored overnight  and discharged home with PCP follow-up.  I spoke with the patient's daughter who is also concerned about the patient's alcohol intake.  The patient on questioning declines any rehab, states he only has 2 drinks per night and has not increased his alcohol consumption.    Significant Findings, Care, Treatment and Services Provided:     Trauma Scan  No acute fractures    Complications: none    Discharge Diagnosis: see above    Medical Problems       Resolved Problems  Date Reviewed: 12/17/2023   None         Condition at Discharge: fair         Discharge instructions/Information to patient and family:   See after visit summary for information provided to patient and family.      Provisions for Follow-Up Care:  See after visit summary for information related to follow-up care and any pertinent home health orders.      PCP: Travis Bergman DO    Disposition: Home    Planned Readmission: No      Discharge Statement   I spent 45 minutes discharging the patient. This time was spent on the day of discharge. I had direct contact with the patient on the day of discharge. Additional documentation is required if more than 30 minutes were spent on discharge.     Discharge Medications:  See after visit summary for reconciled discharge medications provided to patient and family.

## 2023-12-24 NOTE — PHYSICAL THERAPY NOTE
Physical Therapy Evaluation    Patient Name: Jorge Bajwa    Today's Date: 12/24/2023     Problem List  Principal Problem:    Alcoholic intoxication without complication (HCC)  Active Problems:    Benign essential hypertension    Coronary artery disease of native artery of native heart with stable angina pectoris (HCC)    Type 2 diabetes mellitus with complication (HCC)    Dyslipidemia    Fall at home, initial encounter       Past Medical History  Past Medical History:   Diagnosis Date    Asthma     resolved: 08/14/17    Closed fracture of fifth metacarpal bone of right hand     last assessed: 06/30/15    Deep vein thrombosis (HCC)     Diabetes mellitus (HCC)     Diverticulitis     Fracture of metacarpal shaft     Hemopneumothorax, left     last assessed: 06/02/15    Hyperlipidemia     Hypertension     Inguinal hernia     Lumbar transverse process fracture (HCC)     Pilonidal cyst     Pleural effusion     last assessed: 07/01/15    Rib fractures     last assessed: 06/02/15    Status post fall     Superficial thrombophlebitis of leg     last assessed: 03/19/14        Past Surgical History  Past Surgical History:   Procedure Laterality Date    COLON SURGERY      COLONOSCOPY      CORONARY ANGIOPLASTY WITH STENT PLACEMENT  2009    PTCA/RITA to RCA    FRACTURE SURGERY  05/04/2015    Closed treatment of fracture of metacarpal bone, right 5t metacarpal fracture Dr. Claire    HERNIA REPAIR      KNEE ARTHROSCOPY W/ MENISCAL REPAIR      Lateral meniscus    LAPAROSCOPY  05/18/2015    Exploratory, extensive lysis of adhesions Dr. Pérez    OTHER SURGICAL HISTORY      Surgical lysis of intestinal adhesions    RECTAL SURGERY      REVISION COLOSTOMY      THORACENTESIS  06/02/2015    with imaging guidance left, removed 1300cc of fluid w/o problem lower base of lun06    TONSILLECTOMY AND ADENOIDECTOMY             12/24/23 1002   PT Last Visit   PT Visit Date 12/24/23   Note  "Type   Note type Evaluation   Pain Assessment   Pain Assessment Tool 0-10   Pain Score 3   Pain Location/Orientation Orientation: Right  (flank)   Pain Onset/Description Frequency: Intermittent   Effect of Pain on Daily Activities Slow with bed mobility   Patient's Stated Pain Goal No pain   Hospital Pain Intervention(s) Repositioned   Restrictions/Precautions   Other Precautions Fall Risk;Pain   Home Living   Type of Home House   Home Layout Two level;Ramped entrance  (stairglide to second floor)   Bathroom Shower/Tub Tub/shower unit   Bathroom Toilet Raised   Bathroom Equipment Toilet raiser   Bathroom Accessibility Accessible   Home Equipment Walker;Cane  (transport chair)   Prior Function   Level of [place] Independent with ADLs;Independent with functional mobility   Lives With Alone   Receives Help From Family   IADLs Independent with medication management;Independent with meal prep;Independent with driving   Falls in the last 6 months 1 to 4   Vocational Retired   Comments Pt has a RW and SPC, but has been walking without a device.   General   Family/Caregiver Present No   Cognition   Overall Cognitive Status WFL   Arousal/Participation Alert   Orientation Level Oriented X4   Memory Within functional limits   Following Commands Follows all commands and directions without difficulty   Subjective   Subjective \"Do I have a [place] on my back?\"   RLE Assessment   RLE Assessment WFL   Strength RLE   RLE Overall Strength 4/5   LLE Assessment   LLE Assessment WFL   Strength LLE   LLE Overall Strength 4/5   Coordination   Movements are Fluid and Coordinated 1   Bed Mobility   Supine to Sit 7  Independent   Sit to Supine 7  Independent   Transfers   Sit to Stand 5  Supervision   Additional items   (no device)   Stand to Sit 5  Supervision   Additional items   (no device)   Ambulation/Elevation   Gait pattern   (decreased step length, slightly flexed knees)   Gait Assistance   (close S)   Assistive Device None "   Distance 100'   Ambulation/Elevation Additional Comments Pt gait trained without a device.  Intermittent lateral deviations, but only S provided.  Pt encouraged to use SPC or RW as needed at home in order to improve balance.   Balance   Static Sitting Good   Dynamic Sitting Good   Static Standing Fair  (no device)   Dynamic Standing Fair  (no device)   Ambulatory Fair  (no device)   Activity Tolerance   Activity Tolerance Patient tolerated treatment well   Assessment   Prognosis Good   Problem List Decreased strength;Impaired balance;Decreased endurance;Decreased mobility;Decreased safety awareness;Pain   Assessment Pt is 73 y.o. male seen for PT evaluation on 12/24/23 s/p admit to [facility] on 12/24/23 due to alcohol intoxication, found on floor, back pain. PT consulted to assess pt's functional mobility and d/c needs. Order placed for PT eval and tx.. Performed at least 2 patient identifiers during session: Name and wristband. Comorbidities affecting pt's physical performance at time of assessment include: hx of alcohol abuse with recent admission from 12/17-12/20/23 due to AMS, strokelike symptoms, alcohol abuse. Pt also with PMH including R TKR March 2023, CAD, HTN, DM Type 2. LOF prior to admission was I with gait without device. Personal factors affecting pt at time of IE include: pt lives alone.  Pt with recent hospitalization. Please find objective findings from PT assessment regarding body systems outlined above with impairments and limitations including weakness, impaired balance, decreased endurance, pain, decreased activity tolerance and fall risk, as well as mobility assessment.  Pt was slightly unsteady with gait without device. S provided. Pt without LOB. Offered RW, but pt declined. Pt's clinical presentation is currently unstable/unpredictable seen in pt's presentation of ongoing medical assessment. Given co-morbidities and presented presentation, moderate Level eval was completed.  Pt to  benefit from continued PT tx to address deficits as defined above and maximize level of functional independent mobility and consistency. From PT/mobility standpoint, recommendation at time of d/c would be no services indicated at d/c. The patient's AM-PAC Basic Mobility Inpatient Short Form Raw Score is 20. A Raw score of greater than 16 suggests the patient may benefit from discharge to home. Please also refer to the recommendation of the Physical Therapist for safe discharge planning.   Barriers to Discharge Decreased caregiver support   Goals   Patient Goals To go home   LTG Expiration Date 01/07/24   Long Term Goal #1 Transfers - Mod I from all surfaces,   Long Term Goal #2 Gait - 150' with least restrictive device vs without device with I.   Plan   Treatment/Interventions Functional transfer training;LE strengthening/ROM;Therapeutic exercise;Endurance training;Patient/family training;Gait training;Equipment eval/education   PT Frequency 3-5x/wk   Discharge Recommendation   Rehab Resource Intensity Level, PT No post-acute rehabilitation needs   AM-PAC Basic Mobility Inpatient   Turning in Flat Bed Without Bedrails 4   Lying on Back to Sitting on Edge of Flat Bed Without Bedrails 4   Moving Bed to Chair 3   Standing Up From Chair Using Arms 3   Walk in Room 3   Climb 3-5 Stairs With Railing 3   Basic Mobility Inpatient Raw Score 20   Basic Mobility Standardized Score 43.99   Highest Level Of Mobility   JH-HLM Goal 6: Walk 10 steps or more   JH-HLM Achieved 7: Walk 25 feet or more   End of Consult   Patient Position at End of Consult Supine;All needs within reach

## 2023-12-24 NOTE — ASSESSMENT & PLAN NOTE
Presented to the ER evaluation after an unwitnessed fall at home  Patient was found by his neighbor at the base of his staircase  Patient cannot recall incident  Patient is intoxicated which may have contributed to his fall  Imaging studies negative for acute findings  Patient sustained presents to upper and lower back.  No active bleeding  Fall precautions  OT PT eval  Supportive care

## 2023-12-24 NOTE — ASSESSMENT & PLAN NOTE
Denies chest pain or discomfort  EKG shows-sinus rhythm at a rate of 74 bpm  Continue PTA medications  Continue PCP, outpatient neurology follow-up

## 2023-12-24 NOTE — ASSESSMENT & PLAN NOTE
Blood pressure is stable  Continue PTA blood pressure medication  Monitor blood pressure while admitted

## 2023-12-24 NOTE — H&P
[facility]  H&P  Name: Jorge Bajwa 73 y.o. male I MRN: [id number]  Unit/Bed#: RM13 I Date of Admission: 12/23/2023   Date of Service: 12/24/2023 I Hospital Day: 0      Assessment/Plan   * Alcoholic intoxication without complication (HCC)  Assessment & Plan  Concern for alcohol abuse   Alcohol intoxication as noted blood alcohol levels of 185  Patient unable to disclose how much alcohol he consumed today  Continue IV fluids  Continue daily thiamine, folic acid, multivitamin  Repeat blood alcohol levels in the a.m.  Continue to monitor for withdrawal symptoms  Supportive care        Fall at home, initial encounter  Assessment & Plan  Presented to the ER evaluation after an unwitnessed fall at home  Patient was found by his neighbor at the base of his staircase  Patient cannot recall incident  Patient is intoxicated which may have contributed to his fall  Imaging studies negative for acute findings  Patient sustained presents to upper and lower back.  No active bleeding  Fall precautions  OT PT eval  Supportive care    Type 2 diabetes mellitus with complication (HCC)  Assessment & Plan  Lab Results   Component Value Date    HGBA1C 6.4 (H) 12/18/2023     Blood glucose level at 133  Hold oral diabetic medication  Fingerstick glucose 4 times daily AC/HS  Sliding scale insulin  Monitor while admitted        Dyslipidemia  Assessment & Plan  Stable   Continue PTA medication    Coronary artery disease of native artery of native heart with stable angina pectoris (HCC)  Assessment & Plan  Denies chest pain or discomfort  EKG shows-sinus rhythm at a rate of 74 bpm  Continue PTA medications  Continue PCP, outpatient neurology follow-up    Benign essential hypertension  Assessment & Plan  Blood pressure is stable  Continue PTA blood pressure medication  Monitor blood pressure while admitted         VTE Pharmacologic Prophylaxis:   Moderate Risk (Score 3-4) - Pharmacological DVT Prophylaxis Ordered:  heparin.  Code Status: Level 1 - Full Code   Discussion with family: Patient declined call to .     Anticipated Length of Stay: Patient will be admitted on an observation basis with an anticipated length of stay of less than 2 midnights secondary to fall at home, alcohol intoxication.    Total Time Spent on Date of Encounter in care of patient: 40 mins. This time was spent on one or more of the following: performing physical exam; counseling and coordination of care; obtaining or reviewing history; documenting in the medical record; reviewing/ordering tests, medications or procedures; communicating with other healthcare professionals and discussing with patient's family/caregivers.    Chief Complaint: Fall at home    History of Present Illness:  Jorge Bajwa is a 73 y.o. male with a PMH of diabetes, hyperlipidemia, hypertension who presents to the emergency room via EMS evaluation of complaint of an unwitnessed fall.  Was found by his neighbor at the base of his stairs.  Patient is unaware of details of his fall.  Patient's blood alcohol level at 185.  Patient unable to state whether he has been drinking today or how much did he actually drink.  Patient had no acute complaints upon arrival to the ER.  Family reports that they are concerned patient's safety as she lives alone.  They reported they arrived to meet patient without any clothing on.  Of note patient was admitted recently alcohol intoxication and possible strokelike symptoms.  Patient's workup was negative for stroke on that occasion.      Workup in the ER included labs difficult for WBC of 10.42, medical alcohol level 185.  CT head and CT cervical spine CT chest abdomen and pelvis completed with results as shown below.  Patient was treated in the ER with Isolyte 1 L, Boostrix 0.5 mL IM    Patient is being admitted on observation status [facility] level care for further management of alcohol intoxication, fall at home.  Is also concern for  patient safety and wellbeing at home as he lives alone.    Review of Systems:  Review of Systems   Constitutional:  Positive for activity change. Negative for chills and fever.   Eyes:  Negative for photophobia and visual disturbance.   Respiratory:  Negative for cough, chest tightness and shortness of breath.    Cardiovascular:  Negative for chest pain.   Gastrointestinal:  Negative for diarrhea, nausea and vomiting.   Genitourinary:  Negative for dysuria, flank pain and hematuria.   Musculoskeletal:  Negative for neck pain.   Skin:  Negative for rash and wound.   Neurological:  Negative for tremors, seizures and weakness.   Psychiatric/Behavioral:  Positive for confusion. Negative for agitation.        Past Medical and Surgical History:   Past Medical History:   Diagnosis Date    Asthma     resolved: 08/14/17    Closed fracture of fifth metacarpal bone of right hand     last assessed: 06/30/15    Deep vein thrombosis (HCC)     Diabetes mellitus (HCC)     Diverticulitis     Fracture of metacarpal shaft     Hemopneumothorax, left     last assessed: 06/02/15    Hyperlipidemia     Hypertension     Inguinal hernia     Lumbar transverse process fracture (HCC)     Pilonidal cyst     Pleural effusion     last assessed: 07/01/15    Rib fractures     last assessed: 06/02/15    Status post fall     Superficial thrombophlebitis of leg     last assessed: 03/19/14       Past Surgical History:   Procedure Laterality Date    COLON SURGERY      COLONOSCOPY      CORONARY ANGIOPLASTY WITH STENT PLACEMENT  2009    PTCA/RITA to RCA    FRACTURE SURGERY  05/04/2015    Closed treatment of fracture of metacarpal bone, right 5t metacarpal fracture Dr. Claire    HERNIA REPAIR      KNEE ARTHROSCOPY W/ MENISCAL REPAIR      Lateral meniscus    LAPAROSCOPY  05/18/2015    Exploratory, extensive lysis of adhesions Dr. Pérez    OTHER SURGICAL HISTORY      Surgical lysis of intestinal adhesions    RECTAL SURGERY      REVISION COLOSTOMY       THORACENTESIS  06/02/2015    with imaging guidance left, removed 1300cc of fluid w/o problem lower base of lun06    TONSILLECTOMY AND ADENOIDECTOMY         Meds/Allergies:  Prior to Admission medications    Medication Sig Start Date End Date Taking? Authorizing Provider   amLODIPine (NORVASC) 5 mg tablet Take 1 tablet (5 mg total) by mouth daily 12/6/23   Travis Bergman, DO   aspirin (Aspirin 81) 81 mg EC tablet Take 1 tablet (81 mg total) by mouth daily 12/18/23   Sharmin Denson PA-C   atorvastatin (LIPITOR) 40 mg tablet TAKE 1 TABLET BY MOUTH  DAILY 3/23/23   Travis Bergman, DO   thck-wboinlm-xyr-hydrocort (CORTISPORIN) 1 % ointment Administer into the left eye every 4 (four) hours for 7 days 12/18/23 12/25/23  Sharmin Denson PA-C   FIBER ADULT GUMMIES PO Take 2 tablets by mouth daily    Historical Provider, MD   folic acid ( Folic Acid) 1 mg tablet Take 1 tablet (1 mg total) by mouth daily 12/18/23   Uriel Ruiz, DO   furosemide (LASIX) 20 mg tablet TAKE 1 TABLET BY MOUTH  DAILY 6/19/23   Traivs Bergman, DO   metFORMIN (GLUCOPHAGE-XR) 500 mg 24 hr tablet TAKE 1 TABLET BY MOUTH TWICE  DAILY WITH MEALS 9/29/23   Travis Bergman, DO   metoprolol tartrate (LOPRESSOR) 25 mg tablet TAKE 1 TABLET BY MOUTH TWICE  DAILY 12/4/23   Travis Bergman, DO   Multiple Vitamin (multivitamin) tablet Take 1 tablet by mouth daily 12/18/23   Uriel Ruiz, DO   nitroglycerin (NITROSTAT) 0.4 mg SL tablet Place 1 tablet (0.4 mg total) under the tongue every 5 (five) minutes as needed for chest pain 6/20/23   Veronica Esparza PA-C   potassium chloride (Klor-Con) 10 mEq tablet TAKE 1 TABLET BY MOUTH  DAILY 3/23/23   Travis Bergman, DO   ramipril (ALTACE) 10 MG capsule TAKE 1 CAPSULE BY MOUTH  DAILY 3/23/23   Travis Bergman, DO   sildenafil (VIAGRA) 100 mg tablet Take 1 tablet (100 mg total) by mouth daily as needed for erectile dysfunction 9/11/23   Travis Bergman, DO   Thiamine Mononitrate (VITAMIN B1) 100 mg tablet Take 1 tablet (100 mg  total) by mouth daily 23   Uriel Ruiz, DO     I have reviewed home medications using recent Epic encounter.    Allergies: No Known Allergies    Social History:  Marital Status:    Occupation: Retired  Patient Pre-hospital Living Situation: Home  Patient Pre-hospital Level of Mobility: walks  Patient Pre-hospital Diet Restrictions: None reported  Substance Use History:   Social History     Substance and Sexual Activity   Alcohol Use Not Currently    Alcohol/week: 0.0 standard drinks of alcohol    Comment: Social     Social History     Tobacco Use   Smoking Status Former    Current packs/day: 0.00    Average packs/day: 1 pack/day for 30.0 years (30.0 ttl pk-yrs)    Types: Cigarettes    Start date: 1968    Quit date:     Years since quittin.5   Smokeless Tobacco Never     Social History     Substance and Sexual Activity   Drug Use No       Family History:  Family History   Problem Relation Age of Onset    Coronary artery disease Mother     Heart disease Father         Benign hypertensive    Hyperlipidemia Father        Physical Exam:     Vitals:   Blood Pressure: 105/52 (23 0000)  Pulse: 73 (23 0000)  Temperature: (!) 96.9 °F (36.1 °C) (23)  Temp Source: Temporal (23)  Respirations: 18 (23 0000)  Weight - Scale: 106 kg (234 lb 5.6 oz) (23)  SpO2: 91 % (23 0000)    Physical Exam  Constitutional:       General: He is not in acute distress.     Appearance: He is not ill-appearing.   HENT:      Head: Normocephalic and atraumatic.      Mouth/Throat:      Mouth: Mucous membranes are dry.      Pharynx: Oropharynx is clear.   Eyes:      General:         Right eye: No discharge.         Left eye: No discharge.      Pupils: Pupils are equal, round, and reactive to light.   Cardiovascular:      Rate and Rhythm: Normal rate and regular rhythm.      Pulses: Normal pulses.      Heart sounds: No murmur heard.     No friction rub.   Pulmonary:       Effort: No respiratory distress.      Breath sounds: No wheezing or rhonchi.   Abdominal:      General: There is no distension.      Palpations: Abdomen is soft.      Tenderness: There is no abdominal tenderness.   Musculoskeletal:      Cervical back: Neck supple. Tenderness present. No rigidity.      Right lower leg: No edema.      Left lower leg: No edema.   Skin:     General: Skin is warm and dry.      Capillary Refill: Capillary refill takes less than 2 seconds.      Coloration: Skin is not jaundiced or pale.      Findings: Bruising present.      Comments: Bruising to right upper back,left mid to lower back   Neurological:      Mental Status: He is disoriented.          Additional Data:     Lab Results:  Results from last 7 days   Lab Units 12/24/23  0104 12/23/23  2042   WBC Thousand/uL  --  10.42*   HEMOGLOBIN g/dL  --  16.0   HEMATOCRIT %  --  49.3   PLATELETS Thousands/uL 163 200   NEUTROS PCT %  --  82*   LYMPHS PCT %  --  13*   MONOS PCT %  --  4   EOS PCT %  --  0     Results from last 7 days   Lab Units 12/23/23  2042   SODIUM mmol/L 139   POTASSIUM mmol/L 3.5   CHLORIDE mmol/L 106   CO2 mmol/L 21   BUN mg/dL 21   CREATININE mg/dL 1.10   ANION GAP mmol/L 12   CALCIUM mg/dL 8.4   ALBUMIN g/dL 4.0   TOTAL BILIRUBIN mg/dL 0.82   ALK PHOS U/L 87   ALT U/L 21   AST U/L 17   GLUCOSE RANDOM mg/dL 133     Results from last 7 days   Lab Units 12/23/23  2042   INR  0.94     Results from last 7 days   Lab Units 12/24/23  0050 12/18/23  1057 12/18/23  0729 12/17/23  2133 12/17/23  2042 12/17/23  1744   POC GLUCOSE mg/dl 108 134 108 121 136 139     Results from last 7 days   Lab Units 12/18/23  0510   HEMOGLOBIN A1C % 6.4*           Lines/Drains:  Invasive Devices       Peripheral Intravenous Line  Duration             Peripheral IV 12/23/23 Dorsal (posterior);Left Hand 1 day    Peripheral IV 12/23/23 Left Antecubital <1 day                        Imaging: Reviewed radiology reports from this admission  including: chest CT scan, abdominal/pelvic CT, CT head, and CT cervical spine  TRAUMA - CT head wo contrast   ED Interpretation by Beau Cornejo DO (12/23 2117)   No ICH or acute intracranial process seen by myself based on my interpretation, pending official radiology report.       Final Result by Lidia Trevino MD (12/23 2150)      No acute intracranial pathology. In particular, no intracranial hemorrhage or calvarial fracture.      Findings are concordant with preliminary interpretation provided in the Emergency Department.               Workstation performed: [id number]         TRAUMA - CT spine cervical wo contrast   Final Result by Lidia Trevino MD (12/23 2150)      No cervical spine fracture or traumatic malalignment.      The study was marked in EPIC for immediate notification given trauma designation.               Workstation performed: [id number]         TRAUMA - CT chest abdomen pelvis w contrast   Final Result by Lidia Trevino MD (12/23 2149)      No acute thoracic, abdominal or pelvic trauma within aforementioned limitations.      Additional findings as above.      The study was marked in EPIC for immediate notification given trauma designation.         Workstation performed: [id number]         XR Trauma chest portable   Final Result by Lidia Trevino MD (12/23 2055)      No acute pulmonary pathology.      No obvious acute displaced fractures within limitation of supine AP chest technique. Multiple chronic left-sided rib fractures.               Workstation performed: [id number]             EKG and Other Studies Reviewed on Admission:   EKG: NSR. HR 84 bpm.    ** Please Note: This note has been constructed using a voice recognition system. **

## 2023-12-24 NOTE — PLAN OF CARE
Problem: PHYSICAL THERAPY ADULT  Goal: Performs mobility at highest level of function for planned discharge setting.  See evaluation for individualized goals.  Description:   Note: Prognosis: Good  Problem List: Decreased strength, Impaired balance, Decreased endurance, Decreased mobility, Decreased safety awareness, Pain  Assessment: Pt is 73 y.o. male seen for PT evaluation on 12/24/23 s/p admit to [facility] on 12/24/23 due to alcohol intoxication, found on floor, back pain. PT consulted to assess pt's functional mobility and d/c needs. Order placed for PT eval and tx.. Performed at least 2 patient identifiers during session: Name and wristband. Comorbidities affecting pt's physical performance at time of assessment include: hx of alcohol abuse with recent admission from 12/17-12/20/23 due to AMS, strokelike symptoms, alcohol abuse. Pt also with PMH including R TKR March 2023, CAD, HTN, DM Type 2. LOF prior to admission was I with gait without device. Personal factors affecting pt at time of IE include: pt lives alone.  Pt with recent hospitalization. Please find objective findings from PT assessment regarding body systems outlined above with impairments and limitations including weakness, impaired balance, decreased endurance, pain, decreased activity tolerance and fall risk, as well as mobility assessment.  Pt was slightly unsteady with gait without device. S provided. Pt without LOB. Offered RW, but pt declined. Pt's clinical presentation is currently unstable/unpredictable seen in pt's presentation of ongoing medical assessment. Given co-morbidities and presented presentation, moderate Level eval was completed.  Pt to benefit from continued PT tx to address deficits as defined above and maximize level of functional independent mobility and consistency. From PT/mobility standpoint, recommendation at time of d/c would be no services indicated at d/c. The patient's AM-PAC Basic Mobility Inpatient Short  Form Raw Score is 20. A Raw score of greater than 16 suggests the patient may benefit from discharge to home. Please also refer to the recommendation of the Physical Therapist for safe discharge planning.  Barriers to Discharge: Decreased caregiver support     Rehab Resource Intensity Level, PT: No post-acute rehabilitation needs    See flowsheet documentation for full assessment.

## 2023-12-24 NOTE — ASSESSMENT & PLAN NOTE
Lab Results   Component Value Date    HGBA1C 6.4 (H) 12/18/2023     Blood glucose level at 133  Hold oral diabetic medication  Fingerstick glucose 4 times daily AC/HS  Sliding scale insulin  Monitor while admitted

## 2023-12-24 NOTE — ED PROVIDER NOTES
Emergency Department Trauma Note  Jorge Bajwa 73 y.o. male MRN: [id number]  Unit/Bed#: TR05/TR05 Encounter: [id number]      Trauma Alert: Trauma Acuity: Trauma Evaluation  Model of Arrival: Mode of Arrival: ALS via Trauma Squad Name and Number: [facility]  Trauma Team: Current Providers  Attending Provider: Beau Cornejo DO  Attending Provider: Skip Shelton DO  Registered Nurse: Danna Mario RN  Consultants:     None      History of Present Illness     Chief Complaint:   Chief Complaint   Patient presents with    Trauma     Pt had an unwitnessed fall at home. Possibly down a flight of stairs per ems. No thinners, unknown head strike and loc. Unknown time on floor      HPI:  Jorge Bajwa is a 73 y.o. male who presents with unwitnessed fall, hx alcohol use.  This is a 73-year-old male who presents via EMS from home.  Family called EMS because they found the patient down on the floor at the bottom of the staircase with a large abrasion to the right side of his back.  He was amnestic to the events.  He could not explain how he ended up in the situation.  It was unclear on arrival and the patient was last seen normal.  EMS assessed the patient they did feel like he may exhibit some mild symptoms of alcohol intoxication including some very mild slurred speech but denies any focal weakness facial droop pronator drift or any other obvious stroke symptoms.  Patient not noted to be on any blood thinners.  Due to concern for traumatic injury patient was made a prehospital trauma evaluation.  On arrival here patient denies any acute complaints.  He is oriented to person place situation but not to year.  He is unsure whether he was drinking alcohol today.    Family arrives. They do not live with patient. He was heard to have fallen by neighbor next door around 730pm. Family called EMS. Patient was initially found at bottom of steps by neighbor. By time family arrived patient had crawled about 15 feet  and was resting/leaning against recliner while seated on ground. Patient was wearing no clothing. Kitchen was a mess. Patient denied drinking. Also refuted last admission for alcohol intoxication stating they took someone else's blood and that his symptoms were due to a stroke when workup was negative for stroke.     Relevant history: I did evaluate this patient last week for similar acute neurologic symptoms and was advised as a stroke alert with workup being negative and patient's workup significant for positive ethanol level likely the cause of his symptoms at that time.  Based on that prior history it appears patient has recently been consuming alcohol despite a prior history of not drinking.  Has been with a new girlfriend and they have been drinking frequently in the past several weeks and this did contribute to the prior admission..  Mechanism:Details of Incident: Pt was found on floor due to unwitnessed fall, no blood thinners, unknown head strike and loc. Injury Date: 12/23/23   Injury Occurence Location - Specify County: [facility]     Current symptoms:       Associated symptoms:             Denies abdominal pain, back pain, chest pain, seizures and vomiting.     Review of Systems   Constitutional:  Negative for chills and fever.   HENT:  Negative for ear pain and sore throat.    Eyes:  Negative for pain and visual disturbance.   Respiratory:  Negative for cough and shortness of breath.    Cardiovascular:  Negative for chest pain and palpitations.   Gastrointestinal:  Negative for abdominal pain and vomiting.   Genitourinary:  Negative for dysuria and hematuria.   Musculoskeletal:  Negative for arthralgias and back pain.   Skin:  Negative for color change and rash.   Neurological:  Negative for seizures and syncope.   Hematological:  Bruises/bleeds easily.   Psychiatric/Behavioral:  Positive for confusion.    All other systems reviewed and are negative.      Historical Information      Immunizations:   Immunization History   Administered Date(s) Administered    COVID-19 MODERNA VACC 0.5 ML IM 01/20/2021, 02/17/2021, 10/25/2021, 08/08/2022    INFLUENZA 10/31/2019, 10/08/2020, 10/25/2021, 08/15/2022    Influenza Quadrivalent Preservative Free 3 years and older IM 08/24/2015, 10/05/2016, 08/14/2017    Influenza Split High Dose Preservative Free IM 10/31/2019    Influenza, high dose seasonal 0.7 mL 10/24/2018, 10/08/2020, 09/11/2023    Influenza, seasonal, injectable 1952, 09/27/2012, 10/01/2013, 10/01/2014    Pneumococcal Conjugate 13-Valent 10/31/2019    Pneumococcal Polysaccharide PPV23 10/13/2013    Tdap 12/23/2023    Zoster 09/09/2015       Past Medical History:   Diagnosis Date    Asthma     resolved: 08/14/17    Closed fracture of fifth metacarpal bone of right hand     last assessed: 06/30/15    Deep vein thrombosis (HCC)     Diabetes mellitus (HCC)     Diverticulitis     Fracture of metacarpal shaft     Hemopneumothorax, left     last assessed: 06/02/15    Hyperlipidemia     Hypertension     Inguinal hernia     Lumbar transverse process fracture (HCC)     Pilonidal cyst     Pleural effusion     last assessed: 07/01/15    Rib fractures     last assessed: 06/02/15    Status post fall     Superficial thrombophlebitis of leg     last assessed: 03/19/14       Family History   Problem Relation Age of Onset    Coronary artery disease Mother     Heart disease Father         Benign hypertensive    Hyperlipidemia Father      Past Surgical History:   Procedure Laterality Date    COLON SURGERY      COLONOSCOPY      CORONARY ANGIOPLASTY WITH STENT PLACEMENT  2009    PTCA/RITA to RCA    FRACTURE SURGERY  05/04/2015    Closed treatment of fracture of metacarpal bone, right 5t metacarpal fracture Dr. Claire    HERNIA REPAIR      KNEE ARTHROSCOPY W/ MENISCAL REPAIR      Lateral meniscus    LAPAROSCOPY  05/18/2015    Exploratory, extensive lysis of adhesions Dr. Pérez    OTHER SURGICAL HISTORY       Surgical lysis of intestinal adhesions    RECTAL SURGERY      REVISION COLOSTOMY      THORACENTESIS  2015    with imaging guidance left, removed 1300cc of fluid w/o problem lower base of lun06    TONSILLECTOMY AND ADENOIDECTOMY       Social History     Tobacco Use    Smoking status: Former     Current packs/day: 0.00     Average packs/day: 1 pack/day for 30.0 years (30.0 ttl pk-yrs)     Types: Cigarettes     Start date: 1968     Quit date:      Years since quittin.5    Smokeless tobacco: Never   Vaping Use    Vaping status: Never Used   Substance Use Topics    Alcohol use: Not Currently     Alcohol/week: 0.0 standard drinks of alcohol     Comment: Social    Drug use: No     E-Cigarette/Vaping    E-Cigarette Use Never User      E-Cigarette/Vaping Substances    Nicotine No     THC No     CBD No     Flavoring No     Other No     Unknown No        Family History: non-contributory    Meds/Allergies   Prior to Admission Medications   Prescriptions Last Dose Informant Patient Reported? Taking?   FIBER ADULT GUMMIES PO  Self Yes No   Sig: Take 2 tablets by mouth daily   Multiple Vitamin (multivitamin) tablet   No No   Sig: Take 1 tablet by mouth daily   Thiamine Mononitrate (VITAMIN B1) 100 mg tablet   No No   Sig: Take 1 tablet (100 mg total) by mouth daily   amLODIPine (NORVASC) 5 mg tablet   No No   Sig: Take 1 tablet (5 mg total) by mouth daily   aspirin (Aspirin 81) 81 mg EC tablet   No No   Sig: Take 1 tablet (81 mg total) by mouth daily   atorvastatin (LIPITOR) 40 mg tablet   No No   Sig: TAKE 1 TABLET BY MOUTH  DAILY   wthy-rocsuio-aby-hydrocort (CORTISPORIN) 1 % ointment   No No   Sig: Administer into the left eye every 4 (four) hours for 7 days   folic acid (KP Folic Acid) 1 mg tablet   No No   Sig: Take 1 tablet (1 mg total) by mouth daily   furosemide (LASIX) 20 mg tablet   No No   Sig: TAKE 1 TABLET BY MOUTH  DAILY   metFORMIN (GLUCOPHAGE-XR) 500 mg 24 hr tablet   No No   Sig: TAKE 1 TABLET BY  MOUTH TWICE  DAILY WITH MEALS   metoprolol tartrate (LOPRESSOR) 25 mg tablet   No No   Sig: TAKE 1 TABLET BY MOUTH TWICE  DAILY   nitroglycerin (NITROSTAT) 0.4 mg SL tablet   No No   Sig: Place 1 tablet (0.4 mg total) under the tongue every 5 (five) minutes as needed for chest pain   potassium chloride (Klor-Con) 10 mEq tablet   No No   Sig: TAKE 1 TABLET BY MOUTH  DAILY   ramipril (ALTACE) 10 MG capsule   No No   Sig: TAKE 1 CAPSULE BY MOUTH  DAILY   sildenafil (VIAGRA) 100 mg tablet   No No   Sig: Take 1 tablet (100 mg total) by mouth daily as needed for erectile dysfunction      Facility-Administered Medications: None       No Known Allergies    PHYSICAL EXAM    PE limited by: confusion, alcohol intoxication     Objective   Vitals:   First set: Temperature: (!) 96.9 °F (36.1 °C) (12/23/23 2035)  Pulse: 77 (12/23/23 2035)  Respirations: 19 (12/23/23 2035)  Blood Pressure: 120/60 (12/23/23 2035)  SpO2: 99 % (12/23/23 2035)    Primary Survey:   (A) Airway: intact  (B) Breathing: breath sounds equal bilaterally  (C) Circulation: Pulses:   normal  (D) Disabliity:  GCS Total:  15  (E) Expose:  Completed    Secondary Survey: (Click on Physical Exam tab above)  Physical Exam  Vitals and nursing note reviewed. Exam conducted with a chaperone present.   Constitutional:       General: He is not in acute distress.     Appearance: Normal appearance. He is well-developed.   HENT:      Head: Normocephalic and atraumatic.      Comments: No paz sign, no racoon eyes     Right Ear: External ear normal.      Left Ear: External ear normal.      Mouth/Throat:      Mouth: Mucous membranes are moist.      Pharynx: Oropharynx is clear.   Eyes:      Extraocular Movements: Extraocular movements intact.      Conjunctiva/sclera: Conjunctivae normal.      Pupils: Pupils are equal, round, and reactive to light.   Cardiovascular:      Rate and Rhythm: Normal rate and regular rhythm.      Heart sounds: No murmur heard.  Pulmonary:       Effort: Pulmonary effort is normal. No respiratory distress.      Breath sounds: Normal breath sounds.   Abdominal:      Palpations: Abdomen is soft.      Tenderness: There is no abdominal tenderness.   Musculoskeletal:         General: No swelling.      Cervical back: Neck supple.      Comments: Large abrasion to posterior/right thoracic region    Skin:     General: Skin is warm and dry.      Capillary Refill: Capillary refill takes less than 2 seconds.   Neurological:      General: No focal deficit present.      Mental Status: He is alert. He is disoriented.      GCS: GCS eye subscore is 4. GCS verbal subscore is 5. GCS motor subscore is 6.      Cranial Nerves: Cranial nerves 2-12 are intact. No cranial nerve deficit, dysarthria or facial asymmetry.      Sensory: Sensation is intact.      Motor: Motor function is intact.      Coordination: Coordination is intact.   Psychiatric:         Mood and Affect: Mood normal.         Cervical spine cleared by clinical criteria? No (imaging required)      Invasive Devices       Peripheral Intravenous Line  Duration             Peripheral IV 12/23/23 Dorsal (posterior);Left Hand <1 day    Peripheral IV 12/23/23 Left Antecubital <1 day                    Lab Results:   Results Reviewed       Procedure Component Value Units Date/Time    HS Troponin I 4hr [[id number]]     Lab Status: No result Specimen: Blood     HS Troponin 0hr (reflex protocol) [[id number]]  (Normal) Collected: 12/23/23 2042    Lab Status: Final result Specimen: Blood from Arm, Left Updated: 12/23/23 2118     hs TnI 0hr 8 ng/L     HS Troponin I 2hr [[id number]]     Lab Status: No result Specimen: Blood     CK [[id number]]  (Normal) Collected: 12/23/23 2042    Lab Status: Final result Specimen: Blood from Arm, Left Updated: 12/23/23 2107     Total CK 82 U/L     Comprehensive metabolic panel [[id number]]  (Abnormal) Collected: 12/23/23 2042    Lab Status: Final result Specimen: Blood from Arm, Left Updated: 12/23/23  2107     Sodium 139 mmol/L      Potassium 3.5 mmol/L      Chloride 106 mmol/L      CO2 21 mmol/L      ANION GAP 12 mmol/L      BUN 21 mg/dL      Creatinine 1.10 mg/dL      Glucose 133 mg/dL      Calcium 8.4 mg/dL      AST 17 U/L      ALT 21 U/L      Alkaline Phosphatase 87 U/L      Total Protein 6.2 g/dL      Albumin 4.0 g/dL      Total Bilirubin 0.82 mg/dL      eGFR 67 ml/min/1.73sq m     Narrative:      National Kidney Disease Foundation guidelines for Chronic Kidney Disease (CKD):     Stage 1 with normal or high GFR (GFR > 90 mL/min/1.73 square meters)    Stage 2 Mild CKD (GFR = 60-89 mL/min/1.73 square meters)    Stage 3A Moderate CKD (GFR = 45-59 mL/min/1.73 square meters)    Stage 3B Moderate CKD (GFR = 30-44 mL/min/1.73 square meters)    Stage 4 Severe CKD (GFR = 15-29 mL/min/1.73 square meters)    Stage 5 End Stage CKD (GFR <15 mL/min/1.73 square meters)  Note: GFR calculation is accurate only with a steady state creatinine    Ethanol [[id number]]  (Abnormal) Collected: 12/23/23 2042    Lab Status: Final result Specimen: Blood from Arm, Left Updated: 12/23/23 2105     Ethanol Lvl 185 mg/dL     Protime-INR [[id number]]  (Normal) Collected: 12/23/23 2042    Lab Status: Final result Specimen: Blood from Arm, Left Updated: 12/23/23 2102     Protime 12.5 seconds      INR 0.94    APTT [[id number]]  (Normal) Collected: 12/23/23 2042    Lab Status: Final result Specimen: Blood from Arm, Left Updated: 12/23/23 2102     PTT 24 seconds     CBC and differential [[id number]]  (Abnormal) Collected: 12/23/23 2042    Lab Status: Final result Specimen: Blood from Arm, Left Updated: 12/23/23 2056     WBC 10.42 Thousand/uL      RBC 4.88 Million/uL      Hemoglobin 16.0 g/dL      Hematocrit 49.3 %       fL      MCH 32.8 pg      MCHC 32.5 g/dL      RDW 12.8 %      MPV 10.5 fL      Platelets 200 Thousands/uL      nRBC 0 /100 WBCs      Neutrophils Relative 82 %      Immat GRANS % 1 %      Lymphocytes Relative 13 %      Monocytes  Relative 4 %      Eosinophils Relative 0 %      Basophils Relative 0 %      Neutrophils Absolute 8.49 Thousands/µL      Immature Grans Absolute 0.08 Thousand/uL      Lymphocytes Absolute 1.34 Thousands/µL      Monocytes Absolute 0.45 Thousand/µL      Eosinophils Absolute 0.03 Thousand/µL      Basophils Absolute 0.03 Thousands/µL     Blood gas, venous [[id number]]  (Abnormal) Collected: 12/23/23 2042    Lab Status: Final result Specimen: Blood from Arm, Left Updated: 12/23/23 2052     pH, Jovon 7.323     pCO2, Jovon 41.3 mm Hg      pO2, Jovon 23.9 mm Hg      HCO3, Jovon 20.9 mmol/L      Base Excess, Jovon -4.8 mmol/L      O2 Content, Jovon 9.0 ml/dL      O2 HGB, VENOUS 37.8 %     Rapid drug screen, urine [[id number]]     Lab Status: No result Specimen: Urine     UA w Reflex to Microscopic w Reflex to Culture [[id number]]     Lab Status: No result Specimen: Urine                    Imaging Studies:   Direct to CT: No  TRAUMA - CT head wo contrast   ED Interpretation by Beau Cornejo DO (12/23 2117)   No ICH or acute intracranial process seen by myself based on my interpretation, pending official radiology report.       Final Result by Lidia Trevino MD (12/23 2150)      No acute intracranial pathology. In particular, no intracranial hemorrhage or calvarial fracture.      Findings are concordant with preliminary interpretation provided in the Emergency Department.               Workstation performed: [id number]         TRAUMA - CT spine cervical wo contrast   Final Result by Lidia Trevino MD (12/23 2150)      No cervical spine fracture or traumatic malalignment.      The study was marked in EPIC for immediate notification given trauma designation.               Workstation performed: [id number]         TRAUMA - CT chest abdomen pelvis w contrast   Final Result by Lidia Trevino MD (12/23 2149)      No acute thoracic, abdominal or pelvic trauma within aforementioned limitations.      Additional findings as above.      The  study was marked in EPIC for immediate notification given trauma designation.         Workstation performed: [id number]         XR Trauma chest portable   Final Result by Lidia Trevino MD (12/23 2055)      No acute pulmonary pathology.      No obvious acute displaced fractures within limitation of supine AP chest technique. Multiple chronic left-sided rib fractures.               Workstation performed: [id number]               Procedures  Procedures         ED Course  ED Course as of 12/23/23 2246   Sat Dec 23, 2023   2124 EKG interpreted by myself.  EKG dated 12/23/2023 at 2036 demonstrates sinus rhythm at 74 bpm, normal CO, QRS, QTc intervals, nonspecific ST segment abnormality, no STEMI.   2217 No significant findings on trauma scans, will d/w SLIM for admission.            Medical Decision Making  73-year-old male seen last week for acute encephalopathy thought to be stroke related but workup negative for stroke and found to be intoxicated with alcohol is the main contributor to symptoms with some suspicion for possible dementia, undiagnosed.  Patient appeared to have an unremarkable workup clinically improved and was discharged.  He presents here for an unwitnessed fall he was down for several hours he was naked confused he had an abrasion to his back indicating he fell on the stairs.  Due to all this he was made a prehospital trauma evaluation he had no strokelike symptoms on evaluation here.  He was mildly confused and mildly encephalopathic but did not have physical evidence of a significant injury.  Trauma scans were negative.  Workup revealed an ethanol level of 1 8 5 mg/dL.  Patient had mild KRISTIN.  Patient received IV fluids, tetanus immunization.  Case discussed with KEYANNA for admission.    Problems Addressed:  Abrasion of right side of back, initial encounter: acute illness or injury  Acute encephalopathy: complicated acute illness or injury  KRISTIN (acute kidney injury) (HCC): acute illness or  injury  Alcohol intoxication (HCC): acute illness or injury  At risk for fall due to comorbid condition: chronic illness or injury  Unwitnessed fall: acute illness or injury    Amount and/or Complexity of Data Reviewed  Labs: ordered.  Radiology: ordered and independent interpretation performed.  ECG/medicine tests: ordered and independent interpretation performed. Decision-making details documented in ED Course.    Risk  Prescription drug management.  Decision regarding hospitalization.                Disposition  [facility] Transfer: No  Final diagnoses:   Alcohol intoxication (HCC)   Unwitnessed fall   Acute encephalopathy   Abrasion of right side of back, initial encounter   KRISTIN (acute kidney injury) (HCC)   At risk for fall due to comorbid condition     Time reflects when diagnosis was documented in both MDM as applicable and the Disposition within this note       Time User Action Codes Description Comment    12/23/2023  9:25 PM Beau Cornejo [F10.929] Alcohol intoxication (HCC)     12/23/2023  9:25 PM Beau Cornejo [R29.6] Unwitnessed fall     12/23/2023  9:26 PM Beau Cornejo Add [G93.40] Acute encephalopathy     12/23/2023  9:26 PM KashenhBeau nash Add [S20.411A] Abrasion of right side of back, initial encounter     12/23/2023  9:30 PM KashenhBeau nash Add [N17.9] KRISTIN (acute kidney injury) (HCC)     12/23/2023 10:43 PM Beau Cornejo [Z91.81] At risk for fall due to comorbid condition           ED Disposition       ED Disposition   Admit    Condition   Stable    Date/Time   Sat Dec 23, 2023 10:43 PM    Comment   Case was discussed with KEYANNA and the patient's admission status was agreed to be Admission Status: observation status to the service of Dr. Dr Shelton .               Follow-up Information    None       Patient's Medications   Discharge Prescriptions    No medications on file     No discharge procedures on file.    PDMP Review         Value Time User    PDMP  Reviewed  Yes 12/17/2023  7:59 PM Kumar Gallo PA-C            ED Provider  Electronically Signed by           Beau Cornejo DO  12/23/23 4803

## 2023-12-26 ENCOUNTER — TRANSITIONAL CARE MANAGEMENT (OUTPATIENT)
Dept: FAMILY MEDICINE CLINIC | Facility: CLINIC | Age: 73
End: 2023-12-26

## 2023-12-26 LAB
ATRIAL RATE: 74 BPM
P AXIS: 78 DEGREES
PR INTERVAL: 154 MS
QRS AXIS: 64 DEGREES
QRSD INTERVAL: 92 MS
QT INTERVAL: 386 MS
QTC INTERVAL: 428 MS
T WAVE AXIS: 57 DEGREES
VENTRICULAR RATE: 74 BPM

## 2023-12-28 ENCOUNTER — OFFICE VISIT (OUTPATIENT)
Dept: FAMILY MEDICINE CLINIC | Facility: CLINIC | Age: 73
End: 2023-12-28
Payer: MEDICARE

## 2023-12-28 VITALS
HEART RATE: 61 BPM | WEIGHT: 236 LBS | SYSTOLIC BLOOD PRESSURE: 148 MMHG | TEMPERATURE: 97.6 F | HEIGHT: 71 IN | BODY MASS INDEX: 33.04 KG/M2 | DIASTOLIC BLOOD PRESSURE: 72 MMHG | OXYGEN SATURATION: 97 %

## 2023-12-28 DIAGNOSIS — F10.920 ALCOHOLIC INTOXICATION WITHOUT COMPLICATION (HCC): ICD-10-CM

## 2023-12-28 DIAGNOSIS — E11.8 TYPE 2 DIABETES MELLITUS WITH COMPLICATION (HCC): Primary | ICD-10-CM

## 2023-12-28 DIAGNOSIS — I10 BENIGN ESSENTIAL HYPERTENSION: ICD-10-CM

## 2023-12-28 PROCEDURE — 99214 OFFICE O/P EST MOD 30 MIN: CPT | Performed by: FAMILY MEDICINE

## 2023-12-28 NOTE — PROGRESS NOTES
Assessment/Plan:continue  same meds    Problem List Items Addressed This Visit          Endocrine    Type 2 diabetes mellitus with complication (HCC) - Primary       Cardiovascular and Mediastinum    Benign essential hypertension       Other    Alcoholic intoxication without complication (HCC)        Diagnoses and all orders for this visit:    Type 2 diabetes mellitus with complication (HCC)    Benign essential hypertension    Alcoholic intoxication without complication (HCC)        No problem-specific Assessment & Plan notes found for this encounter.        Subjective:      Patient ID: Jorge Bajwa is a 73 y.o. male.    Mr. Bajwa here for follow-up visit was seen in the emergency room he had a slip and fall down steps and he was evaluated and treated also at the time he had had some alcoholic beverages we did discuss that does not seem to be an issue right now the patient also has been having blood sugars which are pretty well-controlled but the day he was in the ER sugar was 81 could have been that the alcohol he consumed did cause the low sugar so I did caution him about that I also discussed with him the possibility of a late subdural hematoma and I gave him the red flag symptoms that he should look for his brush burn or carpet burn on his back is okay he has no point tenderness with palpation of the back so I think he is very bette none the worse for where        The following portions of the patient's history were reviewed and updated as appropriate:   He has a past medical history of Asthma, Closed fracture of fifth metacarpal bone of right hand, Deep vein thrombosis (HCC), Diabetes mellitus (HCC), Diverticulitis, Fracture of metacarpal shaft, Hemopneumothorax, left, Hyperlipidemia, Hypertension, Inguinal hernia, Lumbar transverse process fracture (HCC), Pilonidal cyst, Pleural effusion, Rib fractures, Status post fall, and Superficial thrombophlebitis of leg.,  does not have any pertinent problems on  file.,   has a past surgical history that includes Coronary angioplasty with stent (2009); Fracture surgery (05/04/2015); Revision Colostomy; LAPAROSCOPY (05/18/2015); Hernia repair; Knee arthroscopy w/ meniscal repair; Rectal surgery; Other surgical history; Thoracentesis (06/02/2015); Tonsillectomy and adenoidectomy; Colonoscopy; and Colon surgery.,  family history includes Coronary artery disease in his mother; Heart disease in his father; Hyperlipidemia in his father.,   reports that he quit smoking about 25 years ago. His smoking use included cigarettes. He started smoking about 55 years ago. He has a 30.0 pack-year smoking history. He has never used smokeless tobacco. He reports that he does not currently use alcohol. He reports that he does not use drugs.,  has No Known Allergies..  Current Outpatient Medications   Medication Sig Dispense Refill    amLODIPine (NORVASC) 5 mg tablet Take 1 tablet (5 mg total) by mouth daily 90 tablet 1    aspirin (Aspirin 81) 81 mg EC tablet Take 1 tablet (81 mg total) by mouth daily 30 tablet 0    atorvastatin (LIPITOR) 40 mg tablet TAKE 1 TABLET BY MOUTH  DAILY 90 tablet 3    FIBER ADULT GUMMIES PO Take 2 tablets by mouth daily      folic acid (KP Folic Acid) 1 mg tablet Take 1 tablet (1 mg total) by mouth daily 30 tablet 0    furosemide (LASIX) 20 mg tablet TAKE 1 TABLET BY MOUTH  DAILY 90 tablet 3    metFORMIN (GLUCOPHAGE-XR) 500 mg 24 hr tablet TAKE 1 TABLET BY MOUTH TWICE  DAILY WITH MEALS 180 tablet 3    metoprolol tartrate (LOPRESSOR) 25 mg tablet TAKE 1 TABLET BY MOUTH TWICE  DAILY 180 tablet 3    Multiple Vitamin (multivitamin) tablet Take 1 tablet by mouth daily 30 tablet 0    potassium chloride (Klor-Con) 10 mEq tablet TAKE 1 TABLET BY MOUTH  DAILY 90 tablet 3    ramipril (ALTACE) 10 MG capsule TAKE 1 CAPSULE BY MOUTH  DAILY 90 capsule 3    sildenafil (VIAGRA) 100 mg tablet Take 1 tablet (100 mg total) by mouth daily as needed for erectile dysfunction 10 tablet 0     "Thiamine Mononitrate (VITAMIN B1) 100 mg tablet Take 1 tablet (100 mg total) by mouth daily 30 tablet 0    wwpj-artqzoq-hub-hydrocort (CORTISPORIN) 1 % ointment Administer into the left eye every 4 (four) hours for 7 days 3.5 g 0     No current facility-administered medications for this visit.       Review of Systems   Constitutional:  Negative for activity change, appetite change, diaphoresis, fatigue and fever.   HENT: Negative.     Eyes: Negative.    Respiratory:  Negative for apnea, cough, chest tightness, shortness of breath and wheezing.    Cardiovascular:  Negative for chest pain, palpitations and leg swelling.   Gastrointestinal:  Negative for abdominal distention, abdominal pain, anal bleeding, constipation, diarrhea, nausea and vomiting.   Endocrine: Negative for cold intolerance, heat intolerance, polydipsia, polyphagia and polyuria.   Genitourinary:  Negative for difficulty urinating, dysuria, flank pain, hematuria and urgency.   Musculoskeletal:  Negative for arthralgias, back pain, gait problem, joint swelling and myalgias.   Skin:  Negative for color change, rash and wound.   Allergic/Immunologic: Negative for environmental allergies, food allergies and immunocompromised state.   Neurological:  Negative for dizziness, seizures, syncope, speech difficulty, numbness and headaches.   Hematological:  Negative for adenopathy. Does not bruise/bleed easily.   Psychiatric/Behavioral:  Negative for agitation, behavioral problems, hallucinations, sleep disturbance and suicidal ideas.          Objective:  Vitals:    12/28/23 0955   BP: 148/72   BP Location: Left arm   Patient Position: Sitting   Cuff Size: Standard   Pulse: 61   Temp: 97.6 °F (36.4 °C)   TempSrc: Temporal   SpO2: 97%   Weight: 107 kg (236 lb)   Height: 5' 11\" (1.803 m)     Body mass index is 32.92 kg/m².     Physical Exam  Constitutional:       General: He is not in acute distress.     Appearance: He is well-developed. He is not diaphoretic. "   HENT:      Head: Normocephalic.      Right Ear: External ear normal.      Left Ear: External ear normal.      Nose: Nose normal.   Eyes:      General: No scleral icterus.        Right eye: No discharge.         Left eye: No discharge.      Conjunctiva/sclera: Conjunctivae normal.      Pupils: Pupils are equal, round, and reactive to light.   Neck:      Thyroid: No thyromegaly.      Trachea: No tracheal deviation.   Cardiovascular:      Rate and Rhythm: Normal rate and regular rhythm.      Heart sounds: Normal heart sounds. No murmur heard.     No friction rub. No gallop.   Pulmonary:      Effort: Pulmonary effort is normal. No respiratory distress.      Breath sounds: Normal breath sounds. No wheezing.   Abdominal:      General: Bowel sounds are normal.      Palpations: Abdomen is soft. There is no mass.      Tenderness: There is no abdominal tenderness. There is no guarding.   Musculoskeletal:         General: No deformity.      Cervical back: Normal range of motion.   Lymphadenopathy:      Cervical: No cervical adenopathy.   Skin:     General: Skin is warm and dry.      Findings: No erythema or rash.   Neurological:      Mental Status: He is alert and oriented to person, place, and time.      Cranial Nerves: No cranial nerve deficit.   Psychiatric:         Thought Content: Thought content normal.

## 2024-01-18 ENCOUNTER — VBI (OUTPATIENT)
Dept: ADMINISTRATIVE | Facility: OTHER | Age: 74
End: 2024-01-18

## 2024-01-19 ENCOUNTER — ANESTHESIA EVENT (OUTPATIENT)
Dept: PERIOP | Facility: HOSPITAL | Age: 74
End: 2024-01-19

## 2024-01-19 ENCOUNTER — HOSPITAL ENCOUNTER (OUTPATIENT)
Dept: PERIOP | Facility: HOSPITAL | Age: 74
Setting detail: OUTPATIENT SURGERY
End: 2024-01-19
Attending: INTERNAL MEDICINE
Payer: MEDICARE

## 2024-01-19 ENCOUNTER — ANESTHESIA (OUTPATIENT)
Dept: PERIOP | Facility: HOSPITAL | Age: 74
End: 2024-01-19

## 2024-01-19 VITALS
BODY MASS INDEX: 33.04 KG/M2 | HEART RATE: 53 BPM | OXYGEN SATURATION: 97 % | WEIGHT: 236 LBS | DIASTOLIC BLOOD PRESSURE: 95 MMHG | HEIGHT: 71 IN | SYSTOLIC BLOOD PRESSURE: 137 MMHG | RESPIRATION RATE: 18 BRPM | TEMPERATURE: 98.4 F

## 2024-01-19 DIAGNOSIS — Z12.11 SCREENING FOR COLON CANCER: ICD-10-CM

## 2024-01-19 LAB — GLUCOSE SERPL-MCNC: 134 MG/DL (ref 65–140)

## 2024-01-19 PROCEDURE — 45380 COLONOSCOPY AND BIOPSY: CPT | Performed by: INTERNAL MEDICINE

## 2024-01-19 PROCEDURE — 82948 REAGENT STRIP/BLOOD GLUCOSE: CPT

## 2024-01-19 PROCEDURE — 88305 TISSUE EXAM BY PATHOLOGIST: CPT | Performed by: STUDENT IN AN ORGANIZED HEALTH CARE EDUCATION/TRAINING PROGRAM

## 2024-01-19 RX ORDER — SODIUM CHLORIDE, SODIUM LACTATE, POTASSIUM CHLORIDE, CALCIUM CHLORIDE 600; 310; 30; 20 MG/100ML; MG/100ML; MG/100ML; MG/100ML
125 INJECTION, SOLUTION INTRAVENOUS CONTINUOUS
Status: CANCELLED | OUTPATIENT
Start: 2024-01-19

## 2024-01-19 RX ORDER — PROPOFOL 10 MG/ML
INJECTION, EMULSION INTRAVENOUS AS NEEDED
Status: DISCONTINUED | OUTPATIENT
Start: 2024-01-19 | End: 2024-01-19

## 2024-01-19 RX ORDER — LIDOCAINE HYDROCHLORIDE 10 MG/ML
INJECTION, SOLUTION EPIDURAL; INFILTRATION; INTRACAUDAL; PERINEURAL AS NEEDED
Status: DISCONTINUED | OUTPATIENT
Start: 2024-01-19 | End: 2024-01-19

## 2024-01-19 RX ORDER — ONDANSETRON 2 MG/ML
4 INJECTION INTRAMUSCULAR; INTRAVENOUS ONCE AS NEEDED
Status: DISCONTINUED | OUTPATIENT
Start: 2024-01-19 | End: 2024-01-23 | Stop reason: HOSPADM

## 2024-01-19 RX ORDER — GLYCOPYRROLATE 0.2 MG/ML
INJECTION INTRAMUSCULAR; INTRAVENOUS AS NEEDED
Status: DISCONTINUED | OUTPATIENT
Start: 2024-01-19 | End: 2024-01-19

## 2024-01-19 RX ORDER — SODIUM CHLORIDE, SODIUM LACTATE, POTASSIUM CHLORIDE, CALCIUM CHLORIDE 600; 310; 30; 20 MG/100ML; MG/100ML; MG/100ML; MG/100ML
100 INJECTION, SOLUTION INTRAVENOUS CONTINUOUS
Status: DISCONTINUED | OUTPATIENT
Start: 2024-01-19 | End: 2024-01-23 | Stop reason: HOSPADM

## 2024-01-19 RX ORDER — ALBUTEROL SULFATE 2.5 MG/3ML
2.5 SOLUTION RESPIRATORY (INHALATION) ONCE AS NEEDED
Status: DISCONTINUED | OUTPATIENT
Start: 2024-01-19 | End: 2024-01-23 | Stop reason: HOSPADM

## 2024-01-19 RX ORDER — EPHEDRINE SULFATE 50 MG/ML
INJECTION INTRAVENOUS AS NEEDED
Status: DISCONTINUED | OUTPATIENT
Start: 2024-01-19 | End: 2024-01-19

## 2024-01-19 RX ADMIN — PROPOFOL 150 MCG/KG/MIN: 10 INJECTION, EMULSION INTRAVENOUS at 08:07

## 2024-01-19 RX ADMIN — EPHEDRINE SULFATE 5 MG: 50 INJECTION, SOLUTION INTRAVENOUS at 08:13

## 2024-01-19 RX ADMIN — PROPOFOL 90 MG: 10 INJECTION, EMULSION INTRAVENOUS at 08:06

## 2024-01-19 RX ADMIN — SODIUM CHLORIDE, SODIUM LACTATE, POTASSIUM CHLORIDE, AND CALCIUM CHLORIDE 100 ML/HR: .6; .31; .03; .02 INJECTION, SOLUTION INTRAVENOUS at 07:37

## 2024-01-19 RX ADMIN — LIDOCAINE HYDROCHLORIDE 50 MG: 10 INJECTION, SOLUTION EPIDURAL; INFILTRATION; INTRACAUDAL; PERINEURAL at 08:06

## 2024-01-19 RX ADMIN — GLYCOPYRROLATE 0.2 MG: 0.2 INJECTION, SOLUTION INTRAMUSCULAR; INTRAVENOUS at 08:02

## 2024-01-19 NOTE — ANESTHESIA PREPROCEDURE EVALUATION
Procedure:  COLONOSCOPY    Relevant Problems   CARDIO   (+) Benign essential hypertension   (+) Coronary artery disease of native artery of native heart with stable angina pectoris (HCC)      ENDO   (+) Type 2 diabetes mellitus with complication (HCC)      MUSCULOSKELETAL   (+) Sciatica        Physical Exam    Airway    Mallampati score: II  TM Distance: >3 FB  Neck ROM: full     Dental       Cardiovascular      Pulmonary      Other Findings        Anesthesia Plan  ASA Score- 3     Anesthesia Type- IV sedation with anesthesia with ASA Monitors.         Additional Monitors:     Airway Plan:            Plan Factors-Exercise tolerance (METS): >4 METS.    Chart reviewed.        Patient is not a current smoker.  Patient did not smoke on day of surgery.            Induction- intravenous.    Postoperative Plan-     Informed Consent- Anesthetic plan and risks discussed with patient.  I personally reviewed this patient with the CRNA. Discussed and agreed on the Anesthesia Plan with the CRNA..            NPO appropriate. Discussed benefits/risks of monitored anesthetic care and discussed providing a dynamic level of mild to deep sedation. Risks include awareness, airway obstruction, aspiration which may necessitate conversion to general anesthesia. All questions answered. Patient understands and wishes to proceed.    Anesthesia plan and consent discussed with Lucien who expressed understanding and agreement. Risks/benefits and alternatives discussed with patient including possible PONV, sore throat, damage to teeth/lips/gums and possibility of rare anesthetic and surgical emergencies.

## 2024-01-19 NOTE — ANESTHESIA POSTPROCEDURE EVALUATION
Post-Op Assessment Note    CV Status:  Stable    Pain management: adequate       Mental Status:  Sleepy   Hydration Status:  Euvolemic   PONV Controlled:  Controlled   Airway Patency:  Patent     Post Op Vitals Reviewed: Yes      Staff: CRNA               BP   111/75   Temp   97.7   Pulse  73   Resp   18   SpO2   98%

## 2024-01-19 NOTE — H&P
History and Physical -  [facility]  Jorge Bajwa 73 y.o. male MRN: [id number]                  HPI: Jorge Bajwa is a 73 y.o. year old male who presents for colon cancer screening.      REVIEW OF SYSTEMS: Per the HPI, and otherwise unremarkable.    Historical Information   Past Medical History:   Diagnosis Date    Asthma     resolved: 08/14/17    Closed fracture of fifth metacarpal bone of right hand     last assessed: 06/30/15    Deep vein thrombosis (HCC)     Diabetes mellitus (HCC)     Diverticulitis     Fracture of metacarpal shaft     Hemopneumothorax, left     last assessed: 06/02/15    Hyperlipidemia     Hypertension     Inguinal hernia     Lacunar infarction (HCC)     Approx 2000    Lumbar transverse process fracture (HCC)     Pilonidal cyst     Pleural effusion     last assessed: 07/01/15    Rib fractures     last assessed: 06/02/15    Status post fall     Superficial thrombophlebitis of leg     last assessed: 03/19/14     Past Surgical History:   Procedure Laterality Date    COLON SURGERY      COLONOSCOPY      CORONARY ANGIOPLASTY WITH STENT PLACEMENT  2009    PTCA/RITA to RCA    FRACTURE SURGERY  05/04/2015    Closed treatment of fracture of metacarpal bone, right 5t metacarpal fracture Dr. Claire    HERNIA REPAIR      KNEE ARTHROSCOPY W/ MENISCAL REPAIR      Lateral meniscus    LAPAROSCOPY  05/18/2015    Exploratory, extensive lysis of adhesions Dr. Pérez    OTHER SURGICAL HISTORY      Surgical lysis of intestinal adhesions    RECTAL SURGERY      REVISION COLOSTOMY      THORACENTESIS  06/02/2015    with imaging guidance left, removed 1300cc of fluid w/o problem lower base of lun06    TONSILLECTOMY AND ADENOIDECTOMY       Social History   Social History     Substance and Sexual Activity   Alcohol Use Not Currently    Alcohol/week: 0.0 standard drinks of alcohol    Comment: Social     Social History     Substance and Sexual Activity   Drug Use No     Social History     Tobacco Use  "  Smoking Status Former    Current packs/day: 0.00    Average packs/day: 1 pack/day for 30.0 years (30.0 ttl pk-yrs)    Types: Cigarettes    Start date: 1968    Quit date:     Years since quittin.6   Smokeless Tobacco Never     Family History   Problem Relation Age of Onset    Coronary artery disease Mother     Heart disease Father         Benign hypertensive    Hyperlipidemia Father        Meds/Allergies       Current Outpatient Medications:     amLODIPine (NORVASC) 5 mg tablet    aspirin (Aspirin 81) 81 mg EC tablet    atorvastatin (LIPITOR) 40 mg tablet    FIBER ADULT GUMMIES PO    folic acid (KP Folic Acid) 1 mg tablet    furosemide (LASIX) 20 mg tablet    metFORMIN (GLUCOPHAGE-XR) 500 mg 24 hr tablet    metoprolol tartrate (LOPRESSOR) 25 mg tablet    Multiple Vitamin (multivitamin) tablet    potassium chloride (Klor-Con) 10 mEq tablet    ramipril (ALTACE) 10 MG capsule    sildenafil (VIAGRA) 100 mg tablet    Thiamine Mononitrate (VITAMIN B1) 100 mg tablet    xvaj-gxezjmh-sfh-hydrocort (CORTISPORIN) 1 % ointment    Current Facility-Administered Medications:     lactated ringers infusion, 100 mL/hr, Intravenous, Continuous, 100 mL/hr at 24 0737    No Known Allergies    Objective     BP (!) 172/72   Pulse (!) 52   Temp 98.3 °F (36.8 °C) (Tympanic)   Resp 18   Ht 5' 11\" (1.803 m)   Wt 107 kg (236 lb)   SpO2 98%   BMI 32.92 kg/m²       PHYSICAL EXAM    Gen: NAD  Head: NCAT  CV: RRR  CHEST: Clear  ABD: soft, NT/ND  EXT: no edema      ASSESSMENT/PLAN:  This is a 73 y.o. year old male here for colonoscopy, and he is stable and optimized for his procedure.        "

## 2024-01-22 ENCOUNTER — VBI (OUTPATIENT)
Dept: ADMINISTRATIVE | Facility: OTHER | Age: 74
End: 2024-01-22

## 2024-01-23 PROCEDURE — 88305 TISSUE EXAM BY PATHOLOGIST: CPT | Performed by: STUDENT IN AN ORGANIZED HEALTH CARE EDUCATION/TRAINING PROGRAM

## 2024-01-26 NOTE — RESULT ENCOUNTER NOTE
The results were negative (unremarkable). Repeat colonoscopy in 5 years because of previous history of colon polyps. This was communicated via [facility].

## 2024-02-09 ENCOUNTER — OFFICE VISIT (OUTPATIENT)
Dept: PODIATRY | Facility: CLINIC | Age: 74
End: 2024-02-09
Payer: MEDICARE

## 2024-02-09 VITALS
BODY MASS INDEX: 33.32 KG/M2 | HEART RATE: 52 BPM | DIASTOLIC BLOOD PRESSURE: 75 MMHG | SYSTOLIC BLOOD PRESSURE: 169 MMHG | HEIGHT: 71 IN | WEIGHT: 238 LBS

## 2024-02-09 DIAGNOSIS — M20.12 VALGUS DEFORMITY OF BOTH GREAT TOES: ICD-10-CM

## 2024-02-09 DIAGNOSIS — M20.11 VALGUS DEFORMITY OF BOTH GREAT TOES: ICD-10-CM

## 2024-02-09 DIAGNOSIS — E11.40 TYPE 2 DIABETES MELLITUS WITH DIABETIC NEUROPATHY, WITHOUT LONG-TERM CURRENT USE OF INSULIN (HCC): Primary | ICD-10-CM

## 2024-02-09 PROCEDURE — 99213 OFFICE O/P EST LOW 20 MIN: CPT | Performed by: PODIATRIST

## 2024-02-09 NOTE — PROGRESS NOTES
PATIENT:  Jorge Bajwa  1950    ASSESSMENT/PLAN:  1. Type 2 diabetes mellitus with diabetic neuropathy, without long-term current use of insulin ([facility])        2. Valgus deformity of both great toes             Reviewed medical records.  Patient was counseled on the condition and diagnosis.  Educated disease prevention and risks related to diabetes.  Educated proper daily foot care and exam.  Instructed proper skin care / protection and footwear.  Instructed to identify any signs of infection and related foot problem.  The recent blood work was reviewed and the last HbA1c was 6.4.  Discussed proper blood glucose control with diet and exercise.  All nails were debrided.  The patient will follow up in 9 weeks for further diabetic foot exam and care.      HPI:  Jorge Bajwa is a 73 y.o.year old male seen for diabetic foot exam.  The patient has class findings with DPN.   BS is under control.  The patient denied any acute pedal disorder, redness, acute swelling, or recent injury.         PAST MEDICAL HISTORY:  Past Medical History:   Diagnosis Date    Asthma     resolved: 08/14/17    Closed fracture of fifth metacarpal bone of right hand     last assessed: 06/30/15    Deep vein thrombosis (HCC)     Diabetes mellitus (HCC)     Diverticulitis     Fracture of metacarpal shaft     Hemopneumothorax, left     last assessed: 06/02/15    Hyperlipidemia     Hypertension     Inguinal hernia     Lacunar infarction (HCC)     Approx 2000    Lumbar transverse process fracture (HCC)     Pilonidal cyst     Pleural effusion     last assessed: 07/01/15    Rib fractures     last assessed: 06/02/15    Status post fall     Superficial thrombophlebitis of leg     last assessed: 03/19/14       PAST SURGICAL HISTORY:  Past Surgical History:   Procedure Laterality Date    COLON SURGERY      COLONOSCOPY      CORONARY ANGIOPLASTY WITH STENT PLACEMENT  2009    PTCA/RITA to RCA    FRACTURE SURGERY  05/04/2015    Closed treatment of  fracture of metacarpal bone, right 5t metacarpal fracture Dr. Claire    HERNIA REPAIR      KNEE ARTHROSCOPY W/ MENISCAL REPAIR      Lateral meniscus    LAPAROSCOPY  05/18/2015    Exploratory, extensive lysis of adhesions Dr. Pérez    OTHER SURGICAL HISTORY      Surgical lysis of intestinal adhesions    RECTAL SURGERY      REVISION COLOSTOMY      THORACENTESIS  06/02/2015    with imaging guidance left, removed 1300cc of fluid w/o problem lower base of lun06    TONSILLECTOMY AND ADENOIDECTOMY          ALLERGIES:  Patient has no known allergies.    MEDICATIONS:  Current Outpatient Medications   Medication Sig Dispense Refill    amLODIPine (NORVASC) 5 mg tablet Take 1 tablet (5 mg total) by mouth daily 90 tablet 1    aspirin (Aspirin 81) 81 mg EC tablet Take 1 tablet (81 mg total) by mouth daily 30 tablet 0    atorvastatin (LIPITOR) 40 mg tablet TAKE 1 TABLET BY MOUTH  DAILY 90 tablet 3    FIBER ADULT GUMMIES PO Take 2 tablets by mouth daily      folic acid (KP Folic Acid) 1 mg tablet Take 1 tablet (1 mg total) by mouth daily 30 tablet 0    furosemide (LASIX) 20 mg tablet TAKE 1 TABLET BY MOUTH  DAILY 90 tablet 3    metFORMIN (GLUCOPHAGE-XR) 500 mg 24 hr tablet TAKE 1 TABLET BY MOUTH TWICE  DAILY WITH MEALS 180 tablet 3    metoprolol tartrate (LOPRESSOR) 25 mg tablet TAKE 1 TABLET BY MOUTH TWICE  DAILY 180 tablet 3    Multiple Vitamin (multivitamin) tablet Take 1 tablet by mouth daily 30 tablet 0    potassium chloride (Klor-Con) 10 mEq tablet TAKE 1 TABLET BY MOUTH  DAILY 90 tablet 3    ramipril (ALTACE) 10 MG capsule TAKE 1 CAPSULE BY MOUTH  DAILY 90 capsule 3    sildenafil (VIAGRA) 100 mg tablet Take 1 tablet (100 mg total) by mouth daily as needed for erectile dysfunction 10 tablet 0    oyxr-wtekaoe-tdl-hydrocort (CORTISPORIN) 1 % ointment Administer into the left eye every 4 (four) hours for 7 days 3.5 g 0    Thiamine Mononitrate (VITAMIN B1) 100 mg tablet Take 1 tablet (100 mg total) by mouth daily 30 tablet 0      No current facility-administered medications for this visit.       SOCIAL HISTORY:  Social History     Socioeconomic History    Marital status:      Spouse name: Not on file    Number of children: Not on file    Years of education: Not on file    Highest education level: Not on file   Occupational History    Occupation: Retired   Tobacco Use    Smoking status: Former     Current packs/day: 0.00     Average packs/day: 1 pack/day for 30.0 years (30.0 ttl pk-yrs)     Types: Cigarettes     Start date: 1968     Quit date:      Years since quittin.7    Smokeless tobacco: Never   Vaping Use    Vaping status: Never Used   Substance and Sexual Activity    Alcohol use: Not Currently     Alcohol/week: 0.0 standard drinks of alcohol     Comment: Social    Drug use: No    Sexual activity: Yes     Partners: Female     Birth control/protection: None   Other Topics Concern    Not on file   Social History Narrative    Always uses seat belt    No living will     Social Determinants of Health     Financial Resource Strain: Medium Risk (2023)    Overall Financial Resource Strain (CARDIA)     Difficulty of Paying Living Expenses: Somewhat hard   Food Insecurity: No Food Insecurity (2023)    Hunger Vital Sign     Worried About Running Out of Food in the Last Year: Never true     Ran Out of Food in the Last Year: Never true   Transportation Needs: No Transportation Needs (2023)    PRAPARE - Transportation     Lack of Transportation (Medical): No     Lack of Transportation (Non-Medical): No   Recent Concern: Transportation Needs - Unmet Transportation Needs (2023)    PRAPARE - Transportation     Lack of Transportation (Medical): Yes     Lack of Transportation (Non-Medical): Yes   Physical Activity: Not on file   Stress: Not on file   Social Connections: Not on file   Intimate Partner Violence: Not At Risk (3/8/2023)    Received from [facility]    Humiliation, Afraid, Rape,  and Kick questionnaire     Fear of Current or Ex-Partner: No     Emotionally Abused: No     Physically Abused: No     Sexually Abused: No   Housing Stability: Low Risk  (12/18/2023)    Housing Stability Vital Sign     Unable to Pay for Housing in the Last Year: No     Number of Places Lived in the Last Year: 1     Unstable Housing in the Last Year: No        REVIEW OF SYSTEMS:  GENERAL: NAD, afebrile  HEART: No chest pain, or palpitation  RESPIRATORY:  No acute SOB or cough  GI: No Nausea, vomit or diarrhea  NEUROLOGIC: No syncope or acute weakness    PHYSICAL EXAM:  VASCULAR EXAM  Dorsalis pedis  +1, Posterior tibial artery  absent.  The patient has class findings with skin atrophy, lack of digital hair, and nail dystrophy.  There is +1 lower extremity edema bilaterally.  Venous stasis skin changes noted BLE.    NEUROLOGIC EXAM  AAO X 3.  Sensation is intact to light touch.  Sensation is intact to 10gm monofilament.  No focal neurologic deficit.          DERMATOLOGIC EXAM:   No ulcer.  No cellulitis.  The patient has hypertrophic toenails with discoloration, onycholysis, and subungal debris.  No abscess.      MUSCULOSKELETAL EXAM:   No acute joint pain.  No acute edema or redness.  No acute musculoskeletal problem.  Patient has deformity including bunion and hammertoe.        Diabetic Foot Exam    Patient's shoes and socks removed.    Right Foot/Ankle   Right Foot Inspection  Skin Exam: skin normal, skin intact and dry skin. No warmth, no callus, no erythema, no maceration, no abnormal color, no pre-ulcer, no ulcer and no callus.     Toe Exam: right toe deformity. No swelling, no tenderness and erythema    Sensory   Vibration: diminished  Proprioception: intact  Monofilament testing: intact    Vascular  Capillary refills: < 3 seconds  The right DP pulse is 1+. The right PT pulse is 0.     Left Foot/Ankle  Left Foot Inspection  Skin Exam: skin normal, skin intact and dry skin. No warmth, no erythema, no maceration,  normal color, no pre-ulcer, no ulcer and no callus.     Toe Exam: left toe deformity. No swelling, no tenderness and no erythema.     Sensory   Vibration: diminished  Proprioception: intact  Monofilament testing: intact    Vascular  Capillary refills: < 3 seconds  The left DP pulse is 1+. The left PT pulse is 0.     Assign Risk Category  Deformity present  No loss of protective sensation  No weak pulses  Risk: 1

## 2024-02-20 ENCOUNTER — VBI (OUTPATIENT)
Dept: ADMINISTRATIVE | Facility: OTHER | Age: 74
End: 2024-02-20

## 2024-03-04 ENCOUNTER — OFFICE VISIT (OUTPATIENT)
Dept: FAMILY MEDICINE CLINIC | Facility: CLINIC | Age: 74
End: 2024-03-04
Payer: MEDICARE

## 2024-03-04 VITALS
DIASTOLIC BLOOD PRESSURE: 80 MMHG | OXYGEN SATURATION: 99 % | SYSTOLIC BLOOD PRESSURE: 166 MMHG | BODY MASS INDEX: 32.48 KG/M2 | HEIGHT: 71 IN | HEART RATE: 49 BPM | TEMPERATURE: 96.6 F | WEIGHT: 232 LBS

## 2024-03-04 DIAGNOSIS — E66.01 CLASS 2 SEVERE OBESITY DUE TO EXCESS CALORIES WITH SERIOUS COMORBIDITY AND BODY MASS INDEX (BMI) OF 38.0 TO 38.9 IN ADULT: ICD-10-CM

## 2024-03-04 DIAGNOSIS — F10.920 ALCOHOLIC INTOXICATION WITHOUT COMPLICATION (HCC): ICD-10-CM

## 2024-03-04 DIAGNOSIS — E78.5 DYSLIPIDEMIA: ICD-10-CM

## 2024-03-04 DIAGNOSIS — I74.3 EMBOLISM AND THROMBOSIS OF ARTERIES OF THE LOWER EXTREMITIES (HCC): ICD-10-CM

## 2024-03-04 DIAGNOSIS — I25.118 CORONARY ARTERY DISEASE OF NATIVE ARTERY OF NATIVE HEART WITH STABLE ANGINA PECTORIS (HCC): ICD-10-CM

## 2024-03-04 DIAGNOSIS — N52.9 ERECTILE DYSFUNCTION, UNSPECIFIED ERECTILE DYSFUNCTION TYPE: ICD-10-CM

## 2024-03-04 DIAGNOSIS — E11.8 TYPE 2 DIABETES MELLITUS WITH COMPLICATION (HCC): Primary | ICD-10-CM

## 2024-03-04 DIAGNOSIS — I1A.0 RESISTANT HYPERTENSION: ICD-10-CM

## 2024-03-04 PROBLEM — N52.03 COMBINED ARTERIAL INSUFFICIENCY AND CORPORO-VENOUS OCCLUSIVE ERECTILE DYSFUNCTION: Status: ACTIVE | Noted: 2024-03-04

## 2024-03-04 PROCEDURE — G2211 COMPLEX E/M VISIT ADD ON: HCPCS | Performed by: FAMILY MEDICINE

## 2024-03-04 PROCEDURE — 99214 OFFICE O/P EST MOD 30 MIN: CPT | Performed by: FAMILY MEDICINE

## 2024-03-04 NOTE — PROGRESS NOTES
Assessment/Plan: Patient needs workup for renovascular hypertension which will include a duplex scan of the renal artery aldosterone level renin aldosterone level giorgio a ratio and a urinalysis also having erectile dysfunction will set up with Dr. Mora for discussion and labs are have been ordered patient will continue current medicines    Problem List Items Addressed This Visit          Endocrine    Type 2 diabetes mellitus with complication (HCC) - Primary       Other    Dyslipidemia        Diagnoses and all orders for this visit:    Type 2 diabetes mellitus with complication (HCC)    Dyslipidemia        No problem-specific Assessment & Plan notes found for this encounter.        Subjective:      Patient ID: Jorge Bajwa is a 73 y.o. male.    Mr. Bajwa is here for a follow-up visit regarding his diabetes also hypertension and he is also experiencing resistant erectile dysfunction patient has poorly controlled hypertension he is on 4 drugs for it needs to have a renal vascular Doppler and also needs to have a renin aldosterone ratio and a aldosterone level and a urinalysis along with kidney ultrasound he is watching his sodium      The following portions of the patient's history were reviewed and updated as appropriate:   He has a past medical history of Asthma, Closed fracture of fifth metacarpal bone of right hand, Deep vein thrombosis (HCC), Diabetes mellitus (HCC), Diverticulitis, Fracture of metacarpal shaft, Hemopneumothorax, left, Hyperlipidemia, Hypertension, Inguinal hernia, Lacunar infarction (HCC), Lumbar transverse process fracture (HCC), Pilonidal cyst, Pleural effusion, Rib fractures, Status post fall, and Superficial thrombophlebitis of leg.,  does not have any pertinent problems on file.,   has a past surgical history that includes Coronary angioplasty with stent (2009); Fracture surgery (05/04/2015); Revision Colostomy; LAPAROSCOPY (05/18/2015); Hernia repair; Knee arthroscopy w/ meniscal  repair; Rectal surgery; Other surgical history; Thoracentesis (06/02/2015); Tonsillectomy and adenoidectomy; Colonoscopy; and Colon surgery.,  family history includes Coronary artery disease in his mother; Heart disease in his father; Hyperlipidemia in his father.,   reports that he quit smoking about 25 years ago. His smoking use included cigarettes. He started smoking about 55 years ago. He has a 30 pack-year smoking history. He has never used smokeless tobacco. He reports that he does not currently use alcohol. He reports that he does not use drugs.,  has No Known Allergies..  Current Outpatient Medications   Medication Sig Dispense Refill    amLODIPine (NORVASC) 5 mg tablet Take 1 tablet (5 mg total) by mouth daily 90 tablet 1    aspirin (Aspirin 81) 81 mg EC tablet Take 1 tablet (81 mg total) by mouth daily 30 tablet 0    atorvastatin (LIPITOR) 40 mg tablet TAKE 1 TABLET BY MOUTH  DAILY 90 tablet 3    FIBER ADULT GUMMIES PO Take 2 tablets by mouth daily      folic acid (KP Folic Acid) 1 mg tablet Take 1 tablet (1 mg total) by mouth daily 30 tablet 0    furosemide (LASIX) 20 mg tablet TAKE 1 TABLET BY MOUTH  DAILY 90 tablet 3    metFORMIN (GLUCOPHAGE-XR) 500 mg 24 hr tablet TAKE 1 TABLET BY MOUTH TWICE  DAILY WITH MEALS 180 tablet 3    metoprolol tartrate (LOPRESSOR) 25 mg tablet TAKE 1 TABLET BY MOUTH TWICE  DAILY 180 tablet 3    Multiple Vitamin (multivitamin) tablet Take 1 tablet by mouth daily 30 tablet 0    potassium chloride (Klor-Con) 10 mEq tablet TAKE 1 TABLET BY MOUTH  DAILY 90 tablet 3    ramipril (ALTACE) 10 MG capsule TAKE 1 CAPSULE BY MOUTH  DAILY 90 capsule 3    sildenafil (VIAGRA) 100 mg tablet Take 1 tablet (100 mg total) by mouth daily as needed for erectile dysfunction 10 tablet 0     No current facility-administered medications for this visit.       Review of Systems   Constitutional:  Positive for activity change and fatigue. Negative for appetite change, diaphoresis and fever.   HENT:   "Positive for dental problem.    Eyes:  Positive for visual disturbance.   Respiratory:  Negative for apnea, cough, chest tightness, shortness of breath and wheezing.    Cardiovascular:  Negative for chest pain, palpitations and leg swelling.   Gastrointestinal:  Negative for abdominal distention, abdominal pain, anal bleeding, constipation, diarrhea, nausea and vomiting.   Endocrine: Negative for cold intolerance, heat intolerance, polydipsia, polyphagia and polyuria.   Genitourinary:  Negative for difficulty urinating, dysuria, flank pain, hematuria and urgency.        Erectile dysfunction   Musculoskeletal:  Negative for arthralgias, back pain, gait problem, joint swelling and myalgias.   Skin:  Negative for color change, rash and wound.   Allergic/Immunologic: Negative for environmental allergies, food allergies and immunocompromised state.   Neurological:  Negative for dizziness, seizures, syncope, speech difficulty, numbness and headaches.   Hematological:  Negative for adenopathy. Does not bruise/bleed easily.   Psychiatric/Behavioral:  Negative for agitation, behavioral problems, hallucinations, sleep disturbance and suicidal ideas.          Objective:  Vitals:    03/04/24 0655   BP: 166/80   BP Location: Left arm   Patient Position: Sitting   Cuff Size: Standard   Pulse: (!) 49   Temp: (!) 96.6 °F (35.9 °C)   TempSrc: Temporal   SpO2: 99%   Weight: 105 kg (232 lb)   Height: 5' 11\" (1.803 m)     Body mass index is 32.36 kg/m².     Physical Exam  Constitutional:       General: He is not in acute distress.     Appearance: He is well-developed. He is not diaphoretic.   HENT:      Head: Normocephalic.      Right Ear: External ear normal.      Left Ear: External ear normal.      Nose: Nose normal.   Eyes:      General: No scleral icterus.        Right eye: No discharge.         Left eye: No discharge.      Conjunctiva/sclera: Conjunctivae normal.      Pupils: Pupils are equal, round, and reactive to light.   Neck: "      Thyroid: No thyromegaly.      Trachea: No tracheal deviation.   Cardiovascular:      Rate and Rhythm: Normal rate and regular rhythm.      Heart sounds: Normal heart sounds. No murmur heard.     No friction rub. No gallop.   Pulmonary:      Effort: Pulmonary effort is normal. No respiratory distress.      Breath sounds: Normal breath sounds. No wheezing.   Abdominal:      General: Bowel sounds are normal.      Palpations: Abdomen is soft. There is no mass.      Tenderness: There is no abdominal tenderness. There is no guarding.   Musculoskeletal:         General: No deformity.      Cervical back: Normal range of motion.   Lymphadenopathy:      Cervical: No cervical adenopathy.   Skin:     General: Skin is warm and dry.      Findings: No erythema or rash.   Neurological:      Mental Status: He is alert and oriented to person, place, and time.      Cranial Nerves: No cranial nerve deficit.   Psychiatric:         Thought Content: Thought content normal.       Depression Screening and Follow-up Plan: Patient was screened for depression during today's encounter. They screened negative with a PHQ-2 score of 0.

## 2024-03-07 NOTE — PROGRESS NOTES
Cardiology Follow Up    Jorge Bajwa  1950  [id number]  [facility] [address] [address] [phone]    1. Presence of drug-eluting stent in right coronary artery        2. Type 2 diabetes mellitus with complication (HCC)        3. Benign essential hypertension        4. Coronary artery disease of native artery of native heart with stable angina pectoris (HCC)        5. Dyslipidemia        6. GEOVANNY (obstructive sleep apnea)            Discussion/Summary:    CAD  Hx of stent to RCA in 2009  Today no complaints of chest pain or palpitations  Continue aspirin/statin/beta blocker    Essential HTN  Recently uncontrolled and PCP has ordered renal ultrasound and aldosterone/renin ratio both of which are not yet completed by he does have them scheduled  Currently maintained on amlodipine 5 mg QD, metoprolol tartrate 25 mg BID, lasix 20 mg QD and ramipril 10 mg QD  BP today is 136/68 and we will have him continue current meds at this time  he does partake in salty snacks per his wife and I have asked him to be diligent about watching his sodium intake  He also drinks quite a bit of caffeine throughout the day and I have asked him to reduce this to no more than 2 cups a day  He also likely has untreated GEOVANNY which may be a contributing factor, he would like to think about if he wants to undergo sleep study    Hyperlipidemia  Lipid panel from December 2023  Triglycerides 64  HDL 58  LDL 55  Continue Lipitor 40 mg QD    DM  HgbA1c 6.4  Defer to PCP    Probable GEOVANNY  Per patient's wife he does snore and have apneic episodes  He has no headaches but does wake up several times at night  I did discuss with him untreated GEOVANNY may be contributing factor to his uncontrolled blood pressure  He would like to think about if he wants to undergo sleep study and he will contact either our office or Dr. Bergman's office  for orders    Interval History:   Jorge Bajwa is a 73 y.o. male with PMH of CAD, HTN, DM, HLD and obesity who returns to the office today for routine follow up visit.   Today, the patient is accompanied by his wife who assists with history.  The patient does not have a formal exercise routine but does perform some household chores such as mowing his lawn, raking leaves and also doing home-improvement jobs recently.  He can perform these activities with no complaints of chest discomfort, dyspnea exertion or palpitations.  He has no lower extremity edema orthopnea no dizziness/lightheadedness or near syncope/syncope.  The patient's PCP has been working on controlling his blood pressure over the past several months and has recently ordered renal ultrasound as well as renin aldosterone ratio and urinalysis.  The patient does have the schedule but they are not yet resulted.  His blood pressure is 136/68 today which at this time I will find acceptable and he may need some adjustments in his antihypertensive based on results of pending testing.  The patient's wife does state that he partakes in salty snacks at times and I have asked the patient to be more diligent about following a low-sodium diet.  He also drinks quite a bit of caffeine and I have asked him to reduce this to no more than 2 cups a day.  His wife states that he does snore and has apneic episodes at night.  He does not have headaches but does wake up several times throughout the night.  He does probably have untreated GEOVANNY which may be contributing factor to his hypertension as well.  I did discuss potential sleep study with him but he would like to think about this and contact either our office or PCP office for orders.  His most recent LDL is at goal.  I again reviewed heart healthy diet with the patient and the need for regular cardiovascular exercise such as walking 5 days a week up to 30 minutes at a time.  Overall, he is stable from a cardiac  standpoint as his PCP continues to monitor blood pressure.  He will follow-up in our office in 1 year.    Medical Problems       Problem List       Benign essential hypertension    Overview Signed 11/19/2018 10:43 AM by Travis Bergman, DO     On good medications ramipril doing well         Coronary artery disease of native artery of native heart with stable angina pectoris (HCC)    Type 2 diabetes mellitus with complication (HCC)    Overview Signed 11/19/2018 10:43 AM by Travis Bergman, DO     Blood sugars in the range of 150           Lab Results   Component Value Date    HGBA1C 6.4 (H) 12/18/2023         Dyslipidemia    Class 2 severe obesity due to excess calories with serious comorbidity in adult (HCC)    Sciatica    Presence of drug-eluting stent in right coronary artery    Embolism and thrombosis of arteries of the lower extremities (HCC)    Stroke-like symptoms    Hypokalemia    Alcoholic intoxication without complication (HCC)    Fall at home, initial encounter    Combined arterial insufficiency and corporo-venous occlusive erectile dysfunction        Past Medical History:   Diagnosis Date    Asthma     resolved: 08/14/17    Closed fracture of fifth metacarpal bone of right hand     last assessed: 06/30/15    Deep vein thrombosis (HCC)     Diabetes mellitus (HCC)     Diverticulitis     Fracture of metacarpal shaft     Hemopneumothorax, left     last assessed: 06/02/15    Hyperlipidemia     Hypertension     Inguinal hernia     Lacunar infarction (HCC)     Approx 2000    Lumbar transverse process fracture (HCC)     Pilonidal cyst     Pleural effusion     last assessed: 07/01/15    Rib fractures     last assessed: 06/02/15    Status post fall     Superficial thrombophlebitis of leg     last assessed: 03/19/14     Social History     Socioeconomic History    Marital status:      Spouse name: Not on file    Number of children: Not on file    Years of education: Not on file    Highest education level: Not on  file   Occupational History    Occupation: Retired   Tobacco Use    Smoking status: Former     Current packs/day: 0.00     Average packs/day: 1 pack/day for 30.0 years (30.0 ttl pk-yrs)     Types: Cigarettes     Start date: 1968     Quit date:      Years since quittin.8    Smokeless tobacco: Never   Vaping Use    Vaping status: Never Used   Substance and Sexual Activity    Alcohol use: Not Currently     Alcohol/week: 0.0 standard drinks of alcohol     Comment: Social    Drug use: No    Sexual activity: Yes     Partners: Female     Birth control/protection: None   Other Topics Concern    Not on file   Social History Narrative    Always uses seat belt    No living will     Social Determinants of Health     Financial Resource Strain: Medium Risk (2023)    Overall Financial Resource Strain (CARDIA)     Difficulty of Paying Living Expenses: Somewhat hard   Food Insecurity: No Food Insecurity (2023)    Hunger Vital Sign     Worried About Running Out of Food in the Last Year: Never true     Ran Out of Food in the Last Year: Never true   Transportation Needs: No Transportation Needs (2023)    PRAPARE - Transportation     Lack of Transportation (Medical): No     Lack of Transportation (Non-Medical): No   Recent Concern: Transportation Needs - Unmet Transportation Needs (2023)    PRAPARE - Transportation     Lack of Transportation (Medical): Yes     Lack of Transportation (Non-Medical): Yes   Physical Activity: Not on file   Stress: Not on file   Social Connections: Not on file   Intimate Partner Violence: Not At Risk (3/8/2023)    Received from [facility]    Humiliation, Afraid, Rape, and Kick questionnaire     Fear of Current or Ex-Partner: No     Emotionally Abused: No     Physically Abused: No     Sexually Abused: No   Housing Stability: Low Risk  (2023)    Housing Stability Vital Sign     Unable to Pay for Housing in the Last Year: No     Number of Places Lived  in the Last Year: 1     Unstable Housing in the Last Year: No      Family History   Problem Relation Age of Onset    Coronary artery disease Mother     Heart disease Father         Benign hypertensive    Hyperlipidemia Father      Past Surgical History:   Procedure Laterality Date    COLON SURGERY      COLONOSCOPY      CORONARY ANGIOPLASTY WITH STENT PLACEMENT  2009    PTCA/RITA to RCA    FRACTURE SURGERY  05/04/2015    Closed treatment of fracture of metacarpal bone, right 5t metacarpal fracture Dr. Claire    HERNIA REPAIR      KNEE ARTHROSCOPY W/ MENISCAL REPAIR      Lateral meniscus    LAPAROSCOPY  05/18/2015    Exploratory, extensive lysis of adhesions Dr. Pérez    OTHER SURGICAL HISTORY      Surgical lysis of intestinal adhesions    RECTAL SURGERY      REVISION COLOSTOMY      THORACENTESIS  06/02/2015    with imaging guidance left, removed 1300cc of fluid w/o problem lower base of lun06    TONSILLECTOMY AND ADENOIDECTOMY         Current Outpatient Medications:     amLODIPine (NORVASC) 5 mg tablet, Take 1 tablet (5 mg total) by mouth daily, Disp: 90 tablet, Rfl: 1    aspirin (Aspirin 81) 81 mg EC tablet, Take 1 tablet (81 mg total) by mouth daily, Disp: 30 tablet, Rfl: 0    atorvastatin (LIPITOR) 40 mg tablet, TAKE 1 TABLET BY MOUTH  DAILY, Disp: 90 tablet, Rfl: 3    FIBER ADULT GUMMIES PO, Take 2 tablets by mouth daily, Disp: , Rfl:     furosemide (LASIX) 20 mg tablet, TAKE 1 TABLET BY MOUTH  DAILY, Disp: 90 tablet, Rfl: 3    metFORMIN (GLUCOPHAGE-XR) 500 mg 24 hr tablet, TAKE 1 TABLET BY MOUTH TWICE  DAILY WITH MEALS, Disp: 180 tablet, Rfl: 3    metoprolol tartrate (LOPRESSOR) 25 mg tablet, TAKE 1 TABLET BY MOUTH TWICE  DAILY, Disp: 180 tablet, Rfl: 3    Multiple Vitamin (multivitamin) tablet, Take 1 tablet by mouth daily, Disp: 30 tablet, Rfl: 0    potassium chloride (Klor-Con) 10 mEq tablet, TAKE 1 TABLET BY MOUTH  DAILY, Disp: 90 tablet, Rfl: 3    ramipril (ALTACE) 10 MG capsule, TAKE 1 CAPSULE BY MOUTH   "DAILY, Disp: 90 capsule, Rfl: 3    sildenafil (VIAGRA) 100 mg tablet, Take 1 tablet (100 mg total) by mouth daily as needed for erectile dysfunction, Disp: 10 tablet, Rfl: 0    folic acid ( Folic Acid) 1 mg tablet, Take 1 tablet (1 mg total) by mouth daily (Patient not taking: Reported on 3/15/2024), Disp: 30 tablet, Rfl: 0  No Known Allergies    Labs:     Chemistry        Component Value Date/Time     06/28/2017 1108    K 3.8 12/24/2023 0425    K 4.7 03/09/2023 0433     12/24/2023 0425     03/09/2023 0433    CO2 21 12/24/2023 0425    CO2 28 03/09/2023 0433    BUN 15 12/24/2023 0425    BUN 14 03/09/2023 0433    CREATININE 0.70 12/24/2023 0425    CREATININE 0.84 03/09/2023 0433        Component Value Date/Time    CALCIUM 8.2 (L) 12/24/2023 0425    CALCIUM 8.3 (L) 03/09/2023 0433    ALKPHOS 75 12/24/2023 0425    ALKPHOS 127 (H) 05/14/2015 1003    AST 17 12/24/2023 0425    AST 18 05/14/2015 1003    ALT 18 12/24/2023 0425    ALT 27 05/14/2015 1003    BILITOT 1.50 (H) 05/14/2015 1003            Lab Results   Component Value Date    CHOL 108 07/14/2016    CHOL 191 10/28/2015    CHOL 117 11/16/2013     Lab Results   Component Value Date    HDL 58 12/18/2023    HDL 48 09/11/2023    HDL 53 02/20/2023     Lab Results   Component Value Date    LDLCALC 55 12/18/2023    LDLCALC 65 09/11/2023    LDLCALC 51 02/20/2023     Lab Results   Component Value Date    TRIG 64 12/18/2023    TRIG 54 09/11/2023    TRIG 78 02/20/2023     No results found for: \"CHOLHDL\"    Imaging: No results found.      Review of Systems   Constitutional: Negative for chills, fever and malaise/fatigue.   HENT:  Negative for congestion.    Cardiovascular:  Negative for chest pain, dyspnea on exertion, leg swelling, orthopnea and palpitations.   Respiratory:  Negative for cough, shortness of breath (no SOB at rest) and wheezing.    Endocrine: Negative.    Hematologic/Lymphatic: Negative.    Skin: Negative.    Musculoskeletal: Negative.  " "  Gastrointestinal:  Negative for bloating, abdominal pain, nausea and vomiting.   Genitourinary: Negative.    Neurological:  Negative for dizziness and light-headedness.   Psychiatric/Behavioral: Negative.     All other systems reviewed and are negative.      Vitals:    03/15/24 1453   BP: 136/68   Pulse: (!) 54   SpO2: 96%     Vitals:    03/15/24 1453   Weight: 103 kg (227 lb)     Height: 5' 11\" (180.3 cm)   Body mass index is 31.66 kg/m².    Physical Exam:  Physical Exam  Vitals reviewed.   Constitutional:       General: He is not in acute distress.     Appearance: He is obese.   HENT:      Head: Normocephalic and atraumatic.      Mouth/Throat:      Mouth: Mucous membranes are moist.   Cardiovascular:      Rate and Rhythm: Normal rate and regular rhythm.      Heart sounds: Normal heart sounds, S1 normal and S2 normal. No murmur heard.  Pulmonary:      Effort: Pulmonary effort is normal. No respiratory distress.      Breath sounds: Normal breath sounds.   Abdominal:      General: Bowel sounds are normal. There is no distension.      Palpations: Abdomen is soft.   Musculoskeletal:         General: Normal range of motion.      Cervical back: Normal range of motion and neck supple.      Right lower leg: No edema.      Left lower leg: No edema.   Skin:     General: Skin is warm and dry.   Neurological:      Mental Status: He is alert and oriented to person, place, and time.   Psychiatric:         Mood and Affect: Mood normal.         "

## 2024-03-15 ENCOUNTER — OFFICE VISIT (OUTPATIENT)
Dept: CARDIOLOGY CLINIC | Facility: HOSPITAL | Age: 74
End: 2024-03-15
Payer: MEDICARE

## 2024-03-15 VITALS
SYSTOLIC BLOOD PRESSURE: 136 MMHG | DIASTOLIC BLOOD PRESSURE: 68 MMHG | OXYGEN SATURATION: 96 % | BODY MASS INDEX: 31.78 KG/M2 | HEART RATE: 54 BPM | WEIGHT: 227 LBS | HEIGHT: 71 IN

## 2024-03-15 DIAGNOSIS — I10 BENIGN ESSENTIAL HYPERTENSION: ICD-10-CM

## 2024-03-15 DIAGNOSIS — G47.33 OSA (OBSTRUCTIVE SLEEP APNEA): ICD-10-CM

## 2024-03-15 DIAGNOSIS — I25.118 CORONARY ARTERY DISEASE OF NATIVE ARTERY OF NATIVE HEART WITH STABLE ANGINA PECTORIS (HCC): ICD-10-CM

## 2024-03-15 DIAGNOSIS — E11.8 TYPE 2 DIABETES MELLITUS WITH COMPLICATION (HCC): ICD-10-CM

## 2024-03-15 DIAGNOSIS — E78.5 DYSLIPIDEMIA: ICD-10-CM

## 2024-03-15 DIAGNOSIS — Z95.5 PRESENCE OF DRUG-ELUTING STENT IN RIGHT CORONARY ARTERY: Primary | ICD-10-CM

## 2024-03-15 PROCEDURE — 99214 OFFICE O/P EST MOD 30 MIN: CPT | Performed by: NURSE PRACTITIONER

## 2024-03-18 ENCOUNTER — LAB (OUTPATIENT)
Dept: LAB | Facility: HOSPITAL | Age: 74
End: 2024-03-18
Payer: MEDICARE

## 2024-03-18 DIAGNOSIS — I1A.0 RESISTANT HYPERTENSION: ICD-10-CM

## 2024-03-18 LAB
ANION GAP SERPL CALCULATED.3IONS-SCNC: 6 MMOL/L (ref 4–13)
BACTERIA UR QL AUTO: NORMAL /HPF
BILIRUB UR QL STRIP: NEGATIVE
BUN SERPL-MCNC: 16 MG/DL (ref 5–25)
CALCIUM SERPL-MCNC: 8.9 MG/DL (ref 8.4–10.2)
CHLORIDE SERPL-SCNC: 106 MMOL/L (ref 96–108)
CLARITY UR: CLEAR
CO2 SERPL-SCNC: 28 MMOL/L (ref 21–32)
COLOR UR: YELLOW
CREAT SERPL-MCNC: 0.86 MG/DL (ref 0.6–1.3)
GFR SERPL CREATININE-BSD FRML MDRD: 87 ML/MIN/1.73SQ M
GLUCOSE P FAST SERPL-MCNC: 121 MG/DL (ref 65–99)
GLUCOSE UR STRIP-MCNC: NEGATIVE MG/DL
HGB UR QL STRIP.AUTO: NEGATIVE
KETONES UR STRIP-MCNC: NEGATIVE MG/DL
LEUKOCYTE ESTERASE UR QL STRIP: NEGATIVE
NITRITE UR QL STRIP: NEGATIVE
NON-SQ EPI CELLS URNS QL MICRO: NORMAL /HPF
PH UR STRIP.AUTO: 6 [PH]
POTASSIUM SERPL-SCNC: 3.8 MMOL/L (ref 3.5–5.3)
PROT UR STRIP-MCNC: NEGATIVE MG/DL
RBC #/AREA URNS AUTO: NORMAL /HPF
SODIUM SERPL-SCNC: 140 MMOL/L (ref 135–147)
SP GR UR STRIP.AUTO: 1.01 (ref 1–1.03)
UROBILINOGEN UR QL STRIP.AUTO: 0.2 E.U./DL
WBC #/AREA URNS AUTO: NORMAL /HPF

## 2024-03-18 PROCEDURE — 81001 URINALYSIS AUTO W/SCOPE: CPT

## 2024-03-18 PROCEDURE — 36415 COLL VENOUS BLD VENIPUNCTURE: CPT

## 2024-03-18 PROCEDURE — 84244 ASSAY OF RENIN: CPT

## 2024-03-18 PROCEDURE — 82088 ASSAY OF ALDOSTERONE: CPT

## 2024-03-22 ENCOUNTER — HOSPITAL ENCOUNTER (OUTPATIENT)
Dept: NON INVASIVE DIAGNOSTICS | Facility: HOSPITAL | Age: 74
Discharge: HOME | End: 2024-03-22
Payer: MEDICARE

## 2024-03-22 DIAGNOSIS — I1A.0 RESISTANT HYPERTENSION: ICD-10-CM

## 2024-03-22 LAB — ALDOST SERPL-MCNC: 4.8 NG/DL (ref 0–30)

## 2024-03-22 PROCEDURE — 93975 VASCULAR STUDY: CPT

## 2024-03-22 PROCEDURE — 93975 VASCULAR STUDY: CPT | Performed by: SURGERY

## 2024-03-23 LAB
ALDOST SERPL-MCNC: 3.2 NG/DL (ref 0–30)
ALDOST/RENIN PLAS-RTO: 10.5 {RATIO} (ref 0–30)
RENIN PLAS-CCNC: 0.3 NG/ML/HR (ref 0.17–5.38)

## 2024-03-25 DIAGNOSIS — I77.1 CELIAC ARTERY STENOSIS (HCC): Primary | ICD-10-CM

## 2024-03-25 DIAGNOSIS — K55.1 SUPERIOR MESENTERIC ARTERY STENOSIS (HCC): ICD-10-CM

## 2024-03-25 NOTE — RESULT ENCOUNTER NOTE
Please call the patient regarding his abnormal result.  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

## 2024-03-25 NOTE — RESULT ENCOUNTER NOTE
Call patient to notify normal results aldosterone level is normal so his elevated blood pressures are not due to hyperaldosteronism

## 2024-04-04 ENCOUNTER — OFFICE VISIT (OUTPATIENT)
Dept: UROLOGY | Facility: CLINIC | Age: 74
End: 2024-04-04
Payer: MEDICARE

## 2024-04-04 VITALS
OXYGEN SATURATION: 98 % | SYSTOLIC BLOOD PRESSURE: 142 MMHG | BODY MASS INDEX: 31.89 KG/M2 | WEIGHT: 227.8 LBS | HEIGHT: 71 IN | DIASTOLIC BLOOD PRESSURE: 70 MMHG | HEART RATE: 53 BPM

## 2024-04-04 DIAGNOSIS — N52.9 ERECTILE DYSFUNCTION, UNSPECIFIED ERECTILE DYSFUNCTION TYPE: Primary | ICD-10-CM

## 2024-04-04 PROCEDURE — 99203 OFFICE O/P NEW LOW 30 MIN: CPT

## 2024-04-04 RX ORDER — TADALAFIL 20 MG/1
20 TABLET ORAL DAILY PRN
Qty: 30 TABLET | Refills: 1 | Status: SHIPPED | OUTPATIENT
Start: 2024-04-04

## 2024-04-04 NOTE — PROGRESS NOTES
4/4/2024    Chief Complaint   Patient presents with    Follow-up     NEWP to be established for evaluation of ED.       Assessment and Plan    73 y.o. male new patient to office    Erectile dysfunction  Multifactorial secondary to age, diabetes, and coronary artery disease.  With Viagra he is able to obtain an erection at about 80% erect however is not able to maintain this for penetration.  We discussed options to trial Cialis on demand instead as this allows for more spontaneity as it does not need to be taken on empty stomach.  We also talked about options to try daily Cialis with the potential for on-demand as well.  My recommendations today are to trial him on Cialis 20 mg as needed.  He is to start with half a tablet or 10 mg to start to ensure he is able to tolerate this without side effects.  If this works for him then he will continue at that dose otherwise he can titrate this up to 20 mg as needed.  I am also recommending a penile ring as this should help with his ability to maintain the erection.  Follow-up in 3 months.                History of Present Illness  Jorge Bajwa is a 73 y.o. male new patient to office with PMHx seen for hypertension, coronary artery disease, DVT, T2DM, sciatica, dyslipidemia. Here for evaluation of erectile dysfunction.    He presents today for evaluation of erectile dysfunction ongoing for about 9 years. He has been tried on sildenafil over the past 5-6 months without any effect. He usually takes this in the evening. When he takes sildenafil he is able to obtain an erection of about 75-80% erect but is not able to maintain the erection for penetration.           Review of Systems   Constitutional:  Negative for chills and fever.   HENT:  Negative for congestion and sore throat.    Respiratory:  Negative for cough and shortness of breath.    Cardiovascular:  Negative for chest pain and leg swelling.   Gastrointestinal:  Negative for abdominal pain, constipation and  "diarrhea.   Genitourinary:  Negative for difficulty urinating, dysuria, frequency, hematuria and urgency.        Erectile dysfunction   Musculoskeletal:  Negative for back pain and gait problem.   Skin:  Negative for wound.   Allergic/Immunologic: Negative for immunocompromised state.   Hematological:  Does not bruise/bleed easily.           AUA SYMPTOM SCORE      Flowsheet Row Most Recent Value   AUA SYMPTOM SCORE    How often have you had a sensation of not emptying your bladder completely after you finished urinating? 0 (P)    How often have you had to urinate again less than two hours after you finished urinating? 1 (P)    How often have you found you stopped and started again several times when you urinate? 1 (P)    How often have you found it difficult to postpone urination? 2 (P)    How often have you had a weak urinary stream? 1 (P)    How often have you had to push or strain to begin urination? 1 (P)    How many times did you most typically get up to urinate from the time you went to bed at night until the time you got up in the morning? 3 (P)    Quality of Life: If you were to spend the rest of your life with your urinary condition just the way it is now, how would you feel about that? 1 (P)    AUA SYMPTOM SCORE 9 (P)             Vitals  Vitals:    04/04/24 0827   BP: 142/70   Pulse: (!) 53   SpO2: 98%   Weight: 103 kg (227 lb 12.8 oz)   Height: 5' 11\" (1.803 m)       Physical Exam  Vitals reviewed.   Constitutional:       General: He is not in acute distress.     Appearance: Normal appearance. He is not ill-appearing or toxic-appearing.   HENT:      Head: Normocephalic and atraumatic.   Eyes:      General: No scleral icterus.     Conjunctiva/sclera: Conjunctivae normal.   Cardiovascular:      Rate and Rhythm: Normal rate.   Pulmonary:      Effort: Pulmonary effort is normal. No respiratory distress.   Abdominal:      Tenderness: There is no right CVA tenderness or left CVA tenderness.      Hernia: No " hernia is present.   Musculoskeletal:      Cervical back: Normal range of motion.      Right lower leg: No edema.      Left lower leg: No edema.   Skin:     General: Skin is warm and dry.      Coloration: Skin is not jaundiced or pale.   Neurological:      General: No focal deficit present.      Mental Status: He is alert and oriented to person, place, and time. Mental status is at baseline.      Gait: Gait normal.   Psychiatric:         Mood and Affect: Mood normal.         Behavior: Behavior normal.         Thought Content: Thought content normal.         Judgment: Judgment normal.         Past History  Past Medical History:   Diagnosis Date    Asthma     resolved: 19    Closed fracture of fifth metacarpal bone of right hand     last assessed: 06/30/15    Deep vein thrombosis (HCC)     Diabetes mellitus (HCC)     Diverticulitis     Fracture of metacarpal shaft     Hemopneumothorax, left     last assessed: 06/02/15    Hyperlipidemia     Hypertension     Inguinal hernia     Lacunar infarction (HCC)     Approx     Lumbar transverse process fracture (HCC)     Pilonidal cyst     Pleural effusion     last assessed: 07/01/15    Rib fractures     last assessed: 06/02/15    Status post fall     Superficial thrombophlebitis of leg     last assessed: 14     Social History     Socioeconomic History    Marital status:      Spouse name: None    Number of children: None    Years of education: None    Highest education level: None   Occupational History    Occupation: Retired   Tobacco Use    Smoking status: Former     Current packs/day: 0.00     Average packs/day: 1 pack/day for 30.0 years (30.0 ttl pk-yrs)     Types: Cigarettes     Start date: 1968     Quit date:      Years since quittin.8    Smokeless tobacco: Never   Vaping Use    Vaping status: Never Used   Substance and Sexual Activity    Alcohol use: Not Currently     Alcohol/week: 0.0 standard drinks of alcohol     Comment: Social     Drug use: No    Sexual activity: Yes     Partners: Female     Birth control/protection: None   Other Topics Concern    None   Social History Narrative    Always uses seat belt    No living will     Social Determinants of Health     Financial Resource Strain: Medium Risk (2023)    Overall Financial Resource Strain (CARDIA)     Difficulty of Paying Living Expenses: Somewhat hard   Food Insecurity: No Food Insecurity (2023)    Hunger Vital Sign     Worried About Running Out of Food in the Last Year: Never true     Ran Out of Food in the Last Year: Never true   Transportation Needs: No Transportation Needs (2023)    PRAPARE - Transportation     Lack of Transportation (Medical): No     Lack of Transportation (Non-Medical): No   Recent Concern: Transportation Needs - Unmet Transportation Needs (2023)    PRAPARE - Transportation     Lack of Transportation (Medical): Yes     Lack of Transportation (Non-Medical): Yes   Physical Activity: Not on file   Stress: Not on file   Social Connections: Not on file   Intimate Partner Violence: Not At Risk (3/8/2023)    [facility] from [facility]    Humiliation, Afraid, Rape, and Kick questionnaire     Fear of Current or Ex-Partner: No     Emotionally Abused: No     Physically Abused: No     Sexually Abused: No   Housing Stability: Low Risk  (2023)    Housing Stability Vital Sign     Unable to Pay for Housing in the Last Year: No     Number of Places Lived in the Last Year: 1     Unstable Housing in the Last Year: No     Social History     Tobacco Use   Smoking Status Former    Current packs/day: 0.00    Average packs/day: 1 pack/day for 30.0 years (30.0 ttl pk-yrs)    Types: Cigarettes    Start date: 1968    Quit date:     Years since quittin.8   Smokeless Tobacco Never     Family History   Problem Relation Age of Onset    Coronary artery disease Mother     Heart disease Father         Benign hypertensive    Hyperlipidemia  Father        The following portions of the patient's history were reviewed and updated as appropriate allergies, current medications, past medical history, past social history, past surgical history and problem list    Imaging:    Results  No results found for this or any previous visit (from the past 1 hour(s)).]  Lab Results   Component Value Date    PSA 1.5 09/11/2023    PSA 1.41 11/16/2013     Lab Results   Component Value Date    GLUCOSE 141 (H) 06/28/2017    CALCIUM 8.9 03/18/2024     06/28/2017    K 3.8 03/18/2024    CO2 28 03/18/2024     03/18/2024    BUN 16 03/18/2024    CREATININE 0.86 03/18/2024     Lab Results   Component Value Date    WBC 9.74 12/24/2023    HGB 13.7 12/24/2023    HCT 41.5 12/24/2023    MCV 99 (H) 12/24/2023     12/24/2023       Please Note:  Voice dictation software has been used to create this document. There may be inadvertent transcriptions errors.     TREY Mo 04/04/24

## 2024-04-12 ENCOUNTER — OFFICE VISIT (OUTPATIENT)
Dept: PODIATRY | Facility: CLINIC | Age: 74
End: 2024-04-12
Payer: MEDICARE

## 2024-04-12 VITALS
BODY MASS INDEX: 30.38 KG/M2 | SYSTOLIC BLOOD PRESSURE: 160 MMHG | DIASTOLIC BLOOD PRESSURE: 88 MMHG | WEIGHT: 217 LBS | HEIGHT: 71 IN | HEART RATE: 51 BPM

## 2024-04-12 DIAGNOSIS — E11.40 TYPE 2 DIABETES MELLITUS WITH DIABETIC NEUROPATHY, WITHOUT LONG-TERM CURRENT USE OF INSULIN (HCC): Primary | ICD-10-CM

## 2024-04-12 DIAGNOSIS — B35.1 ONYCHOMYCOSIS: ICD-10-CM

## 2024-04-12 PROCEDURE — 11721 DEBRIDE NAIL 6 OR MORE: CPT | Performed by: PODIATRIST

## 2024-04-12 NOTE — PROGRESS NOTES
PATIENT:  Jorge Bajwa  1950    ASSESSMENT/PLAN:  1. Type 2 diabetes mellitus with diabetic neuropathy, without long-term current use of insulin ([facility])        2. Onychomycosis  Debridement              Orders Placed This Encounter   Procedures    Debridement        Disease prevention and related risk factors of diabetes were identified and discussed.  The patient was educated in proper foot wear for diabetics. Educated in daily foot assessment and routine diabetic foot care.  The patient will follow up in 10 weeks.        Procedure: All mycotic toenails were reduced and debrided in length, width, and girth using a nail nipper and dremel.  Patient tolerated procedure(s) well without complications.      HPI:  Jorge Bajwa is a 73 y.o.year old male seen for diabetic foot exam.  The patient has class findings with DPN.   BS is under control.  Complained of thick nails.  No acute pedal disorder.    PAST MEDICAL HISTORY:  Past Medical History:   Diagnosis Date    Asthma     resolved: 08/14/17    Closed fracture of fifth metacarpal bone of right hand     last assessed: 06/30/15    Deep vein thrombosis (HCC)     Diabetes mellitus (HCC)     Diverticulitis     Fracture of metacarpal shaft     Hemopneumothorax, left     last assessed: 06/02/15    Hyperlipidemia     Hypertension     Inguinal hernia     Lacunar infarction (HCC)     Approx 2000    Lumbar transverse process fracture (HCC)     Pilonidal cyst     Pleural effusion     last assessed: 07/01/15    Rib fractures     last assessed: 06/02/15    Status post fall     Superficial thrombophlebitis of leg     last assessed: 03/19/14       PAST SURGICAL HISTORY:  Past Surgical History:   Procedure Laterality Date    COLON SURGERY      COLONOSCOPY      CORONARY ANGIOPLASTY WITH STENT PLACEMENT  2009    PTCA/RITA to RCA    FRACTURE SURGERY  05/04/2015    Closed treatment of fracture of metacarpal bone, right 5t metacarpal fracture Dr. Claire    HERNIA REPAIR      KNEE  ARTHROSCOPY W/ MENISCAL REPAIR      Lateral meniscus    LAPAROSCOPY  05/18/2015    Exploratory, extensive lysis of adhesions Dr. Pérez    OTHER SURGICAL HISTORY      Surgical lysis of intestinal adhesions    RECTAL SURGERY      REVISION COLOSTOMY      THORACENTESIS  06/02/2015    with imaging guidance left, removed 1300cc of fluid w/o problem lower base of lun06    TONSILLECTOMY AND ADENOIDECTOMY          ALLERGIES:  Patient has no known allergies.    MEDICATIONS:  Current Outpatient Medications   Medication Sig Dispense Refill    amLODIPine (NORVASC) 5 mg tablet Take 1 tablet (5 mg total) by mouth daily 90 tablet 1    aspirin (Aspirin 81) 81 mg EC tablet Take 1 tablet (81 mg total) by mouth daily 30 tablet 0    atorvastatin (LIPITOR) 40 mg tablet TAKE 1 TABLET BY MOUTH  DAILY 90 tablet 3    FIBER ADULT GUMMIES PO Take 2 tablets by mouth daily      furosemide (LASIX) 20 mg tablet TAKE 1 TABLET BY MOUTH  DAILY 90 tablet 3    metFORMIN (GLUCOPHAGE-XR) 500 mg 24 hr tablet TAKE 1 TABLET BY MOUTH TWICE  DAILY WITH MEALS 180 tablet 3    metoprolol tartrate (LOPRESSOR) 25 mg tablet TAKE 1 TABLET BY MOUTH TWICE  DAILY 180 tablet 3    Multiple Vitamin (multivitamin) tablet Take 1 tablet by mouth daily 30 tablet 0    potassium chloride (Klor-Con) 10 mEq tablet TAKE 1 TABLET BY MOUTH  DAILY 90 tablet 3    ramipril (ALTACE) 10 MG capsule TAKE 1 CAPSULE BY MOUTH  DAILY 90 capsule 3    tadalafil (CIALIS) 20 MG tablet Take 1 tablet (20 mg total) by mouth daily as needed for erectile dysfunction 30 tablet 1     No current facility-administered medications for this visit.       SOCIAL HISTORY:  Social History     Socioeconomic History    Marital status:      Spouse name: None    Number of children: None    Years of education: None    Highest education level: None   Occupational History    Occupation: Retired   Tobacco Use    Smoking status: Former     Current packs/day: 0.00     Average packs/day: 1 pack/day for 30.0 years  (30.0 ttl pk-yrs)     Types: Cigarettes     Start date: 1968     Quit date:      Years since quittin.8    Smokeless tobacco: Never   Vaping Use    Vaping status: Never Used   Substance and Sexual Activity    Alcohol use: Not Currently     Alcohol/week: 0.0 standard drinks of alcohol     Comment: Social    Drug use: No    Sexual activity: Yes     Partners: Female     Birth control/protection: None   Other Topics Concern    None   Social History Narrative    Always uses seat belt    No living will     Social Determinants of Health     Financial Resource Strain: Medium Risk (2023)    Overall Financial Resource Strain (CARDIA)     Difficulty of Paying Living Expenses: Somewhat hard   Food Insecurity: No Food Insecurity (2023)    Hunger Vital Sign     Worried About Running Out of Food in the Last Year: Never true     Ran Out of Food in the Last Year: Never true   Transportation Needs: No Transportation Needs (2023)    PRAPARE - Transportation     Lack of Transportation (Medical): No     Lack of Transportation (Non-Medical): No   Recent Concern: Transportation Needs - Unmet Transportation Needs (2023)    PRAPARE - Transportation     Lack of Transportation (Medical): Yes     Lack of Transportation (Non-Medical): Yes   Physical Activity: Not on file   Stress: Not on file   Social Connections: Not on file   Intimate Partner Violence: Not At Risk (3/8/2023)    Received from [facility]    Humiliation, Afraid, Rape, and Kick questionnaire     Fear of Current or Ex-Partner: No     Emotionally Abused: No     Physically Abused: No     Sexually Abused: No   Housing Stability: Low Risk  (2023)    Housing Stability Vital Sign     Unable to Pay for Housing in the Last Year: No     Number of Places Lived in the Last Year: 1     Unstable Housing in the Last Year: No        REVIEW OF SYSTEMS:  GENERAL: NAD, afebrile  HEART: No chest pain, or palpitation  RESPIRATORY:  No acute  SOB or cough  GI: No Nausea, vomit or diarrhea  NEUROLOGIC: No syncope or acute weakness    PHYSICAL EXAM:  VASCULAR EXAM  Dorsalis pedis  +1, Posterior tibial artery  absent.  The patient has class findings with skin atrophy, lack of digital hair, and nail dystrophy.  There is mild lower extremity edema bilaterally.  Venous stasis skin changes noted BLE.     NEUROLOGIC EXAM  AAO X 3.  Sensation is intact to light touch.  No focal neurologic deficit.         DERMATOLOGIC EXAM:   No ulcer.  No cellulitis noted.  The patient has hypertrophic toenails with discoloration, onycholysis, and subungal debris.      MUSCULOSKELETAL EXAM:   No acute joint pain.  No acute joint edema, or redness.  No acute musculoskeletal problem.  Patient has deformity including bunion and hammertoe.

## 2024-04-30 ENCOUNTER — CONSULT (OUTPATIENT)
Dept: VASCULAR SURGERY | Facility: CLINIC | Age: 74
End: 2024-04-30
Payer: MEDICARE

## 2024-04-30 VITALS
DIASTOLIC BLOOD PRESSURE: 74 MMHG | HEART RATE: 71 BPM | OXYGEN SATURATION: 97 % | TEMPERATURE: 97.4 F | SYSTOLIC BLOOD PRESSURE: 132 MMHG | HEIGHT: 71 IN | WEIGHT: 223.2 LBS | BODY MASS INDEX: 31.25 KG/M2

## 2024-04-30 DIAGNOSIS — I77.1 CELIAC ARTERY STENOSIS (HCC): ICD-10-CM

## 2024-04-30 DIAGNOSIS — I10 BENIGN ESSENTIAL HYPERTENSION: Primary | ICD-10-CM

## 2024-04-30 DIAGNOSIS — K55.1 SUPERIOR MESENTERIC ARTERY STENOSIS (HCC): ICD-10-CM

## 2024-04-30 DIAGNOSIS — I87.2 VENOUS INSUFFICIENCY OF BOTH LOWER EXTREMITIES: ICD-10-CM

## 2024-04-30 DIAGNOSIS — E78.5 DYSLIPIDEMIA: ICD-10-CM

## 2024-04-30 DIAGNOSIS — E11.8 TYPE 2 DIABETES MELLITUS WITH COMPLICATION (HCC): ICD-10-CM

## 2024-04-30 PROBLEM — I74.3 EMBOLISM AND THROMBOSIS OF ARTERIES OF THE LOWER EXTREMITIES (HCC): Status: RESOLVED | Noted: 2022-08-15 | Resolved: 2024-04-30

## 2024-04-30 PROCEDURE — 99204 OFFICE O/P NEW MOD 45 MIN: CPT | Performed by: SURGERY

## 2024-04-30 RX ORDER — NITROGLYCERIN 0.4 MG/1
TABLET SUBLINGUAL
COMMUNITY

## 2024-04-30 NOTE — PATIENT INSTRUCTIONS
1) For your legs, you likely have venous insufficiency  -wear compression daily, elevate your legs, stay active, healthy weight, and apply moisturizing cream daily  -if you have worsening symptoms or wound that doesn't heal quickly, please come back to see me    2) Mesenteric stenosis  -if you start having pain with eating, please come back in for further assessment

## 2024-04-30 NOTE — PROGRESS NOTES
Assessment/Plan:    Pt is a 74 yo M w/ HTN, CAD s/p stent '09, ED, DM, HLD, obesity, s/p R TKA, mesenteric stenosis    Benign essential hypertension  -reviewed renal DU; mild R renal artery stenosis  -reviewed CT w/ contrast which shows mild R renal artery stenosis, <50% w/ calcification at origin  -I do not think this is renovascular hypertension  -well controlled today    Celiac artery stenosis (HCC)  Superior mesenteric artery stenosis (HCC)  -     Ambulatory Referral to Vascular Surgery  -reviewed renal DU which shows >70% stenosis of the celaic and SMA  -reviewed CT w/ contrast which shows stenosis at the origin of the celiac and stlightly distal to origin of the SMA; neither vessel appears >70%  -patietn is asymptomatic with no abdominal pain or pain after eating  -no need for vascular intervention; discussed warning symptoms and patient will RTC if any    Venous insufficiency of both lower extremities  -large varicosities and advanced stasis changes without wounds  -discussed medical management including compression, elevation, exercise, skin care, healthy weight  -also discussed need for intervention if symptoms worsened or wounds developed    Type 2 diabetes mellitus with complication (HCC)  -A1C: 6.4 (12/23)    Dyslipidemia  Medication  -continue ASA/statin    Subjective:      Patient ID: Jorge Bajwa is a 73 y.o. male.      New patient referred by PCP Dr. Travis Bergman for Celiac Stenosis had renal artery complete done 3/22/24. Pt is taking ASA 81 mg and Atorvastatin. Pt is a former smoker.     HPI:    Patient referred for mesenteric stenosis.    Patient was having refractory HTN and underwent renal DU.  THis had incidental finding of celiac and SMA stenosis.    BP is better controlled now.    Denies abdominal pain, pain with eating.  Has had some intentional weight loss through diet.    Hx of diverticulitis requiring partial colectomy and ostomy, s/p reversal.    Notes BLE varicosities since his teens.  " No wounds.  Wore compression in the past but not recently.  Has family hx of venous disease in his mother who had stripping in her 30s.    He is s/p coronary stent in '09    Former smoker, quit '00s.    Takes ASA and statin.        The following portions of the patient's history were reviewed and updated as appropriate: allergies, current medications, past family history, past medical history, past social history, past surgical history, and problem list.    Review of Systems   Constitutional: Negative.    HENT: Negative.     Eyes: Negative.    Respiratory: Negative.     Cardiovascular: Negative.    Gastrointestinal: Negative.    Endocrine: Negative.    Genitourinary: Negative.    Musculoskeletal:  Positive for arthralgias.   Skin: Negative.    Allergic/Immunologic: Negative.    Neurological: Negative.    Hematological:  Bruises/bleeds easily.   Psychiatric/Behavioral: Negative.           Objective:      /74 (BP Location: Left arm, Patient Position: Sitting, Cuff Size: Standard)   Pulse 71   Temp (!) 97.4 °F (36.3 °C) (Temporal)   Ht 5' 11\" (1.803 m)   Wt 101 kg (223 lb 3.2 oz)   SpO2 97%   BMI 31.13 kg/m²          Physical Exam  Cardiovascular:      Rate and Rhythm: Normal rate and regular rhythm.      Pulses:           Radial pulses are 2+ on the right side and 2+ on the left side.        Dorsalis pedis pulses are 2+ on the right side and 2+ on the left side.      Heart sounds: No murmur heard.     Comments: No carotid bruits B  Pulmonary:      Effort: No respiratory distress.      Breath sounds: No wheezing or rales.   Musculoskeletal:      Right lower leg: Edema present.      Left lower leg: Edema present.   Skin:     Comments: There are large varicosities on BLE, mainly on the legs and one on the L medial thigh; there are moderate to advanced stasis changes of the legs, ankles, feet with darkening, hair loss, thickening; no wounds           I have reviewed and made appropriate changes to the review " "of systems input by the medical assistant.    Vitals:    04/30/24 1441   BP: 132/74   BP Location: Left arm   Patient Position: Sitting   Cuff Size: Standard   Pulse: 71   Temp: (!) 97.4 °F (36.3 °C)   TempSrc: Temporal   SpO2: 97%   Weight: 101 kg (223 lb 3.2 oz)   Height: 5' 11\" (1.803 m)       Patient Active Problem List   Diagnosis   • Benign essential hypertension   • Coronary artery disease of native artery of native heart with stable angina pectoris (HCC)   • Type 2 diabetes mellitus with complication (HCC)   • Dyslipidemia   • Class 2 severe obesity due to excess calories with serious comorbidity in adult (HCC)   • Sciatica   • Presence of drug-eluting stent in right coronary artery   • Stroke-like symptoms   • Hypokalemia   • Alcoholic intoxication without complication (HCC)   • Fall at home, initial encounter   • Combined arterial insufficiency and corporo-venous occlusive erectile dysfunction   • Venous insufficiency of both lower extremities       Past Surgical History:   Procedure Laterality Date   • COLON SURGERY     • COLONOSCOPY     • CORONARY ANGIOPLASTY WITH STENT PLACEMENT  2009    PTCA/RITA to RCA   • FRACTURE SURGERY  05/04/2015    Closed treatment of fracture of metacarpal bone, right 5t metacarpal fracture Dr. Claire   • HERNIA REPAIR     • KNEE ARTHROSCOPY W/ MENISCAL REPAIR      Lateral meniscus   • LAPAROSCOPY  05/18/2015    Exploratory, extensive lysis of adhesions Dr. Pérez   • OTHER SURGICAL HISTORY      Surgical lysis of intestinal adhesions   • RECTAL SURGERY     • REVISION COLOSTOMY     • THORACENTESIS  06/02/2015    with imaging guidance left, removed 1300cc of fluid w/o problem lower base of lun06   • TONSILLECTOMY AND ADENOIDECTOMY         Family History   Problem Relation Age of Onset   • Coronary artery disease Mother    • Heart disease Father         Benign hypertensive   • Hyperlipidemia Father        Social History     Socioeconomic History   • Marital status:      " Spouse name: Not on file   • Number of children: Not on file   • Years of education: Not on file   • Highest education level: Not on file   Occupational History   • Occupation: Retired   Tobacco Use   • Smoking status: Former     Current packs/day: 0.00     Average packs/day: 1 pack/day for 30.0 years (30.0 ttl pk-yrs)     Types: Cigarettes     Start date: 1968     Quit date:      Years since quittin.9   • Smokeless tobacco: Never   Vaping Use   • Vaping status: Never Used   Substance and Sexual Activity   • Alcohol use: Not Currently     Alcohol/week: 0.0 standard drinks of alcohol     Comment: Social   • Drug use: No   • Sexual activity: Yes     Partners: Female     Birth control/protection: None   Other Topics Concern   • Not on file   Social History Narrative    Always uses seat belt    No living will     Social Determinants of Health     Financial Resource Strain: Medium Risk (2023)    Overall Financial Resource Strain (CARDIA)    • Difficulty of Paying Living Expenses: Somewhat hard   Food Insecurity: No Food Insecurity (2023)    Hunger Vital Sign    • Worried About Running Out of Food in the Last Year: Never true    • Ran Out of Food in the Last Year: Never true   Transportation Needs: No Transportation Needs (2023)    PRAPARE - Transportation    • Lack of Transportation (Medical): No    • Lack of Transportation (Non-Medical): No   Recent Concern: Transportation Needs - Unmet Transportation Needs (2023)    PRAPARE - Transportation    • Lack of Transportation (Medical): Yes    • Lack of Transportation (Non-Medical): Yes   Physical Activity: Not on file   Stress: Not on file   Social Connections: Not on file   Intimate Partner Violence: Not At Risk (3/8/2023)    Received from [facility]    Humiliation, Afraid, Rape, and Kick questionnaire    • Fear of Current or Ex-Partner: No    • Emotionally Abused: No    • Physically Abused: No    • Sexually Abused: No    Housing Stability: Low Risk  (12/18/2023)    Housing Stability Vital Sign    • Unable to Pay for Housing in the Last Year: No    • Number of Places Lived in the Last Year: 1    • Unstable Housing in the Last Year: No       No Known Allergies      Current Outpatient Medications:   •  amLODIPine (NORVASC) 5 mg tablet, Take 1 tablet (5 mg total) by mouth daily, Disp: 90 tablet, Rfl: 1  •  aspirin (Aspirin 81) 81 mg EC tablet, Take 1 tablet (81 mg total) by mouth daily, Disp: 30 tablet, Rfl: 0  •  atorvastatin (LIPITOR) 40 mg tablet, TAKE 1 TABLET BY MOUTH  DAILY, Disp: 90 tablet, Rfl: 3  •  FIBER ADULT GUMMIES PO, Take 2 tablets by mouth daily, Disp: , Rfl:   •  furosemide (LASIX) 20 mg tablet, TAKE 1 TABLET BY MOUTH  DAILY, Disp: 90 tablet, Rfl: 3  •  metFORMIN (GLUCOPHAGE-XR) 500 mg 24 hr tablet, TAKE 1 TABLET BY MOUTH TWICE  DAILY WITH MEALS, Disp: 180 tablet, Rfl: 3  •  metoprolol tartrate (LOPRESSOR) 25 mg tablet, TAKE 1 TABLET BY MOUTH TWICE  DAILY, Disp: 180 tablet, Rfl: 3  •  nitroglycerin (NITROSTAT) 0.4 mg SL tablet, PLACE 1 TABLET UNDER THE TONGUE EVERY 5 MINUTES AS NEEDED FOR CHEST PAIN, Disp: , Rfl:   •  potassium chloride (Klor-Con) 10 mEq tablet, TAKE 1 TABLET BY MOUTH  DAILY, Disp: 90 tablet, Rfl: 3  •  ramipril (ALTACE) 10 MG capsule, TAKE 1 CAPSULE BY MOUTH  DAILY, Disp: 90 capsule, Rfl: 3  •  tadalafil (CIALIS) 20 MG tablet, Take 1 tablet (20 mg total) by mouth daily as needed for erectile dysfunction, Disp: 30 tablet, Rfl: 1  •  Multiple Vitamin (multivitamin) tablet, Take 1 tablet by mouth daily (Patient not taking: Reported on 4/30/2024), Disp: 30 tablet, Rfl: 0

## 2024-06-06 DIAGNOSIS — E11.9 TYPE 2 DIABETES MELLITUS WITHOUT COMPLICATION, UNSPECIFIED WHETHER LONG TERM INSULIN USE (HCC): Primary | ICD-10-CM

## 2024-06-20 ENCOUNTER — OFFICE VISIT (OUTPATIENT)
Dept: PODIATRY | Facility: CLINIC | Age: 74
End: 2024-06-20
Payer: MEDICARE

## 2024-06-20 VITALS
HEIGHT: 71 IN | DIASTOLIC BLOOD PRESSURE: 70 MMHG | HEART RATE: 55 BPM | SYSTOLIC BLOOD PRESSURE: 154 MMHG | BODY MASS INDEX: 31.22 KG/M2 | WEIGHT: 223 LBS

## 2024-06-20 DIAGNOSIS — B35.1 ONYCHOMYCOSIS: ICD-10-CM

## 2024-06-20 DIAGNOSIS — E11.40 TYPE 2 DIABETES MELLITUS WITH DIABETIC NEUROPATHY, WITHOUT LONG-TERM CURRENT USE OF INSULIN (HCC): Primary | ICD-10-CM

## 2024-06-20 PROCEDURE — 11721 DEBRIDE NAIL 6 OR MORE: CPT | Performed by: PODIATRIST

## 2024-06-20 NOTE — PROGRESS NOTES
PATIENT:  Jorge Bajwa  1950    ASSESSMENT/PLAN:  1. Type 2 diabetes mellitus with diabetic neuropathy, without long-term current use of insulin ([facility]ly [facility])        2. Onychomycosis             Disease prevention and related risk factors of diabetes were identified and discussed.  The patient was educated in proper foot wear for diabetics. Educated in daily foot assessment and routine diabetic foot care.  The patient will follow up in 10 weeks.        Procedure: All mycotic toenails were reduced and debrided in length, width, and girth using a nail nipper and dremel.  Patient tolerated procedure(s) well without complications.      HPI:  Jorge Bajwa is a 73 y.o.year old male seen for diabetic foot exam and care.  The patient has class findings with DPN.   BS is under control.  No acute pedal disorder.    PAST MEDICAL HISTORY:  Past Medical History:   Diagnosis Date    Asthma     resolved: 08/14/17    Closed fracture of fifth metacarpal bone of right hand     last assessed: 06/30/15    Deep vein thrombosis (HCC)     Diabetes mellitus (HCC)     Diverticulitis     Fracture of metacarpal shaft     Hemopneumothorax, left     last assessed: 06/02/15    Hyperlipidemia     Hypertension     Inguinal hernia     Lacunar infarction (HCC)     Approx 2000    Lumbar transverse process fracture (HCC)     Pilonidal cyst     Pleural effusion     last assessed: 07/01/15    Rib fractures     last assessed: 06/02/15    Status post fall     Superficial thrombophlebitis of leg     last assessed: 03/19/14       PAST SURGICAL HISTORY:  Past Surgical History:   Procedure Laterality Date    COLON SURGERY      COLONOSCOPY      CORONARY ANGIOPLASTY WITH STENT PLACEMENT  2009    PTCA/RITA to RCA    FRACTURE SURGERY  05/04/2015    Closed treatment of fracture of metacarpal bone, right 5t metacarpal fracture Dr. Claire    HERNIA REPAIR      KNEE ARTHROSCOPY W/ MENISCAL REPAIR      Lateral meniscus    LAPAROSCOPY  05/18/2015    Exploratory,  extensive lysis of adhesions Dr. Pérez    OTHER SURGICAL HISTORY      Surgical lysis of intestinal adhesions    RECTAL SURGERY      REVISION COLOSTOMY      THORACENTESIS  06/02/2015    with imaging guidance left, removed 1300cc of fluid w/o problem lower base of lun06    TONSILLECTOMY AND ADENOIDECTOMY          ALLERGIES:  Patient has no known allergies.    MEDICATIONS:  Current Outpatient Medications   Medication Sig Dispense Refill    amLODIPine (NORVASC) 5 mg tablet Take 1 tablet (5 mg total) by mouth daily 90 tablet 1    aspirin (Aspirin 81) 81 mg EC tablet Take 1 tablet (81 mg total) by mouth daily 30 tablet 0    atorvastatin (LIPITOR) 40 mg tablet TAKE 1 TABLET BY MOUTH  DAILY 90 tablet 3    FIBER ADULT GUMMIES PO Take 2 tablets by mouth daily      furosemide (LASIX) 20 mg tablet TAKE 1 TABLET BY MOUTH  DAILY 90 tablet 3    metFORMIN (GLUCOPHAGE-XR) 500 mg 24 hr tablet TAKE 1 TABLET BY MOUTH TWICE  DAILY WITH MEALS 180 tablet 3    metoprolol tartrate (LOPRESSOR) 25 mg tablet TAKE 1 TABLET BY MOUTH TWICE  DAILY 180 tablet 3    nitroglycerin (NITROSTAT) 0.4 mg SL tablet PLACE 1 TABLET UNDER THE TONGUE EVERY 5 MINUTES AS NEEDED FOR CHEST PAIN      potassium chloride (Klor-Con) 10 mEq tablet TAKE 1 TABLET BY MOUTH  DAILY 90 tablet 3    ramipril (ALTACE) 10 MG capsule TAKE 1 CAPSULE BY MOUTH  DAILY 90 capsule 3    tadalafil (CIALIS) 20 MG tablet Take 1 tablet (20 mg total) by mouth daily as needed for erectile dysfunction 30 tablet 1    Multiple Vitamin (multivitamin) tablet Take 1 tablet by mouth daily (Patient not taking: Reported on 4/30/2024) 30 tablet 0     No current facility-administered medications for this visit.       SOCIAL HISTORY:  Social History     Socioeconomic History    Marital status:      Spouse name: None    Number of children: None    Years of education: None    Highest education level: None   Occupational History    Occupation: Retired   Tobacco Use    Smoking status: Former      Current packs/day: 0.00     Average packs/day: 1 pack/day for 30.0 years (30.0 ttl pk-yrs)     Types: Cigarettes     Start date: 1968     Quit date:      Years since quittin.0    Smokeless tobacco: Never   Vaping Use    Vaping status: Never Used   Substance and Sexual Activity    Alcohol use: Not Currently     Alcohol/week: 0.0 standard drinks of alcohol     Comment: Social    Drug use: No    Sexual activity: Yes     Partners: Female     Birth control/protection: None   Other Topics Concern    None   Social History Narrative    Always uses seat belt    No living will     Social Determinants of Health     Financial Resource Strain: Medium Risk (2023)    Overall Financial Resource Strain (CARDIA)     Difficulty of Paying Living Expenses: Somewhat hard   Food Insecurity: No Food Insecurity (2023)    Hunger Vital Sign     Worried About Running Out of Food in the Last Year: Never true     Ran Out of Food in the Last Year: Never true   Transportation Needs: No Transportation Needs (2023)    PRAPARE - Transportation     Lack of Transportation (Medical): No     Lack of Transportation (Non-Medical): No   Recent Concern: Transportation Needs - Unmet Transportation Needs (2023)    PRAPARE - Transportation     Lack of Transportation (Medical): Yes     Lack of Transportation (Non-Medical): Yes   Physical Activity: Not on file   Stress: Not on file   Social Connections: Not on file   Intimate Partner Violence: Not At Risk (3/8/2023)    [facility]    Humiliation, Afraid, Rape, and Kick questionnaire     Fear of Current or Ex-Partner: No     Emotionally Abused: No     Physically Abused: No     Sexually Abused: No   Housing Stability: Low Risk  (2023)    Housing Stability Vital Sign     Unable to Pay for Housing in the Last Year: No     Number of Times Moved in the Last Year: 1     Homeless in the Last Year: No        REVIEW OF  SYSTEMS:  GENERAL: NAD, afebrile  HEART: No chest pain, or palpitation  RESPIRATORY:  No acute SOB or cough  GI: No Nausea, vomit or diarrhea  NEUROLOGIC: No syncope or acute weakness    PHYSICAL EXAM:  VASCULAR EXAM  Dorsalis pedis  +1, Posterior tibial artery  absent.  The patient has class findings with skin atrophy, lack of digital hair, and nail dystrophy.  There is mild lower extremity edema bilaterally.  Venous stasis skin changes noted BLE.     NEUROLOGIC EXAM  AAO X 3.  Sensation is intact to light touch.  No focal neurologic deficit.         DERMATOLOGIC EXAM:   No ulcer.  No cellulitis noted.  The patient has hypertrophic toenails with discoloration, onycholysis, and subungal debris.      MUSCULOSKELETAL EXAM:   No acute joint pain.  No acute joint edema, or redness.  No acute musculoskeletal problem.  Patient has deformity including bunion and hammertoe.

## 2024-06-28 ENCOUNTER — RA CDI HCC (OUTPATIENT)
Dept: OTHER | Facility: HOSPITAL | Age: 74
End: 2024-06-28
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
## 2024-07-02 ENCOUNTER — LAB (OUTPATIENT)
Dept: LAB | Facility: HOSPITAL | Age: 74
End: 2024-07-02
Payer: MEDICARE

## 2024-07-02 DIAGNOSIS — E11.9 TYPE 2 DIABETES MELLITUS WITHOUT COMPLICATION, UNSPECIFIED WHETHER LONG TERM INSULIN USE (HCC): ICD-10-CM

## 2024-07-02 LAB
ALBUMIN SERPL BCG-MCNC: 4 G/DL (ref 3.5–5)
ALP SERPL-CCNC: 79 U/L (ref 34–104)
ALT SERPL W P-5'-P-CCNC: 9 U/L (ref 7–52)
ANION GAP SERPL CALCULATED.3IONS-SCNC: 8 MMOL/L (ref 4–13)
AST SERPL W P-5'-P-CCNC: 12 U/L (ref 13–39)
BILIRUB SERPL-MCNC: 1.32 MG/DL (ref 0.2–1)
BUN SERPL-MCNC: 12 MG/DL (ref 5–25)
CALCIUM SERPL-MCNC: 9.1 MG/DL (ref 8.4–10.2)
CHLORIDE SERPL-SCNC: 104 MMOL/L (ref 96–108)
CHOLEST SERPL-MCNC: 114 MG/DL
CO2 SERPL-SCNC: 28 MMOL/L (ref 21–32)
CREAT SERPL-MCNC: 0.78 MG/DL (ref 0.6–1.3)
CREAT UR-MCNC: 64.7 MG/DL
EST. AVERAGE GLUCOSE BLD GHB EST-MCNC: 123 MG/DL
GFR SERPL CREATININE-BSD FRML MDRD: 90 ML/MIN/1.73SQ M
GLUCOSE P FAST SERPL-MCNC: 129 MG/DL (ref 65–99)
HBA1C MFR BLD: 5.9 %
HDLC SERPL-MCNC: 49 MG/DL
LDLC SERPL CALC-MCNC: 52 MG/DL (ref 0–100)
MICROALBUMIN UR-MCNC: <7 MG/L
POTASSIUM SERPL-SCNC: 4 MMOL/L (ref 3.5–5.3)
PROT SERPL-MCNC: 6.2 G/DL (ref 6.4–8.4)
SODIUM SERPL-SCNC: 140 MMOL/L (ref 135–147)
TRIGL SERPL-MCNC: 67 MG/DL

## 2024-07-02 PROCEDURE — 80061 LIPID PANEL: CPT

## 2024-07-02 PROCEDURE — 83036 HEMOGLOBIN GLYCOSYLATED A1C: CPT

## 2024-07-02 PROCEDURE — 80053 COMPREHEN METABOLIC PANEL: CPT

## 2024-07-02 PROCEDURE — 82043 UR ALBUMIN QUANTITATIVE: CPT

## 2024-07-02 PROCEDURE — 36415 COLL VENOUS BLD VENIPUNCTURE: CPT

## 2024-07-02 PROCEDURE — 82570 ASSAY OF URINE CREATININE: CPT

## 2024-07-08 ENCOUNTER — OFFICE VISIT (OUTPATIENT)
Dept: FAMILY MEDICINE CLINIC | Facility: CLINIC | Age: 74
End: 2024-07-08
Payer: MEDICARE

## 2024-07-08 VITALS
HEART RATE: 50 BPM | TEMPERATURE: 97.8 F | WEIGHT: 210 LBS | SYSTOLIC BLOOD PRESSURE: 158 MMHG | HEIGHT: 71 IN | DIASTOLIC BLOOD PRESSURE: 80 MMHG | OXYGEN SATURATION: 98 % | BODY MASS INDEX: 29.4 KG/M2

## 2024-07-08 DIAGNOSIS — I10 BENIGN ESSENTIAL HYPERTENSION: ICD-10-CM

## 2024-07-08 DIAGNOSIS — E78.2 MIXED HYPERLIPIDEMIA: ICD-10-CM

## 2024-07-08 DIAGNOSIS — E11.8 TYPE 2 DIABETES MELLITUS WITH COMPLICATION (HCC): Primary | ICD-10-CM

## 2024-07-08 DIAGNOSIS — E11.9 CONTROLLED TYPE 2 DIABETES MELLITUS WITHOUT COMPLICATION, WITHOUT LONG-TERM CURRENT USE OF INSULIN (HCC): ICD-10-CM

## 2024-07-08 DIAGNOSIS — E78.5 DYSLIPIDEMIA: ICD-10-CM

## 2024-07-08 DIAGNOSIS — I10 ESSENTIAL HYPERTENSION: ICD-10-CM

## 2024-07-08 DIAGNOSIS — N52.9 ERECTILE DYSFUNCTION, UNSPECIFIED ERECTILE DYSFUNCTION TYPE: ICD-10-CM

## 2024-07-08 PROBLEM — E87.6 HYPOKALEMIA: Status: RESOLVED | Noted: 2023-12-17 | Resolved: 2024-07-08

## 2024-07-08 PROCEDURE — G2211 COMPLEX E/M VISIT ADD ON: HCPCS | Performed by: FAMILY MEDICINE

## 2024-07-08 PROCEDURE — 99214 OFFICE O/P EST MOD 30 MIN: CPT | Performed by: FAMILY MEDICINE

## 2024-07-08 RX ORDER — TADALAFIL 20 MG/1
20 TABLET ORAL DAILY PRN
Qty: 30 TABLET | Refills: 1 | Status: SHIPPED | OUTPATIENT
Start: 2024-07-08

## 2024-07-08 RX ORDER — RAMIPRIL 10 MG/1
10 CAPSULE ORAL DAILY
Qty: 90 CAPSULE | Refills: 3 | Status: SHIPPED | OUTPATIENT
Start: 2024-07-08

## 2024-07-08 RX ORDER — METFORMIN HYDROCHLORIDE 500 MG/1
500 TABLET, EXTENDED RELEASE ORAL 2 TIMES DAILY WITH MEALS
Qty: 180 TABLET | Refills: 3 | Status: SHIPPED | OUTPATIENT
Start: 2024-07-08

## 2024-07-08 RX ORDER — ATORVASTATIN CALCIUM 40 MG/1
40 TABLET, FILM COATED ORAL DAILY
Qty: 90 TABLET | Refills: 3 | Status: SHIPPED | OUTPATIENT
Start: 2024-07-08

## 2024-07-08 RX ORDER — AMLODIPINE BESYLATE 5 MG/1
5 TABLET ORAL DAILY
Qty: 90 TABLET | Refills: 3 | Status: SHIPPED | OUTPATIENT
Start: 2024-07-08

## 2024-07-08 RX ORDER — FUROSEMIDE 20 MG/1
20 TABLET ORAL DAILY
Qty: 90 TABLET | Refills: 3 | Status: SHIPPED | OUTPATIENT
Start: 2024-07-08

## 2024-07-08 RX ORDER — POTASSIUM CHLORIDE 750 MG/1
10 TABLET, FILM COATED, EXTENDED RELEASE ORAL DAILY
Qty: 90 TABLET | Refills: 3 | Status: SHIPPED | OUTPATIENT
Start: 2024-07-08

## 2024-07-08 NOTE — PROGRESS NOTES
Ambulatory Visit  Name: Jorge Bajwa      : 1950      MRN: [id number]  Encounter Provider: Travis Bergman DO  Encounter Date: 2024   Encounter department: [facility]    Assessment & Plan   1. Type 2 diabetes mellitus with complication (HCC)  2. Benign essential hypertension  3. Dyslipidemia       History of Present Illness     Mr. Bajwa here for a follow-up visit his BMI is down to 29.29 we can remove obesity from his working diagnoses seems to be doing pretty well labs that were done on  are excellent his A1c was 5.9 he is working on redoing a laundry room right now and has an upcoming eye exam with the [facility] and will get a diabetic eye exam done at that visit        Review of Systems   Constitutional:  Negative for activity change, appetite change, diaphoresis, fatigue and fever.   HENT: Negative.  Negative for dental problem and hearing loss.    Eyes: Negative.  Negative for visual disturbance.   Respiratory:  Negative for apnea, cough, chest tightness, shortness of breath and wheezing.    Cardiovascular:  Negative for chest pain, palpitations and leg swelling.   Gastrointestinal:  Negative for abdominal distention, abdominal pain, anal bleeding, constipation, diarrhea, nausea and vomiting.   Endocrine: Negative for cold intolerance, heat intolerance, polydipsia, polyphagia and polyuria.   Genitourinary:  Negative for difficulty urinating, dysuria, flank pain, hematuria and urgency.   Musculoskeletal:  Negative for arthralgias, back pain, gait problem, joint swelling and myalgias.   Skin:  Negative for color change, rash and wound.   Allergic/Immunologic: Negative for environmental allergies, food allergies and immunocompromised state.   Neurological:  Negative for dizziness, seizures, syncope, speech difficulty, numbness and headaches.   Hematological:  Negative for adenopathy. Does not bruise/bleed easily.   Psychiatric/Behavioral:  Negative for agitation, behavioral  "problems, hallucinations, sleep disturbance and suicidal ideas.        Objective     /80 (BP Location: Left arm, Patient Position: Sitting, Cuff Size: Standard)   Pulse (!) 50   Temp 97.8 °F (36.6 °C) (Temporal)   Ht 5' 11\" (1.803 m)   Wt 95.3 kg (210 lb)   SpO2 98%   BMI 29.29 kg/m²     Physical Exam  Constitutional:       Appearance: He is well-developed.   HENT:      Head: Normocephalic and atraumatic.      Right Ear: External ear normal.      Left Ear: External ear normal.      Nose: Nose normal.   Eyes:      Conjunctiva/sclera: Conjunctivae normal.      Pupils: Pupils are equal, round, and reactive to light.   Cardiovascular:      Rate and Rhythm: Normal rate and regular rhythm.      Heart sounds: Normal heart sounds. No murmur heard.     No friction rub.   Pulmonary:      Effort: Pulmonary effort is normal. No respiratory distress.      Breath sounds: Normal breath sounds. No wheezing or rales.   Chest:      Chest wall: No tenderness.   Abdominal:      General: Bowel sounds are normal.      Palpations: Abdomen is soft.   Musculoskeletal:         General: Normal range of motion.      Cervical back: Normal range of motion and neck supple.   Skin:     General: Skin is warm and dry.      Capillary Refill: Capillary refill takes 2 to 3 seconds.   Neurological:      Mental Status: He is alert and oriented to person, place, and time.   Psychiatric:         Behavior: Behavior normal.         Thought Content: Thought content normal.         Judgment: Judgment normal.       Administrative Statements           "

## 2024-07-09 ENCOUNTER — OFFICE VISIT (OUTPATIENT)
Dept: UROLOGY | Facility: CLINIC | Age: 74
End: 2024-07-09
Payer: MEDICARE

## 2024-07-09 VITALS
HEART RATE: 51 BPM | WEIGHT: 210.2 LBS | SYSTOLIC BLOOD PRESSURE: 130 MMHG | OXYGEN SATURATION: 98 % | BODY MASS INDEX: 29.43 KG/M2 | DIASTOLIC BLOOD PRESSURE: 66 MMHG | HEIGHT: 71 IN

## 2024-07-09 DIAGNOSIS — N52.9 ERECTILE DYSFUNCTION, UNSPECIFIED ERECTILE DYSFUNCTION TYPE: Primary | ICD-10-CM

## 2024-07-09 PROCEDURE — 99213 OFFICE O/P EST LOW 20 MIN: CPT

## 2024-07-09 NOTE — PROGRESS NOTES
7/9/2024    Chief Complaint   Patient presents with    Follow-up     3 month follow-up: ED was started on Cialis 20 mg PRN states not working.       Assessment and Plan    73 y.o. male manage by    Erectile dysfunction  Failed oral therapy with both Viagra and Cialis  We discussed alternative options to try Trimix or schedule him for a consultation for possible penile prosthetic implant.  He wishes to try Trimix and we will schedule him for a visit in the near future for proper teaching.  He was provided a handout today.      Interval HPI:.    He presents today reporting no change in his erections with switching to Cialis 20 mg on demand.  He is able to obtain a erection of about 70 to 80% however is not able to maintain his long enough for intercourse.  He did try using a penile ring as suggested but reports no change.        History of Present Illness  Jorge Bajwa is a 73 y.o. male here for follow-up evaluation of erectile dysfunction.    Recently established patient initially seen by me on 4/4/2024 for evaluation of erectile dysfunction.  This has been ongoing for about 9 years.  Given tried on sildenafil over the past 5 to 6 months without any effect.  When he takes sildenafil he is able to obtain an erection of about 75 to 80% erect but is not able to maintain erection for penetration.  I recommended he try Cialis 20 mg as needed and was instructed to start with half a tablet or 10 mg to ensure he is able to tolerate without side effects.  I also recommended a penile ring as this should help with his ability to maintain erection.            Review of Systems   Constitutional:  Negative for chills and fever.   HENT:  Negative for congestion and sore throat.    Respiratory:  Negative for cough and shortness of breath.    Cardiovascular:  Negative for chest pain and leg swelling.   Gastrointestinal:  Negative for abdominal pain, constipation and diarrhea.   Genitourinary:  Negative for difficulty urinating,  "dysuria, frequency, hematuria and urgency.   Musculoskeletal:  Negative for back pain and gait problem.   Skin:  Negative for wound.   Allergic/Immunologic: Negative for immunocompromised state.   Hematological:  Does not bruise/bleed easily.           AUA SYMPTOM SCORE      Flowsheet Row Most Recent Value   AUA SYMPTOM SCORE    How often have you had a sensation of not emptying your bladder completely after you finished urinating? 0 (P)     How often have you had to urinate again less than two hours after you finished urinating? 1 (P)     How often have you found you stopped and started again several times when you urinate? 0 (P)     How often have you found it difficult to postpone urination? 0 (P)     How often have you had a weak urinary stream? 0 (P)     How often have you had to push or strain to begin urination? 0 (P)     How many times did you most typically get up to urinate from the time you went to bed at night until the time you got up in the morning? 1 (P)     Quality of Life: If you were to spend the rest of your life with your urinary condition just the way it is now, how would you feel about that? 1 (P)     AUA SYMPTOM SCORE 2 (P)              Vitals  Vitals:    07/09/24 0943   BP: 130/66   Pulse: (!) 51   SpO2: 98%   Weight: 95.3 kg (210 lb 3.2 oz)   Height: 5' 11\" (1.803 m)       Physical Exam  Vitals reviewed.   Constitutional:       General: He is not in acute distress.     Appearance: Normal appearance. He is not ill-appearing or toxic-appearing.   HENT:      Head: Normocephalic and atraumatic.   Eyes:      General: No scleral icterus.     Conjunctiva/sclera: Conjunctivae normal.   Cardiovascular:      Rate and Rhythm: Normal rate.   Pulmonary:      Effort: Pulmonary effort is normal. No respiratory distress.   Abdominal:      Tenderness: There is no right CVA tenderness or left CVA tenderness.      Hernia: No hernia is present.   Musculoskeletal:      Cervical back: Normal range of motion.      " Right lower leg: No edema.      Left lower leg: No edema.   Skin:     General: Skin is warm and dry.      Coloration: Skin is not jaundiced or pale.   Neurological:      General: No focal deficit present.      Mental Status: He is alert and oriented to person, place, and time. Mental status is at baseline.      Gait: Gait normal.   Psychiatric:         Mood and Affect: Mood normal.         Behavior: Behavior normal.         Thought Content: Thought content normal.         Judgment: Judgment normal.         Past History  Past Medical History:   Diagnosis Date    Asthma     resolved: 19    Closed fracture of fifth metacarpal bone of right hand     last assessed: 06/30/15    Deep vein thrombosis (HCC)     Diabetes mellitus (HCC)     Diverticulitis     Fall at home, initial encounter 2023    Fracture of metacarpal shaft     Hemopneumothorax, left     last assessed: 06/02/15    Hyperlipidemia     Hypertension     Inguinal hernia     Lacunar infarction (HCC)     Approx     Lumbar transverse process fracture (HCC)     Pilonidal cyst     Pleural effusion     last assessed: 07/01/15    Rib fractures     last assessed: 06/02/15    Status post fall     Superficial thrombophlebitis of leg     last assessed: 14     Social History     Socioeconomic History    Marital status:      Spouse name: None    Number of children: None    Years of education: None    Highest education level: None   Occupational History    Occupation: Retired   Tobacco Use    Smoking status: Former     Current packs/day: 0.00     Average packs/day: 1 pack/day for 30.0 years (30.0 ttl pk-yrs)     Types: Cigarettes     Start date: 1968     Quit date:      Years since quittin.1    Smokeless tobacco: Never   Vaping Use    Vaping status: Never Used   Substance and Sexual Activity    Alcohol use: Not Currently     Alcohol/week: 0.0 standard drinks of alcohol     Comment: Social    Drug use: No    Sexual activity: Yes      Partners: Female     Birth control/protection: None   Other Topics Concern    None   Social History Narrative    Always uses seat belt    No living will     Social Determinants of Health     Financial Resource Strain: Medium Risk (2023)    Overall Financial Resource Strain (CARDIA)     Difficulty of Paying Living Expenses: Somewhat hard   Food Insecurity: No Food Insecurity (2023)    Hunger Vital Sign     Worried About Running Out of Food in the Last Year: Never true     Ran Out of Food in the Last Year: Never true   Transportation Needs: No Transportation Needs (2023)    PRAPARE - Transportation     Lack of Transportation (Medical): No     Lack of Transportation (Non-Medical): No   Recent Concern: Transportation Needs - Unmet Transportation Needs (2023)    PRAPARE - Transportation     Lack of Transportation (Medical): Yes     Lack of Transportation (Non-Medical): Yes   Physical Activity: Not on file   Stress: Not on file   Social Connections: Not on file   Intimate Partner Violence: Not At Risk (3/8/2023)    [facility]    Humiliation, Afraid, Rape, and Kick questionnaire     Fear of Current or Ex-Partner: No     Emotionally Abused: No     Physically Abused: No     Sexually Abused: No   Housing Stability: Low Risk  (2023)    Housing Stability Vital Sign     Unable to Pay for Housing in the Last Year: No     Number of Times Moved in the Last Year: 1     Homeless in the Last Year: No     Social History     Tobacco Use   Smoking Status Former    Current packs/day: 0.00    Average packs/day: 1 pack/day for 30.0 years (30.0 ttl pk-yrs)    Types: Cigarettes    Start date: 1968    Quit date:     Years since quittin.1   Smokeless Tobacco Never     Family History   Problem Relation Age of Onset    Coronary artery disease Mother     Heart disease Father         Benign hypertensive    Hyperlipidemia Father        The  following portions of the patient's history were reviewed and updated as appropriate allergies, current medications, past medical history, past social history, past surgical history and problem list    Imaging:    Results  No results found for this or any previous visit (from the past 1 hour(s)).]  Lab Results   Component Value Date    PSA 1.5 09/11/2023    PSA 1.41 11/16/2013     Lab Results   Component Value Date    GLUCOSE 141 (H) 06/28/2017    CALCIUM 9.1 07/02/2024     06/28/2017    K 4.0 07/02/2024    CO2 28 07/02/2024     07/02/2024    BUN 12 07/02/2024    CREATININE 0.78 07/02/2024     Lab Results   Component Value Date    WBC 9.74 12/24/2023    HGB 13.7 12/24/2023    HCT 41.5 12/24/2023    MCV 99 (H) 12/24/2023     12/24/2023       Please Note:  Voice dictation software has been used to create this document. There may be inadvertent transcriptions errors.     TREY Mo 07/09/24

## 2024-08-13 ENCOUNTER — OFFICE VISIT (OUTPATIENT)
Dept: UROLOGY | Facility: CLINIC | Age: 74
End: 2024-08-13
Payer: MEDICARE

## 2024-08-13 VITALS
HEIGHT: 71 IN | OXYGEN SATURATION: 98 % | BODY MASS INDEX: 28.28 KG/M2 | DIASTOLIC BLOOD PRESSURE: 80 MMHG | SYSTOLIC BLOOD PRESSURE: 128 MMHG | HEART RATE: 72 BPM | WEIGHT: 202 LBS | RESPIRATION RATE: 16 BRPM

## 2024-08-13 DIAGNOSIS — N52.9 ERECTILE DYSFUNCTION, UNSPECIFIED ERECTILE DYSFUNCTION TYPE: Primary | ICD-10-CM

## 2024-08-13 PROCEDURE — 99213 OFFICE O/P EST LOW 20 MIN: CPT

## 2024-08-13 NOTE — PROGRESS NOTES
8/13/2024  Jorge Bajwa  1950  [id number]      Assessment/Plan  Erectile dysfunction  Initiation of Tri Mix [20/1/30] injections at 0.2mL dose  Follow-up 6 months    Discussion  Jorge Bajwa is a 73 y.o. being managed by myself. He was provided verbal, written, and physical demonstration of intracavernosal injection use. He was instructed on storage, disposal, and titration of the medication. He was instructed on hospital precautions for a priapism and use of home pseudoephedrine.  He is instructed not to use more than 2-3 doses per week, alternate left/right injection sites with each use to prevent scarring. He was able to demonstrate understanding of injection use. All questions were answered.    Patient with NO history of sickle cell trait/disease, thrombocytopenia, cocaine or trazodone use. Has trialed various PDE5i either without sufficient benefit or with adverse side effects for which local therapy has been recommended instead.    History of Present Illness  55 y.o. male with ED presents today for intracavernosal injection teaching.    There were no vitals taken for this visit.      Procedure    Procedure: intracavernosal injection  Indication: Erectile Rehabilitation  unknown cause  Discussed:. Proper technique and instruction for intracavernosal injection were explained to the patient. risks of injection including but not limited to pain, bleeding, infection, fibrosis, and priapism (including the potential complications of priapism) were discussed.   Procedure: The skin overlying the injection site RIGHT side was then prepped with Alcohol. 0.1 ml of medication was injected into the corpora cavernosum.   Patient Status:. the patient was closely monitored for 10 minutes and reassessed. He tolerated the procedure well. Response to intracavernosal injection was fair  Complications:. No complications noted.   Instructions:. the patient was counseled about priapism and instructed to return to the clinic  or the emergency room if his erection lasted up to 4 hours. The patient expressed understanding of the injection technique and felt able to perform self-injection.       TREY Mo

## 2024-08-29 ENCOUNTER — OFFICE VISIT (OUTPATIENT)
Dept: PODIATRY | Facility: CLINIC | Age: 74
End: 2024-08-29
Payer: MEDICARE

## 2024-08-29 VITALS
HEART RATE: 55 BPM | HEIGHT: 71 IN | BODY MASS INDEX: 26.6 KG/M2 | WEIGHT: 190 LBS | DIASTOLIC BLOOD PRESSURE: 68 MMHG | SYSTOLIC BLOOD PRESSURE: 129 MMHG

## 2024-08-29 DIAGNOSIS — B35.1 ONYCHOMYCOSIS: ICD-10-CM

## 2024-08-29 DIAGNOSIS — E11.40 TYPE 2 DIABETES MELLITUS WITH DIABETIC NEUROPATHY, WITHOUT LONG-TERM CURRENT USE OF INSULIN (HCC): Primary | ICD-10-CM

## 2024-08-29 PROCEDURE — 11721 DEBRIDE NAIL 6 OR MORE: CPT | Performed by: PODIATRIST

## 2024-08-29 NOTE — PROGRESS NOTES
PATIENT:  Jorge Bajwa  1950    ASSESSMENT/PLAN:  1. Type 2 diabetes mellitus with diabetic neuropathy, without long-term current use of insulin ([facility])        2. Onychomycosis               Disease prevention and related risk factors of diabetes were identified and discussed.  The patient was educated in proper foot wear for diabetics. Educated in daily foot assessment and routine diabetic foot care.  The patient will follow up in 10 weeks.        Procedure: All mycotic toenails were reduced and debrided in length, width, and girth using a nail nipper and dremel.  Patient tolerated procedure(s) well without complications.      HPI:  Jorge Bajwa is a 73 y.o.year old male seen for diabetic foot exam and care.  The patient has class findings with DPN.   BS is under control.  No acute pedal disorder or injury.    PAST MEDICAL HISTORY:  Past Medical History:   Diagnosis Date    Asthma     resolved: 08/14/17    Closed fracture of fifth metacarpal bone of right hand     last assessed: 06/30/15    Deep vein thrombosis (HCC)     Diabetes mellitus (HCC)     Diverticulitis     Fall at home, initial encounter 12/23/2023    Fracture of metacarpal shaft     Hemopneumothorax, left     last assessed: 06/02/15    Hyperlipidemia     Hypertension     Inguinal hernia     Lacunar infarction (HCC)     Approx 2000    Lumbar transverse process fracture (HCC)     Pilonidal cyst     Pleural effusion     last assessed: 07/01/15    Rib fractures     last assessed: 06/02/15    Status post fall     Superficial thrombophlebitis of leg     last assessed: 03/19/14       PAST SURGICAL HISTORY:  Past Surgical History:   Procedure Laterality Date    COLON SURGERY      COLONOSCOPY      CORONARY ANGIOPLASTY WITH STENT PLACEMENT  2009    PTCA/RITA to RCA    FRACTURE SURGERY  05/04/2015    Closed treatment of fracture of metacarpal bone, right 5t metacarpal fracture Dr. Claire    HERNIA REPAIR      KNEE ARTHROSCOPY W/ MENISCAL REPAIR       Lateral meniscus    LAPAROSCOPY  05/18/2015    Exploratory, extensive lysis of adhesions Dr. Pérez    OTHER SURGICAL HISTORY      Surgical lysis of intestinal adhesions    RECTAL SURGERY      REVISION COLOSTOMY      THORACENTESIS  06/02/2015    with imaging guidance left, removed 1300cc of fluid w/o problem lower base of lun06    TONSILLECTOMY AND ADENOIDECTOMY          ALLERGIES:  Patient has no known allergies.    MEDICATIONS:  Current Outpatient Medications   Medication Sig Dispense Refill    amLODIPine (NORVASC) 5 mg tablet Take 1 tablet (5 mg total) by mouth daily 90 tablet 3    aspirin (Aspirin 81) 81 mg EC tablet Take 1 tablet (81 mg total) by mouth daily 30 tablet 0    atorvastatin (LIPITOR) 40 mg tablet Take 1 tablet (40 mg total) by mouth daily 90 tablet 3    FIBER ADULT GUMMIES PO Take 2 tablets by mouth daily      furosemide (LASIX) 20 mg tablet Take 1 tablet (20 mg total) by mouth daily 90 tablet 3    metFORMIN (GLUCOPHAGE-XR) 500 mg 24 hr tablet Take 1 tablet (500 mg total) by mouth 2 (two) times a day with meals 180 tablet 3    metoprolol tartrate (LOPRESSOR) 25 mg tablet Take 1 tablet (25 mg total) by mouth 2 (two) times a day 180 tablet 3    Multiple Vitamin (multivitamin) tablet Take 1 tablet by mouth daily 30 tablet 0    potassium chloride (Klor-Con) 10 mEq tablet Take 1 tablet (10 mEq total) by mouth daily 90 tablet 3    ramipril (ALTACE) 10 MG capsule Take 1 capsule (10 mg total) by mouth daily 90 capsule 3    nitroglycerin (NITROSTAT) 0.4 mg SL tablet PLACE 1 TABLET UNDER THE TONGUE EVERY 5 MINUTES AS NEEDED FOR CHEST PAIN (Patient not taking: Reported on 8/29/2024)      tadalafil (CIALIS) 20 MG tablet Take 1 tablet (20 mg total) by mouth daily as needed for erectile dysfunction 30 tablet 1     No current facility-administered medications for this visit.       SOCIAL HISTORY:  Social History     Socioeconomic History    Marital status:      Spouse name: Not on file    Number of  children: Not on file    Years of education: Not on file    Highest education level: Not on file   Occupational History    Occupation: Retired   Tobacco Use    Smoking status: Former     Current packs/day: 0.00     Average packs/day: 1 pack/day for 30.0 years (30.0 ttl pk-yrs)     Types: Cigarettes     Start date: 1968     Quit date:      Years since quittin.2    Smokeless tobacco: Never   Vaping Use    Vaping status: Never Used   Substance and Sexual Activity    Alcohol use: Not Currently     Alcohol/week: 0.0 standard drinks of alcohol     Comment: Social    Drug use: No    Sexual activity: Yes     Partners: Female     Birth control/protection: None   Other Topics Concern    Not on file   Social History Narrative    Always uses seat belt    No living will     Social Determinants of Health     Financial Resource Strain: Medium Risk (2023)    Overall Financial Resource Strain (CARDIA)     Difficulty of Paying Living Expenses: Somewhat hard   Food Insecurity: No Food Insecurity (2023)    Hunger Vital Sign     Worried About Running Out of Food in the Last Year: Never true     Ran Out of Food in the Last Year: Never true   Transportation Needs: No Transportation Needs (2023)    PRAPARE - Transportation     Lack of Transportation (Medical): No     Lack of Transportation (Non-Medical): No   Recent Concern: Transportation Needs - Unmet Transportation Needs (2023)    PRAPARE - Transportation     Lack of Transportation (Medical): Yes     Lack of Transportation (Non-Medical): Yes   Physical Activity: Not on file   Stress: Not on file   Social Connections: Not on file   Intimate Partner Violence: Not At Risk (3/8/2023)    Received from [facility], [facility]    Humiliation, Afraid, Rape, and Kick questionnaire     Fear of Current or Ex-Partner: No     Emotionally Abused: No     Physically Abused: No     Sexually Abused: No   Housing Stability: Low Risk   (12/18/2023)    Housing Stability Vital Sign     Unable to Pay for Housing in the Last Year: No     Number of Times Moved in the Last Year: 1     Homeless in the Last Year: No        REVIEW OF SYSTEMS:  GENERAL: NAD, afebrile  HEART: No chest pain, or palpitation  RESPIRATORY:  No acute SOB or cough  GI: No Nausea, vomit or diarrhea  NEUROLOGIC: No syncope or acute weakness    PHYSICAL EXAM:  VASCULAR EXAM  Dorsalis pedis  +1, Posterior tibial artery  absent.  The patient has class findings with skin atrophy, lack of digital hair, and nail dystrophy.  There is mild lower extremity edema bilaterally.  Venous stasis skin changes noted BLE.     NEUROLOGIC EXAM  AAO X 3.  Sensation is intact to light touch.  No focal neurologic deficit.         DERMATOLOGIC EXAM:   No ulcer.  No cellulitis noted.  The patient has hypertrophic toenails with discoloration, onycholysis, and subungal debris.      MUSCULOSKELETAL EXAM:   No acute joint pain.  No acute joint edema, or redness.  No acute musculoskeletal problem.  Patient has deformity including bunion and hammertoe.

## 2024-10-30 LAB
LEFT EYE DIABETIC RETINOPATHY: NORMAL
RIGHT EYE DIABETIC RETINOPATHY: NORMAL

## 2024-11-01 ENCOUNTER — APPOINTMENT (OUTPATIENT)
Dept: LAB | Facility: HOSPITAL | Age: 74
End: 2024-11-01
Payer: MEDICARE

## 2024-11-01 DIAGNOSIS — E11.8 TYPE 2 DIABETES MELLITUS WITH COMPLICATION (HCC): Primary | ICD-10-CM

## 2024-11-01 LAB
ANION GAP SERPL CALCULATED.3IONS-SCNC: 8 MMOL/L (ref 4–13)
BUN SERPL-MCNC: 12 MG/DL (ref 5–25)
CALCIUM SERPL-MCNC: 8.7 MG/DL (ref 8.4–10.2)
CHLORIDE SERPL-SCNC: 105 MMOL/L (ref 96–108)
CO2 SERPL-SCNC: 27 MMOL/L (ref 21–32)
CREAT SERPL-MCNC: 0.8 MG/DL (ref 0.6–1.3)
EST. AVERAGE GLUCOSE BLD GHB EST-MCNC: 111 MG/DL
GFR SERPL CREATININE-BSD FRML MDRD: 89 ML/MIN/1.73SQ M
GLUCOSE P FAST SERPL-MCNC: 119 MG/DL (ref 65–99)
HBA1C MFR BLD: 5.5 %
POTASSIUM SERPL-SCNC: 3.8 MMOL/L (ref 3.5–5.3)
SODIUM SERPL-SCNC: 140 MMOL/L (ref 135–147)

## 2024-11-01 PROCEDURE — 83036 HEMOGLOBIN GLYCOSYLATED A1C: CPT

## 2024-11-01 PROCEDURE — 80048 BASIC METABOLIC PNL TOTAL CA: CPT

## 2024-11-01 PROCEDURE — 36415 COLL VENOUS BLD VENIPUNCTURE: CPT

## 2024-11-05 ENCOUNTER — RA CDI HCC (OUTPATIENT)
Dept: OTHER | Facility: HOSPITAL | Age: 74
End: 2024-11-05

## 2024-11-08 ENCOUNTER — OFFICE VISIT (OUTPATIENT)
Dept: PODIATRY | Facility: CLINIC | Age: 74
End: 2024-11-08
Payer: MEDICARE

## 2024-11-08 VITALS — WEIGHT: 190 LBS | BODY MASS INDEX: 26.6 KG/M2 | HEIGHT: 71 IN

## 2024-11-08 DIAGNOSIS — B35.1 ONYCHOMYCOSIS: ICD-10-CM

## 2024-11-08 DIAGNOSIS — E11.40 TYPE 2 DIABETES MELLITUS WITH DIABETIC NEUROPATHY, WITHOUT LONG-TERM CURRENT USE OF INSULIN (HCC): Primary | ICD-10-CM

## 2024-11-08 PROCEDURE — 11721 DEBRIDE NAIL 6 OR MORE: CPT | Performed by: PODIATRIST

## 2024-11-08 NOTE — PROGRESS NOTES
PATIENT:  Jorge Bajwa  1950    ASSESSMENT/PLAN:  1. Type 2 diabetes mellitus with diabetic neuropathy, without long-term current use of insulin ([facility])        2. Onychomycosis             Disease prevention and related risk factors of diabetes were identified and discussed.  The patient was educated in proper foot wear for diabetics. Educated in daily foot assessment and routine diabetic foot care.  The patient will follow up in 10 weeks.        Procedure: All mycotic toenails were reduced and debrided in length, width, and girth using a nail nipper and dremel.  Patient tolerated procedure(s) well without complications.      HPI:  Jorge Bajwa is a 73 y.o.year old male seen for diabetic foot exam and care.  The patient has class findings with DPN.   BS is under control.  No acute pedal disorder or injury.    PAST MEDICAL HISTORY:  Past Medical History:   Diagnosis Date    Asthma     resolved: 08/14/17    Closed fracture of fifth metacarpal bone of right hand     last assessed: 06/30/15    Deep vein thrombosis (HCC)     Diabetes mellitus (HCC)     Diverticulitis     Fall at home, initial encounter 12/23/2023    Fracture of metacarpal shaft     Hemopneumothorax, left     last assessed: 06/02/15    Hyperlipidemia     Hypertension     Inguinal hernia     Lacunar infarction (HCC)     Approx 2000    Lumbar transverse process fracture (HCC)     Pilonidal cyst     Pleural effusion     last assessed: 07/01/15    Rib fractures     last assessed: 06/02/15    Status post fall     Superficial thrombophlebitis of leg     last assessed: 03/19/14       PAST SURGICAL HISTORY:  Past Surgical History:   Procedure Laterality Date    COLON SURGERY      COLONOSCOPY      CORONARY ANGIOPLASTY WITH STENT PLACEMENT  2009    PTCA/RITA to RCA    FRACTURE SURGERY  05/04/2015    Closed treatment of fracture of metacarpal bone, right 5t metacarpal fracture Dr. Claire    HERNIA REPAIR      KNEE ARTHROSCOPY W/ MENISCAL REPAIR       Lateral meniscus    LAPAROSCOPY  05/18/2015    Exploratory, extensive lysis of adhesions Dr. Pérez    OTHER SURGICAL HISTORY      Surgical lysis of intestinal adhesions    RECTAL SURGERY      REVISION COLOSTOMY      THORACENTESIS  06/02/2015    with imaging guidance left, removed 1300cc of fluid w/o problem lower base of lun06    TONSILLECTOMY AND ADENOIDECTOMY          ALLERGIES:  Patient has no known allergies.    MEDICATIONS:  Current Outpatient Medications   Medication Sig Dispense Refill    amLODIPine (NORVASC) 5 mg tablet Take 1 tablet (5 mg total) by mouth daily 90 tablet 3    aspirin (Aspirin 81) 81 mg EC tablet Take 1 tablet (81 mg total) by mouth daily 30 tablet 0    atorvastatin (LIPITOR) 40 mg tablet Take 1 tablet (40 mg total) by mouth daily 90 tablet 3    FIBER ADULT GUMMIES PO Take 2 tablets by mouth daily      furosemide (LASIX) 20 mg tablet Take 1 tablet (20 mg total) by mouth daily 90 tablet 3    metFORMIN (GLUCOPHAGE-XR) 500 mg 24 hr tablet Take 1 tablet (500 mg total) by mouth 2 (two) times a day with meals 180 tablet 3    metoprolol tartrate (LOPRESSOR) 25 mg tablet Take 1 tablet (25 mg total) by mouth 2 (two) times a day 180 tablet 3    Multiple Vitamin (multivitamin) tablet Take 1 tablet by mouth daily 30 tablet 0    potassium chloride (Klor-Con) 10 mEq tablet Take 1 tablet (10 mEq total) by mouth daily 90 tablet 3    ramipril (ALTACE) 10 MG capsule Take 1 capsule (10 mg total) by mouth daily 90 capsule 3    nitroglycerin (NITROSTAT) 0.4 mg SL tablet PLACE 1 TABLET UNDER THE TONGUE EVERY 5 MINUTES AS NEEDED FOR CHEST PAIN (Patient not taking: Reported on 8/29/2024)      tadalafil (CIALIS) 20 MG tablet Take 1 tablet (20 mg total) by mouth daily as needed for erectile dysfunction 30 tablet 1     No current facility-administered medications for this visit.       SOCIAL HISTORY:  Social History     Socioeconomic History    Marital status:      Spouse name: None    Number of children: None     Years of education: None    Highest education level: None   Occupational History    Occupation: Retired   Tobacco Use    Smoking status: Former     Current packs/day: 0.00     Average packs/day: 1 pack/day for 30.0 years (30.0 ttl pk-yrs)     Types: Cigarettes     Start date: 1968     Quit date:      Years since quittin.4    Smokeless tobacco: Never   Vaping Use    Vaping status: Never Used   Substance and Sexual Activity    Alcohol use: Not Currently     Alcohol/week: 0.0 standard drinks of alcohol     Comment: Social    Drug use: No    Sexual activity: Yes     Partners: Female     Birth control/protection: None   Other Topics Concern    None   Social History Narrative    Always uses seat belt    No living will     Social Determinants of Health     Financial Resource Strain: Medium Risk (2023)    Overall Financial Resource Strain (CARDIA)     Difficulty of Paying Living Expenses: Somewhat hard   Food Insecurity: No Food Insecurity (2023)    Nursing - Inadequate Food Risk Classification     Worried About Running Out of Food in the Last Year: Never true     Ran Out of Food in the Last Year: Never true     Ran Out of Food in the Last Year: Not on file   Transportation Needs: No Transportation Needs (2023)    PRAPARE - Transportation     Lack of Transportation (Medical): No     Lack of Transportation (Non-Medical): No   Recent Concern: Transportation Needs - Unmet Transportation Needs (2023)    PRAPARE - Transportation     Lack of Transportation (Medical): Yes     Lack of Transportation (Non-Medical): Yes   Physical Activity: Not on file   Stress: Not on file   Social Connections: Not on file   Intimate Partner Violence: Not At Risk (3/8/2023)    [facility]    Humiliation, Afraid, Rape, and Kick questionnaire     Fear of Current or Ex-Partner: No     Emotionally Abused: No     Physically Abused: No     Sexually Abused:  No   Housing Stability: Low Risk  (12/18/2023)    Housing Stability Vital Sign     Unable to Pay for Housing in the Last Year: No     Number of Times Moved in the Last Year: 1     Homeless in the Last Year: No        REVIEW OF SYSTEMS:  GENERAL: NAD, afebrile  HEART: No chest pain, or palpitation  RESPIRATORY:  No acute SOB or cough  GI: No Nausea, vomit or diarrhea  NEUROLOGIC: No syncope or acute weakness    PHYSICAL EXAM:  VASCULAR EXAM  Dorsalis pedis  +1, Posterior tibial artery  absent.  The patient has class findings with skin atrophy, lack of digital hair, and nail dystrophy.  There is slight lower extremity edema bilaterally.  Venous stasis skin changes noted BLE.     NEUROLOGIC EXAM  AAO X 3.  Sensation is intact to light touch.  No focal neurologic deficit.         DERMATOLOGIC EXAM:   No ulcer.  No cellulitis noted.  The patient has hypertrophic toenails with discoloration, onycholysis, and subungal debris.      MUSCULOSKELETAL EXAM:   No acute joint pain.  No acute joint edema, or redness.  No acute musculoskeletal problem.  Patient has deformity including bunion and hammertoe.

## 2024-11-12 ENCOUNTER — TELEPHONE (OUTPATIENT)
Dept: ADMINISTRATIVE | Facility: OTHER | Age: 74
End: 2024-11-12

## 2024-11-12 ENCOUNTER — OFFICE VISIT (OUTPATIENT)
Dept: FAMILY MEDICINE CLINIC | Facility: CLINIC | Age: 74
End: 2024-11-12
Payer: MEDICARE

## 2024-11-12 VITALS
TEMPERATURE: 96.4 F | WEIGHT: 188 LBS | OXYGEN SATURATION: 99 % | SYSTOLIC BLOOD PRESSURE: 142 MMHG | HEIGHT: 71 IN | DIASTOLIC BLOOD PRESSURE: 64 MMHG | HEART RATE: 46 BPM | BODY MASS INDEX: 26.32 KG/M2

## 2024-11-12 DIAGNOSIS — I10 BENIGN ESSENTIAL HYPERTENSION: ICD-10-CM

## 2024-11-12 DIAGNOSIS — Z95.5 PRESENCE OF DRUG-ELUTING STENT IN RIGHT CORONARY ARTERY: ICD-10-CM

## 2024-11-12 DIAGNOSIS — E11.8 TYPE 2 DIABETES MELLITUS WITH COMPLICATION (HCC): Primary | ICD-10-CM

## 2024-11-12 PROCEDURE — G2211 COMPLEX E/M VISIT ADD ON: HCPCS | Performed by: FAMILY MEDICINE

## 2024-11-12 PROCEDURE — 99214 OFFICE O/P EST MOD 30 MIN: CPT | Performed by: FAMILY MEDICINE

## 2024-11-12 RX ORDER — CEPHALEXIN 500 MG/1
CAPSULE ORAL
COMMUNITY
Start: 2024-09-23

## 2024-11-12 NOTE — PROGRESS NOTES
Ambulatory Visit  Name: Jorge Bajwa      : 1950      MRN: [id number]  Encounter Provider: Travis Bergman DO  Encounter Date: 2024   Encounter department: [facility]    Assessment & Plan  Type 2 diabetes mellitus with complication (HCC)    Lab Results   Component Value Date    HGBA1C 5.5 2024            Benign essential hypertension         Presence of drug-eluting stent in right coronary artery            History of Present Illness     Aydee is here today for a follow-up visit he has lost about well overall 100 pounds and looks well but has now decided that he has reached his goal weight he can fit in his [employer] uniform interestingly his blood pressure is normal but his pulse rate is 48 he is on 25 mg twice daily of metoprolol I will forward a secure chat message to Dr. Pulido asking if they want to wean back on his metoprolol          Review of Systems   Constitutional:  Negative for activity change, appetite change, diaphoresis, fatigue and fever.   HENT: Negative.  Negative for dental problem.    Eyes:  Positive for visual disturbance.        Follows with Dr. Mahmood for cataracts anticipating cataract surgery   Respiratory:  Negative for apnea, cough, chest tightness, shortness of breath and wheezing.    Cardiovascular:  Negative for chest pain, palpitations and leg swelling.        Follows with Dr. Pulido   Gastrointestinal:  Negative for abdominal distention, abdominal pain, anal bleeding, constipation, diarrhea, nausea and vomiting.        Up to date on colonoscopies   Endocrine: Negative for cold intolerance, heat intolerance, polydipsia, polyphagia and polyuria.   Genitourinary:  Negative for difficulty urinating, dysuria, flank pain, hematuria and urgency.   Musculoskeletal:  Positive for arthralgias and gait problem. Negative for back pain, joint swelling and myalgias.        Severe arthritis left knee anticipating a total knee replacement   Skin:  Negative for color change,  "rash and wound.   Allergic/Immunologic: Negative for environmental allergies, food allergies and immunocompromised state.   Neurological:  Negative for dizziness, seizures, syncope, speech difficulty, numbness and headaches.   Hematological:  Negative for adenopathy. Does not bruise/bleed easily.   Psychiatric/Behavioral:  Negative for agitation, behavioral problems, hallucinations, sleep disturbance and suicidal ideas.            Objective     /64   Pulse (!) 46   Temp (!) 96.4 °F (35.8 °C) (Temporal)   Ht 5' 11\" (1.803 m)   Wt 85.3 kg (188 lb)   SpO2 99%   BMI 26.22 kg/m²     Physical Exam  Constitutional:       Appearance: He is well-developed.   HENT:      Head: Normocephalic and atraumatic.      Right Ear: External ear normal.      Left Ear: External ear normal.      Nose: Nose normal.   Eyes:      Conjunctiva/sclera: Conjunctivae normal.      Pupils: Pupils are equal, round, and reactive to light.   Cardiovascular:      Rate and Rhythm: Normal rate and regular rhythm.      Heart sounds: Normal heart sounds. No murmur heard.     No friction rub.   Pulmonary:      Effort: Pulmonary effort is normal. No respiratory distress.      Breath sounds: Normal breath sounds. No wheezing or rales.   Chest:      Chest wall: No tenderness.   Abdominal:      General: Bowel sounds are normal.      Palpations: Abdomen is soft.   Musculoskeletal:         General: Tenderness and deformity present. Normal range of motion.      Cervical back: Normal range of motion and neck supple.   Skin:     General: Skin is warm and dry.      Capillary Refill: Capillary refill takes 2 to 3 seconds.   Neurological:      Mental Status: He is alert and oriented to person, place, and time.   Psychiatric:         Behavior: Behavior normal.         Thought Content: Thought content normal.         Judgment: Judgment normal.         "

## 2024-11-12 NOTE — TELEPHONE ENCOUNTER
----- Message from Sagrario SANDY sent at 11/12/2024  7:00 AM EST -----  11/12/24 7:01 AM    Hello, our patient Jorge Bajwa has had Diabetic Eye Exam completed/performed. Please assist in updating the patient chart by pulling the document from the Media Tab. The date of service is 10/30/2024  (put in media 11/12/24.     Thank you,  Sagrario Moncada MA  [facility]

## 2024-11-13 ENCOUNTER — RESULTS FOLLOW-UP (OUTPATIENT)
Dept: FAMILY MEDICINE CLINIC | Facility: CLINIC | Age: 74
End: 2024-11-13

## 2024-11-14 NOTE — TELEPHONE ENCOUNTER
Upon review of the In Basket request we were able to note that no further action is required. The patient chart is up to date as a result of a previous request.      Any additional questions or concerns should be emailed to the Practice Liaisons via the appropriate education email address, please do not reply via In Basket.    Thank you  Deandre Hu MA   [facility]

## 2024-12-09 ENCOUNTER — OFFICE VISIT (OUTPATIENT)
Dept: FAMILY MEDICINE CLINIC | Facility: CLINIC | Age: 74
End: 2024-12-09
Payer: MEDICARE

## 2024-12-09 VITALS
HEIGHT: 71 IN | HEART RATE: 43 BPM | OXYGEN SATURATION: 99 % | SYSTOLIC BLOOD PRESSURE: 160 MMHG | BODY MASS INDEX: 27.02 KG/M2 | TEMPERATURE: 96.4 F | WEIGHT: 193 LBS | DIASTOLIC BLOOD PRESSURE: 82 MMHG

## 2024-12-09 DIAGNOSIS — Z00.00 MEDICARE ANNUAL WELLNESS VISIT, SUBSEQUENT: Primary | ICD-10-CM

## 2024-12-09 PROCEDURE — G0439 PPPS, SUBSEQ VISIT: HCPCS | Performed by: FAMILY MEDICINE

## 2024-12-09 NOTE — PATIENT INSTRUCTIONS
Medicare Preventive Visit Patient Instructions  Thank you for completing your Welcome to Medicare Visit or Medicare Annual Wellness Visit today. Your next wellness visit will be due in one year (12/10/2025).  The screening/preventive services that you may require over the next 5-10 years are detailed below. Some tests may not apply to you based off risk factors and/or age. Screening tests ordered at today's visit but not completed yet may show as past due. Also, please note that scanned in results may not display below.  Preventive Screenings:  Service Recommendations Previous Testing/Comments   Colorectal Cancer Screening  Colonoscopy    Fecal Occult Blood Test (FOBT)/Fecal Immunochemical Test (FIT)  Fecal DNA/Cologuard Test  Flexible Sigmoidoscopy Age: 45-75 years old   Colonoscopy: every 10 years (May be performed more frequently if at higher risk)  OR  FOBT/FIT: every 1 year  OR  Cologuard: every 3 years  OR  Sigmoidoscopy: every 5 years  Screening may be recommended earlier than age 45 if at higher risk for colorectal cancer. Also, an individualized decision between you and your healthcare provider will decide whether screening between the ages of 76-85 would be appropriate. Colonoscopy: 01/19/2024  FOBT/FIT: Not on file  Cologuard: Not on file  Sigmoidoscopy: Not on file    Screening Current  Screening Current     Prostate Cancer Screening Individualized decision between patient and health care provider in men between ages of 55-69   Medicare will cover every 12 months beginning on the day after your 50th birthday PSA: 1.5 ng/mL           Hepatitis C Screening Once for adults born between 1945 and 1965  More frequently in patients at high risk for Hepatitis C Hep C Antibody: 12/05/2018    Screening Current  Screening Current   Diabetes Screening 1-2 times per year if you're at risk for diabetes or have pre-diabetes Fasting glucose: 119 mg/dL (11/1/2024)  A1C: 5.5 % (11/1/2024)  Screening Not Indicated  History  Diabetes  Screening Not Indicated  History Diabetes   Cholesterol Screening Once every 5 years if you don't have a lipid disorder. May order more often based on risk factors. Lipid panel: 07/02/2024  Screening Not Indicated  History Lipid Disorder  Screening Not Indicated  History Lipid Disorder      Other Preventive Screenings Covered by Medicare:  Abdominal Aortic Aneurysm (AAA) Screening: covered once if your at risk. You're considered to be at risk if you have a family history of AAA or a male between the age of 65-75 who smoking at least 100 cigarettes in your lifetime.  Lung Cancer Screening: covers low dose CT scan once per year if you meet all of the following conditions: (1) Age 55-77; (2) No signs or symptoms of lung cancer; (3) Current smoker or have quit smoking within the last 15 years; (4) You have a tobacco smoking history of at least 20 pack years (packs per day x number of years you smoked); (5) You get a written order from a healthcare provider.  Glaucoma Screening: covered annually if you're considered high risk: (1) You have diabetes OR (2) Family history of glaucoma OR (3)  aged 50 and older OR (4)  American aged 65 and older  Osteoporosis Screening: covered every 2 years if you meet one of the following conditions: (1) Have a vertebral abnormality; (2) On glucocorticoid therapy for more than 3 months; (3) Have primary hyperparathyroidism; (4) On osteoporosis medications and need to assess response to drug therapy.  HIV Screening: covered annually if you're between the age of 15-65. Also covered annually if you are younger than 15 and older than 65 with risk factors for HIV infection. For pregnant patients, it is covered up to 3 times per pregnancy.    Immunizations:  Immunization Recommendations   Influenza Vaccine Annual influenza vaccination during flu season is recommended for all persons aged >= 6 months who do not have contraindications   Pneumococcal Vaccine   *  Pneumococcal conjugate vaccine = PCV13 (Prevnar 13), PCV15 (Vaxneuvance), PCV20 (Prevnar 20)  * Pneumococcal polysaccharide vaccine = PPSV23 (Pneumovax) Adults 19-65 yo with certain risk factors or if 65+ yo  If never received any pneumonia vaccine: recommend Prevnar 20 (PCV20)  Give PCV20 if previously received 1 dose of PCV13 or PPSV23   Hepatitis B Vaccine 3 dose series if at intermediate or high risk (ex: diabetes, end stage renal disease, liver disease)   Respiratory syncytial virus (RSV) Vaccine - COVERED BY MEDICARE PART D  * RSVPreF3 (Arexvy) CDC recommends that adults 60 years of age and older may receive a single dose of RSV vaccine using shared clinical decision-making (SCDM)   Tetanus (Td) Vaccine - COST NOT COVERED BY MEDICARE PART B Following completion of primary series, a booster dose should be given every 10 years to maintain immunity against tetanus. Td may also be given as tetanus wound prophylaxis.   Tdap Vaccine - COST NOT COVERED BY MEDICARE PART B Recommended at least once for all adults. For pregnant patients, recommended with each pregnancy.   Shingles Vaccine (Shingrix) - COST NOT COVERED BY MEDICARE PART B  2 shot series recommended in those 19 years and older who have or will have weakened immune systems or those 50 years and older     Health Maintenance Due:      Topic Date Due   • Colorectal Cancer Screening  01/17/2029   • Hepatitis C Screening  Completed     Immunizations Due:      Topic Date Due   • Pneumococcal Vaccine: 65+ Years (3 of 3 - PPSV23 or PCV20) 10/31/2020     Advance Directives   What are advance directives?  Advance directives are legal documents that state your wishes and plans for medical care. These plans are made ahead of time in case you lose your ability to make decisions for yourself. Advance directives can apply to any medical decision, such as the treatments you want, and if you want to donate organs.   What are the types of advance directives?  There are many  types of advance directives, and each state has rules about how to use them. You may choose a combination of any of the following:  Living will:  This is a written record of the treatment you want. You can also choose which treatments you do not want, which to limit, and which to stop at a certain time. This includes surgery, medicine, IV fluid, and tube feedings.   Durable power of  for healthcare (DPAHC):  This is a written record that states who you want to make healthcare choices for you when you are unable to make them for yourself. This person, called a proxy, is usually a family member or a friend. You may choose more than 1 proxy.  Do not resuscitate (DNR) order:  A DNR order is used in case your heart stops beating or you stop breathing. It is a request not to have certain forms of treatment, such as CPR. A DNR order may be included in other types of advance directives.  Medical directive:  This covers the care that you want if you are in a coma, near death, or unable to make decisions for yourself. You can list the treatments you want for each condition. Treatment may include pain medicine, surgery, blood transfusions, dialysis, IV or tube feedings, and a ventilator (breathing machine).  Values history:  This document has questions about your views, beliefs, and how you feel and think about life. This information can help others choose the care that you would choose.  Why are advance directives important?  An advance directive helps you control your care. Although spoken wishes may be used, it is better to have your wishes written down. Spoken wishes can be misunderstood, or not followed. Treatments may be given even if you do not want them. An advance directive may make it easier for your family to make difficult choices about your care.   Weight Management   Why it is important to manage your weight:  Being overweight increases your risk of health conditions such as heart disease, high blood  pressure, type 2 diabetes, and certain types of cancer. It can also increase your risk for osteoarthritis, sleep apnea, and other respiratory problems. Aim for a slow, steady weight loss. Even a small amount of weight loss can lower your risk of health problems.  How to lose weight safely:  A safe and healthy way to lose weight is to eat fewer calories and get regular exercise. You can lose up about 1 pound a week by decreasing the number of calories you eat by 500 calories each day.   Healthy meal plan for weight management:  A healthy meal plan includes a variety of foods, contains fewer calories, and helps you stay healthy. A healthy meal plan includes the following:  Eat whole-grain foods more often.  A healthy meal plan should contain fiber. Fiber is the part of grains, fruits, and vegetables that is not broken down by your body. Whole-grain foods are healthy and provide extra fiber in your diet. Some examples of whole-grain foods are whole-wheat breads and pastas, oatmeal, brown rice, and bulgur.  Eat a variety of vegetables every day.  Include dark, leafy greens such as spinach, kale, jhonathan greens, and mustard greens. Eat yellow and orange vegetables such as carrots, sweet potatoes, and winter squash.   Eat a variety of fruits every day.  Choose fresh or canned fruit (canned in its own juice or light syrup) instead of juice. Fruit juice has very little or no fiber.  Eat low-fat dairy foods.  Drink fat-free (skim) milk or 1% milk. Eat fat-free yogurt and low-fat cottage cheese. Try low-fat cheeses such as mozzarella and other reduced-fat cheeses.  Choose meat and other protein foods that are low in fat.  Choose beans or other legumes such as split peas or lentils. Choose fish, skinless poultry (chicken or turkey), or lean cuts of red meat (beef or pork). Before you cook meat or poultry, cut off any visible fat.   Use less fat and oil.  Try baking foods instead of frying them. Add less fat, such as margarine,  sour cream, regular salad dressing and mayonnaise to foods. Eat fewer high-fat foods. Some examples of high-fat foods include french fries, doughnuts, ice cream, and cakes.  Eat fewer sweets.  Limit foods and drinks that are high in sugar. This includes candy, cookies, regular soda, and sweetened drinks.  Exercise:  Exercise at least 30 minutes per day on most days of the week. Some examples of exercise include walking, biking, dancing, and swimming. You can also fit in more physical activity by taking the stairs instead of the elevator or parking farther away from stores. Ask your healthcare provider about the best exercise plan for you.      © Copyright [employer] 2018 Information is for End User's use only and may not be sold, redistributed or otherwise used for commercial purposes. All illustrations and images included in CareNotes® are the copyrighted property of [employer] or [employer]    Medicare Preventive Visit Patient Instructions  Thank you for completing your Welcome to Medicare Visit or Medicare Annual Wellness Visit today. Your next wellness visit will be due in one year (12/10/2025).  The screening/preventive services that you may require over the next 5-10 years are detailed below. Some tests may not apply to you based off risk factors and/or age. Screening tests ordered at today's visit but not completed yet may show as past due. Also, please note that scanned in results may not display below.  Preventive Screenings:  Service Recommendations Previous Testing/Comments   Colorectal Cancer Screening  Colonoscopy    Fecal Occult Blood Test (FOBT)/Fecal Immunochemical Test (FIT)  Fecal DNA/Cologuard Test  Flexible Sigmoidoscopy Age: 45-75 years old   Colonoscopy: every 10 years (May be performed more frequently if at higher risk)  OR  FOBT/FIT: every 1 year  OR  Cologuard: every 3 years  OR  Sigmoidoscopy: every 5 years  Screening may be recommended earlier than age 45 if at higher risk  for colorectal cancer. Also, an individualized decision between you and your healthcare provider will decide whether screening between the ages of 76-85 would be appropriate. Colonoscopy: 01/19/2024  FOBT/FIT: Not on file  Cologuard: Not on file  Sigmoidoscopy: Not on file    Screening Current  Screening Current     Prostate Cancer Screening Individualized decision between patient and health care provider in men between ages of 55-69   Medicare will cover every 12 months beginning on the day after your 50th birthday PSA: 1.5 ng/mL           Hepatitis C Screening Once for adults born between 1945 and 1965  More frequently in patients at high risk for Hepatitis C Hep C Antibody: 12/05/2018    Screening Current  Screening Current   Diabetes Screening 1-2 times per year if you're at risk for diabetes or have pre-diabetes Fasting glucose: 119 mg/dL (11/1/2024)  A1C: 5.5 % (11/1/2024)  Screening Not Indicated  History Diabetes  Screening Not Indicated  History Diabetes   Cholesterol Screening Once every 5 years if you don't have a lipid disorder. May order more often based on risk factors. Lipid panel: 07/02/2024  Screening Not Indicated  History Lipid Disorder  Screening Not Indicated  History Lipid Disorder      Other Preventive Screenings Covered by Medicare:  Abdominal Aortic Aneurysm (AAA) Screening: covered once if your at risk. You're considered to be at risk if you have a family history of AAA or a male between the age of 65-75 who smoking at least 100 cigarettes in your lifetime.  Lung Cancer Screening: covers low dose CT scan once per year if you meet all of the following conditions: (1) Age 55-77; (2) No signs or symptoms of lung cancer; (3) Current smoker or have quit smoking within the last 15 years; (4) You have a tobacco smoking history of at least 20 pack years (packs per day x number of years you smoked); (5) You get a written order from a healthcare provider.  Glaucoma Screening: covered annually if  you're considered high risk: (1) You have diabetes OR (2) Family history of glaucoma OR (3)  aged 50 and older OR (4)  American aged 65 and older  Osteoporosis Screening: covered every 2 years if you meet one of the following conditions: (1) Have a vertebral abnormality; (2) On glucocorticoid therapy for more than 3 months; (3) Have primary hyperparathyroidism; (4) On osteoporosis medications and need to assess response to drug therapy.  HIV Screening: covered annually if you're between the age of 15-65. Also covered annually if you are younger than 15 and older than 65 with risk factors for HIV infection. For pregnant patients, it is covered up to 3 times per pregnancy.    Immunizations:  Immunization Recommendations   Influenza Vaccine Annual influenza vaccination during flu season is recommended for all persons aged >= 6 months who do not have contraindications   Pneumococcal Vaccine   * Pneumococcal conjugate vaccine = PCV13 (Prevnar 13), PCV15 (Vaxneuvance), PCV20 (Prevnar 20)  * Pneumococcal polysaccharide vaccine = PPSV23 (Pneumovax) Adults 19-65 yo with certain risk factors or if 65+ yo  If never received any pneumonia vaccine: recommend Prevnar 20 (PCV20)  Give PCV20 if previously received 1 dose of PCV13 or PPSV23   Hepatitis B Vaccine 3 dose series if at intermediate or high risk (ex: diabetes, end stage renal disease, liver disease)   Respiratory syncytial virus (RSV) Vaccine - COVERED BY MEDICARE PART D  * RSVPreF3 (Arexvy) CDC recommends that adults 60 years of age and older may receive a single dose of RSV vaccine using shared clinical decision-making (SCDM)   Tetanus (Td) Vaccine - COST NOT COVERED BY MEDICARE PART B Following completion of primary series, a booster dose should be given every 10 years to maintain immunity against tetanus. Td may also be given as tetanus wound prophylaxis.   Tdap Vaccine - COST NOT COVERED BY MEDICARE PART B Recommended at least once for all  adults. For pregnant patients, recommended with each pregnancy.   Shingles Vaccine (Shingrix) - COST NOT COVERED BY MEDICARE PART B  2 shot series recommended in those 19 years and older who have or will have weakened immune systems or those 50 years and older     Health Maintenance Due:      Topic Date Due   • Colorectal Cancer Screening  01/17/2029   • Hepatitis C Screening  Completed     Immunizations Due:      Topic Date Due   • Pneumococcal Vaccine: 65+ Years (3 of 3 - PPSV23 or PCV20) 10/31/2020     Advance Directives   What are advance directives?  Advance directives are legal documents that state your wishes and plans for medical care. These plans are made ahead of time in case you lose your ability to make decisions for yourself. Advance directives can apply to any medical decision, such as the treatments you want, and if you want to donate organs.   What are the types of advance directives?  There are many types of advance directives, and each state has rules about how to use them. You may choose a combination of any of the following:  Living will:  This is a written record of the treatment you want. You can also choose which treatments you do not want, which to limit, and which to stop at a certain time. This includes surgery, medicine, IV fluid, and tube feedings.   Durable power of  for healthcare (DPAHC):  This is a written record that states who you want to make healthcare choices for you when you are unable to make them for yourself. This person, called a proxy, is usually a family member or a friend. You may choose more than 1 proxy.  Do not resuscitate (DNR) order:  A DNR order is used in case your heart stops beating or you stop breathing. It is a request not to have certain forms of treatment, such as CPR. A DNR order may be included in other types of advance directives.  Medical directive:  This covers the care that you want if you are in a coma, near death, or unable to make decisions  for yourself. You can list the treatments you want for each condition. Treatment may include pain medicine, surgery, blood transfusions, dialysis, IV or tube feedings, and a ventilator (breathing machine).  Values history:  This document has questions about your views, beliefs, and how you feel and think about life. This information can help others choose the care that you would choose.  Why are advance directives important?  An advance directive helps you control your care. Although spoken wishes may be used, it is better to have your wishes written down. Spoken wishes can be misunderstood, or not followed. Treatments may be given even if you do not want them. An advance directive may make it easier for your family to make difficult choices about your care.   Weight Management   Why it is important to manage your weight:  Being overweight increases your risk of health conditions such as heart disease, high blood pressure, type 2 diabetes, and certain types of cancer. It can also increase your risk for osteoarthritis, sleep apnea, and other respiratory problems. Aim for a slow, steady weight loss. Even a small amount of weight loss can lower your risk of health problems.  How to lose weight safely:  A safe and healthy way to lose weight is to eat fewer calories and get regular exercise. You can lose up about 1 pound a week by decreasing the number of calories you eat by 500 calories each day.   Healthy meal plan for weight management:  A healthy meal plan includes a variety of foods, contains fewer calories, and helps you stay healthy. A healthy meal plan includes the following:  Eat whole-grain foods more often.  A healthy meal plan should contain fiber. Fiber is the part of grains, fruits, and vegetables that is not broken down by your body. Whole-grain foods are healthy and provide extra fiber in your diet. Some examples of whole-grain foods are whole-wheat breads and pastas, oatmeal, brown rice, and bulgur.  Eat  a variety of vegetables every day.  Include dark, leafy greens such as spinach, kale, jhonathan greens, and mustard greens. Eat yellow and orange vegetables such as carrots, sweet potatoes, and winter squash.   Eat a variety of fruits every day.  Choose fresh or canned fruit (canned in its own juice or light syrup) instead of juice. Fruit juice has very little or no fiber.  Eat low-fat dairy foods.  Drink fat-free (skim) milk or 1% milk. Eat fat-free yogurt and low-fat cottage cheese. Try low-fat cheeses such as mozzarella and other reduced-fat cheeses.  Choose meat and other protein foods that are low in fat.  Choose beans or other legumes such as split peas or lentils. Choose fish, skinless poultry (chicken or turkey), or lean cuts of red meat (beef or pork). Before you cook meat or poultry, cut off any visible fat.   Use less fat and oil.  Try baking foods instead of frying them. Add less fat, such as margarine, sour cream, regular salad dressing and mayonnaise to foods. Eat fewer high-fat foods. Some examples of high-fat foods include french fries, doughnuts, ice cream, and cakes.  Eat fewer sweets.  Limit foods and drinks that are high in sugar. This includes candy, cookies, regular soda, and sweetened drinks.  Exercise:  Exercise at least 30 minutes per day on most days of the week. Some examples of exercise include walking, biking, dancing, and swimming. You can also fit in more physical activity by taking the stairs instead of the elevator or parking farther away from stores. Ask your healthcare provider about the best exercise plan for you.      © Copyright [employer] 2018 Information is for End User's use only and may not be sold, redistributed or otherwise used for commercial purposes. All illustrations and images included in CareNotes® are the copyrighted property of [employer] or [employer]

## 2024-12-09 NOTE — PROGRESS NOTES
Name: Jorge Bajwa      : 1950      MRN: [id number]  Encounter Provider: Travis Bergman DO  Encounter Date: 2024   Encounter department: [facility]    Assessment & Plan       Preventive health issues were discussed with patient, and age appropriate screening tests were ordered as noted in patient's After Visit Summary. Personalized health advice and appropriate referrals for health education or preventive services given if needed, as noted in patient's After Visit Summary.    History of Present Illness     HPI   Patient Care Team:  Travis Bergman DO as PCP - General (Family Medicine)  Kae Benz MD (Vascular Surgery)    Review of Systems  Medical History Reviewed by provider this encounter:       Annual Wellness Visit Questionnaire   Jorge is here for his Subsequent Wellness visit.     Health Risk Assessment:   Patient rates overall health as good. Patient feels that their physical health rating is same. Patient is satisfied with their life. Eyesight was rated as same. Hearing was rated as same. Patient feels that their emotional and mental health rating is same. Patients states they are never, rarely angry. Patient states they are never, rarely unusually tired/fatigued. Pain experienced in the last 7 days has been some. Patient's pain rating has been 2/10. Patient states that he has experienced no weight loss or gain in last 6 months.     Fall Risk Screening:   In the past year, patient has experienced: no history of falling in past year      Home Safety:  Patient does not have trouble with stairs inside or outside of their home. Patient has working smoke alarms and has working carbon monoxide detector. Home safety hazards include: none.     Nutrition:   Current diet is Regular and Limited junk food.     Medications:   Patient is currently taking over-the-counter supplements. OTC medications include: see medication list. Patient is able to manage medications.     Activities of Daily  Living (ADLs)/Instrumental Activities of Daily Living (IADLs):   Walk and transfer into and out of bed and chair?: Yes  Dress and groom yourself?: Yes    Bathe or shower yourself?: Yes    Feed yourself? Yes  Do your laundry/housekeeping?: Yes  Manage your money, pay your bills and track your expenses?: Yes  Make your own meals?: Yes    Do your own shopping?: Yes    Durable Medical Equipment Suppliers  NA    Previous Hospitalizations:   Any hospitalizations or ED visits within the last 12 months?: Yes    How many hospitalizations have you had in the last year?: 1-2    Advance Care Planning:   Living will: No    Durable POA for healthcare: No    Advanced directive: No      PREVENTIVE SCREENINGS      Cardiovascular Screening:    General: Screening Not Indicated and History Lipid Disorder      Diabetes Screening:     General: Screening Not Indicated and History Diabetes      Colorectal Cancer Screening:     General: Screening Current      Abdominal Aortic Aneurysm (AAA) Screening:    Risk factors include: age between 65-76 yo and tobacco use        Lung Cancer Screening:     General: Screening Not Indicated      Hepatitis C Screening:    General: Screening Current    Screening, Brief Intervention, and Referral to Treatment (SBIRT)    Screening  Typical number of drinks in a day: 0  Typical number of drinks in a week: 0  Interpretation: Low risk drinking behavior.    AUDIT-C Screenin) How often did you have a drink containing alcohol in the past year? monthly or less  2) How many drinks did you have on a typical day when you were drinking in the past year? 0  3) How often did you have 6 or more drinks on one occasion in the past year? never    AUDIT-C Score: 1  Interpretation: Score 0-3 (male): Negative screen for alcohol misuse    Single Item Drug Screening:  How often have you used an illegal drug (including marijuana) or a prescription medication for non-medical reasons in the past year? never    Single Item Drug  "Screen Score: 0  Interpretation: Negative screen for possible drug use disorder    Social Drivers of Health     Financial Resource Strain: Medium Risk (11/27/2023)    Overall Financial Resource Strain (CARDIA)     Difficulty of Paying Living Expenses: Somewhat hard   Food Insecurity: No Food Insecurity (12/2/2024)    Hunger Vital Sign     Worried About Running Out of Food in the Last Year: Never true     Ran Out of Food in the Last Year: Never true   Transportation Needs: No Transportation Needs (12/2/2024)    PRAPARE - Transportation     Lack of Transportation (Medical): No     Lack of Transportation (Non-Medical): No   Housing Stability: Low Risk  (12/2/2024)    Housing Stability Vital Sign     Unable to Pay for Housing in the Last Year: No     Number of Times Moved in the Last Year: 0     Homeless in the Last Year: No   Utilities: Not At Risk (12/2/2024)    [facility] Utilities     Threatened with loss of utilities: No     No results found.    Objective   BP (!) 182/70 (BP Location: Left arm, Patient Position: Sitting, Cuff Size: Standard)   Pulse (!) 43   Temp (!) 96.4 °F (35.8 °C) (Temporal)   Ht 5' 11\" (1.803 m)   Wt 87.5 kg (193 lb)   SpO2 99%   BMI 26.92 kg/m²     Physical Exam    "

## 2024-12-09 NOTE — PROGRESS NOTES
Name: Jorge Bajwa      : 1950      MRN: [id number]  Encounter Provider: Travis Bergman DO  Encounter Date: 2024   Encounter department: [facility]    Assessment & Plan  Medicare annual wellness visit, subsequent             Depression Screening and Follow-up Plan: Patient was screened for depression during today's encounter. They screened negative with a PHQ-2 score of 0.      Preventive health issues were discussed with patient, and age appropriate screening tests were ordered as noted in patient's After Visit Summary. Personalized health advice and appropriate referrals for health education or preventive services given if needed, as noted in patient's After Visit Summary.    History of Present Illness     Mr. Bajwa is here for a Medicare subsequent well visit       Patient Care Team:  Travis Bergman DO as PCP - General (Family Medicine)  Kae Benz MD (Vascular Surgery)    Review of Systems   Constitutional:  Negative for activity change, appetite change, diaphoresis, fatigue and fever.   HENT:  Positive for hearing loss. Negative for dental problem.    Eyes:  Positive for visual disturbance.        Patient has cataracts he follows again in 4 months   Respiratory:  Negative for apnea, cough, chest tightness, shortness of breath and wheezing.    Cardiovascular:  Negative for chest pain, palpitations and leg swelling.   Gastrointestinal:  Negative for abdominal distention, abdominal pain, anal bleeding, constipation, diarrhea, nausea and vomiting.        Patient is up-to-date on colonoscopies   Endocrine: Negative for cold intolerance, heat intolerance, polydipsia, polyphagia and polyuria.   Genitourinary:  Positive for frequency. Negative for difficulty urinating, dysuria, flank pain, hematuria and urgency.   Musculoskeletal:  Positive for arthralgias and back pain. Negative for gait problem, joint swelling and myalgias.   Skin:  Negative for color change, rash and wound.    Allergic/Immunologic: Negative for environmental allergies, food allergies and immunocompromised state.   Neurological:  Negative for dizziness, seizures, syncope, speech difficulty, numbness and headaches.   Hematological:  Negative for adenopathy. Does not bruise/bleed easily.   Psychiatric/Behavioral:  Negative for agitation, behavioral problems, hallucinations, sleep disturbance and suicidal ideas.      Medical History Reviewed by provider this encounter:       Annual Wellness Visit Questionnaire       Health Risk Assessment:   Patient rates overall health as good. Patient feels that their physical health rating is same. Patient is satisfied with their life. Eyesight was rated as same. Hearing was rated as same. Patient feels that their emotional and mental health rating is same. Patients states they are never, rarely angry. Patient states they are never, rarely unusually tired/fatigued. Pain experienced in the last 7 days has been some. Patient's pain rating has been 2/10. Patient states that he has experienced no weight loss or gain in last 6 months.     Depression Screening:   PHQ-2 Score: 0      Fall Risk Screening:   In the past year, patient has experienced: no history of falling in past year      Home Safety:  Patient does not have trouble with stairs inside or outside of their home. Patient has working smoke alarms and has working carbon monoxide detector. Home safety hazards include: none.     Nutrition:   Current diet is Regular and Limited junk food.     Medications:   Patient is currently taking over-the-counter supplements. OTC medications include: see medication list. Patient is able to manage medications.     Activities of Daily Living (ADLs)/Instrumental Activities of Daily Living (IADLs):   Walk and transfer into and out of bed and chair?: Yes  Dress and groom yourself?: Yes    Bathe or shower yourself?: Yes    Feed yourself? Yes  Do your laundry/housekeeping?: Yes  Manage your money, pay your  bills and track your expenses?: Yes  Make your own meals?: Yes    Do your own shopping?: Yes    Durable Medical Equipment Suppliers  NA    Previous Hospitalizations:   Any hospitalizations or ED visits within the last 12 months?: Yes    How many hospitalizations have you had in the last year?: 1-2    Advance Care Planning:   Living will: No    Durable POA for healthcare: No    Advanced directive: No    Advanced directive counseling given: No    Five wishes given: No    Patient declined ACP directive: No    End of Life Decisions reviewed with patient: Yes    Provider agrees with end of life decisions: Yes      Cognitive Screening:   Provider or family/friend/caregiver concerned regarding cognition?: No    PREVENTIVE SCREENINGS      Cardiovascular Screening:    General: Screening Not Indicated and History Lipid Disorder      Diabetes Screening:     General: Screening Not Indicated and History Diabetes      Colorectal Cancer Screening:     General: Screening Current      Osteoporosis Screening:    General: Screening Not Indicated      Abdominal Aortic Aneurysm (AAA) Screening:    Risk factors include: age between 65-76 yo and tobacco use        Lung Cancer Screening:     General: Screening Not Indicated      Hepatitis C Screening:    General: Screening Current    Screening, Brief Intervention, and Referral to Treatment (SBIRT)    Screening  Typical number of drinks in a day: 0  Typical number of drinks in a week: 0  Interpretation: Low risk drinking behavior.    AUDIT-C Screenin) How often did you have a drink containing alcohol in the past year? monthly or less  2) How many drinks did you have on a typical day when you were drinking in the past year? 0  3) How often did you have 6 or more drinks on one occasion in the past year? never    AUDIT-C Score: 1  Interpretation: Score 0-3 (male): Negative screen for alcohol misuse    Single Item Drug Screening:  How often have you used an illegal drug (including  "marijuana) or a prescription medication for non-medical reasons in the past year? never    Single Item Drug Screen Score: 0  Interpretation: Negative screen for possible drug use disorder    Social Drivers of Health     Financial Resource Strain: Medium Risk (11/27/2023)    Overall Financial Resource Strain (CARDIA)     Difficulty of Paying Living Expenses: Somewhat hard   Food Insecurity: No Food Insecurity (12/2/2024)    Hunger Vital Sign     Worried About Running Out of Food in the Last Year: Never true     Ran Out of Food in the Last Year: Never true   Transportation Needs: No Transportation Needs (12/2/2024)    PRAPARE - Transportation     Lack of Transportation (Medical): No     Lack of Transportation (Non-Medical): No   Housing Stability: Low Risk  (12/2/2024)    Housing Stability Vital Sign     Unable to Pay for Housing in the Last Year: No     Number of Times Moved in the Last Year: 0     Homeless in the Last Year: No   Utilities: Not At Risk (12/2/2024)    [facility] Utilities     Threatened with loss of utilities: No     No results found.    Objective   /82   Pulse (!) 43   Temp (!) 96.4 °F (35.8 °C) (Temporal)   Ht 5' 11\" (1.803 m)   Wt 87.5 kg (193 lb)   SpO2 99%   BMI 26.92 kg/m²     Physical Exam    "

## 2025-01-24 ENCOUNTER — OFFICE VISIT (OUTPATIENT)
Dept: PODIATRY | Facility: CLINIC | Age: 75
End: 2025-01-24
Payer: MEDICARE

## 2025-01-24 VITALS — HEIGHT: 71 IN | BODY MASS INDEX: 26.74 KG/M2 | WEIGHT: 191 LBS

## 2025-01-24 DIAGNOSIS — E11.40 TYPE 2 DIABETES MELLITUS WITH DIABETIC NEUROPATHY, WITHOUT LONG-TERM CURRENT USE OF INSULIN (HCC): Primary | ICD-10-CM

## 2025-01-24 DIAGNOSIS — M20.11 VALGUS DEFORMITY OF BOTH GREAT TOES: ICD-10-CM

## 2025-01-24 DIAGNOSIS — M20.12 VALGUS DEFORMITY OF BOTH GREAT TOES: ICD-10-CM

## 2025-01-24 PROCEDURE — 99213 OFFICE O/P EST LOW 20 MIN: CPT | Performed by: PODIATRIST

## 2025-02-13 ENCOUNTER — OFFICE VISIT (OUTPATIENT)
Dept: UROLOGY | Facility: CLINIC | Age: 75
End: 2025-02-13
Payer: MEDICARE

## 2025-02-13 VITALS
OXYGEN SATURATION: 98 % | HEIGHT: 71 IN | HEART RATE: 52 BPM | TEMPERATURE: 97.6 F | BODY MASS INDEX: 26.88 KG/M2 | WEIGHT: 192 LBS | DIASTOLIC BLOOD PRESSURE: 74 MMHG | SYSTOLIC BLOOD PRESSURE: 132 MMHG

## 2025-02-13 DIAGNOSIS — N52.9 ERECTILE DYSFUNCTION, UNSPECIFIED ERECTILE DYSFUNCTION TYPE: Primary | ICD-10-CM

## 2025-02-13 DIAGNOSIS — Z12.5 SCREENING FOR PROSTATE CANCER: ICD-10-CM

## 2025-02-13 PROCEDURE — 99213 OFFICE O/P EST LOW 20 MIN: CPT

## 2025-02-13 NOTE — PROGRESS NOTES
Name: Jorge Bajwa      : 1950      MRN: [id number]  Encounter Provider: TREY Mo  Encounter Date: 2025   Encounter department: [facility]    :  Assessment & Plan  Erectile dysfunction, unspecified erectile dysfunction type  No change in his erections with use of Trimix at 30 with a volume of 0.5 mL.  Will try increasing him to 30 and he was instructed to start at 0.2 mL IC no more than 3 doses per week  He is not currently interested in IPP.  If he still does not see sufficient response with increasing the dose I would recommend consultation with Dr. Mora.       Screening for prostate cancer  PSA 1.5 (2023)  Orders placed for updated PSA and I will have those results.    Orders:    PSA, Total Screen; Future          Interval HPI:    He presents today for follow-up of his erectile dysfunction after initiating Trimix at 30. He reports again only about a 70 to 80% erection not suitable for penetration.  He has gone up to as high as 0.5 mL with no change.    History of Present Illness     Established patient initially seen by me on 2024 for evaluation of erectile dysfunction. This has been ongoing for about 9 years.  He has failed both Cialis and Viagra reporting that he is able to obtain an erection of about 70 to 80% however is not able to maintain it long enough for intercourse.  He has tried using a penile ring to help maintain his erection without improvement.  He was started on Trimix in 2024.      Objective   There were no vitals taken for this visit.    Review of Systems   Constitutional:  Negative for chills and fever.   HENT:  Negative for congestion and sore throat.    Respiratory:  Negative for cough and shortness of breath.    Cardiovascular:  Negative for chest pain and leg swelling.   Gastrointestinal:  Negative for abdominal pain, constipation and diarrhea.   Genitourinary:  Negative for difficulty urinating,  dysuria, frequency, hematuria and urgency.   Musculoskeletal:  Negative for back pain and gait problem.   Skin:  Negative for wound.   Allergic/Immunologic: Negative for immunocompromised state.   Hematological:  Does not bruise/bleed easily.       Physical Exam  Vitals and nursing note reviewed.   Constitutional:       General: He is not in acute distress.     Appearance: He is well-developed.   HENT:      Head: Normocephalic and atraumatic.   Eyes:      Conjunctiva/sclera: Conjunctivae normal.   Cardiovascular:      Rate and Rhythm: Normal rate and regular rhythm.      Heart sounds: No murmur heard.  Pulmonary:      Effort: Pulmonary effort is normal. No respiratory distress.      Breath sounds: Normal breath sounds.   Abdominal:      Palpations: Abdomen is soft.      Tenderness: There is no abdominal tenderness.   Musculoskeletal:         General: No swelling.      Cervical back: Neck supple.   Skin:     General: Skin is warm and dry.      Capillary Refill: Capillary refill takes less than 2 seconds.   Neurological:      Mental Status: He is alert.   Psychiatric:         Mood and Affect: Mood normal.           Imaging:          Please Note:  Voice dictation software has been used to create this document. There may be inadvertent transcriptions errors.     TREY Mo 02/13/25

## 2025-03-03 ENCOUNTER — RA CDI HCC (OUTPATIENT)
Dept: OTHER | Facility: HOSPITAL | Age: 75
End: 2025-03-03

## 2025-03-04 ENCOUNTER — APPOINTMENT (OUTPATIENT)
Dept: LAB | Facility: HOSPITAL | Age: 75
End: 2025-03-04
Payer: MEDICARE

## 2025-03-04 ENCOUNTER — RESULTS FOLLOW-UP (OUTPATIENT)
Dept: FAMILY MEDICINE CLINIC | Facility: CLINIC | Age: 75
End: 2025-03-04

## 2025-03-04 DIAGNOSIS — E11.8 TYPE 2 DIABETES MELLITUS WITH COMPLICATION (HCC): ICD-10-CM

## 2025-03-04 DIAGNOSIS — E11.8 TYPE 2 DIABETES MELLITUS WITH COMPLICATION (HCC): Primary | ICD-10-CM

## 2025-03-04 LAB
ANION GAP SERPL CALCULATED.3IONS-SCNC: 7 MMOL/L (ref 4–13)
BUN SERPL-MCNC: 14 MG/DL (ref 5–25)
CALCIUM SERPL-MCNC: 8.7 MG/DL (ref 8.4–10.2)
CHLORIDE SERPL-SCNC: 106 MMOL/L (ref 96–108)
CO2 SERPL-SCNC: 27 MMOL/L (ref 21–32)
CREAT SERPL-MCNC: 0.69 MG/DL (ref 0.6–1.3)
CREAT UR-MCNC: 76.5 MG/DL
EST. AVERAGE GLUCOSE BLD GHB EST-MCNC: 117 MG/DL
GFR SERPL CREATININE-BSD FRML MDRD: 94 ML/MIN/1.73SQ M
GLUCOSE P FAST SERPL-MCNC: 126 MG/DL (ref 65–99)
HBA1C MFR BLD: 5.7 %
MICROALBUMIN UR-MCNC: 12.5 MG/L
MICROALBUMIN/CREAT 24H UR: 16 MG/G CREATININE (ref 0–30)
POTASSIUM SERPL-SCNC: 4.1 MMOL/L (ref 3.5–5.3)
SODIUM SERPL-SCNC: 140 MMOL/L (ref 135–147)

## 2025-03-04 PROCEDURE — 80048 BASIC METABOLIC PNL TOTAL CA: CPT

## 2025-03-04 PROCEDURE — 82043 UR ALBUMIN QUANTITATIVE: CPT

## 2025-03-04 PROCEDURE — 36415 COLL VENOUS BLD VENIPUNCTURE: CPT

## 2025-03-04 PROCEDURE — 82570 ASSAY OF URINE CREATININE: CPT

## 2025-03-04 PROCEDURE — 83036 HEMOGLOBIN GLYCOSYLATED A1C: CPT

## 2025-03-07 ENCOUNTER — OFFICE VISIT (OUTPATIENT)
Dept: FAMILY MEDICINE CLINIC | Facility: CLINIC | Age: 75
End: 2025-03-07
Payer: MEDICARE

## 2025-03-07 VITALS
WEIGHT: 196 LBS | OXYGEN SATURATION: 98 % | HEART RATE: 49 BPM | BODY MASS INDEX: 27.44 KG/M2 | SYSTOLIC BLOOD PRESSURE: 158 MMHG | HEIGHT: 71 IN | TEMPERATURE: 96 F | DIASTOLIC BLOOD PRESSURE: 64 MMHG

## 2025-03-07 DIAGNOSIS — Z95.5 PRESENCE OF DRUG-ELUTING STENT IN RIGHT CORONARY ARTERY: ICD-10-CM

## 2025-03-07 DIAGNOSIS — I10 BENIGN ESSENTIAL HYPERTENSION: Primary | ICD-10-CM

## 2025-03-07 DIAGNOSIS — E11.8 TYPE 2 DIABETES MELLITUS WITH COMPLICATION (HCC): ICD-10-CM

## 2025-03-07 PROCEDURE — G2211 COMPLEX E/M VISIT ADD ON: HCPCS | Performed by: FAMILY MEDICINE

## 2025-03-07 PROCEDURE — 99214 OFFICE O/P EST MOD 30 MIN: CPT | Performed by: FAMILY MEDICINE

## 2025-03-07 NOTE — PROGRESS NOTES
Name: Jorge Bajwa      : 1950      MRN: [id number]  Encounter Provider: Travis Bergman DO  Encounter Date: 3/7/2025   Encounter department: [facility]  :  Assessment & Plan  Benign essential hypertension         Type 2 diabetes mellitus with complication (HCC)    Lab Results   Component Value Date    HGBA1C 5.7 (H) 2025            Presence of drug-eluting stent in right coronary artery                History of Present Illness   Mr. Bajwa here today for a follow-up visit blood pressure was up a little bit he had a large coffee before he came to the office also we talked about his diet he has been eating a lot of salami and bologna sandwiches a we spoke about low-sodium diet and made some appropriate changes I did recheck his blood pressure did come down a slight amount while he was in the office he is following with Dr. Mahmood he does have cataracts which are progressing slowly      Review of Systems   Constitutional:  Positive for activity change and fatigue. Negative for appetite change, diaphoresis and fever.   HENT:  Positive for dental problem and hearing loss.    Eyes:  Positive for visual disturbance.        Patient follows with Dr. Mahmood is in preparation for possible cataract surgery   Respiratory:  Negative for apnea, cough, chest tightness, shortness of breath and wheezing.    Cardiovascular:  Negative for chest pain, palpitations and leg swelling.   Gastrointestinal:  Negative for abdominal distention, abdominal pain, anal bleeding, constipation, diarrhea, nausea and vomiting.   Endocrine: Negative for cold intolerance, heat intolerance, polydipsia, polyphagia and polyuria.   Genitourinary:  Negative for difficulty urinating, dysuria, flank pain, hematuria and urgency.   Musculoskeletal:  Positive for arthralgias. Negative for back pain, gait problem, joint swelling and myalgias.        Left knee is becoming more symptomatic he is has an appointment with Dr. Norton to discuss  "possible knee replacement   Skin:  Negative for color change, rash and wound.   Allergic/Immunologic: Negative for environmental allergies, food allergies and immunocompromised state.   Neurological:  Negative for dizziness, seizures, syncope, speech difficulty, numbness and headaches.   Hematological:  Negative for adenopathy. Does not bruise/bleed easily.   Psychiatric/Behavioral:  Negative for agitation, behavioral problems, hallucinations, sleep disturbance and suicidal ideas.        Objective   /64   Pulse (!) 49   Temp (!) 96 °F (35.6 °C) (Temporal)   Ht 5' 11\" (1.803 m)   Wt 88.9 kg (196 lb)   SpO2 98%   BMI 27.34 kg/m²      Physical Exam  Constitutional:       Appearance: He is well-developed.   HENT:      Head: Normocephalic and atraumatic.      Right Ear: External ear normal.      Left Ear: External ear normal.      Nose: Nose normal.   Eyes:      Conjunctiva/sclera: Conjunctivae normal.      Pupils: Pupils are equal, round, and reactive to light.   Cardiovascular:      Rate and Rhythm: Normal rate and regular rhythm.      Pulses: no weak pulses.           Dorsalis pedis pulses are 2+ on the right side and 2+ on the left side.        Posterior tibial pulses are 2+ on the right side and 2+ on the left side.      Heart sounds: Normal heart sounds. No murmur heard.     No friction rub.   Pulmonary:      Effort: Pulmonary effort is normal. No respiratory distress.      Breath sounds: Normal breath sounds. No wheezing or rales.   Chest:      Chest wall: No tenderness.   Abdominal:      General: Bowel sounds are normal.      Palpations: Abdomen is soft.   Musculoskeletal:         General: Normal range of motion.      Cervical back: Normal range of motion and neck supple.        Feet:    Feet:      Right foot:      Skin integrity: No ulcer, skin breakdown, erythema, warmth, callus or dry skin.      Left foot:      Skin integrity: No ulcer, skin breakdown, erythema, warmth, callus or dry skin. "   Skin:     General: Skin is warm and dry.      Capillary Refill: Capillary refill takes 2 to 3 seconds.   Neurological:      Mental Status: He is alert and oriented to person, place, and time.   Psychiatric:         Behavior: Behavior normal.         Thought Content: Thought content normal.         Judgment: Judgment normal.     Patient's shoes and socks removed.    Right Foot/Ankle   Right Foot Inspection  Skin Exam: skin normal and skin intact. No dry skin, no warmth, no callus, no erythema, no maceration, no abnormal color, no pre-ulcer, no ulcer and no callus.     Toe Exam: ROM and strength within normal limits.     Sensory   Vibration: intact  Proprioception: intact  Monofilament testing: intact    Vascular  Capillary refills: < 3 seconds  The right DP pulse is 2+. The right PT pulse is 2+.     Left Foot/Ankle  Left Foot Inspection  Skin Exam: skin normal and skin intact. No dry skin, no warmth, no erythema, no maceration, normal color, no pre-ulcer, no ulcer and no callus.     Toe Exam: ROM and strength within normal limits.     Sensory   Vibration: intact  Proprioception: intact  Monofilament testing: intact    Vascular  Capillary refills: < 3 seconds  The left DP pulse is 2+. The left PT pulse is 2+.     Assign Risk Category  No deformity present  No loss of protective sensation  No weak pulses  Risk: 0

## 2025-04-11 ENCOUNTER — OFFICE VISIT (OUTPATIENT)
Dept: PODIATRY | Facility: CLINIC | Age: 75
End: 2025-04-11
Payer: MEDICARE

## 2025-04-11 VITALS — HEIGHT: 71 IN | BODY MASS INDEX: 26.1 KG/M2 | WEIGHT: 186.4 LBS

## 2025-04-11 DIAGNOSIS — E11.40 TYPE 2 DIABETES MELLITUS WITH DIABETIC NEUROPATHY, WITHOUT LONG-TERM CURRENT USE OF INSULIN (HCC): Primary | ICD-10-CM

## 2025-04-11 DIAGNOSIS — B35.1 ONYCHOMYCOSIS: ICD-10-CM

## 2025-04-11 PROCEDURE — 11721 DEBRIDE NAIL 6 OR MORE: CPT | Performed by: PODIATRIST

## 2025-04-11 NOTE — PROGRESS NOTES
PATIENT:  Jorge Bajwa  1950    ASSESSMENT/PLAN:  1. Type 2 diabetes mellitus with diabetic neuropathy, without long-term current use of insulin ([facility])        2. Onychomycosis             Disease prevention and related risk factors of diabetes were identified and discussed.  The patient was educated in proper foot wear for diabetics. Educated in daily foot assessment and routine diabetic foot care.  The patient will follow up in 10 weeks.        Procedure: All mycotic toenails were reduced and debrided in length, width, and girth using a nail nipper and dremel.  Patient tolerated procedure(s) well without complications.      HPI:  Jorge Bajwa is a 74 y.o.year old male seen for diabetic foot exam and care.  The patient has class findings with DPN.   BS is under control.  No acute pedal disorder.    PAST MEDICAL HISTORY:  Past Medical History:   Diagnosis Date    Arthritis unknown    Asthma     resolved: 08/14/17    Bunion 1/1/2019    Closed fracture of fifth metacarpal bone of right hand     last assessed: 06/30/15    Coronary artery disease 2014    Deep vein thrombosis (HCC)     Diabetes mellitus (HCC)     Diverticulitis     Diverticulitis of colon     Fall at home, initial encounter 12/23/2023    Fracture of metacarpal shaft     Hemopneumothorax, left     last assessed: 06/02/15    Hyperlipidemia     Hypertension     Inguinal hernia     Lacunar infarction (HCC)     Approx 2000    Lumbar transverse process fracture (HCC)     Obesity     Pilonidal cyst     Plantar fasciitis 2/1/2009    Pleural effusion     last assessed: 07/01/15    Rib fractures     last assessed: 06/02/15    Status post fall     Superficial thrombophlebitis of leg     last assessed: 03/19/14       PAST SURGICAL HISTORY:  Past Surgical History:   Procedure Laterality Date    ABDOMINAL SURGERY  multiple    BOWEL RESECTION  2011?    COLON SURGERY      COLONOSCOPY      CORONARY ANGIOPLASTY WITH STENT PLACEMENT  2009    PTCA/RITA to RCA     FRACTURE SURGERY  05/04/2015    Closed treatment of fracture of metacarpal bone, right 5t metacarpal fracture Dr. Claire    HERNIA REPAIR      JOINT REPLACEMENT  03/04/2023    KNEE ARTHROSCOPY W/ MENISCAL REPAIR      Lateral meniscus    KNEE SURGERY  5/10/2015    LAPAROSCOPY  05/18/2015    Exploratory, extensive lysis of adhesions Dr. Pérez    OTHER SURGICAL HISTORY      Surgical lysis of intestinal adhesions    RECTAL SURGERY      REVISION COLOSTOMY      THORACENTESIS  06/02/2015    with imaging guidance left, removed 1300cc of fluid w/o problem lower base of lun06    TONSILLECTOMY AND ADENOIDECTOMY          ALLERGIES:  Patient has no known allergies.    MEDICATIONS:  Current Outpatient Medications   Medication Sig Dispense Refill    amLODIPine (NORVASC) 5 mg tablet Take 1 tablet (5 mg total) by mouth daily 90 tablet 3    aspirin (Aspirin 81) 81 mg EC tablet Take 1 tablet (81 mg total) by mouth daily 30 tablet 0    atorvastatin (LIPITOR) 40 mg tablet Take 1 tablet (40 mg total) by mouth daily 90 tablet 3    cephalexin (KEFLEX) 500 mg capsule TAKE 4 CAPSULES (500 MG) 1 HOUR PRIOR TO DENTAL PROCEDURE      FIBER ADULT GUMMIES PO Take 2 tablets by mouth daily      furosemide (LASIX) 20 mg tablet Take 1 tablet (20 mg total) by mouth daily 90 tablet 3    metFORMIN (GLUCOPHAGE-XR) 500 mg 24 hr tablet Take 1 tablet (500 mg total) by mouth 2 (two) times a day with meals 180 tablet 3    metoprolol tartrate (LOPRESSOR) 25 mg tablet Take 1 tablet (25 mg total) by mouth 2 (two) times a day 180 tablet 3    Multiple Vitamin (multivitamin) tablet Take 1 tablet by mouth daily 30 tablet 0    nitroglycerin (NITROSTAT) 0.4 mg SL tablet       potassium chloride (Klor-Con) 10 mEq tablet Take 1 tablet (10 mEq total) by mouth daily 90 tablet 3    ramipril (ALTACE) 10 MG capsule Take 1 capsule (10 mg total) by mouth daily 90 capsule 3     No current facility-administered medications for this visit.       SOCIAL HISTORY:  Social History      Socioeconomic History    Marital status:      Spouse name: None    Number of children: None    Years of education: None    Highest education level: None   Occupational History    Occupation: Retired   Tobacco Use    Smoking status: Former     Current packs/day: 0.00     Average packs/day: 1 pack/day for 30.0 years (30.0 ttl pk-yrs)     Types: Cigarettes     Start date: 1968     Quit date:      Years since quittin.8    Smokeless tobacco: Never   Vaping Use    Vaping status: Never Used   Substance and Sexual Activity    Alcohol use: Not Currently     Comment: Social    Drug use: No    Sexual activity: Not Currently     Partners: Female     Birth control/protection: None   Other Topics Concern    None   Social History Narrative    Always uses seat belt    No living will     Social Drivers of Health     Financial Resource Strain: Medium Risk (2023)    Overall Financial Resource Strain (CARDIA)     Difficulty of Paying Living Expenses: Somewhat hard   Food Insecurity: No Food Insecurity (2024)    Hunger Vital Sign     Worried About Running Out of Food in the Last Year: Never true     Ran Out of Food in the Last Year: Never true   Transportation Needs: No Transportation Needs (2024)    PRAPARE - Transportation     Lack of Transportation (Medical): No     Lack of Transportation (Non-Medical): No   Physical Activity: Not on file   Stress: Not on file   Social Connections: Not on file   Intimate Partner Violence: Not At Risk (3/8/2023)    Received from [facility], [facility]    Humiliation, Afraid, Rape, and Kick questionnaire     Fear of Current or Ex-Partner: No     Emotionally Abused: No     Physically Abused: No     Sexually Abused: No   Housing Stability: Low Risk  (2024)    Housing Stability Vital Sign     Unable to Pay for Housing in the Last Year: No     Number of Times Moved in the Last Year: 0     Homeless in the Last Year: No         REVIEW OF SYSTEMS:  GENERAL: NAD, afebrile  HEART: No chest pain, or palpitation  RESPIRATORY:  No acute SOB or cough  GI: No Nausea, vomit or diarrhea  NEUROLOGIC: No syncope or acute weakness    PHYSICAL EXAM:  VASCULAR EXAM  Dorsalis pedis  +1, Posterior tibial artery  absent.  The patient has class findings with skin atrophy, lack of digital hair, and nail dystrophy.  There is mild lower extremity edema bilaterally.  Venous stasis skin changes noted BLE.     NEUROLOGIC EXAM  AAO X 3.  Sensation is intact to light touch.  No focal neurologic deficit.         DERMATOLOGIC EXAM:   No ulcer.  No cellulitis noted.  The patient has hypertrophic toenails with discoloration, onycholysis, and subungal debris.      MUSCULOSKELETAL EXAM:   No acute joint pain.  No acute joint edema, or redness.  No acute musculoskeletal problem.  Patient has deformity including bunion and hammertoe.

## 2025-04-29 DIAGNOSIS — E11.9 CONTROLLED TYPE 2 DIABETES MELLITUS WITHOUT COMPLICATION, WITHOUT LONG-TERM CURRENT USE OF INSULIN (HCC): ICD-10-CM

## 2025-04-30 RX ORDER — METFORMIN HYDROCHLORIDE 500 MG/1
500 TABLET, EXTENDED RELEASE ORAL 2 TIMES DAILY WITH MEALS
Qty: 180 TABLET | Refills: 1 | Status: SHIPPED | OUTPATIENT
Start: 2025-04-30

## 2025-05-06 DIAGNOSIS — E11.9 CONTROLLED TYPE 2 DIABETES MELLITUS WITHOUT COMPLICATION, WITHOUT LONG-TERM CURRENT USE OF INSULIN (HCC): ICD-10-CM

## 2025-05-06 DIAGNOSIS — E78.2 MIXED HYPERLIPIDEMIA: ICD-10-CM

## 2025-05-06 DIAGNOSIS — I10 ESSENTIAL HYPERTENSION: ICD-10-CM

## 2025-05-06 NOTE — TELEPHONE ENCOUNTER
Reason for call:   [x] Refill   [] Prior Auth  [x] Other: Not a Duplicate Pharmacy Change    Office:   [x] PCP/Provider - [facility] -  Travis Bergman DO  [] Specialty/Provider -     Medication:  amLODIPine (NORVASC) 5 mg tablet    Dose/Frequency: Take 1 tablet (5 mg total) by mouth daily     Quantity: 90 tablet     Medication: atorvastatin (LIPITOR) 40 mg tablet    Dose/Frequency: Take 1 tablet (40 mg total) by mouth daily     Quantity: 90 tablet    Medication: furosemide (LASIX) 20 mg tablet    Dose/Frequency: Take 1 tablet (20 mg total) by mouth daily     Quantity: 90 tablet    Medication: metFORMIN (GLUCOPHAGE-XR) 500 mg 24 hr tablet    Dose/Frequency:  Take 1 tablet (500 mg total) by mouth 2 (two) times a day with meals    Quantity: 180 tablet    Medication: metoprolol tartrate (LOPRESSOR) 25 mg tablet    Dose/Frequency: Take 1 tablet (25 mg total) by mouth 2 (two) times a day     Quantity: 180 tablet    Medication: potassium chloride (Klor-Con) 10 mEq tablet    Dose/Frequency: Take 1 tablet (10 mEq total) by mouth daily     Quantity: 90 tablet    Medication:  ramipril (ALTACE) 10 MG capsule    Dose/Frequency: Take 1 capsule (10 mg total) by mouth daily     Quantity: 90 capsule      Pharmacy: [facility] [address] [phone]    Local Pharmacy   Does the patient have enough for 3 days?   [] Yes   [] No - Send as HP to POD    Mail Away Pharmacy   Does the patient have enough for 10 days?   [] Yes   [] No - Send as HP to POD

## 2025-05-08 ENCOUNTER — TELEPHONE (OUTPATIENT)
Dept: FAMILY MEDICINE CLINIC | Facility: CLINIC | Age: 75
End: 2025-05-08

## 2025-05-08 DIAGNOSIS — I10 ESSENTIAL HYPERTENSION: Primary | ICD-10-CM

## 2025-05-08 DIAGNOSIS — E11.9 TYPE 2 DIABETES MELLITUS WITHOUT COMPLICATION, WITHOUT LONG-TERM CURRENT USE OF INSULIN (HCC): ICD-10-CM

## 2025-05-08 DIAGNOSIS — R30.0 DYSURIA: ICD-10-CM

## 2025-05-08 DIAGNOSIS — Z01.818 PREOPERATIVE TESTING: ICD-10-CM

## 2025-05-08 DIAGNOSIS — Z51.81 MEDICATION MONITORING ENCOUNTER: ICD-10-CM

## 2025-05-08 RX ORDER — POTASSIUM CHLORIDE 750 MG/1
10 TABLET, EXTENDED RELEASE ORAL DAILY
Qty: 90 TABLET | Refills: 1 | Status: SHIPPED | OUTPATIENT
Start: 2025-05-08

## 2025-05-08 RX ORDER — RAMIPRIL 10 MG/1
10 CAPSULE ORAL DAILY
Qty: 90 CAPSULE | Refills: 1 | Status: SHIPPED | OUTPATIENT
Start: 2025-05-08

## 2025-05-08 RX ORDER — ATORVASTATIN CALCIUM 40 MG/1
40 TABLET, FILM COATED ORAL DAILY
Qty: 90 TABLET | Refills: 1 | Status: SHIPPED | OUTPATIENT
Start: 2025-05-08

## 2025-05-08 RX ORDER — FUROSEMIDE 20 MG/1
20 TABLET ORAL DAILY
Qty: 90 TABLET | Refills: 1 | Status: SHIPPED | OUTPATIENT
Start: 2025-05-08

## 2025-05-08 RX ORDER — METFORMIN HYDROCHLORIDE 500 MG/1
500 TABLET, EXTENDED RELEASE ORAL 2 TIMES DAILY WITH MEALS
Qty: 180 TABLET | Refills: 1 | Status: SHIPPED | OUTPATIENT
Start: 2025-05-08

## 2025-05-08 RX ORDER — AMLODIPINE BESYLATE 5 MG/1
5 TABLET ORAL DAILY
Qty: 90 TABLET | Refills: 1 | Status: SHIPPED | OUTPATIENT
Start: 2025-05-08

## 2025-05-08 RX ORDER — METOPROLOL TARTRATE 25 MG/1
25 TABLET, FILM COATED ORAL 2 TIMES DAILY
Qty: 180 TABLET | Refills: 1 | Status: SHIPPED | OUTPATIENT
Start: 2025-05-08

## 2025-05-08 NOTE — TELEPHONE ENCOUNTER
Regarding Pre-op clearance for Pt - What labs & testing do you want ordered for clearance, please put in orders    Pt was sent message regarding clearance, but I didn't see any orders for Pt to have 2-3 days before pre-op clearance.

## 2025-06-04 ENCOUNTER — OFFICE VISIT (OUTPATIENT)
Dept: CARDIOLOGY CLINIC | Facility: CLINIC | Age: 75
End: 2025-06-04
Payer: MEDICARE

## 2025-06-04 VITALS
OXYGEN SATURATION: 99 % | WEIGHT: 189.5 LBS | DIASTOLIC BLOOD PRESSURE: 60 MMHG | BODY MASS INDEX: 26.53 KG/M2 | HEIGHT: 71 IN | HEART RATE: 49 BPM | SYSTOLIC BLOOD PRESSURE: 118 MMHG

## 2025-06-04 DIAGNOSIS — I25.118 CORONARY ARTERY DISEASE OF NATIVE ARTERY OF NATIVE HEART WITH STABLE ANGINA PECTORIS (HCC): ICD-10-CM

## 2025-06-04 DIAGNOSIS — G47.9 SLEEP DISORDER: ICD-10-CM

## 2025-06-04 DIAGNOSIS — E11.8 TYPE 2 DIABETES MELLITUS WITH COMPLICATION (HCC): ICD-10-CM

## 2025-06-04 DIAGNOSIS — I10 BENIGN ESSENTIAL HYPERTENSION: ICD-10-CM

## 2025-06-04 DIAGNOSIS — Z01.810 PRE-OPERATIVE CARDIOVASCULAR EXAMINATION: Primary | ICD-10-CM

## 2025-06-04 DIAGNOSIS — E78.5 DYSLIPIDEMIA: ICD-10-CM

## 2025-06-04 LAB
ATRIAL RATE: 49 BPM
P AXIS: 29 DEGREES
PR INTERVAL: 134 MS
QRS AXIS: 7 DEGREES
QRSD INTERVAL: 90 MS
QT INTERVAL: 390 MS
QTC INTERVAL: 352 MS
T WAVE AXIS: 8 DEGREES
VENTRICULAR RATE: 49 BPM

## 2025-06-04 PROCEDURE — 93000 ELECTROCARDIOGRAM COMPLETE: CPT | Performed by: STUDENT IN AN ORGANIZED HEALTH CARE EDUCATION/TRAINING PROGRAM

## 2025-06-04 PROCEDURE — 99214 OFFICE O/P EST MOD 30 MIN: CPT | Performed by: STUDENT IN AN ORGANIZED HEALTH CARE EDUCATION/TRAINING PROGRAM

## 2025-06-04 NOTE — ASSESSMENT & PLAN NOTE
Stable.  7/02/24 cholesterol 114, triglycerides 67, HDL 49, LDL 52  Continue aspirin, atorvastatin as prescribed  Reports he has repeat labs upcoming with PCP.

## 2025-06-04 NOTE — PROGRESS NOTES
[facility]  [address] [facility]  [facility] [place] [address]  Phone#  [phone]  Fax#  [phone]  [facility]'s Cardiology Office Consultation             NAME: Jorge Bajwa  AGE: 74 y.o. SEX: male   : 1950   MRN: [id number]    DATE: 2025  TIME: 1:04 PM    Assessment & Plan  Pre-operative cardiovascular examination  See below  Benign essential hypertension  Stable.  Blood pressure today 118/60  Continue current medications  Coronary artery disease of native artery of native heart with stable angina pectoris (HCC)  Stable.  No new symptoms.  Continue aspirin, atorvastatin, beta-blocker as prescribed  Type 2 diabetes mellitus with complication (HCC)  Stable.  Last hemoglobin A1c 5.7% 3/4/25.  Management per PCP.  Dyslipidemia  Stable.  24 cholesterol 114, triglycerides 67, HDL 49, LDL 52  Continue aspirin, atorvastatin as prescribed  Reports he has repeat labs upcoming with PCP.  Sleep disorder  Suspected GEOVANNY    Pre-Op Evaluation Assessment-    Results for Lane Perioperative Cardiac Risk  Estimated Risk Probability for Perioperative Myocardial Infarction or Cardiac Arrest: 0.7 %    Answers calculated to formulate result:  1. Age? -- 74 Years  2. Creatinine? -- <133 µmol/L  3. ASA Class? -- Patients with severe systemic disease  4. Preoperative Functional Status? -- Total independent  5. Procedure Site? -- Orthopedic and non-vascular Extremity      RCRI Cardiac Risk Estimation:    RCRI risk factors: history of ischemic heart disease  RCI RISK CLASS II (1 risk factor, risk of major cardiac compl. appr. 1.3%)    No cardiac contraindications to planned surgery    Low Risk for major adverse cardiac event (MACE).   Patient may proceed with surgery as planned without further workup.    Pre-Op Evaluation Plan  1. Further preoperative workup as follows:   - None; no further preoperative work-up is required    2. Medication Management/Recommendations:   -Patient instructed to hold  aspirin for 7 days prior to surgery.    Follow-up as previously scheduled.      -----    Chief Complaint   Patient presents with    Advice Only     Pt needs CC for knee surgery on 06/10/2025.    Edema     Hands, legs and feet.       HPI:    Jorge Bajwa is a 74 y.o. male who has been referred here for assessment of preoperative cardiovascular risk prior to upcoming surgery. Preoperative consultation for knee surgery on 6/10/2025.     Patient has no complaints today.  He presents primarily for preoperative evaluation prior to knee surgery that is planned next week.  He denies any chest pain, chest discomfort, shortness of breath at rest, dyspnea on exertion.  Reports his mobility is mainly limited by knee pain.  However, he is able to achieve greater than 4 METS without difficulty.  Has a personal history of DVT after a prolonged flight from [place] to [place].    I have personally reviewed his labs as detailed above in assessment and plan. ECG today showed sinus bradycardia with nonspecific ST abnormality.    Last echo was 12/2023. He had preserved LVEF with moderately dilated LA.    -----    Pre-operative evaluation:  Current anti-platelet/anti-coagulation medications that the patient is prescribed includes: Aspirin.      Exercise Capacity:  Able to walk 4 blocks without symptoms?: Yes  Able to walk 2 flights without symptoms?: Yes    Personal history of venous thromboembolic disease? Yes, after prolonged flight    Assessment of Cardiac Contraindications:  No history of severe angina or MI in the last 6 weeks. No recent PCI.  No recent decompensated heart failure   No recent serious arrhythmias   No known severe heart valve disease including severe aortic stenosis or symptomatic mitral stenosis        Past history, family history, social history, current medications, vital signs, recent lab and imaging studies and  prior cardiology studies reviewed independently on this visit.    Allergies[1]  Current  Medications[2]    Past Medical History[3]  Past Surgical History[4]  Family History[5]    Social History   reports that he quit smoking about 27 years ago. His smoking use included cigarettes. He started smoking about 57 years ago. He has a 30 pack-year smoking history. He has never used smokeless tobacco. He reports that he does not currently use alcohol. He reports that he does not use drugs.     Review of Systems   Constitutional:  Negative for fatigue.   Respiratory:  Negative for chest tightness and shortness of breath.    Cardiovascular:  Negative for chest pain, palpitations and leg swelling.   Neurological:  Negative for dizziness and light-headedness.         Objective:     Vitals:    06/04/25 1118   BP: 118/60   Pulse: (!) 49   SpO2: 99%     Physical Exam  Vitals reviewed.   Constitutional:       General: He is not in acute distress.     Appearance: Normal appearance. He is well-developed.   HENT:      Head: Normocephalic and atraumatic.     Cardiovascular:      Rate and Rhythm: Normal rate and regular rhythm.      Heart sounds: No murmur heard.  Pulmonary:      Effort: Pulmonary effort is normal. No respiratory distress.      Breath sounds: Normal breath sounds.   Abdominal:      General: There is no distension.     Musculoskeletal:         General: No swelling.     Skin:     General: Skin is warm and dry.     Neurological:      Mental Status: He is alert.     Psychiatric:         Mood and Affect: Mood normal.         Pertinent Laboratory/Diagnostic Studies:    Laboratory studies reviewed personally by Lashaun Alfaro MD    BMP:   Lab Results   Component Value Date    SODIUM 140 03/04/2025    K 4.1 03/04/2025     03/04/2025    CO2 27 03/04/2025    BUN 14 03/04/2025    CREATININE 0.69 03/04/2025    GLUC 115 12/24/2023    CALCIUM 8.7 03/04/2025     CBC:  Lab Results   Component Value Date    WBC 9.74 12/24/2023    HGB 13.7 12/24/2023    HCT 41.5 12/24/2023    MCV 99 (H) 12/24/2023     12/24/2023  "    Lipid Profile:   Lab Results   Component Value Date    HDL 49 07/02/2024     Lab Results   Component Value Date    LDLCALC 52 07/02/2024     Lab Results   Component Value Date    TRIG 67 07/02/2024      Other labs:  Lab Results   Component Value Date    CFW9DIIJFTHI 1.633 09/11/2023     Lab Results   Component Value Date    ALT 9 07/02/2024    AST 12 (L) 07/02/2024       Imaging Studies:     Pertinent cardiac studies and imaging studies were personally reviewed on this visit and results summarized.    Lashaun Alfaro MD, PhD    Portions of the record may have been created with voice recognition software.  Occasional wrong word or \"sound alike\" substitutions may have occurred due to the inherent limitations of voice recognition software.  Read the chart carefully and recognize, using context, where substitutions have occurred. Please reach out to me directly for any clarifications.          [1] No Known Allergies  [2]   Current Outpatient Medications:     amLODIPine (NORVASC) 5 mg tablet, Take 1 tablet (5 mg total) by mouth daily, Disp: 90 tablet, Rfl: 1    atorvastatin (LIPITOR) 40 mg tablet, Take 1 tablet (40 mg total) by mouth daily, Disp: 90 tablet, Rfl: 1    FIBER ADULT GUMMIES PO, Take 2 tablets by mouth in the morning., Disp: , Rfl:     furosemide (LASIX) 20 mg tablet, Take 1 tablet (20 mg total) by mouth daily, Disp: 90 tablet, Rfl: 1    metFORMIN (GLUCOPHAGE-XR) 500 mg 24 hr tablet, Take 1 tablet (500 mg total) by mouth 2 (two) times a day with meals, Disp: 180 tablet, Rfl: 1    metoprolol tartrate (LOPRESSOR) 25 mg tablet, Take 1 tablet (25 mg total) by mouth 2 (two) times a day, Disp: 180 tablet, Rfl: 1    Multiple Vitamin (multivitamin) tablet, Take 1 tablet by mouth daily, Disp: 30 tablet, Rfl: 0    nitroglycerin (NITROSTAT) 0.4 mg SL tablet, , Disp: , Rfl:     potassium chloride (Klor-Con) 10 mEq tablet, Take 1 tablet (10 mEq total) by mouth daily, Disp: 90 tablet, Rfl: 1    ramipril (ALTACE) 10 " MG capsule, Take 1 capsule (10 mg total) by mouth daily, Disp: 90 capsule, Rfl: 1    aspirin (Aspirin 81) 81 mg EC tablet, Take 1 tablet (81 mg total) by mouth daily (Patient not taking: Reported on 6/4/2025), Disp: 30 tablet, Rfl: 0  [3]   Past Medical History:  Diagnosis Date    Arthritis unknown    Asthma     resolved: 08/14/17    Bunion 1/1/2019    Closed fracture of fifth metacarpal bone of right hand     last assessed: 06/30/15    Coronary artery disease 2014    Deep vein thrombosis (HCC)     Diabetes mellitus (HCC)     Diverticulitis     Diverticulitis of colon     Fall at home, initial encounter 12/23/2023    Fracture of metacarpal shaft     Hemopneumothorax, left     last assessed: 06/02/15    Hyperlipidemia     Hypertension     Inguinal hernia     Lacunar infarction (HCC)     Approx 2000    Lumbar transverse process fracture (HCC)     Obesity     Pilonidal cyst     Plantar fasciitis 2/1/2009    Pleural effusion     last assessed: 07/01/15    Rib fractures     last assessed: 06/02/15    Status post fall     Superficial thrombophlebitis of leg     last assessed: 03/19/14   [4]   Past Surgical History:  Procedure Laterality Date    ABDOMINAL SURGERY  multiple    BOWEL RESECTION  2011?    COLON SURGERY      COLONOSCOPY      CORONARY ANGIOPLASTY WITH STENT PLACEMENT  2009    PTCA/RITA to RCA    FRACTURE SURGERY  05/04/2015    Closed treatment of fracture of metacarpal bone, right 5t metacarpal fracture Dr. Claire    HERNIA REPAIR      JOINT REPLACEMENT  03/04/2023    KNEE ARTHROSCOPY W/ MENISCAL REPAIR      Lateral meniscus    KNEE SURGERY  5/10/2015    LAPAROSCOPY  05/18/2015    Exploratory, extensive lysis of adhesions Dr. Pérez    OTHER SURGICAL HISTORY      Surgical lysis of intestinal adhesions    RECTAL SURGERY      REVISION COLOSTOMY      THORACENTESIS  06/02/2015    with imaging guidance left, removed 1300cc of fluid w/o problem lower base of lun06    TONSILLECTOMY AND ADENOIDECTOMY     [5]   Family  History  Problem Relation Name Age of Onset    Coronary artery disease Mother Hermelinda     Stroke Mother Hermelinda     Heart disease Father Sherman Bajwa         Benign hypertensive    Hyperlipidemia Father Sherman Bajwa

## 2025-06-05 ENCOUNTER — OFFICE VISIT (OUTPATIENT)
Dept: FAMILY MEDICINE CLINIC | Facility: CLINIC | Age: 75
End: 2025-06-05
Payer: MEDICARE

## 2025-06-05 ENCOUNTER — RESULTS FOLLOW-UP (OUTPATIENT)
Dept: FAMILY MEDICINE CLINIC | Facility: CLINIC | Age: 75
End: 2025-06-05

## 2025-06-05 ENCOUNTER — APPOINTMENT (OUTPATIENT)
Dept: LAB | Facility: HOSPITAL | Age: 75
End: 2025-06-05
Payer: MEDICARE

## 2025-06-05 VITALS
BODY MASS INDEX: 27.02 KG/M2 | DIASTOLIC BLOOD PRESSURE: 58 MMHG | HEIGHT: 71 IN | TEMPERATURE: 98.6 F | WEIGHT: 193 LBS | SYSTOLIC BLOOD PRESSURE: 132 MMHG | OXYGEN SATURATION: 97 % | HEART RATE: 47 BPM

## 2025-06-05 DIAGNOSIS — Z01.818 PREOP TESTING: ICD-10-CM

## 2025-06-05 DIAGNOSIS — R79.1 ABNORMAL COAGULATION PROFILE: ICD-10-CM

## 2025-06-05 DIAGNOSIS — Z01.818 PREOPERATIVE CLEARANCE: ICD-10-CM

## 2025-06-05 DIAGNOSIS — E11.9 TYPE 2 DIABETES MELLITUS WITHOUT COMPLICATION, WITHOUT LONG-TERM CURRENT USE OF INSULIN (HCC): ICD-10-CM

## 2025-06-05 DIAGNOSIS — Z01.818 PREOP TESTING: Primary | ICD-10-CM

## 2025-06-05 LAB
ANION GAP SERPL CALCULATED.3IONS-SCNC: 6 MMOL/L (ref 4–13)
BACTERIA UR QL AUTO: NORMAL /HPF
BILIRUB UR QL STRIP: NEGATIVE
BUN SERPL-MCNC: 16 MG/DL (ref 5–25)
CALCIUM SERPL-MCNC: 8.8 MG/DL (ref 8.4–10.2)
CHLORIDE SERPL-SCNC: 108 MMOL/L (ref 96–108)
CLARITY UR: CLEAR
CO2 SERPL-SCNC: 27 MMOL/L (ref 21–32)
COLOR UR: YELLOW
CREAT SERPL-MCNC: 0.88 MG/DL (ref 0.6–1.3)
EST. AVERAGE GLUCOSE BLD GHB EST-MCNC: 114 MG/DL
GFR SERPL CREATININE-BSD FRML MDRD: 85 ML/MIN/1.73SQ M
GLUCOSE SERPL-MCNC: 132 MG/DL (ref 65–140)
GLUCOSE UR STRIP-MCNC: NEGATIVE MG/DL
HBA1C MFR BLD: 5.6 %
HGB UR QL STRIP.AUTO: NEGATIVE
INR PPP: 1 (ref 0.85–1.19)
KETONES UR STRIP-MCNC: NEGATIVE MG/DL
LEUKOCYTE ESTERASE UR QL STRIP: NEGATIVE
NITRITE UR QL STRIP: NEGATIVE
NON-SQ EPI CELLS URNS QL MICRO: NORMAL /HPF
PH UR STRIP.AUTO: 7 [PH]
POTASSIUM SERPL-SCNC: 4.2 MMOL/L (ref 3.5–5.3)
PROT UR STRIP-MCNC: NEGATIVE MG/DL
PROTHROMBIN TIME: 13.7 SECONDS (ref 12.3–15)
RBC #/AREA URNS AUTO: NORMAL /HPF
SODIUM SERPL-SCNC: 141 MMOL/L (ref 135–147)
SP GR UR STRIP.AUTO: 1.02 (ref 1–1.03)
UROBILINOGEN UR STRIP-ACNC: <2 MG/DL
WBC #/AREA URNS AUTO: NORMAL /HPF

## 2025-06-05 PROCEDURE — 99214 OFFICE O/P EST MOD 30 MIN: CPT | Performed by: FAMILY MEDICINE

## 2025-06-05 PROCEDURE — 81001 URINALYSIS AUTO W/SCOPE: CPT

## 2025-06-05 NOTE — PROGRESS NOTES
Pre-operative Clearance  Name: Jorge Bajwa      : 1950      MRN: [id number]  Encounter Provider: Travis Bergman DO  Encounter Date: 2025   Encounter department: [facility]    :  Assessment & Plan  Preop testing         Type 2 diabetes mellitus without complication, without long-term current use of insulin ([facility])    Lab Results   Component Value Date    HGBA1C 5.7 (H) 2025            Preoperative clearance             Pre-operative Clearance:     Revised Cardiac Risk Index:  RCI RISK CLASS II (1 risk factor, risk of major cardiac complications approximately 1.3%)    Clearance:  Patient is medically optimized (CLEARED) for proposed surgery without any additional cardiac testing.      Medication Instructions:   - Avoid herbs or non-directed vitamins one week prior to surgery    - Avoid aspirin containing medications or non-steroidal anti-inflammatory drugs one week preceding surgery    - May take tylenol for pain up until the night before surgery    - ACE Inhibitors or ARBs: Continue this medication up to the evening before surgery/procedure, but do not take the morning of the day of surgery.  - Beta blockers:  Continue to take this medication on your normal schedule.  - Calcium channel blockers: Continue to take this medication on your normal schedule.  - Diuretics: Continue taking this medication up to the evening before surgery/procedure, but do not take in the morning of the day of surgery/procedure.  - Hyperlipidemia meds: Continue to take this medication on your normal schedule.  - Nitroglycerin: Continue to take this medication on your normal schedule.      Medicine Instructions for Adults with Diabetes (NO Bowel Prep)    Follow these instructions when a BOWEL PREP is NOT required for your procedure or surgery!    NOTE:  GLP Agonists taken weekly: do not take in the 7 days before your procedure. **Bariatric surgery: do not take 4 weeks prior to your procedure.    SGLT-2  Inhibitors: do not take in the 4 days before your procedure    On the Day Before Surgery/Procedure  If you are having a procedure (e.g., Colonoscopy) or surgery which DOES NOT require a bowel prep, follow the directions below based on the type of medicine you take for your diabetes.  Type of Medicine You Take Examples What to Do   Pre-Mixed Insulin Intermediate  Yzkgpvt61/25, Rskaemj14/30, Novolog 70/30, Regular Insulin Take 1/2 your regular dose the evening before our procedure.   Rapid/Fast Acting  Insulin and/or Long-Acting Insulin Humalog U200, NovoLog, Apidra,  Lantus, Levemir, Tresiba, Toujeo,  Fias, Basaglar Take your FULL regular dose the day before procedure.   Oral Diabetic Medicines (sulfonylurea) Glipizide/Glimepiride/  Glucotrol Take your regular dose with dinner the evening before your procedure.   Other Oral Diabetic Medicines Metformin, Glucophage, Glucophage  XR, Riomet, Glumetza, Actose,  Avandia, Gl set, Prandin Take your regular dose with dinner the evening before your procedure   GLP Agonists Adlyxin, Byetta, Bydureon,  Ozempic, Soliqua, Tanzeum,  Trulicity, Victoza, Saxenda,  Rybelsus, Wegovy, Mounjaro, Zepbound If taken daily, take as normal  If taken weekly, do not take this medicine for 7 days before your procedure including the day of the procedure (resume taking after the procedure). **Bariatric surgery: do not take 4 weeks prior to procedure   SGLT-2 Inhibitors Jardiance, Invokana, Farxiga, Steglatro, Brenzavvy, Qtern, Segluromet Glyxambi, Synjardy, Synjardy XR, Invokamet, InvokametXR, Trijary XR, Xigduo X Do not take for 4 days before your procedure including the day of the procedure (resume taking after the procedure)   This educational material has been approved by the Patient Education Advisory Committee.    On the Day of Surgery/Procedure  Follow the directions below based on the type of medicine you take for your diabetes.  Type of Medicine You Take  Examples What to Do    Long-Acting Insulin Lantus, Levemir, Tresiba,  Toujeo, Basaglar, Semglee If you normally take your Long Acting Insulin in the morning, take the full dose as scheduled.   GLP-I Agonists Adlyxin, Byetta, Bydureon,  Ozempic, Soliqua, Tanzeum,  Trulicity, Victoza, Saxenda,  Rybelsus, Mounjaro Do NOT take this medicine on the day of your procedure (resume taking after the procedure)   Except for the morning Long-Acting Insulin, DO NOT take ANY diabetic medicine on the day of your procedure unless you were instructed by the doctor who manages your diabetes medicines.  Continue to check your blood sugars.  If you have an insulin pump, ask your endocrinologist for instructions at least 3 days before your procedure. NOTE: If you are not able to ask your endocrinologist in advance, on the day of the procedure set your insulin pump to your basal rate only. Bring your insulin pump supplies to the hospital.        Lung Cancer Screening Shared Decision Making: I discussed with him that he is a candidate for lung cancer CT screening.     The following Shared Decision-Making points were covered:  Benefits of screening were discussed, including the rates of reduction in death from lung cancer and other causes.  Harms of screening were reviewed, including false positive tests, radiation exposure levels, risks of invasive procedures, risks of complications of screening, and risk of overdiagnosis.  I counseled on the importance of adherence to annual lung cancer LDCT screening, impact of co-morbidities, and ability or willingness to undergo diagnosis and treatment.  I counseled on the importance of maintaining abstinence as a former smoker or was counseled on the importance of smoking cessation if a current smoker    Review of Eligibility Criteria: He meets all of the criteria for Lung Cancer Screening.   - He is 74 y.o.   - He has 20 pack year tobacco history and is a current smoker or has quit within the past 15 years  - He  presents no signs or symptoms of lung cancer    After discussion, the patient decided to elect lung cancer screening.      History of Present Illness     Pre-Op Examination     Surgery: Excision of popliteal cyst left knee   Anticipated Date of Surgery: Abbi 10, 2025   Surgeon: Collin Colon MD     Intractable pain left knee with cool joint effusion.  Locking of knee present     Previous history of bleeding disorders or clots?: No    Previous Anesthesia reaction?: No    Prolonged steroid use in the last 6 months?: No      Assessment of Cardiac Risk:   - Unstable or severe angina or MI in the last 6 weeks or history of stent placement in the last year?: No    - Decompensated heart failure (e.g. New onset heart failure, NYHA  Class IV heart failure, or worsening existing heart failure)?: No    - Significant arrhythmias such as high grade AV block, symptomatic ventricular arrhythmia, newly recognized ventricular tachycardia, supraventricular tachycardia with resting heart rate >100, or symptomatic bradycardia?: No    - Severe heart valve disease including aortic stenosis or symptomatic mitral stenosis?: No      Pre-operative Risk Factors:  - Elevated-risk surgery: No    - History of cerebrovascular disease: No    - History of ischemic heart disease: Yes    - History of congestive heart failure: No    - Pre-operative treatment with insulin: No    - Pre-operative creatinine >2 mg/dL: No      Review of Systems   Constitutional:  Negative for activity change, appetite change, diaphoresis, fatigue and fever.   HENT: Negative.     Eyes: Negative.    Respiratory:  Negative for apnea, cough, chest tightness, shortness of breath and wheezing.    Cardiovascular:  Negative for chest pain, palpitations and leg swelling.   Gastrointestinal:  Negative for abdominal distention, abdominal pain, anal bleeding, constipation, diarrhea, nausea and vomiting.   Endocrine: Negative for cold intolerance, heat intolerance, polydipsia, polyphagia  "and polyuria.   Genitourinary:  Negative for difficulty urinating, dysuria, flank pain, hematuria and urgency.   Musculoskeletal:  Negative for arthralgias, back pain, gait problem, joint swelling and myalgias.   Skin:  Negative for color change, rash and wound.   Allergic/Immunologic: Negative for environmental allergies, food allergies and immunocompromised state.   Neurological:  Negative for dizziness, seizures, syncope, speech difficulty, numbness and headaches.   Hematological:  Negative for adenopathy. Does not bruise/bleed easily.   Psychiatric/Behavioral:  Negative for agitation, behavioral problems, hallucinations, sleep disturbance and suicidal ideas.      Past Medical History   Past Medical History[1]  Past Surgical History[2]  Family History[3]  Social History[4]  Medications[5]  No Known Allergies  Objective   /58 (BP Location: Left arm, Patient Position: Sitting, Cuff Size: Standard)   Pulse (!) 47   Temp 98.6 °F (37 °C) (Temporal)   Ht 5' 11\" (1.803 m)   Wt 87.5 kg (193 lb)   SpO2 97%   BMI 26.92 kg/m²     Physical Exam  Constitutional:       Appearance: He is well-developed.   HENT:      Head: Normocephalic and atraumatic.      Right Ear: External ear normal.      Left Ear: External ear normal.      Nose: Nose normal.     Eyes:      Conjunctiva/sclera: Conjunctivae normal.      Pupils: Pupils are equal, round, and reactive to light.       Cardiovascular:      Rate and Rhythm: Normal rate and regular rhythm.      Heart sounds: Normal heart sounds. No murmur heard.     No friction rub.   Pulmonary:      Effort: Pulmonary effort is normal. No respiratory distress.      Breath sounds: Normal breath sounds. No wheezing or rales.   Chest:      Chest wall: No tenderness.   Abdominal:      General: Bowel sounds are normal.      Palpations: Abdomen is soft.     Musculoskeletal:         General: Normal range of motion.      Cervical back: Normal range of motion and neck supple.      Comments: Left " knee swelling especially in popliteal space     Skin:     General: Skin is warm and dry.      Capillary Refill: Capillary refill takes 2 to 3 seconds.     Neurological:      Mental Status: He is alert and oriented to person, place, and time.     Psychiatric:         Behavior: Behavior normal.         Thought Content: Thought content normal.         Judgment: Judgment normal.           Travis Bergman DO       [1]   Past Medical History:  Diagnosis Date    Arthritis unknown    Asthma     resolved: 08/14/17    Bunion 1/1/2019    Closed fracture of fifth metacarpal bone of right hand     last assessed: 06/30/15    Coronary artery disease 2014    Deep vein thrombosis (HCC)     Diabetes mellitus (HCC)     Diverticulitis     Diverticulitis of colon     Fall at home, initial encounter 12/23/2023    Fracture of metacarpal shaft     Hemopneumothorax, left     last assessed: 06/02/15    Hyperlipidemia     Hypertension     Inguinal hernia     Lacunar infarction (HCC)     Approx 2000    Lumbar transverse process fracture (HCC)     Obesity     Pilonidal cyst     Plantar fasciitis 2/1/2009    Pleural effusion     last assessed: 07/01/15    Rib fractures     last assessed: 06/02/15    Status post fall     Superficial thrombophlebitis of leg     last assessed: 03/19/14   [2]   Past Surgical History:  Procedure Laterality Date    ABDOMINAL SURGERY  multiple    BOWEL RESECTION  2011?    COLON SURGERY      COLONOSCOPY      CORONARY ANGIOPLASTY WITH STENT PLACEMENT  2009    PTCA/RITA to RCA    FRACTURE SURGERY  05/04/2015    Closed treatment of fracture of metacarpal bone, right 5t metacarpal fracture Dr. Claire    HERNIA REPAIR      JOINT REPLACEMENT  03/04/2023    KNEE ARTHROSCOPY W/ MENISCAL REPAIR      Lateral meniscus    KNEE SURGERY  5/10/2015    LAPAROSCOPY  05/18/2015    Exploratory, extensive lysis of adhesions Dr. Pérez    OTHER SURGICAL HISTORY      Surgical lysis of intestinal adhesions    RECTAL SURGERY      REVISION  COLOSTOMY      THORACENTESIS  2015    with imaging guidance left, removed 1300cc of fluid w/o problem lower base of lun06    TONSILLECTOMY AND ADENOIDECTOMY     [3]   Family History  Problem Relation Name Age of Onset    Coronary artery disease Mother Hermelinda     Stroke Mother Hermelinda     Heart disease Father Sherman Bajwa         Benign hypertensive    Hyperlipidemia Father Sherman Bajwa    [4]   Social History  Tobacco Use    Smoking status: Former     Current packs/day: 0.00     Average packs/day: 1 pack/day for 30.0 years (30.0 ttl pk-yrs)     Types: Cigarettes     Start date: 1968     Quit date:      Years since quittin.0    Smokeless tobacco: Never   Vaping Use    Vaping status: Never Used   Substance and Sexual Activity    Alcohol use: Not Currently     Comment: Social    Drug use: No    Sexual activity: Not Currently     Partners: Female     Birth control/protection: None   [5]   Current Outpatient Medications on File Prior to Visit   Medication Sig    amLODIPine (NORVASC) 5 mg tablet Take 1 tablet (5 mg total) by mouth daily    atorvastatin (LIPITOR) 40 mg tablet Take 1 tablet (40 mg total) by mouth daily    FIBER ADULT GUMMIES PO Take 2 tablets by mouth in the morning.    furosemide (LASIX) 20 mg tablet Take 1 tablet (20 mg total) by mouth daily    metFORMIN (GLUCOPHAGE-XR) 500 mg 24 hr tablet Take 1 tablet (500 mg total) by mouth 2 (two) times a day with meals    metoprolol tartrate (LOPRESSOR) 25 mg tablet Take 1 tablet (25 mg total) by mouth 2 (two) times a day    Multiple Vitamin (multivitamin) tablet Take 1 tablet by mouth daily    potassium chloride (Klor-Con) 10 mEq tablet Take 1 tablet (10 mEq total) by mouth daily    ramipril (ALTACE) 10 MG capsule Take 1 capsule (10 mg total) by mouth daily    aspirin (Aspirin 81) 81 mg EC tablet Take 1 tablet (81 mg total) by mouth daily (Patient not taking: Reported on 2025)    nitroglycerin (NITROSTAT) 0.4 mg SL tablet  (Patient  not taking: Reported on 6/5/2025)

## 2025-06-05 NOTE — PATIENT INSTRUCTIONS
Pre-operative Medication Instructions    Avoid herbs or non-directed vitamins one week prior to surgery  Avoid aspirin containing medications or non-steroidal anti-inflammatory drugs one week preceding surgery  May take tylenol for pain up until the night before surgery    ACE Inhibitors or ARBs     Medication Name     ramipril (ALTACE) 10 MG capsule      Continue this medication up to the evening before surgery/procedure, but do not take the morning of the day of surgery.    Beta Blockers     Medication Name     metoprolol tartrate (LOPRESSOR) 25 mg tablet      Continue to take this heart medication on your normal schedule.  If this is an oral medication and you take it in the morning, then you may take this medicine with a sip of water.    Calcium Channel Blockers     Medication Name     amLODIPine (NORVASC) 5 mg tablet      Continue to take this heart medication on your normal schedule.  If this is an oral medication and you take it in the morning, then you may take this medicine with a sip of water.    Diuretics     Medication Name     furosemide (LASIX) 20 mg tablet      Continue this medication up to the evening before surgery/procedure, but do not take the morning of the day of surgery.    Cholesterol lowering meds     Medication Name     atorvastatin (LIPITOR) 40 mg tablet      Continue to take this medication on your normal schedule.  If this is an oral medication and you take it in the morning, then you may take this medicine with a sip of water.    Nitroglycerin     Medication Name     nitroglycerin (NITROSTAT) 0.4 mg SL tablet      Continue to take your nitroglyerin as directed by your prescribing Physician and notify your Physician and Anesthesiologist if you have needed to increase the frequency or dosage.    Diabetic Medications     Medication Name     metFORMIN (GLUCOPHAGE-XR) 500 mg 24 hr tablet        Medicine Instructions for Adults with Diabetes (NO Bowel Prep)    Follow these instructions when a  BOWEL PREP is NOT required for your procedure or surgery!    NOTE:  GLP Agonists taken weekly: do not take in the 7 days before your procedure. **Bariatric surgery: do not take 4 weeks prior to your procedure.    SGLT-2 Inhibitors: do not take in the 4 days before your procedure    On the Day Before Surgery/Procedure  If you are having a procedure (e.g., Colonoscopy) or surgery which DOES NOT require a bowel prep, follow the directions below based on the type of medicine you take for your diabetes.  Type of Medicine You Take Examples What to Do   Pre-Mixed Insulin Intermediate  Cwjtzmq05/25, Henvwzc71/30, Novolog 70/30, Regular Insulin Take 1/2 your regular dose the evening before our procedure.   Rapid/Fast Acting  Insulin and/or Long-Acting Insulin Humalog U200, NovoLog, Apidra,  Lantus, Levemir, Tresiba, Toujeo,  Fias, Basaglar Take your FULL regular dose the day before procedure.   Oral Diabetic Medicines (sulfonylurea) Glipizide/Glimepiride/  Glucotrol Take your regular dose with dinner the evening before your procedure.   Other Oral Diabetic Medicines Metformin, Glucophage, Glucophage  XR, Riomet, Glumetza, Actose,  Avandia, Gl set, Prandin Take your regular dose with dinner the evening before your procedure   GLP Agonists Adlyxin, Byetta, Bydureon,  Ozempic, Soliqua, Tanzeum,  Trulicity, Victoza, Saxenda,  Rybelsus, Wegovy, Mounjaro, Zepbound If taken daily, take as normal  If taken weekly, do not take this medicine for 7 days before your procedure including the day of the procedure (resume taking after the procedure). **Bariatric surgery: do not take 4 weeks prior to procedure   SGLT-2 Inhibitors Jardiance, Invokana, Farxiga, Steglatro, Brenzavvy, Qtern, Segluromet Glyxambi, Synjardy, Synjardy XR, Invokamet, InvokametXR, Trijary XR, Xigduo X Do not take for 4 days before your procedure including the day of the procedure (resume taking after the procedure)   This educational material has been approved by the  Patient Education Advisory Committee.    On the Day of Surgery/Procedure  Follow the directions below based on the type of medicine you take for your diabetes.  Type of Medicine You Take  Examples What to Do   Long-Acting Insulin Lantus, Levemir, Tresiba,  Toujeo, Basaglar, Semglee If you normally take your Long Acting Insulin in the morning, take the full dose as scheduled.   GLP-I Agonists Adlyxin, Byetta, Bydureon,  Ozempic, Soliqua, Tanzeum,  Trulicity, Victoza, Saxenda,  Rybelsus, Mounjaro Do NOT take this medicine on the day of your procedure (resume taking after the procedure)   Except for the morning Long-Acting Insulin, DO NOT take ANY diabetic medicine on the day of your procedure unless you were instructed by the doctor who manages your diabetes medicines.  Continue to check your blood sugars.  If you have an insulin pump, ask your endocrinologist for instructions at least 3 days before your procedure. NOTE: If you are not able to ask your endocrinologist in advance, on the day of the procedure set your insulin pump to your basal rate only. Bring your insulin pump supplies to the hospital.    If you have any questions about taking your diabetes medicines prior to your procedure, please contact the doctor who manages your diabetes medicines.

## 2025-06-06 LAB — BACTERIA UR CULT: NORMAL

## 2025-06-09 ENCOUNTER — TELEPHONE (OUTPATIENT)
Age: 75
End: 2025-06-09

## 2025-06-09 NOTE — TELEPHONE ENCOUNTER
Andree from [facility] called stating that the pt is going to have left knee popliteal cyst removal tomorrow and she needs last EKG.     Please print EKG and fax to [phone]        Faxed last OV and echo per request.

## 2025-06-12 ENCOUNTER — EVALUATION (OUTPATIENT)
Dept: PHYSICAL THERAPY | Facility: HOME HEALTHCARE | Age: 75
End: 2025-06-12
Payer: MEDICARE

## 2025-06-12 DIAGNOSIS — M94.262 CHONDROMALACIA OF KNEE, LEFT: Primary | ICD-10-CM

## 2025-06-12 PROCEDURE — 97161 PT EVAL LOW COMPLEX 20 MIN: CPT

## 2025-06-12 PROCEDURE — 97110 THERAPEUTIC EXERCISES: CPT

## 2025-06-12 NOTE — PROGRESS NOTES
PT Evaluation     Today's date: 2025  Patient name: Jorge Bajwa  : 1950  MRN: [id number]  Referring provider: Collin Colon MD  Dx:   Encounter Diagnosis     ICD-10-CM    1. Chondromalacia of knee, left  M94.262           Start Time: 1001  Stop Time: 1045  Total time in clinic (min): 44 minutes    Assessment  Impairments: abnormal gait, abnormal or restricted ROM, activity intolerance, impaired balance, impaired physical strength, lacks appropriate home exercise program, pain with function, weight-bearing intolerance, participation limitations, activity limitations and endurance    Assessment details: Jorge Bajwa is a 74 y.o. male presenting to OP PT clinic in [place] w/ referral for chondromalacia of L knee.  Pt presents w/ edema in L knee restricting ROM, decreased P/AROM in L knee, previous disturbed sleep due to L knee pain, pain w/ prolonged standing,  ambulation, stair climbing, and pain w/ function.  Pt has difficulty performing ADLs and recreational activities due to above mentioned deficits.  Pt would benefit from skilled therapy to address impairments, allowing pt to perform ADLs and recreational activities w/o restriction or pain to improve QoL and return to PLOF.  PT gave pt HEP focusing on performing exercises/ activities outside of the clinic to further improve and address impairments.  Thank you for this referral!        Goals  STG:  -Pt will improve pain from 4/10 to 2/10 at worst in posterior L knee to allow reduced difficulty performing ADLs due to pain in 4 weeks.  -Pt will increase L knee ext AROM from -15* to <-10* to allow pt w/ improved ability to ascend/descend stairs w/ less difficulty in 4 weeks.  -Pt will increase L knee flexion strength from 4+/5 to 5/5 to allow pt w/ improved ability of performing a STS tx w/ less difficulty in 4 weeks.    LTG:  -Pt will be d/c from OP PT w/ I HEP to allow pt to continue improving upon their impairments for improved ADLs/  "recreational activities in 12 weeks.  -Pt will improve L knee fig 8 edema from 150cm to 140cm to allow improved ability to perform ADLs/ recreational activities w/o need for a rest break in 12 weeks.       Plan  Patient would benefit from: skilled physical therapy  Planned modality interventions: cryotherapy, neuromuscular electric stimulation and TENS    Planned therapy interventions: joint mobilization, manual therapy, massage, neuromuscular re-education, strengthening, stretching, therapeutic activities, therapeutic exercise, home exercise program, gait training, flexibility, functional ROM exercises, balance/weight bearing training and balance    Frequency: 2x week  Duration in weeks: 12  Plan of Care beginning date: 2025  Plan of Care expiration date: 2025  Treatment plan discussed with: patient  Plan details: Begin POC focusing on addressing & improving impairments listed in eval.        Subjective Evaluation    History of Present Illness  Mechanism of injury: From Dr. Colon referral script, \"L knee popliteal cyst removal of loose bodies.  Eval and treat.  Dressing change.  Patient will bring additional PT sheet w/ post op images.  Edema control.  Please make sure patient has a HEP.  Keep steri strips in place until they fall off themselves.  WBAT unless otherwise directed from day of surgery.\"    Pt states that his most difficult functional activity related to the L knee is sleeping.  States he has disturbed sleep due to L knee nightly.  Pt states that since cyst removal he has not had disturbed sleep for the past 2 nights.  Pt has a stair lift at home.  Patient Goals  Patient goals for therapy: decreased pain, improved balance, decreased edema, increased motion, return to sport/leisure activities, independence with ADLs/IADLs and increased strength    Pain  Current pain ratin  At best pain ratin  At worst pain ratin  Location: L knee  Quality: dull ache and tight  Relieving factors: ice, " "relaxation and rest  Aggravating factors: standing and walking    Social Support  Steps to enter house: yes  Lives with: alone    Employment status: not working  Treatments  Current treatment: physical therapy        Objective     Active Range of Motion   Left Knee   Flexion: 104 degrees   Extension: -15 degrees     Right Knee   Flexion: 115 degrees   Extension: 0 degrees     Passive Range of Motion   Left Knee   Flexion: 106 degrees with pain  Extension: -12 degrees with pain    Right Knee   Flexion: 116 degrees     Strength/Myotome Testing     Left Knee   Flexion: 4+  Extension: 4+    Right Knee   Flexion: 5  Extension: 5    General Comments:      Knee Comments  GIRTH MEASUREMENTS:    Left Knee:  -Superior Patella = 43.5cm  -Mid Patella = 44.25cm  -Inferior Patella = 42.5cm  -Figure 8 = 150cm    Right Knee:  -Superior Patella = 40cm  -Mid Patella = 41cm  -Inferior Patella = 39.5cm  -Figure 8 = 140cm             Precautions: n/a    Visit Count 1           Manuals 6/12         ? knee flex/ext                                           Neuro Re-Ed           Balance as appropriate                                 Ther Ex          NuStep           Ankle pumps           HR/TR        Standing Hip flex/abd/ext        Mini Squats        Step ups/ Step downs                        Heel Slides w/ towel HEP    5\"x10         QS HEP    5\"x10         Hip abd on sliding board           LAQ           Hip add          Hip abd          SLR          SAQ        S/L SLR                                                  Education on HEP, use of CP to decrease edema, etc. DF                               Ther Activity                                Gait Training                                Modalities          CP                        "

## 2025-06-12 NOTE — LETTER
2025    Collin Colon MD  [address] [place] [id number]    Patient: Jorge Bajwa   YOB: 1950   Date of Visit: 2025     Encounter Diagnosis     ICD-10-CM    1. Chondromalacia of knee, left  M94.262           Dear Dr. Collin Cooln MD:    Thank you for your recent referral of Jorge Bajwa. Please review the attached evaluation summary from Jorge's recent visit.     Please verify that you agree with the plan of care by signing the attached order.     If you have any questions or concerns, please do not hesitate to call.     I sincerely appreciate the opportunity to share in the care of one of your patients and hope to have another opportunity to work with you in the near future.       Sincerely,    Skip Shelby, PT      Referring Provider:      I certify that I have read the below Plan of Care and certify the need for these services furnished under this plan of treatment while under my care.                    Collin Colon MD  [address] [place] [id number]  Via Fax: [phone]          PT Evaluation     Today's date: 2025  Patient name: Jorge Bajwa  : 1950  MRN: [id number]  Referring provider: Collin Colon MD  Dx:   Encounter Diagnosis     ICD-10-CM    1. Chondromalacia of knee, left  M94.262           Start Time: 1001  Stop Time: 1045  Total time in clinic (min): 44 minutes    Assessment  Impairments: abnormal gait, abnormal or restricted ROM, activity intolerance, impaired balance, impaired physical strength, lacks appropriate home exercise program, pain with function, weight-bearing intolerance, participation limitations, activity limitations and endurance    Assessment details: Jorge Bajwa is a 74 y.o. male presenting to OP PT clinic in [place] w/ referral for chondromalacia of L knee.  Pt presents w/ edema in L knee restricting ROM, decreased P/AROM in L knee, previous disturbed sleep due to L knee pain, pain w/  prolonged standing,  ambulation, stair climbing, and pain w/ function.  Pt has difficulty performing ADLs and recreational activities due to above mentioned deficits.  Pt would benefit from skilled therapy to address impairments, allowing pt to perform ADLs and recreational activities w/o restriction or pain to improve QoL and return to PLOF.  PT gave pt HEP focusing on performing exercises/ activities outside of the clinic to further improve and address impairments.  Thank you for this referral!        Goals  STG:  -Pt will improve pain from 4/10 to 2/10 at worst in posterior L knee to allow reduced difficulty performing ADLs due to pain in 4 weeks.  -Pt will increase L knee ext AROM from -15* to <-10* to allow pt w/ improved ability to ascend/descend stairs w/ less difficulty in 4 weeks.  -Pt will increase L knee flexion strength from 4+/5 to 5/5 to allow pt w/ improved ability of performing a STS tx w/ less difficulty in 4 weeks.    LTG:  -Pt will be d/c from OP PT w/ I HEP to allow pt to continue improving upon their impairments for improved ADLs/ recreational activities in 12 weeks.  -Pt will improve L knee fig 8 edema from 150cm to 140cm to allow improved ability to perform ADLs/ recreational activities w/o need for a rest break in 12 weeks.       Plan  Patient would benefit from: skilled physical therapy  Planned modality interventions: cryotherapy, neuromuscular electric stimulation and TENS    Planned therapy interventions: joint mobilization, manual therapy, massage, neuromuscular re-education, strengthening, stretching, therapeutic activities, therapeutic exercise, home exercise program, gait training, flexibility, functional ROM exercises, balance/weight bearing training and balance    Frequency: 2x week  Duration in weeks: 12  Plan of Care beginning date: 6/12/2025  Plan of Care expiration date: 9/4/2025  Treatment plan discussed with: patient  Plan details: Begin POC focusing on addressing & improving  "impairments listed in eval.        Subjective Evaluation    History of Present Illness  Mechanism of injury: From Dr. Colon referral script, \"L knee popliteal cyst removal of loose bodies.  Eval and treat.  Dressing change.  Patient will bring additional PT sheet w/ post op images.  Edema control.  Please make sure patient has a HEP.  Keep steri strips in place until they fall off themselves.  WBAT unless otherwise directed from day of surgery.\"    Pt states that his most difficult functional activity related to the L knee is sleeping.  States he has disturbed sleep due to L knee nightly.  Pt states that since cyst removal he has not had disturbed sleep for the past 2 nights.  Pt has a stair lift at home.  Patient Goals  Patient goals for therapy: decreased pain, improved balance, decreased edema, increased motion, return to sport/leisure activities, independence with ADLs/IADLs and increased strength    Pain  Current pain ratin  At best pain ratin  At worst pain ratin  Location: L knee  Quality: dull ache and tight  Relieving factors: ice, relaxation and rest  Aggravating factors: standing and walking    Social Support  Steps to enter house: yes  Lives with: alone    Employment status: not working  Treatments  Current treatment: physical therapy        Objective     Active Range of Motion   Left Knee   Flexion: 104 degrees   Extension: -15 degrees     Right Knee   Flexion: 115 degrees   Extension: 0 degrees     Passive Range of Motion   Left Knee   Flexion: 106 degrees with pain  Extension: -12 degrees with pain    Right Knee   Flexion: 116 degrees     Strength/Myotome Testing     Left Knee   Flexion: 4+  Extension: 4+    Right Knee   Flexion: 5  Extension: 5    General Comments:      Knee Comments  GIRTH MEASUREMENTS:    Left Knee:  -Superior Patella = 43.5cm  -Mid Patella = 44.25cm  -Inferior Patella = 42.5cm  -Figure 8 = 150cm    Right Knee:  -Superior Patella = 40cm  -Mid Patella = 41cm  -Inferior " "Patella = 39.5cm  -Figure 8 = 140cm             Precautions: n/a    Visit Count 1           Manuals 6/12         ? knee flex/ext                                           Neuro Re-Ed           Balance as appropriate                                 Ther Ex          NuStep           Ankle pumps           HR/TR        Standing Hip flex/abd/ext        Mini Squats        Step ups/ Step downs                        Heel Slides w/ towel HEP    5\"x10         QS HEP    5\"x10         Hip abd on sliding board           LAQ           Hip add          Hip abd          SLR          SAQ        S/L SLR                                                  Education on HEP, use of CP to decrease edema, etc. DF                               Ther Activity                                Gait Training                                Modalities          CP                                        "

## 2025-06-17 ENCOUNTER — OFFICE VISIT (OUTPATIENT)
Dept: PHYSICAL THERAPY | Facility: HOME HEALTHCARE | Age: 75
End: 2025-06-17
Payer: MEDICARE

## 2025-06-17 DIAGNOSIS — M94.262 CHONDROMALACIA OF KNEE, LEFT: Primary | ICD-10-CM

## 2025-06-17 PROCEDURE — 97110 THERAPEUTIC EXERCISES: CPT

## 2025-06-17 NOTE — PROGRESS NOTES
"Daily Note     Today's date: 2025  Patient name: Jorge Bajwa  : 1950  MRN: [id number]  Referring provider: Collin Colon MD  Dx: No diagnosis found.    Start Time: 0700          Subjective: Pain of maybe 1/10 at the incision site. I am sleeping well and it is the best I have slept in 6 months. I am going up and down stairs normally but slowly.     Objective: See treatment diary below    Assessment:  Pt kailash session very well with additional TE as per flowsheet. Vc's provided for correct form. Pt was able to complete all TE with report of minimal discomfort and declined CP at end of tx. Discussed and instructed pt in elevation for edema reduction. Patient would benefit from continued PT    Plan: Continue per plan of care.      Precautions: n/a    Visit Count 1 2          Manuals -        ? knee flex/ext                                           Neuro Re-Ed           Balance as appropriate                                 Ther Ex          NuStep   L 3  8'        Ankle pumps           HR/TR  1x10 gentle      Standing Hip flex/abd/ext  3 way L only       Mini Squats  1x10      Step ups/ Step downs  NV                      Heel Slides w/ towel HEP    5\"x10 3\" x10        QS HEP    5\"x10 5\" x10        Hip abd on sliding board   3\" x10        LAQ   5\" x10        Hip add  5\" x10        Hip abd          SLR  3\" x10        SAQ        S/L SLR                                                  Education on HEP, use of CP to decrease edema, etc. DF                               Ther Activity                                Gait Training                                Modalities          CP                             "

## 2025-06-19 ENCOUNTER — OFFICE VISIT (OUTPATIENT)
Dept: PHYSICAL THERAPY | Facility: HOME HEALTHCARE | Age: 75
End: 2025-06-19
Payer: MEDICARE

## 2025-06-19 DIAGNOSIS — M94.262 CHONDROMALACIA OF KNEE, LEFT: Primary | ICD-10-CM

## 2025-06-19 PROCEDURE — 97110 THERAPEUTIC EXERCISES: CPT

## 2025-06-19 NOTE — PROGRESS NOTES
"Daily Note     Today's date: 2025  Patient name: Jorge Bajwa  : 1950  MRN: [id number]  Referring provider: Collin Colon MD  Dx: No diagnosis found.    Start Time: 0745          Subjective: All of the strips are off. I had a stitch what was getting stuck on my pants so I trimmed it.    Objective: See treatment diary below    Assessment: Pt ol session well and was able to complete TE with added step up/down without c/o pain. Pt 's incision is slightly red with no sign of infection, drainage or open areas. Pt with end range tightness with flex/ext evident and declined MT.  Pt reports understanding and consistency with HEP and self stretch. Patient would benefit from continued PT    Plan: Continue per plan of care.      Precautions: n/a    Visit Count 1 2 3         Manuals        ? knee flex/ext                                           Neuro Re-Ed          Balance as appropriate                                 Ther Ex         NuStep   L 3  8' L3  11'       Ankle pumps           HR/TR  1x10 gentle 1x10     Standing Hip flex/abd/ext  3 way L only  3-way florencio  x10 ea     Mini Squats  1x10 1x10     Step ups/ Step downs  NV C up + down  1x10 florencio                     Heel Slides w/ towel HEP    5\"x10 3\" x10 3\" x15       QS HEP    5\"x10 5\" x10 5\" x10       Hip abd on sliding board   3\" x10 3\" x15       LAQ   5\" x10 5\" x15 florencio       Hip add  5\" x10 5\" x15       Hip abd          SLR  3\" x10 3\" x10       SAQ        S/L SLR                                                  Education on HEP, use of CP to decrease edema, etc. DF                               Ther Activity                                Gait Training                                Modalities         CP                               "

## 2025-06-24 ENCOUNTER — OFFICE VISIT (OUTPATIENT)
Dept: PHYSICAL THERAPY | Facility: HOME HEALTHCARE | Age: 75
End: 2025-06-24
Payer: MEDICARE

## 2025-06-24 DIAGNOSIS — M94.262 CHONDROMALACIA OF KNEE, LEFT: Primary | ICD-10-CM

## 2025-06-24 PROCEDURE — 97140 MANUAL THERAPY 1/> REGIONS: CPT

## 2025-06-24 PROCEDURE — 97110 THERAPEUTIC EXERCISES: CPT

## 2025-06-24 NOTE — PROGRESS NOTES
"Daily Note     Today's date: 2025  Patient name: Jorge Bajwa  : 1950  MRN: [id number]  Referring provider: Collin Colon MD  Dx:   Encounter Diagnosis     ICD-10-CM    1. Chondromalacia of knee, left  M94.262           Start Time: 745  Stop Time: 823  Total time in clinic (min): 38 minutes    Subjective: Pt states that he has not been able to sleep as of late.  Pt states that he is unsure if it is due to weather or potentially from the L knee, however he has minimal discomfort in L knee.  Pt states that is major complaint w/ L knee today is stiffness.        Objective: See treatment diary below      Assessment:  Pt tolerated treatment session w/o increase in L knee pain.  Pt was able to perform D step (8 inch) today, as well as increased reps of other exercises.  Pt still has tightness in L quad and anterior knee.  Pt benefits from manual therapy to reduce tightness.        Plan: Continue per plan of care.      Precautions: n/a    Visit Count 1 2 3 4        Manuals       L knee flex/ext     L knee flexion  & L HS stretch                                        Neuro Re-Ed          Balance as appropriate            Long foam in // bars    Tandem w/ Finger tip HHA    5x <>    Lateral steps w/ 1 HHA w/ decreasing visual reliance    5x<>                Ther Ex         NuStep   L 3  8' L3  11'  L3 12'       Ankle pumps           HR/TR  1x10 gentle 1x10     Standing Hip flex/abd/ext  3 way L only  3-way florencio  x10 ea 3 way  X15 ea b/l      Mini Squats  1x10 1x10 1x15      Step ups/ Step downs  NV C up + down  1x10 florencio   D up & down   1x 15 ea b/l                    Heel Slides w/ towel HEP    5\"x10 3\" x10 3\" x15       QS HEP    5\"x10 5\" x10 5\" x10       Hip abd on sliding board   3\" x10 3\" x15       LAQ   5\" x10 5\" x15 florencio  1.5# cuff 5\" x15 ea b/l     Hip add  5\" x10 5\" x15       Hip abd          SLR  3\" x10 3\" x10  1.5# cuff 3\" x15 ea b/l     SAQ        S/L SLR        "                                           Education on HEP, use of CP to decrease edema, etc. DF                               Ther Activity                                Gait Training                                Modalities  6-17 6-19       CP

## 2025-06-25 ENCOUNTER — TELEPHONE (OUTPATIENT)
Dept: FAMILY MEDICINE CLINIC | Facility: CLINIC | Age: 75
End: 2025-06-25

## 2025-06-25 NOTE — TELEPHONE ENCOUNTER
Lvm for patient to call the office to schedule pre op clearance. Needs to be completed within 30 days of scheduled surgery.

## 2025-06-26 ENCOUNTER — OFFICE VISIT (OUTPATIENT)
Dept: PHYSICAL THERAPY | Facility: HOME HEALTHCARE | Age: 75
End: 2025-06-26
Payer: MEDICARE

## 2025-06-26 DIAGNOSIS — M94.262 CHONDROMALACIA OF KNEE, LEFT: Primary | ICD-10-CM

## 2025-06-26 PROCEDURE — 97112 NEUROMUSCULAR REEDUCATION: CPT

## 2025-06-26 PROCEDURE — 97110 THERAPEUTIC EXERCISES: CPT

## 2025-06-26 NOTE — PROGRESS NOTES
"Daily Note     Today's date: 2025  Patient name: Jorge Bajwa  : 1950  MRN: [id number]  Referring provider: Collin Colon MD  Dx:   Encounter Diagnosis     ICD-10-CM    1. Chondromalacia of knee, left  M94.262           Start Time: 832          Subjective: Pt states that he performed lawn mowing w/ push mower for roughly 2-2.5 hours yesterday morning and his L knee was pretty sore after.  Pt states besides the soreness he doesn't have any pain, just discomfort.       Objective: See treatment diary below      Assessment: Pt tolerated treatment session w/o increase in L knee pain.  PT had pt perform increased holds and weight of exercises for further strength training of BLEs.   Pt w/ improvements during balance ambulation on long foam, pt decreasing HHA throughout reps w/ intrinsic feedback.  PT added HS curls to pt's POC which pt was able to perform but has limitations in LLE due to weakness.  Continue POC to further strengthen and improve ROM of pt's L knee.       Plan: Continue per plan of care.      Precautions: n/a    Visit Count 1 2 3 4 5       Manuals       L knee flex/ext     L knee flexion  & L HS stretch                                        Neuro Re-Ed          Balance as appropriate            Long foam in // bars    Tandem w/ Finger tip HHA    5x <>    Lateral steps w/ 1 HHA w/ decreasing visual reliance    5x<>  Tandem w/ Finger tip HHA    3x <>    Lateral steps w/ 1 HHA w/ decreasing visual reliance    3x<>              Ther Ex         NuStep   L 3  8' L3  11'  L3 12'    L5 12'   Ankle pumps           HR/TR  1x10 gentle 1x10     Standing Hip flex/abd/ext  3 way L only  3-way florencio  x10 ea 3 way  X15 ea b/l   3 way  3\" X15 ea b/l   Mini Squats  1x10 1x10 1x15   1x15   Step ups/ Step downs  NV C up + down  1x10 florencio   D up & down   1x 15 ea b/l D up & down   1x 15 ea b/l                   Heel Slides w/ towel HEP    5\"x10 3\" x10 3\" x15       QS " "HEP    5\"x10 5\" x10 5\" x10       Hip abd on sliding board   3\" x10 3\" x15       LAQ   5\" x10 5\" x15 florencio  1.5# cuff 5\" x15 ea b/l  3# cuff 3\" x15 ea b/l   Hip add  5\" x10 5\" x15       Hip abd          SLR  3\" x10 3\" x10  1.5# cuff 3\" x15 ea b/l  3# cuff 3\" x15 ea b/l   Prone HS Curls       3# cuff 3\" x15 ea b/l   SAQ        S/L SLR                                                  Education on HEP, use of CP to decrease edema, etc. DF                               Ther Activity                                Gait Training                                Modalities  6-17 6-19       CP                                   "

## 2025-06-27 ENCOUNTER — PROCEDURE VISIT (OUTPATIENT)
Dept: PODIATRY | Facility: CLINIC | Age: 75
End: 2025-06-27
Payer: MEDICARE

## 2025-06-27 VITALS — WEIGHT: 190 LBS | BODY MASS INDEX: 26.6 KG/M2 | HEIGHT: 71 IN

## 2025-06-27 DIAGNOSIS — E11.40 TYPE 2 DIABETES MELLITUS WITH DIABETIC NEUROPATHY, WITHOUT LONG-TERM CURRENT USE OF INSULIN (HCC): Primary | ICD-10-CM

## 2025-06-27 DIAGNOSIS — B35.1 ONYCHOMYCOSIS: ICD-10-CM

## 2025-06-27 PROCEDURE — 11721 DEBRIDE NAIL 6 OR MORE: CPT | Performed by: PODIATRIST

## 2025-06-27 NOTE — PROGRESS NOTES
PATIENT:  Jorge Bajwa  1950    ASSESSMENT/PLAN:  1. Type 2 diabetes mellitus with diabetic neuropathy, without long-term current use of insulin ([facility])        2. Onychomycosis             Disease prevention and related risk factors of diabetes were identified and discussed.  The patient was educated in proper foot wear for diabetics. Educated in daily foot assessment and routine diabetic foot care.  The patient will follow up in 12 weeks.        Procedure: All mycotic toenails were reduced and debrided in length, width, and girth using a nail nipper and dremel.  Patient tolerated procedure(s) well without complications.      HPI:  Jorge Bajwa is a 74 y.o.year old male seen for diabetic foot exam and care.  The patient has class findings with DPN.   BS is under control.  No acute pedal disorder or injury.    PAST MEDICAL HISTORY:  Past Medical History:   Diagnosis Date    Arthritis unknown    Asthma     resolved: 08/14/17    Bunion 1/1/2019    Closed fracture of fifth metacarpal bone of right hand     last assessed: 06/30/15    Coronary artery disease 2014    Deep vein thrombosis (HCC)     Diabetes mellitus (HCC)     Diverticulitis     Diverticulitis of colon     Fall at home, initial encounter 12/23/2023    Fracture of metacarpal shaft     Hemopneumothorax, left     last assessed: 06/02/15    Hyperlipidemia     Hypertension     Inguinal hernia     Lacunar infarction (HCC)     Approx 2000    Lumbar transverse process fracture (HCC)     Obesity     Pilonidal cyst     Plantar fasciitis 2/1/2009    Pleural effusion     last assessed: 07/01/15    Rib fractures     last assessed: 06/02/15    Status post fall     Superficial thrombophlebitis of leg     last assessed: 03/19/14       PAST SURGICAL HISTORY:  Past Surgical History:   Procedure Laterality Date    ABDOMINAL SURGERY  multiple    BOWEL RESECTION  2011?    COLON SURGERY      COLONOSCOPY      CORONARY ANGIOPLASTY WITH STENT PLACEMENT  2009    PTCA/RITA  to RCA    FRACTURE SURGERY  05/04/2015    Closed treatment of fracture of metacarpal bone, right 5t metacarpal fracture Dr. Claire    HERNIA REPAIR      JOINT REPLACEMENT  03/04/2023    KNEE ARTHROSCOPY W/ MENISCAL REPAIR      Lateral meniscus    KNEE SURGERY  5/10/2015    LAPAROSCOPY  05/18/2015    Exploratory, extensive lysis of adhesions Dr. Pérez    OTHER SURGICAL HISTORY      Surgical lysis of intestinal adhesions    RECTAL SURGERY      REVISION COLOSTOMY      THORACENTESIS  06/02/2015    with imaging guidance left, removed 1300cc of fluid w/o problem lower base of lun06    TONSILLECTOMY AND ADENOIDECTOMY          ALLERGIES:  Patient has no known allergies.    MEDICATIONS:  Current Outpatient Medications   Medication Sig Dispense Refill    amLODIPine (NORVASC) 5 mg tablet Take 1 tablet (5 mg total) by mouth daily 90 tablet 1    aspirin (Aspirin 81) 81 mg EC tablet Take 1 tablet (81 mg total) by mouth daily (Patient not taking: Reported on 6/4/2025) 30 tablet 0    atorvastatin (LIPITOR) 40 mg tablet Take 1 tablet (40 mg total) by mouth daily 90 tablet 1    FIBER ADULT GUMMIES PO Take 2 tablets by mouth in the morning.      furosemide (LASIX) 20 mg tablet Take 1 tablet (20 mg total) by mouth daily 90 tablet 1    metFORMIN (GLUCOPHAGE-XR) 500 mg 24 hr tablet Take 1 tablet (500 mg total) by mouth 2 (two) times a day with meals 180 tablet 1    metoprolol tartrate (LOPRESSOR) 25 mg tablet Take 1 tablet (25 mg total) by mouth 2 (two) times a day 180 tablet 1    Multiple Vitamin (multivitamin) tablet Take 1 tablet by mouth daily 30 tablet 0    nitroglycerin (NITROSTAT) 0.4 mg SL tablet  (Patient not taking: Reported on 6/5/2025)      potassium chloride (Klor-Con) 10 mEq tablet Take 1 tablet (10 mEq total) by mouth daily 90 tablet 1    ramipril (ALTACE) 10 MG capsule Take 1 capsule (10 mg total) by mouth daily 90 capsule 1     No current facility-administered medications for this visit.       SOCIAL HISTORY:  Social  History     Socioeconomic History    Marital status:    Occupational History    Occupation: Retired   Tobacco Use    Smoking status: Former     Current packs/day: 0.00     Average packs/day: 1 pack/day for 30.0 years (30.0 ttl pk-yrs)     Types: Cigarettes     Start date: 1968     Quit date:      Years since quittin.0    Smokeless tobacco: Never   Vaping Use    Vaping status: Never Used   Substance and Sexual Activity    Alcohol use: Not Currently     Comment: Social    Drug use: No    Sexual activity: Not Currently     Partners: Female     Birth control/protection: None   Social History Narrative    Always uses seat belt    No living will     Social Drivers of Health     Financial Resource Strain: Medium Risk (2023)    Overall Financial Resource Strain (CARDIA)     Difficulty of Paying Living Expenses: Somewhat hard   Food Insecurity: No Food Insecurity (2024)    Nursing - Inadequate Food Risk Classification     Worried About Running Out of Food in the Last Year: Never true     Ran Out of Food in the Last Year: Never true   Transportation Needs: No Transportation Needs (2024)    PRAPARE - Transportation     Lack of Transportation (Medical): No     Lack of Transportation (Non-Medical): No   Intimate Partner Violence: Not At Risk (3/8/2023)    Received from [facility]    Humiliation, Afraid, Rape, and Kick questionnaire     Fear of Current or Ex-Partner: No     Emotionally Abused: No     Physically Abused: No     Sexually Abused: No   Housing Stability: Low Risk  (2024)    Housing Stability Vital Sign     Unable to Pay for Housing in the Last Year: No     Number of Times Moved in the Last Year: 0     Homeless in the Last Year: No        REVIEW OF SYSTEMS:  GENERAL: NAD, afebrile  HEART: No chest pain, or palpitation  RESPIRATORY:  No acute SOB or cough  GI: No Nausea, vomit or diarrhea  NEUROLOGIC: No syncope or acute weakness    PHYSICAL EXAM:  VASCULAR  EXAM  Dorsalis pedis  +1, Posterior tibial artery  absent.  The patient has class findings with skin atrophy, lack of digital hair, and nail dystrophy.  There is mild lower extremity edema bilaterally.  Venous stasis skin changes noted BLE.     NEUROLOGIC EXAM  AAO X 3.  Sensation is intact to light touch.  No focal neurologic deficit.         DERMATOLOGIC EXAM:   No ulcer.  No cellulitis noted.  The patient has hypertrophic toenails with discoloration, onycholysis, and subungal debris.      MUSCULOSKELETAL EXAM:   No acute joint pain.  No acute joint edema, or redness.  No acute musculoskeletal problem.  Patient has deformity including bunion and hammertoe.

## 2025-07-01 ENCOUNTER — OFFICE VISIT (OUTPATIENT)
Dept: PHYSICAL THERAPY | Facility: HOME HEALTHCARE | Age: 75
End: 2025-07-01
Payer: MEDICARE

## 2025-07-01 ENCOUNTER — RA CDI HCC (OUTPATIENT)
Dept: OTHER | Facility: HOSPITAL | Age: 75
End: 2025-07-01

## 2025-07-01 DIAGNOSIS — M94.262 CHONDROMALACIA OF KNEE, LEFT: Primary | ICD-10-CM

## 2025-07-01 PROCEDURE — 97110 THERAPEUTIC EXERCISES: CPT

## 2025-07-01 PROCEDURE — 97112 NEUROMUSCULAR REEDUCATION: CPT

## 2025-07-01 NOTE — PROGRESS NOTES
"Daily Note     Today's date: 2025  Patient name: Jorge Bajwa  : 1950  MRN: [id number]  Referring provider: Collin Colon MD  Dx: No diagnosis found.    Start Time: 0700          Subjective: I notice a little more movement but pain levels are about the same. I can go upstairs normally if I have 2 railings. I go down 1 step at a time because don't trust my knee.     Objective: See treatment diary below    Assessment:  Pt kailash session well. Pt with slight increase in L knee pain during step down fwd but was able to complete. Occasional vc's needed to maintain knee ext and correct form during supine SLR. HS curl in seated w/t-band today vs prone for pt comfort. Patient would benefit from continued PT    Plan: Continue per plan of care.      Precautions: n/a    Visit Count 6 2 3 4 5       Manuals   6    L knee flex/ext     L knee flexion  & L HS stretch                                        Neuro Re-Ed         Balance as appropriate            Long foam in // bars Tandem w/fingertip  HHA  <> x3    Lateral steps w/  Firgertip HHA   <> x3   Tandem w/ Finger tip HHA    5x <>    Lateral steps w/ 1 HHA w/ decreasing visual reliance    5x<>  Tandem w/ Finger tip HHA    3x <>    Lateral steps w/ 1 HHA w/ decreasing visual reliance    3x<>              Ther Ex        NuStep  L5  12' L 3  8' L3  11'  L3 12'    L5 12'   Ankle pumps           HR/TR  1x10 gentle 1x10     Standing Hip flex/abd/ext 3 way  3\" x15 ea florencio 3 way L only  3-way florencio  x10 ea 3 way  X15 ea b/l   3 way  3\" X15 ea b/l   Mini Squats 1x15 1x10 1x10 1x15   1x15   Step ups/ Step downs D up + down  1x15 ea florencio NV C up + down  1x10 florencio   D up & down   1x 15 ea b/l D up & down   1x 15 ea b/l                   Heel Slides w/ towel  3\" x10 3\" x15       QS  5\" x10 5\" x10       Hip abd on sliding board   3\" x10 3\" x15       LAQ  3# 3\" x15   florencio 5\" x10 5\" x15 florencio  1.5# cuff 5\" x15 ea b/l  3# cuff 3\" x15 ea b/l " "  Hip add  5\" x10 5\" x15       Hip abd          SLR 3#  3\" x15  flornecio 3\" x10 3\" x10  1.5# cuff 3\" x15 ea b/l  3# cuff 3\" x15 ea b/l   Prone HS Curls   Seated  Green  3\" x15 florencio    3# cuff 3\" x15 ea b/l   SAQ        S/L SLR                                                  Education on HEP, use of CP to decrease edema, etc.                                Ther Activity                                Gait Training                                Modalities 7-1 6-17 6-19       CP                                     "

## 2025-07-03 ENCOUNTER — OFFICE VISIT (OUTPATIENT)
Dept: PHYSICAL THERAPY | Facility: HOME HEALTHCARE | Age: 75
End: 2025-07-03
Attending: ORTHOPAEDIC SURGERY
Payer: MEDICARE

## 2025-07-03 DIAGNOSIS — M94.262 CHONDROMALACIA OF KNEE, LEFT: Primary | ICD-10-CM

## 2025-07-03 PROCEDURE — 97112 NEUROMUSCULAR REEDUCATION: CPT

## 2025-07-03 PROCEDURE — 97110 THERAPEUTIC EXERCISES: CPT

## 2025-07-03 NOTE — PROGRESS NOTES
"Daily Note     Today's date: 7/3/2025  Patient name: Jorge Bajwa  : 1950  MRN: [id number]  Referring provider: Collin Colon MD  Dx:   Encounter Diagnosis     ICD-10-CM    1. Chondromalacia of knee, left  M94.262           Start Time: 0700  Stop Time: 0745  Total time in clinic (min): 45 minutes    Subjective: Patient reports that he feels he over did it yesterday walking.  Patient states his pain is 1-2/10 in his left knee.      Objective: See treatment diary below      Assessment: Tolerated treatment well.   Patient participated in skilled PT session focused on strengthening, stretching, and ROM.  Patient able to complete exercise program with no issued.  Patient experience no pain throughout session.  Patient continues to need v.c. for maintaining knee ext and correct form during supine SLR. Patient would continue to benefit from skilled PT interventions to address strengthening, stretching, and ROM. Patient demonstrated fatigue post treatment      Plan: Continue per plan of care.      Precautions: n/a    Visit Count 6 7 3 4 5       Manuals 7-1 7/3 6-19  6/24  6-26    L knee flex/ext     L knee flexion  & L HS stretch                                        Neuro Re-Ed  7-1 7/3 6-       Balance as appropriate            Long foam in // bars Tandem w/fingertip  HHA  <> x3    Lateral steps w/  Firgertip HHA   <> x3 Tandem w/fingertip HHA  <>x4      Lateral steps w/fingertip HHA  <>x4  Tandem w/ Finger tip HHA    5x <>    Lateral steps w/ 1 HHA w/ decreasing visual reliance    5x<>  Tandem w/ Finger tip HHA    3x <>    Lateral steps w/ 1 HHA w/ decreasing visual reliance    3x<>              Ther Ex 7-1 7/3 6-       NuStep  L5  12' L5 x 12' L3  11'  L3 12'    L5 12'   Ankle pumps           HR/TR   1x10     Standing Hip flex/abd/ext 3 way  3\" x15 ea florencio 3 way 3\" 15x ea B/L 3-way florencio  x10 ea 3 way  X15 ea b/l   3 way  3\" X15 ea b/l   Mini Squats 1x15 15x  1x10 1x15   1x15   Step ups/ Step downs D up + " "down  1x15 ea florencio D up/down  15x ea B/L C up + down  1x10 florencio   D up & down   1x 15 ea b/l D up & down   1x 15 ea b/l                   Heel Slides w/ towel   3\" x15       QS   5\" x10       Hip abd on sliding board    3\" x15       LAQ  3# 3\" x15   florencio 3# 3\" 15x B/L  5\" x15 florencio  1.5# cuff 5\" x15 ea b/l  3# cuff 3\" x15 ea b/l   Hip add   5\" x15       Hip abd          SLR 3#  3\" x15  florencio 3# 3\" 15x  B/L 3\" x10  1.5# cuff 3\" x15 ea b/l  3# cuff 3\" x15 ea b/l   Prone HS Curls   Seated  Green  3\" x15 florencio Seated  Green TB  3\" 15x B/L   3# cuff 3\" x15 ea b/l   SAQ        S/L SLR                                                  Education on HEP, use of CP to decrease edema, etc.                                Ther Activity                                Gait Training                                Modalities 7-1  6-19       CP                                       "

## 2025-07-08 ENCOUNTER — OFFICE VISIT (OUTPATIENT)
Dept: FAMILY MEDICINE CLINIC | Facility: CLINIC | Age: 75
End: 2025-07-08
Payer: MEDICARE

## 2025-07-08 ENCOUNTER — OFFICE VISIT (OUTPATIENT)
Dept: PHYSICAL THERAPY | Facility: HOME HEALTHCARE | Age: 75
End: 2025-07-08
Attending: ORTHOPAEDIC SURGERY
Payer: MEDICARE

## 2025-07-08 VITALS
SYSTOLIC BLOOD PRESSURE: 126 MMHG | DIASTOLIC BLOOD PRESSURE: 70 MMHG | OXYGEN SATURATION: 99 % | HEART RATE: 48 BPM | HEIGHT: 71 IN | BODY MASS INDEX: 26.54 KG/M2 | WEIGHT: 189.6 LBS | TEMPERATURE: 97.4 F

## 2025-07-08 DIAGNOSIS — I10 BENIGN ESSENTIAL HYPERTENSION: Primary | ICD-10-CM

## 2025-07-08 DIAGNOSIS — M94.262 CHONDROMALACIA OF KNEE, LEFT: Primary | ICD-10-CM

## 2025-07-08 DIAGNOSIS — E11.8 TYPE 2 DIABETES MELLITUS WITH COMPLICATION (HCC): ICD-10-CM

## 2025-07-08 DIAGNOSIS — Z95.5 PRESENCE OF DRUG-ELUTING STENT IN RIGHT CORONARY ARTERY: ICD-10-CM

## 2025-07-08 PROCEDURE — 97110 THERAPEUTIC EXERCISES: CPT

## 2025-07-08 PROCEDURE — 99214 OFFICE O/P EST MOD 30 MIN: CPT | Performed by: FAMILY MEDICINE

## 2025-07-08 PROCEDURE — G2211 COMPLEX E/M VISIT ADD ON: HCPCS | Performed by: FAMILY MEDICINE

## 2025-07-08 NOTE — PROGRESS NOTES
Name: Jorge Bajwa      : 1950      MRN: [id number]  Encounter Provider: Travis Bergman DO  Encounter Date: 2025   Encounter department: [facility]  :  Assessment & Plan  Benign essential hypertension         Type 2 diabetes mellitus with complication (HCC)    Lab Results   Component Value Date    HGBA1C 5.6 2025            Presence of drug-eluting stent in right coronary artery               Depression Screening and Follow-up Plan: Patient was screened for depression during today's encounter. They screened negative with a PHQ-2 score of 0.        History of Present Illness   Mr. Bajwa here for follow-up visit on diabetes which is under good control hypertension and coronary artery disease doing well with no symptoms in any of those systems      Review of Systems   Constitutional:  Negative for activity change, appetite change, diaphoresis, fatigue and fever.   HENT: Negative.  Negative for dental problem and hearing loss.    Eyes:  Positive for visual disturbance.   Respiratory:  Negative for apnea, cough, chest tightness, shortness of breath and wheezing.    Cardiovascular:  Negative for chest pain, palpitations and leg swelling.   Gastrointestinal:  Negative for abdominal distention, abdominal pain, anal bleeding, constipation, diarrhea, nausea and vomiting.   Endocrine: Negative for cold intolerance, heat intolerance, polydipsia, polyphagia and polyuria.   Genitourinary:  Negative for difficulty urinating, dysuria, flank pain, hematuria and urgency.   Musculoskeletal:  Negative for arthralgias, back pain, gait problem, joint swelling and myalgias.   Skin:  Negative for color change, rash and wound.   Allergic/Immunologic: Negative for environmental allergies, food allergies and immunocompromised state.   Neurological:  Negative for dizziness, seizures, syncope, speech difficulty, numbness and headaches.   Hematological:  Negative for adenopathy. Does not bruise/bleed easily.  "  Psychiatric/Behavioral:  Negative for agitation, behavioral problems, hallucinations, sleep disturbance and suicidal ideas.        Objective   /70   Pulse (!) 48   Temp (!) 97.4 °F (36.3 °C)   Ht 5' 11\" (1.803 m)   Wt 86 kg (189 lb 9.6 oz)   SpO2 99%   BMI 26.44 kg/m²      Physical Exam  Constitutional:       Appearance: He is well-developed.   HENT:      Head: Normocephalic and atraumatic.      Right Ear: External ear normal.      Left Ear: External ear normal.      Nose: Nose normal.     Eyes:      Conjunctiva/sclera: Conjunctivae normal.      Pupils: Pupils are equal, round, and reactive to light.       Cardiovascular:      Rate and Rhythm: Normal rate and regular rhythm.      Pulses: no weak pulses.           Dorsalis pedis pulses are 2+ on the right side and 2+ on the left side.        Posterior tibial pulses are 2+ on the right side and 2+ on the left side.      Heart sounds: Normal heart sounds. No murmur heard.     No friction rub.   Pulmonary:      Effort: Pulmonary effort is normal. No respiratory distress.      Breath sounds: Normal breath sounds. No wheezing or rales.   Chest:      Chest wall: No tenderness.   Abdominal:      General: Bowel sounds are normal.      Palpations: Abdomen is soft.     Musculoskeletal:         General: Normal range of motion.      Cervical back: Normal range of motion and neck supple.        Feet:    Feet:      Right foot:      Skin integrity: No ulcer, skin breakdown, erythema, warmth, callus or dry skin.      Left foot:      Skin integrity: No ulcer, skin breakdown, erythema, warmth, callus or dry skin.     Skin:     General: Skin is warm and dry.      Capillary Refill: Capillary refill takes 2 to 3 seconds.     Neurological:      Mental Status: He is alert and oriented to person, place, and time.     Psychiatric:         Behavior: Behavior normal.         Thought Content: Thought content normal.         Judgment: Judgment normal.     Patient's shoes and socks " removed.    Right Foot/Ankle   Right Foot Inspection  Skin Exam: skin normal and skin intact. No dry skin, no warmth, no callus, no erythema, no maceration, no abnormal color, no pre-ulcer, no ulcer and no callus.     Toe Exam: ROM and strength within normal limits. No swelling, no tenderness, erythema and  no right toe deformity    Sensory   Vibration: intact  Proprioception: intact  Monofilament testing: intact    Vascular  Capillary refills: < 3 seconds  The right DP pulse is 2+. The right PT pulse is 2+.     Left Foot/Ankle  Left Foot Inspection  Skin Exam: skin normal and skin intact. No dry skin, no warmth, no erythema, no maceration, normal color, no pre-ulcer, no ulcer and no callus.     Toe Exam: ROM and strength within normal limits. No swelling, no tenderness, no erythema and no left toe deformity.     Sensory   Vibration: intact  Proprioception: intact  Monofilament testing: intact    Vascular  Capillary refills: < 3 seconds  The left DP pulse is 2+. The left PT pulse is 2+.     Assign Risk Category  No deformity present  No loss of protective sensation  No weak pulses  Risk: 2

## 2025-07-08 NOTE — PROGRESS NOTES
"Daily Note     Today's date: 2025  Patient name: Jorge Bajwa  : 1950  MRN: [id number]  Referring provider: Collin Colon MD  Dx: No diagnosis found.    Start Time: 0840          Subjective: I feel more stable since SOC.     Objective: See treatment diary below    Assessment:  Pt progressing slowly and consistently with strength and pain free AROM. Pt able to complete TE with minimal vc's needed. Pt reports feeling challenged with current program and denied pain at end of tx.  Patient would benefit from continued PT    Plan: Continue per plan of care.      Precautions: n/a    Visit Count 6 7 8 4 5       Manuals 7-1 7/3 7-  6-    L knee flex/ext     L knee flexion  & L HS stretch                                        Neuro Re-Ed  7-1 7/3 7-       Balance as appropriate            Long foam in // bars Tandem w/fingertip  HHA  <> x3    Lateral steps w/  Firgertip HHA   <> x3 Tandem w/fingertip HHA  <>x4      Lateral steps w/fingertip HHA  <>x4 Tandem  W/fingertip HHA  <> x4    Lateral steps  W/fingertip HHA  <> x4 Tandem w/ Finger tip HHA    5x <>    Lateral steps w/ 1 HHA w/ decreasing visual reliance    5x<>  Tandem w/ Finger tip HHA    3x <>    Lateral steps w/ 1 HHA w/ decreasing visual reliance    3x<>              Ther Ex 7-1 7/3 7-8       NuStep  L5  12' L5 x 12' L5  10'  L3 12'    L5 12'   Ankle pumps           HR/TR        Standing Hip flex/abd/ext 3 way  3\" x15 ea florencio 3 way 3\" 15x ea B/L 3-way florencio  3\" x15 ea 3 way  X15 ea b/l   3 way  3\" X15 ea b/l   Mini Squats 1x15 15x  1x15 1x15   1x15   Step ups/ Step downs D up + down  1x15 ea florencio D up/down  15x ea B/L D up + down  1x15 florencio   D up & down   1x 15 ea b/l D up & down   1x 15 ea b/l                   Heel Slides w/ towel          QS          Hip abd on sliding board           LAQ  3# 3\" x15   florencio 3# 3\" 15x B/L  3# 3\" x15 florencio  1.5# cuff 5\" x15 ea b/l  3# cuff 3\" x15 ea b/l   Hip add          Hip abd          SLR 3#  3\" x15  florencio 3# 3\" " "15x  B/L 3# 3\" x15  florencio  1.5# cuff 3\" x15 ea b/l  3# cuff 3\" x15 ea b/l   Prone HS Curls   Seated  Green  3\" x15 florencio Seated  Green TB  3\" 15x B/L Seated   Green  3\" x15 florencio  3# cuff 3\" x15 ea b/l   SAQ        S/L SLR                                                  Education on HEP, use of CP to decrease edema, etc.                                Ther Activity                                Gait Training                                Modalities 7-1  7-8       CP                                         "

## 2025-07-10 ENCOUNTER — OFFICE VISIT (OUTPATIENT)
Dept: PHYSICAL THERAPY | Facility: HOME HEALTHCARE | Age: 75
End: 2025-07-10
Attending: ORTHOPAEDIC SURGERY
Payer: MEDICARE

## 2025-07-10 DIAGNOSIS — M94.262 CHONDROMALACIA OF KNEE, LEFT: Primary | ICD-10-CM

## 2025-07-10 PROCEDURE — 97110 THERAPEUTIC EXERCISES: CPT

## 2025-07-10 PROCEDURE — 97112 NEUROMUSCULAR REEDUCATION: CPT

## 2025-07-10 NOTE — PROGRESS NOTES
"Daily Note     Today's date: 7/10/2025  Patient name: Jorge Bajwa  : 1950  MRN: [id number]  Referring provider: Collin Colon MD  Dx:   Encounter Diagnosis     ICD-10-CM    1. Chondromalacia of knee, left  M94.262                      Subjective: Pt reports he has 2/10 pain in his L knee.       Objective: See treatment diary below      Assessment: Tolerated treatment well. Minimal verbal cues needed t/o session. Pt challenged with current program. Patient would benefit from continued PT      Plan: Continue per plan of care.      Precautions: n/a    Visit Count 6 7 8 9 5       Manuals 7-1 7/3 7-8  7/10  6-    L knee flex/ext                                            Neuro Re-Ed  7-1 7/3 7-       Balance as appropriate            Long foam in // bars Tandem w/fingertip  HHA  <> x3    Lateral steps w/  Firgertip HHA   <> x3 Tandem w/fingertip HHA  <>x4      Lateral steps w/fingertip HHA  <>x4 Tandem  W/fingertip HHA  <> x4    Lateral steps  W/fingertip HHA  <> x4 Tandem w/ Finger tip HHA  <> x 4       Lateral steps  W/fingertip HHA  <> x4  Tandem w/ Finger tip HHA    3x <>    Lateral steps w/ 1 HHA w/ decreasing visual reliance    3x<>              Ther Ex 7-1 7/3 7-8       NuStep  L5  12' L5 x 12' L5  10'  L5 10'    L5 12'   Ankle pumps           HR/TR        Standing Hip flex/abd/ext 3 way  3\" x15 ea florencio 3 way 3\" 15x ea B/L 3-way florencio  3\" x15 ea 3 way  X15 ea b/l   3 way  3\" X15 ea b/l   Mini Squats 1x15 15x  1x15 1x15   1x15   Step ups/ Step downs D up + down  1x15 ea florencio D up/down  15x ea B/L D up + down  1x15 florencio   D up & down   1x 15 ea b/l D up & down   1x 15 ea b/l                   Heel Slides w/ towel          QS          Hip abd on sliding board           LAQ  3# 3\" x15   florencio 3# 3\" 15x B/L  3# 3\" x15 florencio  3# 5\" x15 ea b/l  3# cuff 3\" x15 ea b/l   Hip add          Hip abd          SLR 3#  3\" x15  florencio 3# 3\" 15x  B/L 3# 3\" x15  florencio  3# 3\" x15 ea b/l  3# cuff 3\" x15 ea b/l   Prone HS Curls   " "Seated  Green  3\" x15 perry Seated  Green TB  3\" 15x B/L Seated   Green  3\" x15 perry Seated green  3\"x15 Perry 3# cuff 3\" x15 ea b/l   SAQ        S/L SLR                                                  Education on HEP, use of CP to decrease edema, etc.                                Ther Activity                                Gait Training                                Modalities 7-1  7-8       CP                                           "

## 2025-07-15 ENCOUNTER — OFFICE VISIT (OUTPATIENT)
Dept: PHYSICAL THERAPY | Facility: HOME HEALTHCARE | Age: 75
End: 2025-07-15
Attending: ORTHOPAEDIC SURGERY
Payer: MEDICARE

## 2025-07-15 DIAGNOSIS — M94.262 CHONDROMALACIA OF KNEE, LEFT: Primary | ICD-10-CM

## 2025-07-15 PROCEDURE — 97110 THERAPEUTIC EXERCISES: CPT

## 2025-07-17 ENCOUNTER — OFFICE VISIT (OUTPATIENT)
Dept: PHYSICAL THERAPY | Facility: HOME HEALTHCARE | Age: 75
End: 2025-07-17
Attending: ORTHOPAEDIC SURGERY
Payer: MEDICARE

## 2025-07-17 DIAGNOSIS — M94.262 CHONDROMALACIA OF KNEE, LEFT: Primary | ICD-10-CM

## 2025-07-17 PROCEDURE — 97110 THERAPEUTIC EXERCISES: CPT

## 2025-07-17 PROCEDURE — 97112 NEUROMUSCULAR REEDUCATION: CPT

## 2025-07-17 NOTE — PROGRESS NOTES
"Daily Note     Today's date: 2025  Patient name: Jorge Bajwa  : 1950  MRN: [id number]  Referring provider: Collin Colon MD  Dx:   Encounter Diagnosis     ICD-10-CM    1. Chondromalacia of knee, left  M94.262                  Subjective: Pt reports he has a little pain in his L knee.       Objective: See treatment diary below      Assessment: Tolerated treatment well. Progressed to increased weight and reps with LF hip add and abd. Minimal verbal cues needed t/o session. Pt with no c/o increased pain L knee t/o session. Patient would benefit from continued PT      Plan: Continue per plan of care.      Precautions: n/a    Visit Count 2 7 8 9 1 RE       Manuals 7/17 7/3 7-8  7/10  7/15    L knee flex/ext                                            Neuro Re-Ed   7/3 7-       Balance as appropriate            Long foam in // bars Tandem w/fingertip  HHA  <> x4    Lateral steps w/  Firgertip HHA   <> x4 Tandem w/fingertip HHA  <>x4      Lateral steps w/fingertip HHA  <>x4 Tandem  W/fingertip HHA  <> x4    Lateral steps  W/fingertip HHA  <> x4 Tandem w/ Finger tip HHA  <> x 4       Lateral steps  W/fingertip HHA  <> x4  NP              Ther Ex  7/3 7-8       NuStep  L5  10' L5 x 12' L5  10'  L5 10'    L5 10'   Ankle pumps           HR/TR        Standing Hip flex/abd/ext 3 way  3\" x15 ea florencio 3 way 3\" 15x ea B/L 3-way florencio  3\" x15 ea 3 way  X15 ea b/l   NP   Mini Squats 1x15 15x  1x15 1x15   1x15   Step ups/ Step downs D up + down  1x15 ea florencio D up/down  15x ea B/L D up + down  1x15 florencio   D up & down   1x 15 ea b/l NP                   Heel Slides w/ towel          QS          Hip abd on sliding board           LAQ  3# 3\" x15 ea  florencio 3# 3\" 15x B/L  3# 3\" x15 florencio  3# 5\" x15 ea b/l  3# 5\" x15 ea b/l   Hip add LF Hip ABD  60# 2x10       LF Hip ABD  50# x15    (Increase weight/ reps NV)     Hip abd LF Hip ADD  45# 2x10       LF Hip ADD  35# x15     SLR 3#  3\" x15  florencio 3# 3\" 15x  B/L 3# 3\" x15  florencio  3# 3\" x15 ea " "b/l  3# 3\" x15 ea b/l   Prone HS Curls   Seated  Green  3\" x15 perry Seated  Green TB  3\" 15x B/L Seated   Green  3\" x15 perry Seated green  3\"x15 Perry Seated green  3\"x15 Perry   SAQ        S/L SLR                              RE Tests & Measures      DF             Education on HEP, use of CP to decrease edema, etc.                                Ther Activity                                Gait Training                                Modalities   7-8       CP                             "

## 2025-07-22 ENCOUNTER — OFFICE VISIT (OUTPATIENT)
Dept: PHYSICAL THERAPY | Facility: HOME HEALTHCARE | Age: 75
End: 2025-07-22
Attending: ORTHOPAEDIC SURGERY
Payer: MEDICARE

## 2025-07-22 DIAGNOSIS — M94.262 CHONDROMALACIA OF KNEE, LEFT: Primary | ICD-10-CM

## 2025-07-22 PROCEDURE — 97112 NEUROMUSCULAR REEDUCATION: CPT

## 2025-07-22 PROCEDURE — 97110 THERAPEUTIC EXERCISES: CPT

## 2025-07-22 NOTE — PROGRESS NOTES
"Daily Note     Today's date: 2025  Patient name: Jorge Bajwa  : 1950  MRN: [id number]  Referring provider: Collin Colon MD  Dx: No diagnosis found.    Start Time: 655          Subjective: Pain of 2/10 at L knee from walking a little more than usual yesterday. I did ok with the increased wt on LF last visit.     Objective: See treatment diary below    Assessment:  Pt kailash session well and was able to complete TE with minimal vc's needed. Pt kailash advancements in wt and reps as per flowsheet without c/o increased pain. Pt to use CP at home as needed. Patient would benefit from continued PT    Plan: Continue per plan of care.      Precautions: n/a    Visit Count 2 3 8 9 1 RE       Manuals 7/17 7-22 7-8  7/10  7/15    L knee flex/ext                                            Neuro Re-Ed          Balance as appropriate            Long foam in // bars Tandem w/fingertip  HHA  <> x4    Lateral steps w/  Fingertip HHA   <> x4 Tandem w/fingertip HHA  <>x4      Lateral steps   w/fingertip HHA  <>x4 Tandem  W/fingertip HHA  <> x4    Lateral steps  W/fingertip HHA  <> x4 Tandem w/ Finger tip HHA  <> x 4       Lateral steps  W/fingertip HHA  <> x4  NP              Ther Ex   7       NuStep  L5  10' L5 x 10' L5  10'  L5 10'    L5 10'   Ankle pumps           HR/TR        Standing Hip flex/abd/ext 3 way  3\" x15 ea florencio 3 way 3\"   15x ea B/L 3-way florencio  3\" x15 ea 3 way  X15 ea b/l   NP   Mini Squats 1x15 15x  1x15 1x15   1x15   Step ups/ Step downs D up + down  1x15 ea florencio D up/down  15x ea B/L D up + down  1x15 florencio   D up & down   1x 15 ea b/l NP                   Heel Slides w/ towel          QS          Hip abd on sliding board           LAQ  3# 3\" x15 ea  florencio 3# 3\" 15x B/L  3# 3\" x15 florencio  3# 5\" x15 ea b/l  3# 5\" x15 ea b/l   Hip abd LF Hip ABD  60# 2x10  60# x20     LF Hip ABD  50# x15    (Increase weight/ reps NV)     Hip add LF Hip ADD  45# 2x10  60# x20     LF Hip ADD  35# x15     SLR 3#  3\" " "x15  perry 3# 3\" 15x  B/L 3# 3\" x15  perry  3# 3\" x15 ea b/l  3# 3\" x15 ea b/l   Prone HS Curls   Seated  Green  3\" x15 perry Seated  Green TB  3\" 20x B/L Seated   Green  3\" x15 perry Seated green  3\"x15 Perry Seated green  3\"x15 Perry   SAQ        S/L SLR                              RE Tests & Measures      DF             Education on HEP, use of CP to decrease edema, etc.                                Ther Activity                                Gait Training                                Modalities   7-8       CP                               "

## 2025-07-24 ENCOUNTER — OFFICE VISIT (OUTPATIENT)
Dept: PHYSICAL THERAPY | Facility: HOME HEALTHCARE | Age: 75
End: 2025-07-24
Attending: ORTHOPAEDIC SURGERY
Payer: MEDICARE

## 2025-07-24 DIAGNOSIS — M94.262 CHONDROMALACIA OF KNEE, LEFT: Primary | ICD-10-CM

## 2025-07-24 PROCEDURE — 97112 NEUROMUSCULAR REEDUCATION: CPT

## 2025-07-24 PROCEDURE — 97110 THERAPEUTIC EXERCISES: CPT

## 2025-07-24 NOTE — PROGRESS NOTES
"Daily Note     Today's date: 2025  Patient name: Jorge Bajwa  : 1950  MRN: [id number]  Referring provider: Collin Colon MD  Dx:   Encounter Diagnosis     ICD-10-CM    1. Chondromalacia of knee, left  M94.262                  Subjective: Pt reports he has a little pain in his L knee.       Objective: See treatment diary below      Assessment: Tolerated treatment well. Minimal verbal cues needed t/o session. Progressed to weight with standing hip flex/abd/ext and increased reps with LAQ and supine SLR. Pt with no c/o pain L knee t/o session or at end of session. Patient would benefit from continued PT      Plan: Continue per plan of care.      Precautions: n/a    Visit Count 2 3 4 9 1 RE       Manuals 7/17 7-22 7/24  7/10  7/15    L knee flex/ext                                            Neuro Re-Ed           Balance as appropriate            Long foam in // bars Tandem w/fingertip  HHA  <> x4    Lateral steps w/  Fingertip HHA   <> x4 Tandem w/fingertip HHA  <>x4      Lateral steps   w/fingertip HHA  <>x4 Tandem  W/fingertip HHA  <> x4    Lateral steps  W/fingertip HHA  <> x4 Tandem w/ Finger tip HHA  <> x 4       Lateral steps  W/fingertip HHA  <> x4  NP              Ther Ex          NuStep  L5  10' L5 x 10' L5  10'  L5 10'    L5 10'   Ankle pumps           HR/TR        Standing Hip flex/abd/ext 3 way  3\" x15 ea florencio 3 way 3\"   15x ea B/L 1.5# 3-way florencio  3\" x15 ea 3 way  X15 ea b/l   NP   Mini Squats 1x15 15x  1x15 1x15   1x15   Step ups/ Step downs D up + down  1x15 ea florencio D up/down  15x ea B/L D up + down  1x15 florencio   D up & down   1x 15 ea b/l NP                   Heel Slides w/ towel          QS          Hip abd on sliding board           LAQ  3# 3\" x15 ea  florencio 3# 3\" 15x B/L  3# 3\" x20 florencio  3# 5\" x15 ea b/l  3# 5\" x15 ea b/l   Hip abd LF Hip ABD  60# 2x10  60# x20 60# x 20    LF Hip ABD  50# x15    (Increase weight/ reps NV)     Hip add LF Hip ADD  45# 2x10  60# x20 60# x20     LF Hip " "ADD  35# x15     SLR 3#  3\" x15  perry 3# 3\" 15x  B/L 3# 3\" 2x10 Perry   3# 3\" x15 ea b/l  3# 3\" x15 ea b/l   Prone HS Curls   Seated  Green  3\" x15 perry Seated  Green TB  3\" 20x B/L Seated   Green  3\" x20 perry Seated green  3\"x15 Perry Seated green  3\"x15 Perry   SAQ        S/L SLR                              RE Tests & Measures      DF             Education on HEP, use of CP to decrease edema, etc.                                Ther Activity                                Gait Training                                Modalities          CP                                 "

## 2025-07-29 ENCOUNTER — OFFICE VISIT (OUTPATIENT)
Dept: PHYSICAL THERAPY | Facility: HOME HEALTHCARE | Age: 75
End: 2025-07-29
Attending: ORTHOPAEDIC SURGERY
Payer: MEDICARE

## 2025-07-29 DIAGNOSIS — M94.262 CHONDROMALACIA OF KNEE, LEFT: Primary | ICD-10-CM

## 2025-07-29 PROCEDURE — 97110 THERAPEUTIC EXERCISES: CPT

## 2025-07-29 PROCEDURE — 97112 NEUROMUSCULAR REEDUCATION: CPT

## 2025-07-31 ENCOUNTER — OFFICE VISIT (OUTPATIENT)
Dept: PHYSICAL THERAPY | Facility: HOME HEALTHCARE | Age: 75
End: 2025-07-31
Attending: ORTHOPAEDIC SURGERY
Payer: MEDICARE

## 2025-07-31 DIAGNOSIS — M94.262 CHONDROMALACIA OF KNEE, LEFT: Primary | ICD-10-CM

## 2025-07-31 PROCEDURE — 97110 THERAPEUTIC EXERCISES: CPT

## 2025-08-13 ENCOUNTER — OFFICE VISIT (OUTPATIENT)
Age: 75
End: 2025-08-13
Payer: MEDICARE

## 2025-08-14 ENCOUNTER — APPOINTMENT (OUTPATIENT)
Dept: LAB | Facility: HOSPITAL | Age: 75
End: 2025-08-14
Payer: MEDICARE

## 2025-08-15 ENCOUNTER — RESULTS FOLLOW-UP (OUTPATIENT)
Dept: UROLOGY | Facility: CLINIC | Age: 75
End: 2025-08-15